# Patient Record
Sex: FEMALE | Race: WHITE | NOT HISPANIC OR LATINO | Employment: FULL TIME | URBAN - METROPOLITAN AREA
[De-identification: names, ages, dates, MRNs, and addresses within clinical notes are randomized per-mention and may not be internally consistent; named-entity substitution may affect disease eponyms.]

---

## 2016-01-27 LAB — PHQ9 SCORE: 16

## 2016-03-18 LAB — PHQ9 SCORE: 1

## 2016-04-01 LAB — PHQ9 SCORE: 0

## 2016-09-02 LAB — PHQ9 SCORE: 8

## 2017-01-02 ENCOUNTER — OFFICE VISIT (OUTPATIENT)
Dept: FAMILY MEDICINE | Facility: CLINIC | Age: 47
End: 2017-01-02
Payer: COMMERCIAL

## 2017-01-02 VITALS
TEMPERATURE: 98 F | HEART RATE: 80 BPM | OXYGEN SATURATION: 95 % | HEIGHT: 68 IN | SYSTOLIC BLOOD PRESSURE: 135 MMHG | BODY MASS INDEX: 43.95 KG/M2 | DIASTOLIC BLOOD PRESSURE: 88 MMHG | WEIGHT: 290 LBS

## 2017-01-02 DIAGNOSIS — I10 HYPERTENSION GOAL BP (BLOOD PRESSURE) < 140/90: ICD-10-CM

## 2017-01-02 DIAGNOSIS — M79.2 NEURALGIA: ICD-10-CM

## 2017-01-02 DIAGNOSIS — F33.0 MAJOR DEPRESSIVE DISORDER, RECURRENT EPISODE, MILD (H): ICD-10-CM

## 2017-01-02 DIAGNOSIS — E78.5 HYPERLIPIDEMIA LDL GOAL <100: ICD-10-CM

## 2017-01-02 DIAGNOSIS — I10 ESSENTIAL HYPERTENSION WITH GOAL BLOOD PRESSURE LESS THAN 140/90: ICD-10-CM

## 2017-01-02 DIAGNOSIS — E11.9 TYPE 2 DIABETES MELLITUS WITHOUT COMPLICATION, WITHOUT LONG-TERM CURRENT USE OF INSULIN (H): ICD-10-CM

## 2017-01-02 DIAGNOSIS — R09.81 CONGESTION OF PARANASAL SINUS: ICD-10-CM

## 2017-01-02 DIAGNOSIS — R73.9 HYPERGLYCEMIA: Primary | ICD-10-CM

## 2017-01-02 LAB
ALBUMIN SERPL-MCNC: 3.7 G/DL (ref 3.4–5)
ANION GAP SERPL CALCULATED.3IONS-SCNC: 8 MMOL/L (ref 3–14)
BUN SERPL-MCNC: 12 MG/DL (ref 7–30)
CALCIUM SERPL-MCNC: 9.1 MG/DL (ref 8.5–10.1)
CHLORIDE SERPL-SCNC: 105 MMOL/L (ref 94–109)
CO2 SERPL-SCNC: 28 MMOL/L (ref 20–32)
CREAT SERPL-MCNC: 0.76 MG/DL (ref 0.52–1.04)
CREAT UR-MCNC: 60 MG/DL
GFR SERPL CREATININE-BSD FRML MDRD: 81 ML/MIN/1.7M2
GLUCOSE SERPL-MCNC: 105 MG/DL (ref 70–99)
HBA1C MFR BLD: 7.5 % (ref 4.3–6)
MICROALBUMIN UR-MCNC: <5 MG/L
MICROALBUMIN/CREAT UR: NORMAL MG/G CR (ref 0–25)
PHOSPHATE SERPL-MCNC: 2.8 MG/DL (ref 2.5–4.5)
POTASSIUM SERPL-SCNC: 4 MMOL/L (ref 3.4–5.3)
SODIUM SERPL-SCNC: 141 MMOL/L (ref 133–144)
TSH SERPL DL<=0.005 MIU/L-ACNC: 0.77 MU/L (ref 0.4–4)

## 2017-01-02 PROCEDURE — 80069 RENAL FUNCTION PANEL: CPT | Performed by: FAMILY MEDICINE

## 2017-01-02 PROCEDURE — 82043 UR ALBUMIN QUANTITATIVE: CPT | Performed by: FAMILY MEDICINE

## 2017-01-02 PROCEDURE — 83036 HEMOGLOBIN GLYCOSYLATED A1C: CPT | Performed by: FAMILY MEDICINE

## 2017-01-02 PROCEDURE — 36415 COLL VENOUS BLD VENIPUNCTURE: CPT | Performed by: FAMILY MEDICINE

## 2017-01-02 PROCEDURE — 84443 ASSAY THYROID STIM HORMONE: CPT | Performed by: FAMILY MEDICINE

## 2017-01-02 PROCEDURE — 99214 OFFICE O/P EST MOD 30 MIN: CPT | Performed by: FAMILY MEDICINE

## 2017-01-02 RX ORDER — AZITHROMYCIN 250 MG/1
TABLET, FILM COATED ORAL
Qty: 6 TABLET | Refills: 1 | Status: SHIPPED | OUTPATIENT
Start: 2017-01-02 | End: 2017-02-22

## 2017-01-02 RX ORDER — LOSARTAN POTASSIUM 100 MG/1
50 TABLET ORAL 2 TIMES DAILY
Qty: 90 TABLET | Refills: 1 | Status: SHIPPED | OUTPATIENT
Start: 2017-01-02 | End: 2017-09-24

## 2017-01-02 RX ORDER — GABAPENTIN 300 MG/1
CAPSULE ORAL
Qty: 90 CAPSULE | Refills: 5 | Status: SHIPPED | OUTPATIENT
Start: 2017-01-02 | End: 2017-07-01

## 2017-01-02 RX ORDER — METFORMIN HCL 500 MG
1000 TABLET, EXTENDED RELEASE 24 HR ORAL
Qty: 180 TABLET | Refills: 1 | Status: SHIPPED | OUTPATIENT
Start: 2017-01-02 | End: 2017-02-22

## 2017-01-02 RX ORDER — GABAPENTIN 600 MG/1
600 TABLET ORAL 3 TIMES DAILY
Qty: 90 TABLET | Refills: 5 | Status: SHIPPED | OUTPATIENT
Start: 2017-01-02 | End: 2017-07-03

## 2017-01-02 RX ORDER — SIMVASTATIN 40 MG
40 TABLET ORAL AT BEDTIME
Qty: 90 TABLET | Refills: 3 | Status: SHIPPED | OUTPATIENT
Start: 2017-01-02 | End: 2017-02-22

## 2017-01-02 RX ORDER — METOPROLOL TARTRATE 100 MG
150 TABLET ORAL 2 TIMES DAILY
Qty: 270 TABLET | Refills: 1 | Status: SHIPPED | OUTPATIENT
Start: 2017-01-02 | End: 2017-05-14

## 2017-01-02 ASSESSMENT — ANXIETY QUESTIONNAIRES
2. NOT BEING ABLE TO STOP OR CONTROL WORRYING: NOT AT ALL
6. BECOMING EASILY ANNOYED OR IRRITABLE: SEVERAL DAYS
5. BEING SO RESTLESS THAT IT IS HARD TO SIT STILL: NOT AT ALL
1. FEELING NERVOUS, ANXIOUS, OR ON EDGE: NOT AT ALL
3. WORRYING TOO MUCH ABOUT DIFFERENT THINGS: NOT AT ALL
GAD7 TOTAL SCORE: 2
7. FEELING AFRAID AS IF SOMETHING AWFUL MIGHT HAPPEN: NOT AT ALL

## 2017-01-02 ASSESSMENT — PATIENT HEALTH QUESTIONNAIRE - PHQ9: 5. POOR APPETITE OR OVEREATING: SEVERAL DAYS

## 2017-01-02 NOTE — PROGRESS NOTES
SUBJECTIVE:  Carissa Spears, a 46 year old female scheduled an appointment to discuss the following issues:     Hyperglycemia  Hypertension goal BP (blood pressure) < 140/90  Major depressive disorder, recurrent episode, mild (H)  Congestion of paranasal sinus  Essential hypertension with goal blood pressure less than 140/90  Hyperlipidemia LDL goal <100  Type 2 diabetes mellitus without complication, without long-term current use of insulin (H)  Neuralgia  Follow-up hyperglycemia, bipolar, high cholesterol and htn.  On janumet in past. Has metformin 1000mg at home.   Meniscal tear in summer - limited exercise and needed surgery. New orthopedist going up soon. Eye exam in past year ok.   No chest pain or shortness of breath. No nausea, vomiting. Diarrhea recently - resolved. Sinus congestion and thick mucous. History sinus infection.   Seasonal allergies but not in winter. Fevers or chills. Some cough. No history asthma but bronchitis.   Gabapentin - for chronic pain. Emotionally ok. No feet changes.   NOW has insurance.  Diarrhea and sinus congestion.   Past Medical History   Diagnosis Date     HTN      Toxemia      Herniated lumbar intervertebral disc      age 22     Endometriosis      Polycystic ovarian syndrome      Sleep disorder      Bipolar disorder, unspecified (H)      Obesity      High cholesterol      Other disorder of menstruation and other abnormal bleeding from female genital tract      Allergies      Liver disease      fatty liver     DM II (diabetes mellitus, type II)      diet controlled       Past Surgical History   Procedure Laterality Date     C/section, classical       X2     Salpingo oophorectomy,r/l/ector  4/8/08     L     Hysterectomy, weston  4/8/08     Tubal ligation       Tonsillectomy  1983     Hysterectomy, pap no longer indicated       Laparoscopic cholecystectomy  7/18/2012     Procedure: LAPAROSCOPIC CHOLECYSTECTOMY;  Laparoscopic cholecystectomy;  Surgeon: Miguel Fuentes MD;   "Location: [facility] OR       Family History   Problem Relation Age of Onset     CANCER Mother      skin cancer - non-melanoma     Hypertension Mother      Lipids Father        Social History   Substance Use Topics     Smoking status: Former Smoker -- 0.25 packs/day for 20 years     Quit date: 07/18/2012     Smokeless tobacco: Never Used     Alcohol Use: No       ROS:    OBJECTIVE:  /88 mmHg  Pulse 80  Temp(Src) 98  F (36.7  C) (Oral)  Ht 5' 8\" (1.727 m)  Wt 290 lb (131.543 kg)  BMI 44.10 kg/m2  SpO2 95%  EXAM:  GENERAL APPEARANCE: healthy, alert and no distress  EYES: EOMI,  PERRL  HENT: ear canals and TM's normal and nose thick discharge and mouth without ulcers or lesions  NECK: no adenopathy, no asymmetry, masses, or scars and thyroid normal to palpation  RESP: lungs clear to auscultation - no rales, rhonchi or wheezes  CV: regular rates and rhythm, normal S1 S2, no S3 or S4 and no murmur, click or rub -  ABDOMEN:  soft, nontender, no HSM or masses and bowel sounds normal  MS: extremities normal- no gross deformities noted, no evidence of inflammation in joints, FROM in all extremities.  NEURO: Normal strength and tone, sensory exam grossly normal, mentation intact and speech normal  NEURO: good pulses/sensation bilateral feet  PSYCH: mentation appears normal and affect normal/bright    ASSESSMENT / PLAN:  (R73.9) Hyperglycemia  (primary encounter diagnosis)  Plan: Renal panel (Alb, BUN, Ca, Cl, CO2, Creat,         Gluc, Phos, K, Na), Hemoglobin A1c, TSH with         free T4 reflex, Albumin Random Urine         Quantitative            (I10) Hypertension goal BP (blood pressure) < 140/90  Comment: stable  Plan: Renal panel (Alb, BUN, Ca, Cl, CO2, Creat,         Gluc, Phos, K, Na)        Continue meds. Recheck in 6 months  Sooner if worse    (F33.0) Major depressive disorder, recurrent episode, mild (H)  Comment: stable  Plan: DEPRESSION ACTION PLAN (DAP)        Continue diet/exercise. Patient NOT interested " in psych meds. If SUICIAL IDEATION OR HOMOCIDAL IDEATION OR [place] TO ER. Recheck in 6 months  Sooner if worse    (R09.81) Congestion of paranasal sinus  Plan: azithromycin (ZITHROMAX) 250 MG tablet        zpak helpful in past. Nasal saline    (I10) Essential hypertension with goal blood pressure less than 140/90  Plan: losartan (COZAAR) 100 MG tablet, metoprolol         (LOPRESSOR) 100 MG tablet            (E78.5) Hyperlipidemia LDL goal <100  Comment: stable in past  Plan: simvastatin (ZOCOR) 40 MG tablet        Restart and recheck labs now with insurance. Reveiwed risks and side effects of medication  Chest pain or shortness of breath to er    (E11.9) Type 2 diabetes mellitus without complication, without long-term current use of insulin (H)  Comment: a little high  Plan: metFORMIN (GLUCOPHAGE-XR) 500 MG 24 hr tablet        Diet/exercise. Change to ER. Recheck in 6 months  Sooner if worse.     (M79.2) Neuralgia  Commentstable  Plan: gabapentin (NEURONTIN) 600 MG tablet,         gabapentin (NEURONTIN) 300 MG capsule        Reveiwed risks and side effects of medication      Silvino Beach

## 2017-01-02 NOTE — NURSING NOTE
"Chief Complaint   Patient presents with     Sinus Problem     Diarrhea       Initial /92 mmHg  Pulse 80  Temp(Src) 98  F (36.7  C) (Oral)  Ht 5' 8\" (1.727 m)  Wt 290 lb (131.543 kg)  BMI 44.10 kg/m2  SpO2 95% Estimated body mass index is 44.1 kg/(m^2) as calculated from the following:    Height as of this encounter: 5' 8\" (1.727 m).    Weight as of this encounter: 290 lb (131.543 kg).  BP completed using cuff size: pedro Lester CMA    "

## 2017-01-02 NOTE — Clinical Note
[facility]  [address] [facility] [address] [phone]        January 3, 2017    Carissa Spears  [address]  [place] [address]            Dear Carissa,    Generally normal results except blood sugars a little too high. Recheck in 6 months.  Normal thyroid and kidney tests.    If you have any questions or concerns, please call myself or my nurse at [phone].    Sincerely,    .Silvino Beach MD/evette      Results for orders placed or performed in visit on 01/02/17   Renal panel (Alb, BUN, Ca, Cl, CO2, Creat, Gluc, Phos, K, Na)   Result Value Ref Range    Sodium 141 133 - 144 mmol/L    Potassium 4.0 3.4 - 5.3 mmol/L    Chloride 105 94 - 109 mmol/L    Carbon Dioxide 28 20 - 32 mmol/L    Anion Gap 8 3 - 14 mmol/L    Glucose 105 (H) 70 - 99 mg/dL    Urea Nitrogen 12 7 - 30 mg/dL    Creatinine 0.76 0.52 - 1.04 mg/dL    GFR Estimate 81 >60 mL/min/1.7m2    GFR Estimate If Black >90   GFR Calc   >60 mL/min/1.7m2    Calcium 9.1 8.5 - 10.1 mg/dL    Phosphorus 2.8 2.5 - 4.5 mg/dL    Albumin 3.7 3.4 - 5.0 g/dL   Hemoglobin A1c   Result Value Ref Range    Hemoglobin A1C 7.5 (H) 4.3 - 6.0 %   TSH with free T4 reflex   Result Value Ref Range    TSH 0.77 0.40 - 4.00 mU/L   Albumin Random Urine Quantitative   Result Value Ref Range    Creatinine Urine 60 mg/dL    Albumin Urine mg/L <5 mg/L    Albumin Urine mg/g Cr Unable to calculate due to low value 0 - 25 mg/g Cr

## 2017-01-03 ASSESSMENT — PATIENT HEALTH QUESTIONNAIRE - PHQ9: SUM OF ALL RESPONSES TO PHQ QUESTIONS 1-9: 0

## 2017-01-03 ASSESSMENT — ANXIETY QUESTIONNAIRES: GAD7 TOTAL SCORE: 2

## 2017-01-26 ENCOUNTER — TELEPHONE (OUTPATIENT)
Dept: FAMILY MEDICINE | Facility: CLINIC | Age: 47
End: 2017-01-26

## 2017-01-26 NOTE — Clinical Note
[facility]  [address] [facility] [id number] [phone]          January 26, 2017    Carissa Samsonn  [address]  [facility] [address]                  Our records indicate that you have not scheduled for a(n)appointment with  Silvino Beach MD, mammogram and annual female exam which was recommended by your health care team. Monitoring and managing your preventative and chronic health conditions are very important to us.     [facility] now offers mammograms Thursdays, every other Monday, and Saturdays once a month.    If you have received your health care elsewhere, please provide us with that information so it can be documented in your chart.    Please call [phone] or message us through your [employer] [employer] account to schedule an appointment or provide information for your chart.     I look forward to seeing you and working with you on your health care needs.     Sincerely,       Silvino Beach MD              *If you have already scheduled an appointment, please disregard this reminder

## 2017-01-26 NOTE — TELEPHONE ENCOUNTER
Panel Management Review      Patient has the following on her problem list:     Hypertension   Last three blood pressure readings:  BP Readings from Last 3 Encounters:   01/02/17 135/88   06/17/16 153/92   10/27/13 147/93     Blood pressure: Failed    HTN Guidelines:  Age 18-59 BP range:  Less than 140/90  Age 60-85 with Diabetes:  Less than 140/90  Age 60-85 without Diabetes:  less than 150/90      Composite cancer screening  Chart review shows that this patient is due/due soon for the following Pap Smear and Mammogram  Summary:    Patient is due/failing the following:   BP CHECK    Action needed:   Patient needs office visit for afe.    Type of outreach:    Sent letter. for afe/ blood pressure 6 mos    Questions for provider review:    None                                                                                                                                    Jesenia Lester CMA     Chart routed to none .

## 2017-02-22 ENCOUNTER — OFFICE VISIT (OUTPATIENT)
Dept: FAMILY MEDICINE | Facility: CLINIC | Age: 47
End: 2017-02-22
Payer: COMMERCIAL

## 2017-02-22 VITALS
BODY MASS INDEX: 45.01 KG/M2 | SYSTOLIC BLOOD PRESSURE: 130 MMHG | TEMPERATURE: 98.2 F | WEIGHT: 293 LBS | HEART RATE: 80 BPM | OXYGEN SATURATION: 94 % | DIASTOLIC BLOOD PRESSURE: 84 MMHG

## 2017-02-22 DIAGNOSIS — Z01.818 PREOP GENERAL PHYSICAL EXAM: Primary | ICD-10-CM

## 2017-02-22 DIAGNOSIS — E78.5 HYPERLIPIDEMIA LDL GOAL <100: ICD-10-CM

## 2017-02-22 DIAGNOSIS — F33.0 MAJOR DEPRESSIVE DISORDER, RECURRENT EPISODE, MILD (H): ICD-10-CM

## 2017-02-22 DIAGNOSIS — I10 HYPERTENSION GOAL BP (BLOOD PRESSURE) < 140/90: ICD-10-CM

## 2017-02-22 DIAGNOSIS — E11.9 TYPE 2 DIABETES MELLITUS WITHOUT COMPLICATION, WITHOUT LONG-TERM CURRENT USE OF INSULIN (H): ICD-10-CM

## 2017-02-22 LAB
ALBUMIN SERPL-MCNC: 3.7 G/DL (ref 3.4–5)
ANION GAP SERPL CALCULATED.3IONS-SCNC: 6 MMOL/L (ref 3–14)
BUN SERPL-MCNC: 7 MG/DL (ref 7–30)
CALCIUM SERPL-MCNC: 9.4 MG/DL (ref 8.5–10.1)
CHLORIDE SERPL-SCNC: 102 MMOL/L (ref 94–109)
CO2 SERPL-SCNC: 29 MMOL/L (ref 20–32)
CREAT SERPL-MCNC: 0.68 MG/DL (ref 0.52–1.04)
ERYTHROCYTE [DISTWIDTH] IN BLOOD BY AUTOMATED COUNT: 13.6 % (ref 10–15)
GFR SERPL CREATININE-BSD FRML MDRD: ABNORMAL ML/MIN/1.7M2
GLUCOSE SERPL-MCNC: 171 MG/DL (ref 70–99)
HCT VFR BLD AUTO: 43.7 % (ref 35–47)
HGB BLD-MCNC: 14.3 G/DL (ref 11.7–15.7)
MCH RBC QN AUTO: 30.5 PG (ref 26.5–33)
MCHC RBC AUTO-ENTMCNC: 32.7 G/DL (ref 31.5–36.5)
MCV RBC AUTO: 93 FL (ref 78–100)
PHOSPHATE SERPL-MCNC: 3.9 MG/DL (ref 2.5–4.5)
PLATELET # BLD AUTO: 250 10E9/L (ref 150–450)
POTASSIUM SERPL-SCNC: 4.1 MMOL/L (ref 3.4–5.3)
RBC # BLD AUTO: 4.69 10E12/L (ref 3.8–5.2)
SODIUM SERPL-SCNC: 137 MMOL/L (ref 133–144)
WBC # BLD AUTO: 10.5 10E9/L (ref 4–11)

## 2017-02-22 PROCEDURE — 80069 RENAL FUNCTION PANEL: CPT | Performed by: PHYSICIAN ASSISTANT

## 2017-02-22 PROCEDURE — 85027 COMPLETE CBC AUTOMATED: CPT | Performed by: PHYSICIAN ASSISTANT

## 2017-02-22 PROCEDURE — 36415 COLL VENOUS BLD VENIPUNCTURE: CPT | Performed by: PHYSICIAN ASSISTANT

## 2017-02-22 PROCEDURE — 99215 OFFICE O/P EST HI 40 MIN: CPT | Performed by: PHYSICIAN ASSISTANT

## 2017-02-22 RX ORDER — SIMVASTATIN 40 MG
40 TABLET ORAL AT BEDTIME
Qty: 90 TABLET | Refills: 3 | Status: SHIPPED | OUTPATIENT
Start: 2017-02-22 | End: 2018-04-11

## 2017-02-22 RX ORDER — METFORMIN HCL 500 MG
1000 TABLET, EXTENDED RELEASE 24 HR ORAL
Qty: 180 TABLET | Refills: 1 | Status: SHIPPED | OUTPATIENT
Start: 2017-02-22 | End: 2018-11-19

## 2017-02-22 NOTE — MR AVS SNAPSHOT
After Visit Summary   2/22/2017    Carissa Spears    MRN: [id number]           Patient Information     Date Of Birth          1970        Visit Information        Provider Department      2/22/2017 8:50 AM Kehr, Kristen M, PA-C [facility]        Today's Diagnoses     Preop general physical exam    -  1    Type 2 diabetes mellitus without complication, without long-term current use of insulin (H)        Hyperlipidemia LDL goal <100        Hypertension goal BP (blood pressure) < 140/90        BMI 45.0-49.9, adult (H)        Major depressive disorder, recurrent episode, mild (H)          Care Instructions      Before Your Surgery      Call your surgeon if there is any change in your health. This includes signs of a cold or flu (such as a sore throat, runny nose, cough, rash or fever).    Do not smoke, drink alcohol or take over the counter medicine (unless your surgeon or primary care doctor tells you to) for the 24 hours before and after surgery.    If you take prescribed drugs: Follow your doctor s orders about which medicines to take and which to stop until after surgery.    Eating and drinking prior to surgery: follow the instructions from your surgeon    Take a shower or bath the night before surgery. Use the soap your surgeon gave you to gently clean your skin. If you do not have soap from your surgeon, use your regular soap. Do not shave or scrub the surgery site.  Wear clean pajamas and have clean sheets on your bed.         Follow-ups after your visit        Who to contact     If you have questions or need follow up information about today's clinic visit or your schedule please contact [facility] directly at [phone].  Normal or non-critical lab and imaging results will be communicated to you by MyChart, letter or phone within 4 business days after the clinic has received the results. If you do not hear from us within 7 days, please contact the clinic through  "[employer] or phone. If you have a critical or abnormal lab result, we will notify you by phone as soon as possible.  Submit refill requests through [employer] or call your pharmacy and they will forward the refill request to us. Please allow 3 business days for your refill to be completed.          Additional Information About Your Visit        [email or web address] Information     [employer] lets you send messages to your doctor, view your test results, renew your prescriptions, schedule appointments and more. To sign up, go to [email or web address] . Click on \"Log in\" on the left side of the screen, which will take you to the Welcome page. Then click on \"Sign up Now\" on the right side of the page.     You will be asked to enter the access code listed below, as well as some personal information. Please follow the directions to create your username and password.     Your access code is: [email or web address]  Expires: 2017  9:32 AM     Your access code will  in 90 days. If you need help or a new code, please call your [place] clinic or [phone].        Care EveryWhere ID     This is your Care EveryWhere ID. This could be used by other organizations to access your [place] medical records  [id number]        Your Vitals Were     Pulse Temperature Pulse Oximetry BMI (Body Mass Index)          80 98.2  F (36.8  C) (Oral) 94% 45.01 kg/m2         Blood Pressure from Last 3 Encounters:   17 130/84   17 135/88   16 (!) 153/92    Weight from Last 3 Encounters:   17 296 lb (134.3 kg)   17 290 lb (131.5 kg)   16 296 lb (134.3 kg)              We Performed the Following     CBC with platelets     Renal panel          Where to get your medicines      These medications were sent to [facility]/pharmacy #[id number] - [place]ELS[place] - [address] [address] AT [facility] [address]  [facility] [address] [place] [address]     Phone:  [phone]     metFORMIN 500 MG 24 hr tablet    simvastatin 40 MG tablet          " Primary Care Provider Office Phone # Fax #    Silvino Beach [phone]       [facility] [address] [address] [address]        Thank you!     Thank you for choosing [facility]  for your care. Our goal is always to provide you with excellent care. Hearing back from our patients is one way we can continue to improve our services. Please take a few minutes to complete the written survey that you may receive in the mail after your visit with us. Thank you!             Your Updated Medication List - Protect others around you: Learn how to safely use, store and throw away your medicines at www.disposemymeds.org.          This list is accurate as of: 2/22/17  9:32 AM.  Always use your most recent med list.                   Brand Name Dispense Instructions for use    * gabapentin 600 MG tablet    NEURONTIN    90 tablet    Take 1 tablet (600 mg) by mouth 3 times daily Take with one 300 mg tab 3 times a day for total dose of 900 mg 3 times a day.       * gabapentin 300 MG capsule    NEURONTIN    90 capsule    Take 1 tab with 1 600 mg tab for 900 mg 3 times a day.       losartan 100 MG tablet    COZAAR    90 tablet    Take 0.5 tablets (50 mg) by mouth 2 times daily For blood pressure       metFORMIN 500 MG 24 hr tablet    GLUCOPHAGE-XR    180 tablet    Take 2 tablets (1,000 mg) by mouth daily (with dinner) For diabetes       metoprolol 100 MG tablet    LOPRESSOR    270 tablet    Take 1.5 tablets (150 mg) by mouth 2 times daily For blood pressure       simvastatin 40 MG tablet    ZOCOR    90 tablet    Take 1 tablet (40 mg) by mouth At Bedtime For cholesterol       TRAZODONE HCL PO      Take 100 mg by mouth 1-2 at bedtime       * Notice:  This list has 2 medication(s) that are the same as other medications prescribed for you. Read the directions carefully, and ask your doctor or other care provider to review them with you.

## 2017-02-22 NOTE — PROGRESS NOTES
[facility]  [address] [facility] [address]  [phone]  Dept: [phone]    PRE-OP EVALUATION:  Today's date: 2017    Carissa Spears (: 1970) presents for pre-operative evaluation assessment as requested by Star Todd.  She requires evaluation and anesthesia risk assessment prior to undergoing surgery/procedure for treatment of right knee .  Proposed procedure: right knee total replacement    Date of Surgery/ Procedure: 17  Time of Surgery/ Procedure: 7:30am  Hospital/Surgical Facility: [facility]  Fax number for surgical facility:   Primary Physician: Silvino Beach  Type of Anesthesia Anticipated: to be determined    Patient has a Health Care Directive or Living Will:  NO    1. NO - Do you have a history of heart attack, stroke, stent, bypass or surgery on an artery in the head, neck, heart or legs?  2. NO - Do you ever have any pain or discomfort in your chest?  3. NO - Do you have a history of  Heart Failure?  4. NO - Are you troubled by shortness of breath when: walking on the level, up a slight hill or at night?  5. NO - Do you currently have a cold, bronchitis or other respiratory infection?  6. NO - Do you have a cough, shortness of breath or wheezing?  7. YES - DO YOU SOMETIMES GET PAINS IN THE CALVES OF YOUR LEGS WHEN YOU WALK? Right leg x 4 days due to compensation of her gait and right knee pain.   8. NO - Do you or anyone in your family have previous history of blood clots?  9. NO - Do you or does anyone in your family have a serious bleeding problem such as prolonged bleeding following surgeries or cuts?  10. NO - Have you ever had problems with anemia or been told to take iron pills?  11. NO - Have you had any abnormal blood loss such as black, tarry or bloody stools, or abnormal vaginal bleeding?  12. NO - Have you ever had a blood transfusion?  13. NO - Have you or any of your relatives ever had problems with anesthesia?  14. NO - Do you have  sleep apnea, excessive snoring or daytime drowsiness?  15. NO - Do you have any prosthetic heart valves?  16. NO - Do you have prosthetic joints?  17. NO - Is there any chance that you may be pregnant?      HPI:                                                      Brief HPI related to upcoming procedure:   Carissa will be having right total knee arthroplasty.         DIABETES - Patient has a longstanding history of DiabetesType Type II . Patient is being treated with oral agents and denies significant side effects. Control has been good. Complicating factors include but are not limited to: she has not been taking her metformin. Refills had been sent to the pharmacy after her routine appointment in January, she has not been able to pick them up, the pharmacy says there was not a valid refill. Her A1C done in January at her routine exam was 7.5%    HYPERTENSION - Patient has longstanding history of mod-severe HTN , currently denies any symptoms referable to elevated blood pressure. Specifically denies chest pain, palpitations, dyspnea, orthopnea, PND or peripheral edema. Blood pressure readings have been in normal range. Current medication regimen is as listed below. Patient denies any side effects of medication.                                                                                                                                                                                          .  HYPERLIPIDEMIA - Patient has a long history of significant Hyperlipidemia requiring medication for treatment with recent good control. She is normally on simvastatin, but did not  her refill in January and has been out x 1 month. Patient reports no problems or side effects with the medication.                                                                                                                                                         .  DEPRESSION - Patient has a long history of Depression of moderate severity  requiring medication for control with recent symptoms being stable..Current symptoms of depression include none.                                                                                                                                                                                    .    MEDICAL HISTORY:                                                      Patient Active Problem List    Diagnosis Date Noted     Migraine with aura and without status migrainosus, not intractable 06/17/2016     Priority: Medium     Myofascial pain 06/13/2013     Priority: Medium     Chronic abdominal pain 01/10/2013     Priority: Medium     Hyperglycemia 01/10/2013     Priority: Medium     BMI 45.0-49.9, adult (H) 04/29/2011     Priority: Medium     Tobacco abuse 04/29/2011     Priority: Medium     Mild major depression (H) 04/29/2011     Priority: Medium     Insomnia 04/29/2011     Priority: Medium     Bipolar disorder (H)      Priority: Medium     Problem list name updated by automated process. Provider to review       Polycystic ovarian syndrome      Priority: Medium     Hypertension goal BP (blood pressure) < 140/90 12/16/2010     Priority: Medium     HYPERLIPIDEMIA LDL GOAL <100 10/31/2010     Priority: Medium      Past Medical History   Diagnosis Date     Allergies      Bipolar disorder, unspecified (H)      DM II (diabetes mellitus, type II)      diet controlled     Endometriosis      Herniated lumbar intervertebral disc      age 22     High cholesterol      HTN      Liver disease      fatty liver     Obesity      Other disorder of menstruation and other abnormal bleeding from female genital tract      Polycystic ovarian syndrome      Sleep disorder      Toxemia      Past Surgical History   Procedure Laterality Date     C/section, classical       X2     Salpingo oophorectomy,r/l/ector  4/8/08     L     Hysterectomy, weston  4/8/08     Tubal ligation       Tonsillectomy  1983     Hysterectomy, pap no longer indicated        Laparoscopic cholecystectomy  7/18/2012     Procedure: LAPAROSCOPIC CHOLECYSTECTOMY;  Laparoscopic cholecystectomy;  Surgeon: Miguel Fuentes MD;  Location: [facility] OR     Current Outpatient Prescriptions   Medication Sig Dispense Refill     metFORMIN (GLUCOPHAGE-XR) 500 MG 24 hr tablet Take 2 tablets (1,000 mg) by mouth daily (with dinner) For diabetes 180 tablet 1     simvastatin (ZOCOR) 40 MG tablet Take 1 tablet (40 mg) by mouth At Bedtime For cholesterol 90 tablet 3     losartan (COZAAR) 100 MG tablet Take 0.5 tablets (50 mg) by mouth 2 times daily For blood pressure 90 tablet 1     metoprolol (LOPRESSOR) 100 MG tablet Take 1.5 tablets (150 mg) by mouth 2 times daily For blood pressure 270 tablet 1     gabapentin (NEURONTIN) 600 MG tablet Take 1 tablet (600 mg) by mouth 3 times daily Take with one 300 mg tab 3 times a day for total dose of 900 mg 3 times a day. 90 tablet 5     gabapentin (NEURONTIN) 300 MG capsule Take 1 tab with 1 600 mg tab for 900 mg 3 times a day. 90 capsule 5     TRAZODONE HCL PO Take 100 mg by mouth 1-2 at bedtime       OTC products: None, except as noted above    Allergies   Allergen Reactions     Imitrex [Sumatriptan] Anaphylaxis     Blood pressure high/ she was in hospital     Calcium Channel Blockers      Augmentin Diarrhea     Codeine      Sick to stomach     Latex      Puffy, rash      Sulfa Drugs Nausea     Lisinopril Nausea      Latex Allergy: NO    Social History   Substance Use Topics     Smoking status: Former Smoker     Packs/day: 0.25     Years: 20.00     Quit date: 7/18/2012     Smokeless tobacco: Never Used     Alcohol use No     History   Drug Use No       REVIEW OF SYSTEMS:                                                    C: NEGATIVE for fever, chills, change in weight  I: NEGATIVE for worrisome rashes, moles or lesions  E: NEGATIVE for vision changes or irritation  E/M: NEGATIVE for ear, mouth and throat problems  R: NEGATIVE for significant cough or SOB  B:  NEGATIVE for masses, tenderness or discharge  CV: NEGATIVE for chest pain, palpitations or peripheral edema  GI: NEGATIVE for nausea, abdominal pain, heartburn, or change in bowel habits  : NEGATIVE for frequency, dysuria, or hematuria  MUSCULOSKELETAL:right knee pain / ambulating with a walker.   N: NEGATIVE for weakness, dizziness or paresthesias  E: NEGATIVE for temperature intolerance, skin/hair changes. Blood sugars have been in the 150s since she has been out of the metformin.   H: NEGATIVE for bleeding problems  P: NEGATIVE for changes in mood or affect    EXAM:                                                    /84 (Cuff Size: Adult Large)  Pulse 80  Temp 98.2  F (36.8  C) (Oral)  Wt 296 lb (134.3 kg)  SpO2 94%  BMI 45.01 kg/m2    GENERAL APPEARANCE: healthy, alert and no distress     EYES: EOMI,- PERRL     HENT: ear canals and TM's normal and nose and mouth without ulcers or lesions     NECK: no adenopathy, no asymmetry, masses, or scars and thyroid normal to palpation     RESP: lungs clear to auscultation - no rales, rhonchi or wheezes     CV: regular rates and rhythm, normal S1 S2, no S3 or S4 and no murmur, click or rub -     ABDOMEN:  soft, nontender, no HSM or masses and bowel sounds normal     MS: extremities normal- no gross deformities noted, no evidence of inflammation in joints, FROM in all extremities.     SKIN: no suspicious lesions or rashes     NEURO: Normal strength and tone, sensory exam grossly normal, mentation intact and speech normal     PSYCH: mentation appears normal. and affect normal/bright    DIAGNOSTICS:                                                      Labs Drawn and in Process:     Results for orders placed or performed in visit on 02/22/17   Renal panel   Result Value Ref Range    Sodium 137 133 - 144 mmol/L    Potassium 4.1 3.4 - 5.3 mmol/L    Chloride 102 94 - 109 mmol/L    Carbon Dioxide 29 20 - 32 mmol/L    Anion Gap 6 3 - 14 mmol/L    Glucose 171 (H) 70 - 99  mg/dL    Urea Nitrogen 7 7 - 30 mg/dL    Creatinine 0.68 0.52 - 1.04 mg/dL    GFR Estimate >90  Non  GFR Calc   >60 mL/min/1.7m2    GFR Estimate If Black >90   GFR Calc   >60 mL/min/1.7m2    Calcium 9.4 8.5 - 10.1 mg/dL    Phosphorus 3.9 2.5 - 4.5 mg/dL    Albumin 3.7 3.4 - 5.0 g/dL   CBC with platelets   Result Value Ref Range    WBC 10.5 4.0 - 11.0 10e9/L    RBC Count 4.69 3.8 - 5.2 10e12/L    Hemoglobin 14.3 11.7 - 15.7 g/dL    Hematocrit 43.7 35.0 - 47.0 %    MCV 93 78 - 100 fl    MCH 30.5 26.5 - 33.0 pg    MCHC 32.7 31.5 - 36.5 g/dL    RDW 13.6 10.0 - 15.0 %    Platelet Count 250 150 - 450 10e9/L         Recent Labs   Lab Test  01/02/17   1424  09/23/13   0748   08/30/12   1211   HGB   --   15.2   --   13.8   PLT   --   296   --   264   NA  141  141   < >  142   POTASSIUM  4.0  4.1   < >  4.0   CR  0.76  0.80   < >  0.77   A1C  7.5*  5.8   < >   --     < > = values in this interval not displayed.        IMPRESSION:                                                    Reason for surgery/procedure: Right knee pain   Diagnosis/reason for consult: preoperative cleance    The proposed surgical procedure is considered INTERMEDIATE risk.    REVISED CARDIAC RISK INDEX  The patient has the following serious cardiovascular risks for perioperative complications such as (MI, PE, VFib and 3  AV Block):  High risk surgery (>5% cardiac complication risk)  INTERPRETATION: 2 risks: Class III (moderate risk - 6.6% complication rate)    The patient has the following additional risks for perioperative complications:  Diabetes type 2.   Obesity      ICD-10-CM    1. Preop general physical exam Z01.818 Renal panel     CBC with platelets   2. Type 2 diabetes mellitus without complication, without long-term current use of insulin (H) E11.9 metFORMIN (GLUCOPHAGE-XR) 500 MG 24 hr tablet     Renal panel     CBC with platelets   3. Hyperlipidemia LDL goal <100 E78.5 simvastatin (ZOCOR) 40 MG tablet   4.  Hypertension goal BP (blood pressure) < 140/90 I10    5. BMI 45.0-49.9, adult (H) Z68.42    6. Major depressive disorder, recurrent episode, mild (H) F33.0        RECOMMENDATIONS:                                                      1. Type 2 Diabetes:   Recent A1C done in January at 7.5%. This was not repeated today because it will not be accurate. She will have a renal panel and CBC done today. She has been out of her metformin x 1 month and will start taking it again. A new prescription was sent to the pharmacy and she was advised to contact the office if there is concerns with her prescriptions in the future. She should not go an entire month without better control of her diabetes. Her level is under 200. She is cleared to go forward with surgery as scheduled. She will need close follow up with her blood sugars after surgery.     2. Hyperlipidemia: managed on simvastatin, refills sent today. Stable.     3. Hypertension: well controlled on her current medications. She will take them the day of her surgery.     4. BMI 45 / Morbid Obesity: risk factor for her upcoming surgery. After the procedure she is hoping to be more mobile and will work on weight loss.     5. Depression is stable.     --Consult hospital rounder / IM to assist post-op medical management    --Patient is to take all scheduled medications on the day of surgery EXCEPT for modifications listed below.  trazodone    APPROVAL GIVEN to proceed with proposed procedure, if lab tests are normal. They will be back tomorrow and will be faxed with her preop exam paperwork.        Signed Electronically by: Kristen M. Kehr, PA-C  Agree with above. Silvino Beach MD  Copy of this evaluation report is provided to requesting physician.    [facility] Preop Guidelines

## 2017-02-22 NOTE — NURSING NOTE
"Chief Complaint   Patient presents with     Pre-Op Exam       Initial /84 (Cuff Size: Adult Large)  Pulse 80  Temp 98.2  F (36.8  C) (Oral)  Wt 296 lb (134.3 kg)  SpO2 94%  BMI 45.01 kg/m2 Estimated body mass index is 45.01 kg/(m^2) as calculated from the following:    Height as of 1/2/17: 5' 8\" (1.727 m).    Weight as of this encounter: 296 lb (134.3 kg).  Medication Reconciliation: complete    DESTINEE Mobley MA    "

## 2017-02-24 ENCOUNTER — TELEPHONE (OUTPATIENT)
Dept: FAMILY MEDICINE | Facility: CLINIC | Age: 47
End: 2017-02-24

## 2017-03-01 ENCOUNTER — MEDICAL CORRESPONDENCE (OUTPATIENT)
Dept: HEALTH INFORMATION MANAGEMENT | Facility: CLINIC | Age: 47
End: 2017-03-01

## 2017-03-06 ENCOUNTER — TELEPHONE (OUTPATIENT)
Dept: FAMILY MEDICINE | Facility: CLINIC | Age: 47
End: 2017-03-06

## 2017-03-06 DIAGNOSIS — I10 ESSENTIAL HYPERTENSION WITH GOAL BLOOD PRESSURE LESS THAN 140/90: ICD-10-CM

## 2017-03-06 RX ORDER — LOSARTAN POTASSIUM 100 MG/1
50 TABLET ORAL 2 TIMES DAILY
Qty: 90 TABLET | Refills: 1 | Status: CANCELLED | OUTPATIENT
Start: 2017-03-06

## 2017-03-06 NOTE — TELEPHONE ENCOUNTER
Left message on pharmacy vm that they should refill losartan off of 1/2/17 prescription we sent them.  Jesenia Agrawal RN

## 2017-03-06 NOTE — TELEPHONE ENCOUNTER
Please approve the PT.    Regarding the interactions, discuss with Dr. Beach when she sees him next.    Neto Laurent M.D.

## 2017-03-06 NOTE — TELEPHONE ENCOUNTER
Mary states she would like verbal orders for physical therapy 3 times a week for 2 weeks.  Also states she is taking two meds that have possible interaction but she is not having problems with them and they are vistaril and celexa.    Thank you.

## 2017-03-10 ENCOUNTER — MEDICAL CORRESPONDENCE (OUTPATIENT)
Dept: HEALTH INFORMATION MANAGEMENT | Facility: CLINIC | Age: 47
End: 2017-03-10

## 2017-03-21 ENCOUNTER — MEDICAL CORRESPONDENCE (OUTPATIENT)
Dept: HEALTH INFORMATION MANAGEMENT | Facility: CLINIC | Age: 47
End: 2017-03-21

## 2017-03-23 ENCOUNTER — TELEPHONE (OUTPATIENT)
Dept: NURSING | Facility: CLINIC | Age: 47
End: 2017-03-23

## 2017-03-24 NOTE — TELEPHONE ENCOUNTER
"Call Type: Triage Call    Presenting Problem: \"Is there a gastro flu bug going around?\" Patient  has had it 4 times since January, refused triage. Is eating, but  gets nauseated and has diarrhea. Is urinating normally. Denies other  sx or fever, or localized pain at this time. Gave home care advice,  call back if needed, follow up with PCP as needed.  Triage Note:  Guideline Title: Information Only Call; No Symptom Triage (Adult)  Recommended Disposition: Provide Information or Advice Only  Original Inclination: Wanted to speak with a nurse  Override Disposition:  Intended Action: Follow advice given  Physician Contacted: No  General information question; no triage required. Information provided from  approved references or knowledge of organization. ?  YES  Requesting regular office appointment ? NO  Sign(s) or symptom(s) associated with a diagnosed condition or with a new illness  ? NO  Requesting information about provider, services or community resources ? NO  Call back to complete assessment/clarification of information from prior caller to  complete triage ? NO  Requesting information and provider is best resource; no triage required. ? NO  Caller not with patient and is unable to provide clinical information about  patient to facilitate triage. ? NO  Requesting provider information for recently scheduled test, procedure; no triage  required. Needed information not available per approved resources or clinical  experience. ? NO  Requesting information not available per approved reference or clinical  experience; no triage required. ? NO  Requesting information regarding scheduled exam, test or procedure; no triage  required. Information provided from approved resources or clinical experience. ?  NO  Health information question; person denies any symptoms, no triage required.  Information provided from approved references or clinical experience. ? NO  Physician Instructions:  Care Advice:  "

## 2017-04-07 ENCOUNTER — TRANSFERRED RECORDS (OUTPATIENT)
Dept: HEALTH INFORMATION MANAGEMENT | Facility: CLINIC | Age: 47
End: 2017-04-07

## 2017-04-07 LAB — PHQ9 SCORE: 0

## 2017-05-08 ENCOUNTER — OFFICE VISIT (OUTPATIENT)
Dept: FAMILY MEDICINE | Facility: CLINIC | Age: 47
End: 2017-05-08
Payer: COMMERCIAL

## 2017-05-08 ENCOUNTER — TELEPHONE (OUTPATIENT)
Dept: FAMILY MEDICINE | Facility: CLINIC | Age: 47
End: 2017-05-08

## 2017-05-08 VITALS
HEART RATE: 99 BPM | DIASTOLIC BLOOD PRESSURE: 98 MMHG | SYSTOLIC BLOOD PRESSURE: 160 MMHG | RESPIRATION RATE: 16 BRPM | TEMPERATURE: 98 F | OXYGEN SATURATION: 97 %

## 2017-05-08 DIAGNOSIS — G43.809 OTHER MIGRAINE WITHOUT STATUS MIGRAINOSUS, NOT INTRACTABLE: Primary | ICD-10-CM

## 2017-05-08 PROCEDURE — 99213 OFFICE O/P EST LOW 20 MIN: CPT | Performed by: NURSE PRACTITIONER

## 2017-05-08 RX ORDER — CITALOPRAM HYDROBROMIDE 20 MG/1
20 TABLET ORAL
COMMUNITY
End: 2017-06-14

## 2017-05-08 RX ORDER — LAMOTRIGINE 25 MG/1
TABLET ORAL
COMMUNITY
Start: 2017-05-08 | End: 2017-06-14

## 2017-05-08 RX ORDER — ALPRAZOLAM 0.5 MG
0.5 TABLET ORAL
COMMUNITY
Start: 2017-02-21 | End: 2022-09-26

## 2017-05-08 RX ORDER — HYDROCODONE BITARTRATE AND ACETAMINOPHEN 5; 325 MG/1; MG/1
1 TABLET ORAL EVERY 6 HOURS PRN
Qty: 20 TABLET | Refills: 0 | Status: SHIPPED | OUTPATIENT
Start: 2017-05-08 | End: 2017-05-25

## 2017-05-08 ASSESSMENT — PAIN SCALES - GENERAL: PAINLEVEL: WORST PAIN (10)

## 2017-05-08 NOTE — MR AVS SNAPSHOT
After Visit Summary   5/8/2017    Carissa Spears    MRN: [id number]           Patient Information     Date Of Birth          1970        Visit Information        Provider Department      5/8/2017 2:40 PM Kinga Velasquez APRN [facility] ([facility]        Today's Diagnoses     Other migraine without status migrainosus, not intractable    -  1      Care Instructions      * Migraine Headache  Migraine headaches are related to changes in blood flow to the brain. This causes throbbing or constant pain on one or both sides of the head. The pain may last from a few hours to several days. There is usually nausea, vomiting, sensitivity to light and sound, and blurred vision. A migraine attack may be triggered by emotional stress, hormone changes during the menstrual cycle, oral contraceptives, alcohol use, certain foods containing tyramine, eye strain, weather changes, missing meals, or too little or too much sleep.  Home Care For This Headache:  1) If you were given pain medicine for this headache, do not drive yourself home . Arrange for a ride, instead. When you get home, try to sleep. You should feel much better when you wake up.  2) Migraine headaches may improve with an ice pack on the forehead or at the base of the skull. Heat to the back of your neck may relieve any neck spasm.  3) Drink only clear liquids or eat a very light diet to avoid nausea/vomiting until symptoms improve.  Preventing Future Headaches:  1) Pay attention to those factors that seem to trigger your headache. Try to avoid them when you can. If you have frequent headaches, it is useful to keep a diary of what you were doing, feeling or eating in the hours before each attack. Show this to your doctor to help find the cause of your headaches.  a) If you feel that stress is a factor in your headaches, look at the sources of stress in your life. Find ways to release the build-up of those stresses by using regular exercise,  relaxation methods (yoga, meditation), bio-feedback or simply taking time-out for yourself. For more information about this, consult your doctor or go to a local bookstore and review books and tapes on this subject.  b) Tyramine is a substance present in the following foods : chocolate, yogurt, all cheeses except cottage cheese and cream cheese. smoked or pickled fish and meat (including herring, caviar, bologna, pepperoni, salami), liver, avocados, bananas, figs, raisins, and red wine. Be aware that these foods may trigger a migraine in some persons. Try taking these foods out of your diet for 1-2 months to see if this reduces headache frequency.  Treating Future Attacks:  1) At the first sign of a migraine headache, take a medicine to stop it if one has been prescribed for you. If not, take acetaminophen (Tylenol) or ibuprofen (Motrin, Advil) if you are able to take these. The sooner you take medicine, the better it will work.  2) You may also want to find a quiet, dark, comfortable place to sit or lie down. Let yourself relax or sleep.  3) An ice pack on the forehead or area of greatest pain may also help.  Follow Up  with your doctor if the headache is not better within the next 24 hours. If you have frequent headaches you should discuss a treatment plan with your primary care doctor. Ask if you can have medicine to take at home the next time you get a bad headache. Poorly controlled chronic headaches may require a referral to a neurologist (headache specialist).  Get Prompt Medical Attention  if any of the following occur:    Your head pain gets worse, or does not improve within 24 hours    Repeated vomiting (can t keep liquids down)    Sinus or ear or throat pain (not already reported)    Fever of 101  F (38.3  C) or higher, or as directed by your healthcare provider    Stiff neck    Extreme drowsiness, confusion or fainting    Weakness of an arm or leg or one side of the face    Difficulty with speech or  vision    0945-0141 [employer]. [address] [place] [address], [place] [address]. All rights reserved. This information is not intended as a substitute for professional medical care. Always follow your healthcare professional's instructions.              Follow-ups after your visit        Who to contact     If you have questions or need follow up information about today's clinic visit or your schedule please contact [facility] directly at [phone].  Normal or non-critical lab and imaging results will be communicated to you by Tvoophart, letter or phone within 4 business days after the clinic has received the results. If you do not hear from us within 7 days, please contact the clinic through JMEA or phone. If you have a critical or abnormal lab result, we will notify you by phone as soon as possible.  Submit refill requests through [employer]MEA or call your pharmacy and they will forward the refill request to us. Please allow 3 business days for your refill to be completed.          Additional Information About Your Visit        [facility] Information     [employer]MEA gives you secure access to your electronic health record. If you see a primary care provider, you can also send messages to your care team and make appointments. If you have questions, please call your primary care clinic.  If you do not have a primary care provider, please call [phone] and they will assist you.        Care EveryWhere ID     This is your Care EveryWhere ID. This could be used by other organizations to access your [place] medical records  [id number]        Your Vitals Were     Pulse Temperature Respirations Pulse Oximetry Breastfeeding?       99 98  F (36.7  C) (Tympanic) 16 97% No        Blood Pressure from Last 3 Encounters:   05/08/17 (!) 182/120   02/22/17 130/84   01/02/17 135/88    Weight from Last 3 Encounters:   02/22/17 296 lb (134.3 kg)   01/02/17 290 lb (131.5 kg)   06/17/16 296 lb (134.3 kg)               Today, you had the following     No orders found for display         Today's Medication Changes          These changes are accurate as of: 5/8/17  3:01 PM.  If you have any questions, ask your nurse or doctor.               Start taking these medicines.        Dose/Directions    HYDROcodone-acetaminophen 5-325 MG per tablet   Commonly known as:  NORCO   Used for:  Other migraine without status migrainosus, not intractable   Started by:  Kinga Velasquez APRN CNP        Dose:  1 tablet   Take 1 tablet by mouth every 6 hours as needed for moderate to severe pain   Quantity:  20 tablet   Refills:  0            Where to get your medicines      Some of these will need a paper prescription and others can be bought over the counter.  Ask your nurse if you have questions.     Bring a paper prescription for each of these medications     HYDROcodone-acetaminophen 5-325 MG per tablet                Primary Care Provider Office Phone # Fax #    Silvino Beach [phone]       [facility] [address] [facility] [address]        Thank you!     Thank you for choosing [facility]  for your care. Our goal is always to provide you with excellent care. Hearing back from our patients is one way we can continue to improve our services. Please take a few minutes to complete the written survey that you may receive in the mail after your visit with us. Thank you!             Your Updated Medication List - Protect others around you: Learn how to safely use, store and throw away your medicines at www.disposemymeds.org.          This list is accurate as of: 5/8/17  3:01 PM.  Always use your most recent med list.                   Brand Name Dispense Instructions for use    ALPRAZolam 0.5 MG tablet    XANAX     Take 0.5 mg by mouth       citalopram 20 MG tablet    celeXA     Take 20 mg by mouth       * gabapentin 600 MG tablet    NEURONTIN    90 tablet    Take 1 tablet (600 mg) by mouth  3 times daily Take with one 300 mg tab 3 times a day for total dose of 900 mg 3 times a day.       * gabapentin 300 MG capsule    NEURONTIN    90 capsule    Take 1 tab with 1 600 mg tab for 900 mg 3 times a day.       HYDROcodone-acetaminophen 5-325 MG per tablet    NORCO    20 tablet    Take 1 tablet by mouth every 6 hours as needed for moderate to severe pain       lamoTRIgine 25 MG tablet    LaMICtal         losartan 100 MG tablet    COZAAR    90 tablet    Take 0.5 tablets (50 mg) by mouth 2 times daily For blood pressure       metFORMIN 500 MG 24 hr tablet    GLUCOPHAGE-XR    180 tablet    Take 2 tablets (1,000 mg) by mouth daily (with dinner) For diabetes       metoprolol 100 MG tablet    LOPRESSOR    270 tablet    Take 1.5 tablets (150 mg) by mouth 2 times daily For blood pressure       simvastatin 40 MG tablet    ZOCOR    90 tablet    Take 1 tablet (40 mg) by mouth At Bedtime For cholesterol       TRAZODONE HCL PO      Take 100 mg by mouth Reported on 5/8/2017       * Notice:  This list has 2 medication(s) that are the same as other medications prescribed for you. Read the directions carefully, and ask your doctor or other care provider to review them with you.

## 2017-05-08 NOTE — NURSING NOTE
"Chief Complaint   Patient presents with     Headache     pt c/o juan ceadache, states she has a history of migraines       Initial BP (!) 182/120  Pulse 99  Temp 98  F (36.7  C) (Tympanic)  Resp 16  SpO2 97%  Breastfeeding? No Estimated body mass index is 45.01 kg/(m^2) as calculated from the following:    Height as of 1/2/17: 5' 8\" (1.727 m).    Weight as of 2/22/17: 296 lb (134.3 kg).  Medication Reconciliation: complete   Alivia Sal MA      "

## 2017-05-08 NOTE — PATIENT INSTRUCTIONS
* Migraine Headache  Migraine headaches are related to changes in blood flow to the brain. This causes throbbing or constant pain on one or both sides of the head. The pain may last from a few hours to several days. There is usually nausea, vomiting, sensitivity to light and sound, and blurred vision. A migraine attack may be triggered by emotional stress, hormone changes during the menstrual cycle, oral contraceptives, alcohol use, certain foods containing tyramine, eye strain, weather changes, missing meals, or too little or too much sleep.  Home Care For This Headache:  1) If you were given pain medicine for this headache, do not drive yourself home . Arrange for a ride, instead. When you get home, try to sleep. You should feel much better when you wake up.  2) Migraine headaches may improve with an ice pack on the forehead or at the base of the skull. Heat to the back of your neck may relieve any neck spasm.  3) Drink only clear liquids or eat a very light diet to avoid nausea/vomiting until symptoms improve.  Preventing Future Headaches:  1) Pay attention to those factors that seem to trigger your headache. Try to avoid them when you can. If you have frequent headaches, it is useful to keep a diary of what you were doing, feeling or eating in the hours before each attack. Show this to your doctor to help find the cause of your headaches.  a) If you feel that stress is a factor in your headaches, look at the sources of stress in your life. Find ways to release the build-up of those stresses by using regular exercise, relaxation methods (yoga, meditation), bio-feedback or simply taking time-out for yourself. For more information about this, consult your doctor or go to a local bookstore and review books and tapes on this subject.  b) Tyramine is a substance present in the following foods : chocolate, yogurt, all cheeses except cottage cheese and cream cheese. smoked or pickled fish and meat (including herring,  caviar, bologna, pepperoni, salami), liver, avocados, bananas, figs, raisins, and red wine. Be aware that these foods may trigger a migraine in some persons. Try taking these foods out of your diet for 1-2 months to see if this reduces headache frequency.  Treating Future Attacks:  1) At the first sign of a migraine headache, take a medicine to stop it if one has been prescribed for you. If not, take acetaminophen (Tylenol) or ibuprofen (Motrin, Advil) if you are able to take these. The sooner you take medicine, the better it will work.  2) You may also want to find a quiet, dark, comfortable place to sit or lie down. Let yourself relax or sleep.  3) An ice pack on the forehead or area of greatest pain may also help.  Follow Up  with your doctor if the headache is not better within the next 24 hours. If you have frequent headaches you should discuss a treatment plan with your primary care doctor. Ask if you can have medicine to take at home the next time you get a bad headache. Poorly controlled chronic headaches may require a referral to a neurologist (headache specialist).  Get Prompt Medical Attention  if any of the following occur:    Your head pain gets worse, or does not improve within 24 hours    Repeated vomiting (can t keep liquids down)    Sinus or ear or throat pain (not already reported)    Fever of 101  F (38.3  C) or higher, or as directed by your healthcare provider    Stiff neck    Extreme drowsiness, confusion or fainting    Weakness of an arm or leg or one side of the face    Difficulty with speech or vision    9181-0930 [employer]. [address] [place] [address], [place] [address]. All rights reserved. This information is not intended as a substitute for professional medical care. Always follow your healthcare professional's instructions.

## 2017-05-08 NOTE — TELEPHONE ENCOUNTER
Advised pt she should come into clinic for evaluation of migraine.  Pt states she is unable to come in because her right knee is still recovering from surgery and her left knee just went out.  She can't ambulate.  Pt states she had been on oxycodone for 8 weeks post surgery and then last week was on percocet for her left leg pain.  This is first day without narcotic.  Pt has developed a migraine.  Pt states she usually has to use Vicodin for migraines due to adverse reactions to other migraine medicines in the past.   Pt has tried ibuprofen and tylenol today without relief.  To provider to advise.  Jesenia Agrawal RN

## 2017-05-08 NOTE — TELEPHONE ENCOUNTER
Patient calling, she has a migraine, she thinks it is because of coming off pain medications from her knee replacement. Wondering what she can do to help it.

## 2017-05-08 NOTE — TELEPHONE ENCOUNTER
Pt notified of provider message as written.  Pt verbalized good understanding.  Appointment made.  Jesenia Agrawal RN

## 2017-05-12 ENCOUNTER — TELEPHONE (OUTPATIENT)
Dept: FAMILY MEDICINE | Facility: CLINIC | Age: 47
End: 2017-05-12

## 2017-05-12 DIAGNOSIS — G43.109 MIGRAINE WITH AURA AND WITHOUT STATUS MIGRAINOSUS, NOT INTRACTABLE: Primary | ICD-10-CM

## 2017-05-12 NOTE — TELEPHONE ENCOUNTER
Called patient and gave providers message/ instructions.  Patient understands this.     Jackie Mckeon RN

## 2017-05-12 NOTE — TELEPHONE ENCOUNTER
Patient was seen by Kinga Velasquez Monday. She said to call if headache was still bothering her on Friday. Patient is out of pills. She can only use Vicodin due to side effects. Please call as soon as possible. Ok to leave a message.  Will  prescription at .

## 2017-05-12 NOTE — TELEPHONE ENCOUNTER
Patient will need to be reevaluated if still having headache. No guarantee another provider will give more narcotic  BEKAH Honeycutt CNP

## 2017-05-25 ENCOUNTER — OFFICE VISIT (OUTPATIENT)
Dept: FAMILY MEDICINE | Facility: CLINIC | Age: 47
End: 2017-05-25
Payer: COMMERCIAL

## 2017-05-25 VITALS
SYSTOLIC BLOOD PRESSURE: 136 MMHG | HEART RATE: 91 BPM | OXYGEN SATURATION: 96 % | TEMPERATURE: 98.7 F | BODY MASS INDEX: 42.57 KG/M2 | WEIGHT: 280 LBS | DIASTOLIC BLOOD PRESSURE: 78 MMHG

## 2017-05-25 DIAGNOSIS — R07.0 THROAT PAIN: Primary | ICD-10-CM

## 2017-05-25 LAB
DEPRECATED S PYO AG THROAT QL EIA: NORMAL
MICRO REPORT STATUS: NORMAL
SPECIMEN SOURCE: NORMAL

## 2017-05-25 PROCEDURE — 87880 STREP A ASSAY W/OPTIC: CPT | Performed by: PHYSICIAN ASSISTANT

## 2017-05-25 PROCEDURE — 99213 OFFICE O/P EST LOW 20 MIN: CPT | Performed by: PHYSICIAN ASSISTANT

## 2017-05-25 PROCEDURE — 87081 CULTURE SCREEN ONLY: CPT | Performed by: PHYSICIAN ASSISTANT

## 2017-05-25 NOTE — PATIENT INSTRUCTIONS
Try neti pot or nasal saline rinse over the counter  I will follow up if strep culture is positive

## 2017-05-25 NOTE — MR AVS SNAPSHOT
After Visit Summary   5/25/2017    Carissa Spears    MRN: [id number]           Patient Information     Date Of Birth          1970        Visit Information        Provider Department      5/25/2017 2:00 PM Vera Khan PA-C [facility]        Today's Diagnoses     Throat pain    -  1      Care Instructions    Try neti pot or nasal saline rinse over the counter  I will follow up if strep culture is positive            Follow-ups after your visit        Who to contact     If you have questions or need follow up information about today's clinic visit or your schedule please contact [facility] directly at [phone].  Normal or non-critical lab and imaging results will be communicated to you by MyChart, letter or phone within 4 business days after the clinic has received the results. If you do not hear from us within 7 days, please contact the clinic through SiSafhart or phone. If you have a critical or abnormal lab result, we will notify you by phone as soon as possible.  Submit refill requests through [employer] or call your pharmacy and they will forward the refill request to us. Please allow 3 business days for your refill to be completed.          Additional Information About Your Visit        MyChart Information     [employer] gives you secure access to your electronic health record. If you see a primary care provider, you can also send messages to your care team and make appointments. If you have questions, please call your primary care clinic.  If you do not have a primary care provider, please call [phone] and they will assist you.        Care EveryWhere ID     This is your Care EveryWhere ID. This could be used by other organizations to access your [place] medical records  [id number]        Your Vitals Were     Pulse Temperature Pulse Oximetry BMI (Body Mass Index)          91 98.7  F (37.1  C) (Tympanic) 96% 42.57 kg/m2         Blood Pressure from  Last 3 Encounters:   05/25/17 146/86   05/08/17 (!) 160/98   02/22/17 130/84    Weight from Last 3 Encounters:   05/25/17 280 lb (127 kg)   02/22/17 296 lb (134.3 kg)   01/02/17 290 lb (131.5 kg)              We Performed the Following     Beta strep group A culture     Strep, Rapid Screen        Primary Care Provider Office Phone # Fax #    Silvino Beach [phone]       [facility] [address] [facility] [address]        Thank you!     Thank you for choosing [facility]  for your care. Our goal is always to provide you with excellent care. Hearing back from our patients is one way we can continue to improve our services. Please take a few minutes to complete the written survey that you may receive in the mail after your visit with us. Thank you!             Your Updated Medication List - Protect others around you: Learn how to safely use, store and throw away your medicines at www.disposemymeds.org.          This list is accurate as of: 5/25/17  2:31 PM.  Always use your most recent med list.                   Brand Name Dispense Instructions for use    ALPRAZolam 0.5 MG tablet    XANAX     Take 0.5 mg by mouth       citalopram 20 MG tablet    celeXA     Take 20 mg by mouth       * gabapentin 600 MG tablet    NEURONTIN    90 tablet    Take 1 tablet (600 mg) by mouth 3 times daily Take with one 300 mg tab 3 times a day for total dose of 900 mg 3 times a day.       * gabapentin 300 MG capsule    NEURONTIN    90 capsule    Take 1 tab with 1 600 mg tab for 900 mg 3 times a day.       lamoTRIgine 25 MG tablet    LaMICtal         losartan 100 MG tablet    COZAAR    90 tablet    Take 0.5 tablets (50 mg) by mouth 2 times daily For blood pressure       metFORMIN 500 MG 24 hr tablet    GLUCOPHAGE-XR    180 tablet    Take 2 tablets (1,000 mg) by mouth daily (with dinner) For diabetes       metoprolol 100 MG tablet    LOPRESSOR    90 tablet    TAKE 1.5 TABLETS (150 MG) BY  MOUTH 2 TIMES DAILY FOR BLOOD PRESSURE       simvastatin 40 MG tablet    ZOCOR    90 tablet    Take 1 tablet (40 mg) by mouth At Bedtime For cholesterol       TRAZODONE HCL PO      Take 100 mg by mouth Reported on 5/8/2017       * Notice:  This list has 2 medication(s) that are the same as other medications prescribed for you. Read the directions carefully, and ask your doctor or other care provider to review them with you.

## 2017-05-25 NOTE — NURSING NOTE
"Chief Complaint   Patient presents with     Pharyngitis     x 2 weeks       Initial /86 (Cuff Size: Adult Large)  Pulse 91  Temp 98.7  F (37.1  C) (Tympanic)  Wt 280 lb (127 kg)  SpO2 96%  BMI 42.57 kg/m2 Estimated body mass index is 42.57 kg/(m^2) as calculated from the following:    Height as of 1/2/17: 5' 8\" (1.727 m).    Weight as of this encounter: 280 lb (127 kg).  Medication Reconciliation: complete    DESTINEE Mobley MA    "

## 2017-05-25 NOTE — PROGRESS NOTES
SUBJECTIVE:                                                    Carissa Spears is a 47 year old female who presents to clinic today for the following health issues:      ENT Symptoms             Symptoms: cc Present Absent Comment   Fever/Chills   x unsure   Fatigue   x    Muscle Aches   x    Eye Irritation   x    Sneezing   x    Nasal Jonathon/Drg   x    Sinus Pressure/Pain   x    Loss of smell   x    Dental pain   x    Sore Throat  x     Swollen Glands  x     Ear Pain/Fullness   x    Cough   x    Wheeze   x    Chest Pain   x    Shortness of breath   x    Rash   x    Other   x      Symptom duration:  2 weeks   Symptom severity: On and off   Treatments tried:  tylenol and ibuprofen   Contacts:  n/a       Taking zyrtec for allergies already.  Mainly compalins of sore throat.   Some PND also.   Does flonase already for her allergies.   Has not tried the neti pot.   Has a son with special needs, wants to make sure its just allergies and no strep.   No fevers or malaise.       Problem list and histories reviewed & adjusted, as indicated.  Additional history: as documented    Patient Active Problem List   Diagnosis     HYPERLIPIDEMIA LDL GOAL <100     Hypertension goal BP (blood pressure) < 140/90     Bipolar disorder (H)     Polycystic ovarian syndrome     BMI 45.0-49.9, adult (H)     Tobacco abuse     Mild major depression (H)     Insomnia     Chronic abdominal pain     Hyperglycemia     Myofascial pain     Migraine with aura and without status migrainosus, not intractable     Past Surgical History:   Procedure Laterality Date     C/SECTION, CLASSICAL      X2     HYSTERECTOMY, PAP NO LONGER INDICATED       HYSTERECTOMY, KAMILLA  4/8/08     LAPAROSCOPIC CHOLECYSTECTOMY  7/18/2012    Procedure: LAPAROSCOPIC CHOLECYSTECTOMY;  Laparoscopic cholecystectomy;  Surgeon: Miguel Fuentes MD;  Location: [facility] OR     SALPINGO OOPHORECTOMY,R/L/YANELIS  4/8/08    L     TONSILLECTOMY  1983     TUBAL LIGATION         Social History    Substance Use Topics     Smoking status: Former Smoker     Packs/day: 0.25     Years: 20.00     Quit date: 7/18/2012     Smokeless tobacco: Never Used     Alcohol use No     Family History   Problem Relation Age of Onset     CANCER Mother      skin cancer - non-melanoma     Hypertension Mother      Lipids Father          Current Outpatient Prescriptions   Medication Sig Dispense Refill     metoprolol (LOPRESSOR) 100 MG tablet TAKE 1.5 TABLETS (150 MG) BY MOUTH 2 TIMES DAILY FOR BLOOD PRESSURE 90 tablet 0     ALPRAZolam (XANAX) 0.5 MG tablet Take 0.5 mg by mouth       citalopram (CELEXA) 20 MG tablet Take 20 mg by mouth       lamoTRIgine (LAMICTAL) 25 MG tablet        metFORMIN (GLUCOPHAGE-XR) 500 MG 24 hr tablet Take 2 tablets (1,000 mg) by mouth daily (with dinner) For diabetes 180 tablet 1     simvastatin (ZOCOR) 40 MG tablet Take 1 tablet (40 mg) by mouth At Bedtime For cholesterol 90 tablet 3     losartan (COZAAR) 100 MG tablet Take 0.5 tablets (50 mg) by mouth 2 times daily For blood pressure 90 tablet 1     gabapentin (NEURONTIN) 600 MG tablet Take 1 tablet (600 mg) by mouth 3 times daily Take with one 300 mg tab 3 times a day for total dose of 900 mg 3 times a day. 90 tablet 5     gabapentin (NEURONTIN) 300 MG capsule Take 1 tab with 1 600 mg tab for 900 mg 3 times a day. 90 capsule 5     TRAZODONE HCL PO Take 100 mg by mouth Reported on 5/8/2017       Allergies   Allergen Reactions     Imitrex [Sumatriptan] Anaphylaxis     Blood pressure high/ she was in hospital     Calcium Channel Blockers      Augmentin Diarrhea     Codeine      Sick to stomach     Latex      Puffy, rash      Sulfa Drugs Nausea     Lisinopril Nausea       Reviewed and updated as needed this visit by clinical staff  Tobacco  Allergies  Meds  Med Hx  Surg Hx  Fam Hx  Soc Hx      Reviewed and updated as needed this visit by Provider         ROS:  Constitutional, HEENT, cardiovascular, pulmonary, gi and gu systems are negative, except  as otherwise noted.    OBJECTIVE:                                                    /86 (Cuff Size: Adult Large)  Pulse 91  Temp 98.7  F (37.1  C) (Tympanic)  Wt 280 lb (127 kg)  SpO2 96%  BMI 42.57 kg/m2  Body mass index is 42.57 kg/(m^2).  GENERAL:  No acute distress.  Interacts appropriately.  Breathing without difficulty.  Alert.  HEENT:  Tympanic membranes intact without effusion or erythema.  Oral mucosa moist. Posterior pharynx has no erythema.  Posterior pharynx has no exudate. Without edema. Has PND.   NECK:  Soft and supple.  without tenderness.  Without lymphadenopathy.  Normal range of motion.    CARDIAC:   Regular rate and rhythm.  No murmurs, rubs, or gallops.   PULMONARY: Clear to auscultation bilaterally.  No  wheezes, crackles, or rhonchi.  Normal air exchange/breath sounds.  No use of accessory muscles.    PSYCH: Normal affect.  SKIN: No rashes.        Results for orders placed or performed in visit on 05/25/17 (from the past 24 hour(s))   Strep, Rapid Screen   Result Value Ref Range    Specimen Description Throat     Rapid Strep A Screen       NEGATIVE: No Group A streptococcal antigen detected by immunoassay, await   culture report.      Micro Report Status FINAL 05/25/2017           ASSESSMENT/PLAN:                                                    ASSESSMENT / PLAN:  (R07.0) Throat pain  (primary encounter diagnosis)  Comment: likely PND from alleriges  Plan: Strep, Rapid Screen, Beta strep group A culture        Continue flonase and zytec  F/u PRN  Add nasal saline rinses  Reassured not strep  Use distilled or sterile water only in neti pot we discussed    Patient Instructions   Try neti pot or nasal saline rinse over the counter  I will follow up if strep culture is positive          Vera Khan PA-C  [facility]

## 2017-05-26 LAB
BACTERIA SPEC CULT: NORMAL
MICRO REPORT STATUS: NORMAL
SPECIMEN SOURCE: NORMAL

## 2017-06-01 ENCOUNTER — TRANSFERRED RECORDS (OUTPATIENT)
Dept: HEALTH INFORMATION MANAGEMENT | Facility: CLINIC | Age: 47
End: 2017-06-01

## 2017-06-01 LAB — PHQ9 SCORE: 17

## 2017-06-14 ENCOUNTER — ALLIED HEALTH/NURSE VISIT (OUTPATIENT)
Dept: NURSING | Facility: CLINIC | Age: 47
End: 2017-06-14
Payer: COMMERCIAL

## 2017-06-14 ENCOUNTER — OFFICE VISIT (OUTPATIENT)
Dept: FAMILY MEDICINE | Facility: CLINIC | Age: 47
End: 2017-06-14
Payer: COMMERCIAL

## 2017-06-14 VITALS
DIASTOLIC BLOOD PRESSURE: 84 MMHG | TEMPERATURE: 97.3 F | BODY MASS INDEX: 43.35 KG/M2 | HEIGHT: 68 IN | WEIGHT: 286 LBS | SYSTOLIC BLOOD PRESSURE: 136 MMHG | HEART RATE: 86 BPM | OXYGEN SATURATION: 96 %

## 2017-06-14 DIAGNOSIS — M25.562 LEFT KNEE PAIN, UNSPECIFIED CHRONICITY: ICD-10-CM

## 2017-06-14 DIAGNOSIS — Z12.31 VISIT FOR SCREENING MAMMOGRAM: ICD-10-CM

## 2017-06-14 DIAGNOSIS — Z01.818 PREOP GENERAL PHYSICAL EXAM: Primary | ICD-10-CM

## 2017-06-14 DIAGNOSIS — Z79.899 HIGH RISK MEDICATION USE: Primary | ICD-10-CM

## 2017-06-14 DIAGNOSIS — Z13.1 SCREENING FOR DIABETES MELLITUS: ICD-10-CM

## 2017-06-14 DIAGNOSIS — Z13.220 SCREENING CHOLESTEROL LEVEL: ICD-10-CM

## 2017-06-14 LAB
ANION GAP SERPL CALCULATED.3IONS-SCNC: 10 MMOL/L (ref 3–14)
BASOPHILS # BLD AUTO: 0 10E9/L (ref 0–0.2)
BASOPHILS NFR BLD AUTO: 0.4 %
BUN SERPL-MCNC: 11 MG/DL (ref 7–30)
CALCIUM SERPL-MCNC: 9.5 MG/DL (ref 8.5–10.1)
CHLORIDE SERPL-SCNC: 103 MMOL/L (ref 94–109)
CHOLEST SERPL-MCNC: 146 MG/DL
CO2 SERPL-SCNC: 25 MMOL/L (ref 20–32)
CREAT SERPL-MCNC: 0.8 MG/DL (ref 0.52–1.04)
DIFFERENTIAL METHOD BLD: NORMAL
EOSINOPHIL # BLD AUTO: 0.2 10E9/L (ref 0–0.7)
EOSINOPHIL NFR BLD AUTO: 2 %
ERYTHROCYTE [DISTWIDTH] IN BLOOD BY AUTOMATED COUNT: 14.9 % (ref 10–15)
GFR SERPL CREATININE-BSD FRML MDRD: 76 ML/MIN/1.7M2
GLUCOSE SERPL-MCNC: 146 MG/DL (ref 70–99)
HBA1C MFR BLD: 7.2 % (ref 4.3–6)
HCT VFR BLD AUTO: 45.3 % (ref 35–47)
HDLC SERPL-MCNC: 39 MG/DL
HGB BLD-MCNC: 14.9 G/DL (ref 11.7–15.7)
LDLC SERPL CALC-MCNC: 88 MG/DL
LYMPHOCYTES # BLD AUTO: 2.9 10E9/L (ref 0.8–5.3)
LYMPHOCYTES NFR BLD AUTO: 29.5 %
MCH RBC QN AUTO: 29.6 PG (ref 26.5–33)
MCHC RBC AUTO-ENTMCNC: 32.9 G/DL (ref 31.5–36.5)
MCV RBC AUTO: 90 FL (ref 78–100)
MONOCYTES # BLD AUTO: 0.3 10E9/L (ref 0–1.3)
MONOCYTES NFR BLD AUTO: 3.4 %
NEUTROPHILS # BLD AUTO: 6.3 10E9/L (ref 1.6–8.3)
NEUTROPHILS NFR BLD AUTO: 64.7 %
NONHDLC SERPL-MCNC: 107 MG/DL
PLATELET # BLD AUTO: 296 10E9/L (ref 150–450)
POTASSIUM SERPL-SCNC: 4.3 MMOL/L (ref 3.4–5.3)
RBC # BLD AUTO: 5.03 10E12/L (ref 3.8–5.2)
SODIUM SERPL-SCNC: 138 MMOL/L (ref 133–144)
TRIGL SERPL-MCNC: 94 MG/DL
WBC # BLD AUTO: 9.7 10E9/L (ref 4–11)

## 2017-06-14 PROCEDURE — 85025 COMPLETE CBC W/AUTO DIFF WBC: CPT | Performed by: PHYSICIAN ASSISTANT

## 2017-06-14 PROCEDURE — 99214 OFFICE O/P EST MOD 30 MIN: CPT | Performed by: PHYSICIAN ASSISTANT

## 2017-06-14 PROCEDURE — 99207 ZZC NO CHARGE NURSE ONLY: CPT

## 2017-06-14 PROCEDURE — 83036 HEMOGLOBIN GLYCOSYLATED A1C: CPT | Performed by: PHYSICIAN ASSISTANT

## 2017-06-14 PROCEDURE — 93005 ELECTROCARDIOGRAM TRACING: CPT

## 2017-06-14 PROCEDURE — 80048 BASIC METABOLIC PNL TOTAL CA: CPT | Performed by: PHYSICIAN ASSISTANT

## 2017-06-14 PROCEDURE — 80061 LIPID PANEL: CPT | Performed by: PHYSICIAN ASSISTANT

## 2017-06-14 PROCEDURE — 36415 COLL VENOUS BLD VENIPUNCTURE: CPT | Performed by: PHYSICIAN ASSISTANT

## 2017-06-14 RX ORDER — CITALOPRAM HYDROBROMIDE 40 MG/1
TABLET ORAL
COMMUNITY
Start: 2017-06-13

## 2017-06-14 RX ORDER — LURASIDONE HYDROCHLORIDE 40 MG/1
TABLET, FILM COATED ORAL
COMMUNITY
Start: 2017-06-06 | End: 2018-12-06

## 2017-06-14 NOTE — MR AVS SNAPSHOT
After Visit Summary   6/14/2017    Carissa Spears    MRN: [id number]           Patient Information     Date Of Birth          1970        Visit Information        Provider Department      6/14/2017 11:00 AM AN ANCILLARY [facility]        Today's Diagnoses     High risk medication use    -  1       Follow-ups after your visit        Future tests that were ordered for you today     Open Future Orders        Priority Expected Expires Ordered    MA SCREENING DIGITAL BILAT - Future  (s+30) Routine  6/14/2018 6/14/2017            Who to contact     If you have questions or need follow up information about today's clinic visit or your schedule please contact [facility] directly at [phone].  Normal or non-critical lab and imaging results will be communicated to you by MyChart, letter or phone within 4 business days after the clinic has received the results. If you do not hear from us within 7 days, please contact the clinic through [employer] or phone. If you have a critical or abnormal lab result, we will notify you by phone as soon as possible.  Submit refill requests through [employer] or call your pharmacy and they will forward the refill request to us. Please allow 3 business days for your refill to be completed.          Additional Information About Your Visit        MyChart Information     [employer] gives you secure access to your electronic health record. If you see a primary care provider, you can also send messages to your care team and make appointments. If you have questions, please call your primary care clinic.  If you do not have a primary care provider, please call [phone] and they will assist you.        Care EveryWhere ID     This is your Care EveryWhere ID. This could be used by other organizations to access your [facility] medical records  [id number]         Blood Pressure from Last 3 Encounters:   06/14/17 136/84   05/25/17 136/78   05/08/17 (!) 160/98     Weight from Last 3 Encounters:   06/14/17 286 lb (129.7 kg)   05/25/17 280 lb (127 kg)   02/22/17 296 lb (134.3 kg)              We Performed the Following     EKG 12-lead, tracing only          Today's Medication Changes          These changes are accurate as of: 6/14/17 11:47 AM.  If you have any questions, ask your nurse or doctor.               These medicines have changed or have updated prescriptions.        Dose/Directions    citalopram 40 MG tablet   Commonly known as:  celeXA   This may have changed:  Another medication with the same name was removed. Continue taking this medication, and follow the directions you see here.   Changed by:  Lucho Alvarez PA-C        Refills:  0         Stop taking these medicines if you haven't already. Please contact your care team if you have questions.     lamoTRIgine 25 MG tablet   Commonly known as:  LaMICtal   Stopped by:  Lucho Alvarez PA-C                    Primary Care Provider Office Phone # Fax #    Silvino [place] [phone]       [facility] [address] [facility] [address]        Thank you!     Thank you for choosing [facility]  for your care. Our goal is always to provide you with excellent care. Hearing back from our patients is one way we can continue to improve our services. Please take a few minutes to complete the written survey that you may receive in the mail after your visit with us. Thank you!             Your Updated Medication List - Protect others around you: Learn how to safely use, store and throw away your medicines at www.disposemymeds.org.          This list is accurate as of: 6/14/17 11:47 AM.  Always use your most recent med list.                   Brand Name Dispense Instructions for use    ALPRAZolam 0.5 MG tablet    XANAX     Take 0.5 mg by mouth       citalopram 40 MG tablet    celeXA         * gabapentin 600 MG tablet    NEURONTIN    90 tablet    Take 1 tablet (600 mg)  by mouth 3 times daily Take with one 300 mg tab 3 times a day for total dose of 900 mg 3 times a day.       * gabapentin 300 MG capsule    NEURONTIN    90 capsule    Take 1 tab with 1 600 mg tab for 900 mg 3 times a day.       LATUDA 40 MG Tabs tablet   Generic drug:  lurasidone          losartan 100 MG tablet    COZAAR    90 tablet    Take 0.5 tablets (50 mg) by mouth 2 times daily For blood pressure       metFORMIN 500 MG 24 hr tablet    GLUCOPHAGE-XR    180 tablet    Take 2 tablets (1,000 mg) by mouth daily (with dinner) For diabetes       metoprolol 100 MG tablet    LOPRESSOR    90 tablet    TAKE 1.5 TABLETS (150 MG) BY MOUTH 2 TIMES DAILY FOR BLOOD PRESSURE       simvastatin 40 MG tablet    ZOCOR    90 tablet    Take 1 tablet (40 mg) by mouth At Bedtime For cholesterol       TRAZODONE HCL PO      Take 100 mg by mouth Reported on 5/8/2017       * Notice:  This list has 2 medication(s) that are the same as other medications prescribed for you. Read the directions carefully, and ask your doctor or other care provider to review them with you.

## 2017-06-14 NOTE — LETTER
My Migraine Action Plan      Date: 6/14/2017     My Name: Carissa Spears   YOB: 1970  My Pharmacy:    WRITTEN PRESCRIPTION REQUESTED  [facility] [place] - [address], [address]  [place] - [address]  [id number] [place], [place] - [address]  [facility] [place] - [address] [facility] [place]       My (Preventative) Control Medicine: ***        My Rescue Medicine: ***   My Doctor: Silvino Beach     My Clinic: [facility]  [address] [facility] [address] [phone]        GREEN ZONE = Good Control    My headache plan is working.   I can do what I need to do.           I WILL:     ? Keep managing my triggers.  ? Write in my migraine diary each time I have a headache.  ? Keep taking my preventive (controller) medicine daily.  ? Take my relief and rescue medicine as needed.             YELLOW ZONE = Not Enough Control    My headache plan isn t always working.   My headaches keep me from doing   some of the things I need to do.       I WILL:     ? Set goals to control my triggers and act on them.  ? Write in my migraine diary each time I have a headache and review it for                      patterns or new triggers.  ? Keep taking my preventive (controller) medicine daily.  ? Take my relief and rescue medicine as needed.  ? Call my doctor or clinic at if I stay in the Yellow Zone.             RED ZONE = Poor or No Control    My headache plan has  failed. I can t do anything  when I have one. My  medicines aren t working.           I WILL:   ? Set goals to control my triggers and act on them.  ? Write in my migraine diary each time I have a headache and review it for                      patterns or new triggers.  ? Keep taking my preventive (controller) medicine daily.  ? Take my relief and rescue medicine as needed.  ? Call  my doctor or clinic or go to urgent care or an ER if I m having the worst                  headache of my life.  ? Call my doctor or clinic or go to urgent care or an ER if my medicine doesn t work.  ? Let my doctor or clinic know within 2 weeks if I have gone to an urgent care or             emergency department.          Provider specific instructions:  ***

## 2017-06-14 NOTE — PROGRESS NOTES
[facility]  [address] [facility] [address]  [phone]  Dept: [phone]    PRE-OP EVALUATION:  Today's date: 2017    Carissa Spears (: 1970) presents for pre-operative evaluation assessment as requested by Dr. Duggan.  She requires evaluation and anesthesia risk assessment prior to undergoing surgery/procedure for treatment of level 4 arthritis per pt .  Proposed procedure: scope of L knee    Date of Surgery/ Procedure: 17  Time of Surgery/ Procedure: TBD  Hospital/Surgical Facility: [facility]  Fax number for surgical facility: [phone]  Primary Physician: Silvino Beach  Type of Anesthesia Anticipated: TBD - possible spinal     Patient has a Health Care Directive or Living Will:  NO    1. NO - Do you have a history of heart attack, stroke, stent, bypass or surgery on an artery in the head, neck, heart or legs?  2. NO - Do you ever have any pain or discomfort in your chest?  3. NO - Do you have a history of  Heart Failure?  4. NO - Are you troubled by shortness of breath when: walking on the level, up a slight hill or at night?  5. NO - Do you currently have a cold, bronchitis or other respiratory infection?  6. NO - Do you have a cough, shortness of breath or wheezing?  7. YES - DO YOU SOMETIMES GET PAINS IN THE CALVES OF YOUR LEGS WHEN YOU WALK? Left leg due to knee.   8. NO - Do you or anyone in your family have previous history of blood clots?  9. NO - Do you or does anyone in your family have a serious bleeding problem such as prolonged bleeding following surgeries or cuts?  10. NO - Have you ever had problems with anemia or been told to take iron pills?  11. NO - Have you had any abnormal blood loss such as black, tarry or bloody stools, or abnormal vaginal bleeding?  12. NO - Have you ever had a blood transfusion?  13. NO - Have you or any of your relatives ever had problems with anesthesia?  14. NO - Do you have sleep apnea, excessive snoring  or daytime drowsiness?  15. NO - Do you have any prosthetic heart valves?  16. YES - DO YOU HAVE PROSTHETIC JOINTS? R knee  17. NO - Is there any chance that you may be pregnant?      HPI:                                                      Brief HPI related to upcoming procedure: history of left knee pain for 5 wks. Gave out on her.       See problem list for active medical problems.  Problems all longstanding and stable, except as noted/documented.  See ROS for pertinent symptoms related to these conditions.                                                                                                  .    MEDICAL HISTORY:                                                      Patient Active Problem List    Diagnosis Date Noted     Migraine with aura and without status migrainosus, not intractable 06/17/2016     Priority: Medium     Myofascial pain 06/13/2013     Priority: Medium     Chronic abdominal pain 01/10/2013     Priority: Medium     Hyperglycemia 01/10/2013     Priority: Medium     BMI 45.0-49.9, adult (H) 04/29/2011     Priority: Medium     Tobacco abuse 04/29/2011     Priority: Medium     Mild major depression (H) 04/29/2011     Priority: Medium     Insomnia 04/29/2011     Priority: Medium     Bipolar disorder (H)      Priority: Medium     Problem list name updated by automated process. Provider to review       Polycystic ovarian syndrome      Priority: Medium     Hypertension goal BP (blood pressure) < 140/90 12/16/2010     Priority: Medium     HYPERLIPIDEMIA LDL GOAL <100 10/31/2010     Priority: Medium      Past Medical History:   Diagnosis Date     Allergies      Bipolar disorder, unspecified (H)      DM II (diabetes mellitus, type II)     diet controlled     Endometriosis      Herniated lumbar intervertebral disc     age 22     High cholesterol      HTN      Liver disease     fatty liver     Obesity      Other disorder of menstruation and other abnormal bleeding from female genital tract       Polycystic ovarian syndrome      Sleep disorder      Toxemia      Past Surgical History:   Procedure Laterality Date     C/SECTION, CLASSICAL      X2     HYSTERECTOMY, PAP NO LONGER INDICATED       HYSTERECTOMY, KAMILLA  4/8/08     LAPAROSCOPIC CHOLECYSTECTOMY  7/18/2012    Procedure: LAPAROSCOPIC CHOLECYSTECTOMY;  Laparoscopic cholecystectomy;  Surgeon: Miguel Fuentes MD;  Location: [facility] OR     SALPINGO OOPHORECTOMY,R/L/YANELIS  4/8/08    L     TONSILLECTOMY  1983     TUBAL LIGATION       Current Outpatient Prescriptions   Medication Sig Dispense Refill     LATUDA 40 MG TABS tablet        citalopram (CELEXA) 40 MG tablet        metoprolol (LOPRESSOR) 100 MG tablet TAKE 1.5 TABLETS (150 MG) BY MOUTH 2 TIMES DAILY FOR BLOOD PRESSURE 90 tablet 0     ALPRAZolam (XANAX) 0.5 MG tablet Take 0.5 mg by mouth       simvastatin (ZOCOR) 40 MG tablet Take 1 tablet (40 mg) by mouth At Bedtime For cholesterol 90 tablet 3     losartan (COZAAR) 100 MG tablet Take 0.5 tablets (50 mg) by mouth 2 times daily For blood pressure 90 tablet 1     gabapentin (NEURONTIN) 600 MG tablet Take 1 tablet (600 mg) by mouth 3 times daily Take with one 300 mg tab 3 times a day for total dose of 900 mg 3 times a day. 90 tablet 5     gabapentin (NEURONTIN) 300 MG capsule Take 1 tab with 1 600 mg tab for 900 mg 3 times a day. 90 capsule 5     metFORMIN (GLUCOPHAGE-XR) 500 MG 24 hr tablet Take 2 tablets (1,000 mg) by mouth daily (with dinner) For diabetes (Patient not taking: Reported on 6/14/2017) 180 tablet 1     TRAZODONE HCL PO Take 100 mg by mouth Reported on 5/8/2017       OTC products: None, except as noted above    Allergies   Allergen Reactions     Imitrex [Sumatriptan] Anaphylaxis     Blood pressure high/ she was in hospital     Calcium Channel Blockers      Augmentin Diarrhea     Codeine      Sick to stomach     Latex      Puffy, rash      Sulfa Drugs Nausea     Lisinopril Nausea      Latex Allergy: Yes- hives    Social History   Substance Use  "Topics     Smoking status: Former Smoker     Packs/day: 0.25     Years: 20.00     Quit date: 7/18/2012     Smokeless tobacco: Never Used     Alcohol use No     History   Drug Use No       REVIEW OF SYSTEMS:                                                    C: NEGATIVE for fever, chills, change in weight  I: NEGATIVE for worrisome rashes, moles or lesions  E: NEGATIVE for vision changes or irritation  E/M: NEGATIVE for ear, mouth and throat problems  R: NEGATIVE for significant cough or SOB  B: NEGATIVE for masses, tenderness or discharge  CV: NEGATIVE for chest pain, palpitations or peripheral edema  GI: NEGATIVE for nausea, abdominal pain, heartburn, or change in bowel habits  : NEGATIVE for frequency, dysuria, or hematuria  M: NEGATIVE for significant arthralgias or myalgia  N: NEGATIVE for weakness, dizziness or paresthesias  E: NEGATIVE for temperature intolerance, skin/hair changes  H: NEGATIVE for bleeding problems  P: NEGATIVE for changes in mood or affect    EXAM:                                                    /84  Pulse 86  Temp 97.3  F (36.3  C) (Oral)  Ht 5' 8\" (1.727 m)  Wt 286 lb (129.7 kg)  SpO2 96%  BMI 43.49 kg/m2    GENERAL APPEARANCE: healthy, alert and no distress     EYES: EOMI, PERRL     HENT: ear canals and TM's normal and nose and mouth without ulcers or lesions     NECK: no adenopathy, no asymmetry, masses, or scars and thyroid normal to palpation     RESP: lungs clear to auscultation - no rales, rhonchi or wheezes     CV: regular rates and rhythm, normal S1 S2, no S3 or S4 and no murmur, click or rub     ABDOMEN:  soft, nontender, no HSM or masses and bowel sounds normal     MS: extremities normal- no gross deformities noted, no evidence of inflammation in joints, FROM in all extremities.     SKIN: no suspicious lesions or rashes     NEURO: Normal strength and tone, sensory exam grossly normal, mentation intact and speech normal     PSYCH: mentation appears normal. and " affect normal/bright     LYMPHATICS: No axillary, cervical, or supraclavicular nodes    DIAGNOSTICS:                                                      Labs Drawn and in Process: BMP and Lipid Panel  Results for orders placed or performed in visit on 06/14/17   HEMOGLOBIN A1C   Result Value Ref Range    Hemoglobin A1C 7.2 (H) 4.3 - 6.0 %   CBC with platelets differential   Result Value Ref Range    WBC 9.7 4.0 - 11.0 10e9/L    RBC Count 5.03 3.8 - 5.2 10e12/L    Hemoglobin 14.9 11.7 - 15.7 g/dL    Hematocrit 45.3 35.0 - 47.0 %    MCV 90 78 - 100 fl    MCH 29.6 26.5 - 33.0 pg    MCHC 32.9 31.5 - 36.5 g/dL    RDW 14.9 10.0 - 15.0 %    Platelet Count 296 150 - 450 10e9/L    Diff Method Automated Method     % Neutrophils 64.7 %    % Lymphocytes 29.5 %    % Monocytes 3.4 %    % Eosinophils 2.0 %    % Basophils 0.4 %    Absolute Neutrophil 6.3 1.6 - 8.3 10e9/L    Absolute Lymphocytes 2.9 0.8 - 5.3 10e9/L    Absolute Monocytes 0.3 0.0 - 1.3 10e9/L    Absolute Eosinophils 0.2 0.0 - 0.7 10e9/L    Absolute Basophils 0.0 0.0 - 0.2 10e9/L         IMPRESSION:                                                    Reason for surgery/procedure: treatment of level 4 arthritis per pt .  Proposed procedure: scope of L knee    The proposed surgical procedure is considered LOW risk.    REVISED CARDIAC RISK INDEX  The patient has the following serious cardiovascular risks for perioperative complications such as (MI, PE, VFib and 3  AV Block):  No serious cardiac risks  INTERPRETATION: 0 risks: Class I (very low risk - 0.4% complication rate)    The patient has the following additional risks for perioperative complications:  No identified additional risks      ICD-10-CM    1. Preop general physical exam Z01.818 Basic metabolic panel     CBC with platelets differential   2. Left knee pain, unspecified chronicity M25.562    3. Screening cholesterol level Z13.220 Lipid panel reflex to direct LDL   4. Screening for diabetes mellitus Z13.1  HEMOGLOBIN A1C   5. Visit for screening mammogram Z12.31 MA SCREENING DIGITAL BILAT - Future  (s+30)       RECOMMENDATIONS:                                                        APPROVAL GIVEN to proceed with proposed procedure, without further diagnostic evaluation       Signed Electronically by: Lucho Alvarez PA-C  Chart reviewed, agree with evaluation and recommendations above.  Isabelle Long M.D.       Copy of this evaluation report is provided to requesting physician.    [facility] Preop Guidelines

## 2017-06-14 NOTE — NURSING NOTE
EKG was done.  Faxed to [facility] [facility] Tad Rodriguez @[phone]      Isabelle Long reviewed EKG, patient okay to leave.    Gisela Dumont MA

## 2017-06-14 NOTE — MR AVS SNAPSHOT
After Visit Summary   6/14/2017    Carissa Spears    MRN: [id number]           Patient Information     Date Of Birth          1970        Visit Information        Provider Department      6/14/2017 7:40 AM Lucho Alvarez PA-C [facility]        Today's Diagnoses     Preop general physical exam    -  1    Screening cholesterol level        Screening for diabetes mellitus        Visit for screening mammogram          Care Instructions      Before Your Surgery      Call your surgeon if there is any change in your health. This includes signs of a cold or flu (such as a sore throat, runny nose, cough, rash or fever).    Do not smoke, drink alcohol or take over the counter medicine (unless your surgeon or primary care doctor tells you to) for the 24 hours before and after surgery.    If you take prescribed drugs: Follow your doctor s orders about which medicines to take and which to stop until after surgery.    Eating and drinking prior to surgery: follow the instructions from your surgeon    Take a shower or bath the night before surgery. Use the soap your surgeon gave you to gently clean your skin. If you do not have soap from your surgeon, use your regular soap. Do not shave or scrub the surgery site.  Wear clean pajamas and have clean sheets on your bed.           Follow-ups after your visit        Future tests that were ordered for you today     Open Future Orders        Priority Expected Expires Ordered    MA SCREENING DIGITAL BILAT - Future  (s+30) Routine  6/14/2018 6/14/2017            Who to contact     If you have questions or need follow up information about today's clinic visit or your schedule please contact [facility] directly at [phone].  Normal or non-critical lab and imaging results will be communicated to you by MyChart, letter or phone within 4 business days after the clinic has received the results. If you do not hear from us within 7 days,  "please contact the clinic through Pin or Peg or phone. If you have a critical or abnormal lab result, we will notify you by phone as soon as possible.  Submit refill requests through Pin or Peg or call your pharmacy and they will forward the refill request to us. Please allow 3 business days for your refill to be completed.          Additional Information About Your Visit        [employer] Translate Information     Pin or Peg gives you secure access to your electronic health record. If you see a primary care provider, you can also send messages to your care team and make appointments. If you have questions, please call your primary care clinic.  If you do not have a primary care provider, please call [phone] and they will assist you.        Care EveryWhere ID     This is your Care EveryWhere ID. This could be used by other organizations to access your [place] medical records  [id number]        Your Vitals Were     Pulse Temperature Height Pulse Oximetry BMI (Body Mass Index)       86 97.3  F (36.3  C) (Oral) 5' 8\" (1.727 m) 96% 43.49 kg/m2        Blood Pressure from Last 3 Encounters:   06/14/17 144/88   05/25/17 136/78   05/08/17 (!) 160/98    Weight from Last 3 Encounters:   06/14/17 286 lb (129.7 kg)   05/25/17 280 lb (127 kg)   02/22/17 296 lb (134.3 kg)              We Performed the Following     Basic metabolic panel     CBC with platelets differential     HEMOGLOBIN A1C     Lipid panel reflex to direct LDL     MIGRAINE ACTION PLAN          Today's Medication Changes          These changes are accurate as of: 6/14/17  8:18 AM.  If you have any questions, ask your nurse or doctor.               These medicines have changed or have updated prescriptions.        Dose/Directions    citalopram 40 MG tablet   Commonly known as:  celeXA   This may have changed:  Another medication with the same name was removed. Continue taking this medication, and follow the directions you see here.   Changed by:  Lucho Alvarez PA-C     "    Refills:  0         Stop taking these medicines if you haven't already. Please contact your care team if you have questions.     lamoTRIgine 25 MG tablet   Commonly known as:  LaMICtal   Stopped by:  Lucho Alvarez PA-C                    Primary Care Provider Office Phone # Fax #    Silvino Basil Beach [phone]       [facility] [address] [facility] [address]        Thank you!     Thank you for choosing [facility]  for your care. Our goal is always to provide you with excellent care. Hearing back from our patients is one way we can continue to improve our services. Please take a few minutes to complete the written survey that you may receive in the mail after your visit with us. Thank you!             Your Updated Medication List - Protect others around you: Learn how to safely use, store and throw away your medicines at www.disposemymeds.org.          This list is accurate as of: 6/14/17  8:18 AM.  Always use your most recent med list.                   Brand Name Dispense Instructions for use    ALPRAZolam 0.5 MG tablet    XANAX     Take 0.5 mg by mouth       citalopram 40 MG tablet    celeXA         * gabapentin 600 MG tablet    NEURONTIN    90 tablet    Take 1 tablet (600 mg) by mouth 3 times daily Take with one 300 mg tab 3 times a day for total dose of 900 mg 3 times a day.       * gabapentin 300 MG capsule    NEURONTIN    90 capsule    Take 1 tab with 1 600 mg tab for 900 mg 3 times a day.       LATUDA 40 MG Tabs tablet   Generic drug:  lurasidone          losartan 100 MG tablet    COZAAR    90 tablet    Take 0.5 tablets (50 mg) by mouth 2 times daily For blood pressure       metFORMIN 500 MG 24 hr tablet    GLUCOPHAGE-XR    180 tablet    Take 2 tablets (1,000 mg) by mouth daily (with dinner) For diabetes       metoprolol 100 MG tablet    LOPRESSOR    90 tablet    TAKE 1.5 TABLETS (150 MG) BY MOUTH 2 TIMES DAILY FOR BLOOD PRESSURE        simvastatin 40 MG tablet    ZOCOR    90 tablet    Take 1 tablet (40 mg) by mouth At Bedtime For cholesterol       TRAZODONE HCL PO      Take 100 mg by mouth Reported on 5/8/2017       * Notice:  This list has 2 medication(s) that are the same as other medications prescribed for you. Read the directions carefully, and ask your doctor or other care provider to review them with you.

## 2017-06-14 NOTE — NURSING NOTE
"Chief Complaint   Patient presents with     Pre-Op Exam       Initial /88  Pulse 86  Temp 97.3  F (36.3  C) (Oral)  Ht 5' 8\" (1.727 m)  Wt 286 lb (129.7 kg)  SpO2 96%  BMI 43.49 kg/m2 Estimated body mass index is 43.49 kg/(m^2) as calculated from the following:    Height as of this encounter: 5' 8\" (1.727 m).    Weight as of this encounter: 286 lb (129.7 kg).  Medication Reconciliation: complete    Vira Garnica MA    "

## 2017-06-20 ENCOUNTER — OFFICE VISIT (OUTPATIENT)
Dept: CARDIOLOGY | Facility: CLINIC | Age: 47
End: 2017-06-20
Payer: COMMERCIAL

## 2017-06-20 ENCOUNTER — TELEPHONE (OUTPATIENT)
Dept: FAMILY MEDICINE | Facility: CLINIC | Age: 47
End: 2017-06-20

## 2017-06-20 VITALS — HEART RATE: 83 BPM | SYSTOLIC BLOOD PRESSURE: 154 MMHG | DIASTOLIC BLOOD PRESSURE: 93 MMHG | OXYGEN SATURATION: 98 %

## 2017-06-20 DIAGNOSIS — Z01.810 PRE-OPERATIVE CARDIOVASCULAR EXAMINATION: ICD-10-CM

## 2017-06-20 DIAGNOSIS — I10 HYPERTENSION GOAL BP (BLOOD PRESSURE) < 140/90: Primary | ICD-10-CM

## 2017-06-20 DIAGNOSIS — Z72.0 TOBACCO ABUSE: ICD-10-CM

## 2017-06-20 DIAGNOSIS — E78.2 MIXED HYPERLIPIDEMIA: ICD-10-CM

## 2017-06-20 PROCEDURE — 99204 OFFICE O/P NEW MOD 45 MIN: CPT | Performed by: INTERNAL MEDICINE

## 2017-06-20 NOTE — NURSING NOTE
"Chief Complaint   Patient presents with     Pre-Op Exam     cardiac consultation self-referral for last EKG and upcoming knee surgery scheduled for 6/21/17 @ [facility].  Patient states that her psychiatrist (Dr. Soto @ [facility] recommended her to see a cardiologist regarding recent research that suggests heart related problems while taking Trazadone and Xanax.       Initial BP (!) 154/93 (BP Location: Left arm, Patient Position: Chair, Cuff Size: Adult Large)  Pulse 83  SpO2 98% Estimated body mass index is 43.49 kg/(m^2) as calculated from the following:    Height as of 6/14/17: 1.727 m (5' 8\").    Weight as of 6/14/17: 129.7 kg (286 lb)..  BP completed using cuff size: KOBI Banda.        "

## 2017-06-20 NOTE — LETTER
6/20/2017      RE: Carissa Spears  [address]  [place] [address]       Dear Colleague,    Thank you for the opportunity to participate in the care of your patient, Carissa Spears, at the [facility] HEART AT [facility]. Please see a copy of my visit note below.    CARDIOLOGY CONSULTATION    Referring Provider:  No ref. provider found  Primary Care Provider:   Silvino Beach  Indication for Consultation:  Pre op    HPI: Carissa Spears is a 47 year old female being seen today for evaluation of pre op ECG abnormality.  48 yo woman is pre op for arthroscopic surgery of her right knee. She had an ECG that was abnormal and her psychiatrist had mentioned to her possible cardiac effects of Trazodone and Xanax so came to my clinic because of concerns about her ECG. Patient is active, taking care of her disabled son including lifting him and his wheelchair which she is able to do. She denies chest pain, dyspnea, orthopnea, PND, palpitations, presyncope, syncope, edema or claudication. No history of bleeding or clotting. She had a left TKA in February of this year which was uneventful.  The patient's risk factor profile is: (+) HTN, Type II DM, (+) hypercholesterolemia, (+) 10 pack-year tobacco use, (-) fam Hx premature CAD.  The patient has no history of cardiovascular disease (CAD, CHF, arrhythmia, valvular heart disease).  The patient has no Hx of PAD or cerebrovascular disease.  The patient has not undergone prior cardiovascular evaluation and has never had an ECHO, stress study, cardiac catheterization, or EP study.     PAST MEDICAL HISTORY:  Past Medical History:   Diagnosis Date     Allergies      Bipolar disorder, unspecified (H)      DM II (diabetes mellitus, type II)     diet controlled     Endometriosis      Herniated lumbar intervertebral disc     age 22     High cholesterol      HTN      Liver disease     fatty liver      Obesity      Other disorder of menstruation and other abnormal bleeding from female genital tract      Polycystic ovarian syndrome      Sleep disorder      Toxemia        CURRENT MEDICATIONS:  Current Outpatient Prescriptions   Medication Sig Dispense Refill     metoprolol (LOPRESSOR) 100 MG tablet TAKE 1.5 TABLETS (150 MG) BY MOUTH 2 TIMES DAILY FOR BLOOD PRESSURE (NEED TO BE SEEN FOR REFILLS) 90 tablet 0     LATUDA 40 MG TABS tablet        citalopram (CELEXA) 40 MG tablet        ALPRAZolam (XANAX) 0.5 MG tablet Take 0.5 mg by mouth       metFORMIN (GLUCOPHAGE-XR) 500 MG 24 hr tablet Take 2 tablets (1,000 mg) by mouth daily (with dinner) For diabetes (Patient not taking: Reported on 6/14/2017) 180 tablet 1     simvastatin (ZOCOR) 40 MG tablet Take 1 tablet (40 mg) by mouth At Bedtime For cholesterol 90 tablet 3     losartan (COZAAR) 100 MG tablet Take 0.5 tablets (50 mg) by mouth 2 times daily For blood pressure 90 tablet 1     gabapentin (NEURONTIN) 600 MG tablet Take 1 tablet (600 mg) by mouth 3 times daily Take with one 300 mg tab 3 times a day for total dose of 900 mg 3 times a day. 90 tablet 5     gabapentin (NEURONTIN) 300 MG capsule Take 1 tab with 1 600 mg tab for 900 mg 3 times a day. 90 capsule 5     TRAZODONE HCL PO Take 100 mg by mouth Reported on 5/8/2017         PAST SURGICAL HISTORY:  Past Surgical History:   Procedure Laterality Date     C/SECTION, CLASSICAL      X2     HYSTERECTOMY, PAP NO LONGER INDICATED       HYSTERECTOMY, KAMILLA  4/8/08     LAPAROSCOPIC CHOLECYSTECTOMY  7/18/2012    Procedure: LAPAROSCOPIC CHOLECYSTECTOMY;  Laparoscopic cholecystectomy;  Surgeon: Miguel Fuentes MD;  Location: [facility] OR     SALPINGO OOPHORECTOMY,R/L/YANELIS  4/8/08    L     TONSILLECTOMY  1983     TUBAL LIGATION         ALLERGIES  Imitrex [sumatriptan]; Calcium channel blockers; Augmentin; Codeine; Latex; Sulfa drugs; and Lisinopril    FAMILY HX:  Family History   Problem Relation Age of Onset     CANCER Mother       skin cancer - non-melanoma     Hypertension Mother      Lipids Father        SOCIAL HX:  Social History     Social History     Marital status:      Spouse name: N/A     Number of children: N/A     Years of education: N/A     Occupational History      [employer]     Social History Main Topics     Smoking status: Former Smoker     Packs/day: 0.25     Years: 20.00     Quit date: 7/18/2012     Smokeless tobacco: Never Used     Alcohol use No     Drug use: No     Sexual activity: Yes     Partners: Male     Other Topics Concern     Not on file     Social History Narrative       ROS:  Constitutional: No fever, chills, or sweats. No weight gain/loss.   ENT: No visual disturbance, ear ache, epistaxis.   Allergies/Immunologic: Negative.   Respiratory: No cough, hemoptysis.   Cardiovascular: As per HPI.   GI: No nausea, vomiting, hematemesis or hematochezia.   : No urinary frequency, dysuria, or hematuria.   Integument: Negative.   Psychiatric: Negative.   Neuro: Negative.   Endocrinology: Negative.   Musculoskeletal: right knee pain.    VITAL SIGNS:  BP (!) 154/93 (BP Location: Left arm, Patient Position: Chair, Cuff Size: Adult Large)  Pulse 83  SpO2 98%  There is no height or weight on file to calculate BMI.  Wt Readings from Last 2 Encounters:   06/14/17 129.7 kg (286 lb)   05/25/17 127 kg (280 lb)       PHYSICAL EXAM  Carissa Spears is a 47 year old obese female in no distress.    HEENT: selerae not injected; nares clear.    Neck: JVP normal.  Carotids +4/4 bilaterally without bruits.  Thyroid normal.  Lungs: CTA.    Cor: RRR. Normal S1 and S2.  No murmur, rub, or gallop.  PMI in Lf 5th ICS.  Abd: Soft, obese nontender, nondistended.  NABS.  No pulsatile mass.  Extremities: No C/C/E.  Pulses +4/4 symmetric at the radial and posterior tibial sites.    Neuro: normal speech, gait and affect.    LABS    Lab Results   Component Value Date    WBC 9.7 06/14/2017     Lab Results   Component Value  Date    RBC 5.03 06/14/2017     Lab Results   Component Value Date    HGB 14.9 06/14/2017     Lab Results   Component Value Date    HCT 45.3 06/14/2017     No components found for: MCT  Lab Results   Component Value Date    MCV 90 06/14/2017     Lab Results   Component Value Date    MCH 29.6 06/14/2017     Lab Results   Component Value Date    MCHC 32.9 06/14/2017     Lab Results   Component Value Date    RDW 14.9 06/14/2017     Lab Results   Component Value Date     06/14/2017      Recent Labs   Lab Test  06/14/17   0825  02/22/17   0921   NA  138  137   POTASSIUM  4.3  4.1   CHLORIDE  103  102   CO2  25  29   ANIONGAP  10  6   GLC  146*  171*   BUN  11  7   CR  0.80  0.68   ANABELLE  9.5  9.4     Recent Labs   Lab Test  06/14/17   0825  09/23/13   0748  01/04/13   0826   CHOL  146  198  183   HDL  39*  44*  41*   LDL  88  136*  131*   TRIG  94  93  60   CHOLHDLRATIO   --   4.5  4.5   NHDL  107   --    --         EKG:    NSR 81/min; left atrial enlargement; QTc is normal at 410 msec    ASSESSMENT AND PLAN:  1. Normal cardiac exam.     2. ECG shows left atrial enlargement. Patient is on trazadone which has been reported to cause QT prolongation; the patient ECG shows a normal QT interval, and the ECG is otherwise normal. It would be reasonable to perform an echo in the future to see if the left atrium is actually enlarged and to look for diastolic dysfunction as a cause for this    3. Hypertension; I asked her to take BID home blood pressures and take them to Dr. Beach after she has recovered from surgery to be sure her hypertension is adequately controlled.    4. Dyslipidemia - last LDL satisfactory at 88   -simvastatin 40 mg/day    5. Tobacco abuse; patient counseled to quit smoking    6. Patient's Revised Cardiac Risk Index value is 0 which predicts a perioperative risk for a mayo adverse cardiac event of 0.4%. Patient may proceed to surgery with no further cardiac testing.    Gamaliel Acevedo MD

## 2017-06-20 NOTE — TELEPHONE ENCOUNTER
Patient is wondering if she is okay to have surgery tomorrow, patient concern about last EKG results.  Please call patient to advise  Thank you

## 2017-06-20 NOTE — MR AVS SNAPSHOT
After Visit Summary   6/20/2017    Carissa Spears    MRN: [id number]           Patient Information     Date Of Birth          1970        Visit Information        Provider Department      6/20/2017 2:40 PM Gamaliel Acevedo MD [facility] PHYSICIANS HEART AT [facility] Instructions    Thank you for coming to the [facility] @ [facility]; please note the following instructions:    1. Dr. Gamaliel Acevedo has requested you to monitor your blood pressure at home twice daily.  We have given you some Daily Blood Pressure Logs to record your readings for your convenience.  Please call your primary care provider, Silvino Beach or send a message via Sape in 1 week(s) with your recorded blood pressure readings.      2.  Dr. Gamaliel Acevedo is recommending to see you on an as needed basis regarding your heart care.  It was a pleasuring seeing you today at the [facility] Care @ the [facility].          If you have any questions regarding your visit please contact your care team:     Cardiology  Telephone Number   Phuong COMBS, YONIS CROWDER, YONIS MILLER, KUMAR SOLO, [facility]   [phone]    *After hours: [phone]   For scheduling appts:     [phone] or    [phone] (select option 1)    *After hours: [phone]     For the Device Clinic (Pacemakers and ICD's)  RN's :  Carissa Khan   During business hours: [phone]    *After business hours:  [phone] (select option 4)      Normal test result notifications will be released via Sape or mailed within 7 business days.  All other test results, will be communicated via telephone once reviewed by your cardiologist.    If you need a medication refill please contact your pharmacy.  Please allow 3 business days for your refill to be completed.    As always, thank you for trusting us with your health care needs!              Follow-ups after your visit         Your next 10 appointments already scheduled     Jul 03, 2017 11:00 AM CDT   MA SCREENING DIGITAL BILATERAL with MGMA1, MG MA TECH   [facility]    [address] [address] [place] [id number] [phone]           Do not use any powder, lotion or deodorant under your arms or on your breast. If you do, we will ask you to remove it before your exam.  Wear comfortable, two-piece clothing.  If you have any allergies, tell your care team.  Bring any previous mammograms from other facilities or have them mailed to the breast center. This mammogram location, [facility], now offers 3D mammography. It doesn't replace a screening mammogram and can be done with a regular screening mammogram. It is optional and not all insurances will pay for it. 3D mammography is a special kind of mammogram that produces a three-dimensional image of the breast by using low dose-xrays. 3D allows the radiologist to see the breast tissue differently from 2D, which reduces the chance of repeat testing due to overlapping breast tissue. If you are interested in have a 3D mammogram, please check with your insurance before you arrive for your exam. 3D mammography is offered to all patients. On the day of your exam you will be asked to sign a form stating yes, you wish to have 3D imaging or, no, you decline.              Who to contact     If you have questions or need follow up information about today's clinic visit or your schedule please contact [facility] PHYSICIANS HEART AT [facility] directly at [phone].  Normal or non-critical lab and imaging results will be communicated to you by MyChart, letter or phone within 4 business days after the clinic has received the results. If you do not hear from us within 7 days, please contact the clinic through MyChart or phone. If you have a critical or abnormal lab result, we will notify you by phone as soon as  possible.  Submit refill requests through [employer] or call your pharmacy and they will forward the refill request to us. Please allow 3 business days for your refill to be completed.          Additional Information About Your Visit        [employer]im[employer] Information     [employer] gives you secure access to your electronic health record. If you see a primary care provider, you can also send messages to your care team and make appointments. If you have questions, please call your primary care clinic.  If you do not have a primary care provider, please call [phone] and they will assist you.        Care EveryWhere ID     This is your Care EveryWhere ID. This could be used by other organizations to access your [place] medical records  [id number]        Your Vitals Were     Pulse Pulse Oximetry                83 98%           Blood Pressure from Last 3 Encounters:   06/20/17 (!) 154/93   06/14/17 136/84   05/25/17 136/78    Weight from Last 3 Encounters:   06/14/17 129.7 kg (286 lb)   05/25/17 127 kg (280 lb)   02/22/17 134.3 kg (296 lb)              Today, you had the following     No orders found for display       Primary Care Provider Office Phone # Fax #    Silvino Beach [phone]       [facility] [address] [address]        Thank you!     Thank you for choosing [facility] PHYSICIANS HEART AT [facility]  for your care. Our goal is always to provide you with excellent care. Hearing back from our patients is one way we can continue to improve our services. Please take a few minutes to complete the written survey that you may receive in the mail after your visit with us. Thank you!             Your Updated Medication List - Protect others around you: Learn how to safely use, store and throw away your medicines at www.disposemymeds.org.          This list is accurate as of: 6/20/17  3:08 PM.  Always use your most recent med list.                   Brand  Name Dispense Instructions for use    ALPRAZolam 0.5 MG tablet    XANAX     Take 0.5 mg by mouth       citalopram 40 MG tablet    celeXA         * gabapentin 600 MG tablet    NEURONTIN    90 tablet    Take 1 tablet (600 mg) by mouth 3 times daily Take with one 300 mg tab 3 times a day for total dose of 900 mg 3 times a day.       * gabapentin 300 MG capsule    NEURONTIN    90 capsule    Take 1 tab with 1 600 mg tab for 900 mg 3 times a day.       LATUDA 40 MG Tabs tablet   Generic drug:  lurasidone          losartan 100 MG tablet    COZAAR    90 tablet    Take 0.5 tablets (50 mg) by mouth 2 times daily For blood pressure       metFORMIN 500 MG 24 hr tablet    GLUCOPHAGE-XR    180 tablet    Take 2 tablets (1,000 mg) by mouth daily (with dinner) For diabetes       metoprolol 100 MG tablet    LOPRESSOR    90 tablet    TAKE 1.5 TABLETS (150 MG) BY MOUTH 2 TIMES DAILY FOR BLOOD PRESSURE (NEED TO BE SEEN FOR REFILLS)       simvastatin 40 MG tablet    ZOCOR    90 tablet    Take 1 tablet (40 mg) by mouth At Bedtime For cholesterol       TRAZODONE HCL PO      Take 100 mg by mouth At Bedtime Patient reports: Takes 100-2000 MG at bedtime       * Notice:  This list has 2 medication(s) that are the same as other medications prescribed for you. Read the directions carefully, and ask your doctor or other care provider to review them with you.

## 2017-06-20 NOTE — PROGRESS NOTES
CARDIOLOGY CONSULTATION    Referring Provider:  No ref. provider found  Primary Care Provider:   Silvino Beach  Indication for Consultation:  Pre op    HPI: Carissa Spears is a 47 year old female being seen today for evaluation of pre op ECG abnormality.  48 yo woman is pre op for arthroscopic surgery of her right knee. She had an ECG that was abnormal and her psychiatrist had mentioned to her possible cardiac effects of Trazodone and Xanax so came to my clinic because of concerns about her ECG. Patient is active, taking care of her disabled son including lifting him and his wheelchair which she is able to do. She denies chest pain, dyspnea, orthopnea, PND, palpitations, presyncope, syncope, edema or claudication. No history of bleeding or clotting. She had a left TKA in February of this year which was uneventful.  The patient's risk factor profile is: (+) HTN, Type II DM, (+) hypercholesterolemia, (+) 10 pack-year tobacco use, (-) fam Hx premature CAD.  The patient has no history of cardiovascular disease (CAD, CHF, arrhythmia, valvular heart disease).  The patient has no Hx of PAD or cerebrovascular disease.  The patient has not undergone prior cardiovascular evaluation and has never had an ECHO, stress study, cardiac catheterization, or EP study.     PAST MEDICAL HISTORY:  Past Medical History:   Diagnosis Date     Allergies      Bipolar disorder, unspecified (H)      DM II (diabetes mellitus, type II)     diet controlled     Endometriosis      Herniated lumbar intervertebral disc     age 22     High cholesterol      HTN      Liver disease     fatty liver     Obesity      Other disorder of menstruation and other abnormal bleeding from female genital tract      Polycystic ovarian syndrome      Sleep disorder      Toxemia        CURRENT MEDICATIONS:  Current Outpatient Prescriptions   Medication Sig Dispense Refill     metoprolol (LOPRESSOR) 100 MG tablet TAKE 1.5 TABLETS (150 MG) BY MOUTH 2 TIMES DAILY FOR  BLOOD PRESSURE (NEED TO BE SEEN FOR REFILLS) 90 tablet 0     LATUDA 40 MG TABS tablet        citalopram (CELEXA) 40 MG tablet        ALPRAZolam (XANAX) 0.5 MG tablet Take 0.5 mg by mouth       metFORMIN (GLUCOPHAGE-XR) 500 MG 24 hr tablet Take 2 tablets (1,000 mg) by mouth daily (with dinner) For diabetes (Patient not taking: Reported on 6/14/2017) 180 tablet 1     simvastatin (ZOCOR) 40 MG tablet Take 1 tablet (40 mg) by mouth At Bedtime For cholesterol 90 tablet 3     losartan (COZAAR) 100 MG tablet Take 0.5 tablets (50 mg) by mouth 2 times daily For blood pressure 90 tablet 1     gabapentin (NEURONTIN) 600 MG tablet Take 1 tablet (600 mg) by mouth 3 times daily Take with one 300 mg tab 3 times a day for total dose of 900 mg 3 times a day. 90 tablet 5     gabapentin (NEURONTIN) 300 MG capsule Take 1 tab with 1 600 mg tab for 900 mg 3 times a day. 90 capsule 5     TRAZODONE HCL PO Take 100 mg by mouth Reported on 5/8/2017         PAST SURGICAL HISTORY:  Past Surgical History:   Procedure Laterality Date     C/SECTION, CLASSICAL      X2     HYSTERECTOMY, PAP NO LONGER INDICATED       HYSTERECTOMY, KAMILLA  4/8/08     LAPAROSCOPIC CHOLECYSTECTOMY  7/18/2012    Procedure: LAPAROSCOPIC CHOLECYSTECTOMY;  Laparoscopic cholecystectomy;  Surgeon: Miguel Fuentes MD;  Location: [facility] OR     SALPINGO OOPHORECTOMY,R/L/AYNELIS  4/8/08    L     TONSILLECTOMY  1983     TUBAL LIGATION         ALLERGIES  Imitrex [sumatriptan]; Calcium channel blockers; Augmentin; Codeine; Latex; Sulfa drugs; and Lisinopril    FAMILY HX:  Family History   Problem Relation Age of Onset     CANCER Mother      skin cancer - non-melanoma     Hypertension Mother      Lipids Father        SOCIAL HX:  Social History     Social History     Marital status:      Spouse name: N/A     Number of children: N/A     Years of education: N/A     Occupational History      [employer]     Social History Main Topics     Smoking status: Former  Smoker     Packs/day: 0.25     Years: 20.00     Quit date: 7/18/2012     Smokeless tobacco: Never Used     Alcohol use No     Drug use: No     Sexual activity: Yes     Partners: Male     Other Topics Concern     Not on file     Social History Narrative       ROS:  Constitutional: No fever, chills, or sweats. No weight gain/loss.   ENT: No visual disturbance, ear ache, epistaxis.   Allergies/Immunologic: Negative.   Respiratory: No cough, hemoptysis.   Cardiovascular: As per HPI.   GI: No nausea, vomiting, hematemesis or hematochezia.   : No urinary frequency, dysuria, or hematuria.   Integument: Negative.   Psychiatric: Negative.   Neuro: Negative.   Endocrinology: Negative.   Musculoskeletal: right knee pain.    VITAL SIGNS:  BP (!) 154/93 (BP Location: Left arm, Patient Position: Chair, Cuff Size: Adult Large)  Pulse 83  SpO2 98%  There is no height or weight on file to calculate BMI.  Wt Readings from Last 2 Encounters:   06/14/17 129.7 kg (286 lb)   05/25/17 127 kg (280 lb)       PHYSICAL EXAM  Carissa Spears is a 47 year old obese female in no distress.    HEENT: selerae not injected; nares clear.    Neck: JVP normal.  Carotids +4/4 bilaterally without bruits.  Thyroid normal.  Lungs: CTA.    Cor: RRR. Normal S1 and S2.  No murmur, rub, or gallop.  PMI in Lf 5th ICS.  Abd: Soft, obese nontender, nondistended.  NABS.  No pulsatile mass.  Extremities: No C/C/E.  Pulses +4/4 symmetric at the radial and posterior tibial sites.    Neuro: normal speech, gait and affect.    LABS    Lab Results   Component Value Date    WBC 9.7 06/14/2017     Lab Results   Component Value Date    RBC 5.03 06/14/2017     Lab Results   Component Value Date    HGB 14.9 06/14/2017     Lab Results   Component Value Date    HCT 45.3 06/14/2017     No components found for: MCT  Lab Results   Component Value Date    MCV 90 06/14/2017     Lab Results   Component Value Date    MCH 29.6 06/14/2017     Lab Results   Component Value Date     MCHC 32.9 06/14/2017     Lab Results   Component Value Date    RDW 14.9 06/14/2017     Lab Results   Component Value Date     06/14/2017      Recent Labs   Lab Test  06/14/17   0825  02/22/17   0921   NA  138  137   POTASSIUM  4.3  4.1   CHLORIDE  103  102   CO2  25  29   ANIONGAP  10  6   GLC  146*  171*   BUN  11  7   CR  0.80  0.68   ANABELLE  9.5  9.4     Recent Labs   Lab Test  06/14/17   0825  09/23/13   0748  01/04/13   0826   CHOL  146  198  183   HDL  39*  44*  41*   LDL  88  136*  131*   TRIG  94  93  60   CHOLHDLRATIO   --   4.5  4.5   NHDL  107   --    --         EKG:    NSR 81/min; left atrial enlargement; QTc is normal at 410 msec    ASSESSMENT AND PLAN:  1. Normal cardiac exam.     2. ECG shows left atrial enlargement. Patient is on trazadone which has been reported to cause QT prolongation; the patient ECG shows a normal QT interval, and the ECG is otherwise normal. It would be reasonable to perform an echo in the future to see if the left atrium is actually enlarged and to look for diastolic dysfunction as a cause for this    3. Hypertension; I asked her to take BID home blood pressures and take them to Dr. Beahc after she has recovered from surgery to be sure her hypertension is adequately controlled.    4. Dyslipidemia - last LDL satisfactory at 88   -simvastatin 40 mg/day    5. Tobacco abuse; patient counseled to quit smoking    6. Patient's Revised Cardiac Risk Index value is 0 which predicts a perioperative risk for a [facility] adverse cardiac event of 0.4%. Patient may proceed to surgery with no further cardiac testing.

## 2017-06-20 NOTE — PATIENT INSTRUCTIONS
Thank you for coming to the [facility]; please note the following instructions:    1. Dr. Gamaliel Acevedo has requested you to monitor your blood pressure at home twice daily.  We have given you some Daily Blood Pressure Logs to record your readings for your convenience.  Please call your primary care provider, Silvino Beach or send a message via [facility] in 1 week(s) with your recorded blood pressure readings.      2.  Dr. Gamaliel Acevedo is recommending to see you on an as needed basis regarding your heart care.  It was a pleasuring seeing you today at the [facility] @ the [facility].          If you have any questions regarding your visit please contact your care team:     Cardiology  Telephone Number   Phuong COMBS, RN  Faina CROWDER, RN   Kaelyn MILLER, KUMAR SOLO, [facility]   [phone]    *After hours: [phone]   For scheduling appts:     [phone] or    [phone] (select option 1)    *After hours: [phone]     For the Device Clinic (Pacemakers and ICD's)  RN's :  Carissa Khan   During business hours: [phone]    *After business hours:  [phone] (select option 4)      Normal test result notifications will be released via [facility] or mailed within 7 business days.  All other test results, will be communicated via telephone once reviewed by your cardiologist.    If you need a medication refill please contact your pharmacy.  Please allow 3 business days for your refill to be completed.    As always, thank you for trusting us with your health care needs!

## 2017-06-20 NOTE — NURSING NOTE
Med Reconcile: Reviewed and verified all current medications with the patient. The updated medication list was printed and given to the patient.    Return Appointment: Patient given instructions regarding scheduling next clinic visit, to her PCP. Patient demonstrated understanding of this information and agreed to call with further questions or concerns.    Pt to take BP's and report back to Dr. Beach in 2 weeks.    OK'ed to proceed to surgery tomorrow.    Faina Quinn RN

## 2017-07-03 ENCOUNTER — TELEPHONE (OUTPATIENT)
Dept: FAMILY MEDICINE | Facility: CLINIC | Age: 47
End: 2017-07-03

## 2017-07-03 DIAGNOSIS — M79.2 NEURALGIA: ICD-10-CM

## 2017-07-03 RX ORDER — GABAPENTIN 600 MG/1
600 TABLET ORAL 3 TIMES DAILY
Qty: 90 TABLET | Refills: 2 | Status: SHIPPED | OUTPATIENT
Start: 2017-07-03 | End: 2017-09-25

## 2017-07-03 NOTE — TELEPHONE ENCOUNTER
Pt states the pharmacy received the 300 mg pills but not the 600 mg pills.  Pt takes 900 mg tid.  To provider for refill.  Jesenia Agrawal RN

## 2017-08-04 ENCOUNTER — TELEPHONE (OUTPATIENT)
Dept: FAMILY MEDICINE | Facility: CLINIC | Age: 47
End: 2017-08-04

## 2017-08-04 DIAGNOSIS — G43.109 MIGRAINE WITH AURA AND WITHOUT STATUS MIGRAINOSUS, NOT INTRACTABLE: Primary | ICD-10-CM

## 2017-08-04 DIAGNOSIS — J30.2 SEASONAL ALLERGIC RHINITIS: Primary | ICD-10-CM

## 2017-08-04 NOTE — TELEPHONE ENCOUNTER
Controlled Substance Refill Request for vicodin  Problem List Complete:  No     PROVIDER TO CONSIDER COMPLETION OF PROBLEM LIST AND OVERVIEW/CONTROLLED SUBSTANCE AGREEMENT    Last Written Prescription Date:  5-8-17  Last Fill Quantity: 20,   # refills: 0    [place] Office [place] [facility] [place]: 2-20-17    Future Office visit:     Controlled substance agreement on file: No.     Processing:  Patient will  in clinic     checked in past 6 months?  Yes 8/4/17   on your desk   Jesenia Agrawal RN

## 2017-08-04 NOTE — TELEPHONE ENCOUNTER
Pt notified of provider message as written.  Referral for pain clinic done and scheduling number given to pt.  Pt states her migraines are brought on by allergies and she is using her allergy medications.  Advised ov for plan if they are not working.  Pt states Dr. Beach is aware of her medical hx and migraines from allergies.   Pt states she almost  from trying imitrex and vicodin is only thing that works for her.  Pt asking what she should do for current migraine.  Advised to make pain clinic appointment and then make appointment for evaluation for plan to appointment.  Pt not happy and raising her voice louder and louder on phone.  Advised Dr. Beach is not willing to prescribe narcotic since pt is on xanax.  Advised pt I am only messenger.  Pt repeats Dr. Beach knows her history and only Vicodin works for her and she almost  trying Imitrex.  Advised pt yelling at nurse is not helpful right now and pt hung up.  Jesenia Agrawal RN

## 2017-08-04 NOTE — TELEPHONE ENCOUNTER
Narcotics denied by me. Patient on xanax - a benzo and can't do benzo's and narcotics. Needs to get through a pain specialist and looks like seen 3 different providers for narcotics in June. Will refer to pain specialist if needed. Silvino Beach MD

## 2017-08-04 NOTE — TELEPHONE ENCOUNTER
Patient states she has a migraine and would like a prescription for vicoden.  Please call when ready for .    Thank you.

## 2017-08-04 NOTE — TELEPHONE ENCOUNTER
Pt states she would like a referral for an allergist.  Referral done and scheduling number given to pt.  Pt asking what she can do now.  Advised Dr. Beach is declining to do Vicodin.  Pt states she did not know message I gave her earlier was from Dr. Beach.  advise I had said Dr. Beach had reviewed her message and this was his response.   I had also said I was only the messenger and that Dr. Beach was declining the refill of Vicodin.  Pt states she is concerned about getting nausea and dehydration.  I asked if she would like me to request an antinausea med for her from Dr. Beach.  Pt states no.  I asked what pt to clarify what she would like, pt states I am not a doctor so I do not know what to do and hung up.  Jesenia Agrawal RN

## 2017-08-08 ENCOUNTER — TELEPHONE (OUTPATIENT)
Dept: PHARMACY | Facility: CLINIC | Age: 47
End: 2017-08-08

## 2017-08-08 NOTE — TELEPHONE ENCOUNTER
Pt was scheduled for 9 AM phone visit for MTM medication review [facility]  Called at 9AM - no answer. Left message stating I would call back in 5-10 minutes. Called second time at 9:10 AM and left VM after no answer that including MTM Scheduling Line to reschedule call.    German BassD  Medication Therapy Management Provider  Pager (voicemail): [phone]

## 2017-08-14 ENCOUNTER — OFFICE VISIT (OUTPATIENT)
Dept: ALLERGY | Facility: CLINIC | Age: 47
End: 2017-08-14
Payer: COMMERCIAL

## 2017-08-14 VITALS
HEIGHT: 68 IN | DIASTOLIC BLOOD PRESSURE: 88 MMHG | WEIGHT: 282.8 LBS | SYSTOLIC BLOOD PRESSURE: 168 MMHG | HEART RATE: 98 BPM | OXYGEN SATURATION: 97 % | BODY MASS INDEX: 42.86 KG/M2

## 2017-08-14 DIAGNOSIS — G43.109 MIGRAINE WITH AURA AND WITHOUT STATUS MIGRAINOSUS, NOT INTRACTABLE: ICD-10-CM

## 2017-08-14 DIAGNOSIS — J31.0 NON-ALLERGIC RHINITIS: Primary | ICD-10-CM

## 2017-08-14 PROCEDURE — 99203 OFFICE O/P NEW LOW 30 MIN: CPT | Mod: 25 | Performed by: ALLERGY & IMMUNOLOGY

## 2017-08-14 PROCEDURE — 95004 PERQ TESTS W/ALRGNC XTRCS: CPT | Performed by: ALLERGY & IMMUNOLOGY

## 2017-08-14 NOTE — NURSING NOTE
BP was elevated at start of visit. At recheck it was 168/88. Patient states she is due to take her bp pills. She also states that she is in pain, and she believes this is the reason it is elevated. She will go home, take her pill, continue monitoring her pressure, and if it increases again she will schedule an appt with her PCP or go to the ED if it becomes too elevated.       Per provider verbal order, placed Adult Environmental Panel scratch test.  Consent was obtained prior to procedure.  Once panels were placed, patient was monitored for 15 minutes in clinic.  RN read test after 15 minutes and provider was notified of results.  Pt tolerated procedure well.  All questions and concerns were addressed at office visit.             Soraya Burnette RN

## 2017-08-14 NOTE — Clinical Note
I saw Carissa today in consultation. Negative allergy testing. Do not think nasal symptoms are contributing to migraines. If she was having persistent sinus headaches then would consider ENT evaluation for chronic sinusitis, but this was not history I was getting. Continue Flonase and Zyrtec as she thinks this has been helpful. Please see note for details. Thanks.   Damon Infante

## 2017-08-14 NOTE — PROGRESS NOTES
Carissa Spears is a 47 year old White female with previous medical history significant for hyperlipidemia, migraines, hypertension, bipolar, tobacco use. Carissa Spears is being seen today for evaluation of seasonal allergies. The patient is being seen in consultation at the request of Dr. Silvino Beach MD.     The patient reports that for her entire life in the spring, summer and fall she has had nasal and ocular symptoms. Her symptoms were improved when she was in her 30s, but have returned over the last few years. She endorses rhinorrhea, nasal itching, sneezing, congestion, ocular itching, ocular watering, ocular redness, ocular burning. Symptoms are present throughout the day. Symptoms are present in door and outdoor. Symptoms are made worse with dust, mowing grass, raking leaves and perfumes. Loratadine has not been beneficial. Flonase has been used for last 2 years and has been somewhat helpful for nasal symptoms. Zyrtec has been used and has been beneficial for nasal symptoms and ocular symptoms. Her main complaint today is headaches. She will have 4-5 prolonged migraine headaches yearly. These tend to occur when the seasons change. She denies sinus pressure on a consistent basis or with these headaches. She had allergy testing in childhood and this was positive, I do not have these results. She's never been on allergen immunotherapy. No recent CT of sinuses. She has not been seen by ENT.     The patient has no history of asthma, eczema, food allergies, medications allergies or hives.     ENVIRONMENTAL HISTORY: [place] The home is heated with a forced air. They does have central air conditioning. The patient's bedroom is furnished with carpeting in bedroom, allergen mattress cover and fabric window coverings.  Pets inside the house include 1 dog(s). There is not history of cockroach or mice infestation. There is/are 0 smokers in the house.  The house does have a  damp basement.     Past Medical History:   Diagnosis Date     Allergies      Bipolar disorder, unspecified (H)      DM II (diabetes mellitus, type II)     diet controlled     Endometriosis      Herniated lumbar intervertebral disc     age 22     High cholesterol      HTN      Liver disease     fatty liver     Obesity      Other disorder of menstruation and other abnormal bleeding from female genital tract      Polycystic ovarian syndrome      Sleep disorder      Toxemia      Family History   Problem Relation Age of Onset     CANCER Mother      skin cancer - non-melanoma     Hypertension Mother      Lipids Father      Past Surgical History:   Procedure Laterality Date     AS KNEE SCOPE, ALLOGRAFT IMPANT Left      C/SECTION, CLASSICAL      X2     HYSTERECTOMY, PAP NO LONGER INDICATED       HYSTERECTOMY, KAMILLA  4/8/08     LAPAROSCOPIC CHOLECYSTECTOMY  7/18/2012    Procedure: LAPAROSCOPIC CHOLECYSTECTOMY;  Laparoscopic cholecystectomy;  Surgeon: Mgiuel Fuentes MD;  Location: [facility] OR     SALPINGO OOPHORECTOMY,R/L/YANELIS  4/8/08    L     TONSILLECTOMY  1983     TUBAL LIGATION         REVIEW OF SYSTEMS:  General: negative for weight gain. negative for weight loss. negative for changes in sleep.   Ears: positive  for fullness. negative for hearing loss. positive  for dizziness.   Nose: negative for snoring.negative for changes in smell. negative for drainage.   Eyes: positive  for eye watering. positive  for eye itching. positive  for vision changes. positive  for eye redness.  Throat: positive  for hoarseness. positive  for sore throat. positive  for trouble swallowing.   Lungs: negative for shortness of breath.negative for wheezing. negative for sputum production.   Cardiovascular: negative for chest pain. negative for swelling of ankles. negative for fast or irregular heartbeat.   Gastrointestinal: positive  for nausea. negative for heartburn. negative for acid reflux.   Musculoskeletal: positive  for joint pain.  positive  for joint stiffness. positive  for joint swelling.   Neurologic: negative for seizures. negative for fainting. negative for weakness.   Psychiatric: negative for changes in mood. negative for anxiety.   Endocrine: negative for cold intolerance. negative for heat intolerance. negative for tremors.   Lymphatic: negative for lower extremity swelling. negative for lymph node swelling.   Hematologic: positive for easy bruising. negative for easy bleeding.  Integumentary: negative for rash. positive  for scaling. negative for nail changes.       Current Outpatient Prescriptions:      gabapentin (NEURONTIN) 300 MG capsule, TAKE 1 TAB WITH 1 600 MG TAB  MG 3 TIMES A DAY., Disp: 90 capsule, Rfl: 2     gabapentin (NEURONTIN) 600 MG tablet, Take 1 tablet (600 mg) by mouth 3 times daily Take with one 300 mg tab 3 times a day for total dose of 900 mg 3 times a day., Disp: 90 tablet, Rfl: 2     metoprolol (LOPRESSOR) 100 MG tablet, TAKE 1.5 TABLETS (150 MG) BY MOUTH 2 TIMES DAILY FOR BLOOD PRESSURE (NEED TO BE SEEN FOR REFILLS), Disp: 90 tablet, Rfl: 0     LATUDA 40 MG TABS tablet, , Disp: , Rfl:      citalopram (CELEXA) 40 MG tablet, , Disp: , Rfl:      simvastatin (ZOCOR) 40 MG tablet, Take 1 tablet (40 mg) by mouth At Bedtime For cholesterol, Disp: 90 tablet, Rfl: 3     losartan (COZAAR) 100 MG tablet, Take 0.5 tablets (50 mg) by mouth 2 times daily For blood pressure, Disp: 90 tablet, Rfl: 1     TRAZODONE HCL PO, Take 100 mg by mouth At Bedtime Patient reports: Takes 100-2000 MG at bedtime, Disp: , Rfl:      ALPRAZolam (XANAX) 0.5 MG tablet, Take 0.5 mg by mouth, Disp: , Rfl:      metFORMIN (GLUCOPHAGE-XR) 500 MG 24 hr tablet, Take 2 tablets (1,000 mg) by mouth daily (with dinner) For diabetes (Patient not taking: Reported on 8/14/2017), Disp: 180 tablet, Rfl: 1  Immunization History   Administered Date(s) Administered     HepB-Peds 09/29/1994     Hepatitis A Vac Ped/Adol-2 Dose 10/20/1999     Influenza  (H1N1) 01/12/2010     Influenza (IIV3) 11/20/2008, 01/12/2010, 12/08/2010, 10/24/2011, 10/09/2012     MMR 10/21/1997     TD (ADULT, 7+) 10/21/1997     TDAP Vaccine (Boostrix) 07/27/2010     Allergies   Allergen Reactions     Imitrex [Sumatriptan] Anaphylaxis     Blood pressure high/ she was in hospital     Calcium Channel Blockers      Augmentin Diarrhea     Codeine      Sick to stomach     Latex      Puffy, rash      Sulfa Drugs Nausea     Lisinopril Nausea         EXAM:   Constitutional:  Appears well-developed and well-nourished. No distress.   HEENT:   Head: Normocephalic.   Right Ear: External ear normal. TM normal  Left Ear: External ear normal. TM normal  Mouth/Throat: No oropharyngeal exudate present.   No cobblestoning of posterior oropharynx.   Nasal tissue pink and normal appearing.  No rhinorrhea noted.    Eyes: Conjunctivae are non-erythematous   No maxillary or frontal sinus tenderness to palpation.   Cardiovascular: Normal rate, regular rhythm and normal heart sounds. Exam reveals no gallop and no friction rub.   No murmur heard.  Respiratory: Effort normal and breath sounds normal. No respiratory distress. No wheezes. No rales.   Musculoskeletal: Normal range of motion.   Lymphadenopathy:   No cervical adenopathy.   No lower extremity edema.   Neuro: Oriented to person, place, and time.  Skin: Skin is warm and dry. No rash noted.   Psychiatric: Normal mood and affect.     Nursing note and vitals reviewed.      WORKUP:   Skin testing  Negative for environmental allergens.     ASSESSMENT/PLAN:  Problem List Items Addressed This Visit        Nervous and Auditory    Migraine with aura and without status migrainosus, not intractable       Respiratory    Non-allergic rhinitis - Primary     Chronic spring, summer and fall nasal and ocular symptoms. Symptoms improved with Flonase 2 sprays per nostril daily and Zyrtec 10 mg by mouth daily. Her symptoms worsened after she stops Zyrtec. Symptoms are made worse  with dust, mowing grass, raking leaves and perfumes. She is concerned that migraine headaches could potentially be secondary to seasonal allergies. She denies sinus headaches. No ENT evaluation. No recent sinus imaging.    Skin testing was negative for all environmental allergens tested. She had good positive and negative responses.    - Treating as nonallergic rhinitis. Continue Flonase 2 sprays per nostril daily. This will be effective for both allergic or nonallergic rhinitis.  - I do not think her headaches are secondary to nonallergic rhinitis. I suppose she may have chronic sinusitis, but typically this leads to sinus pressure and not classic migraine symptoms. She additionally is not persistently having symptoms as they may suspect some chronic sinusitis to have. Could consider ENT evaluation if symptoms persist.   - Since she has found Zyrtec to be helpful she can use daily as needed.   - Return to clinic as needed.          Relevant Orders    ALLERGY SKIN TESTS,ALLERGENS (Completed)          Chart documentation with Dragon Voice recognition Software. Although reviewed after completion, some words and grammatical errors may remain.    Damon Infante,    Allergy/Immunology  [facility], [place] and [place], [place]

## 2017-08-14 NOTE — PATIENT INSTRUCTIONS
Allergy Staff Appt Hours Shot Hours Locations    Physician     Damon Infante DO       Support Staff     Soraya SILVERIO RN      Kenzie MILLER MA  Monday:                      [place] 8-7     Tuesday:         [place] 8-5 Wednesday:        [place]: 7-5     Friday:        [place] 7-5   [place]        Monday: 9-6        Friday: 7-2     [place]        Tuesday: 7-12 Thursday: 1-6 [place] Tuesday: 1-6 Wednesday: 11-6 Thursday: 7-12 [facility]  [address] [place] [address]  Appt Line: [phone]  Allergy RN (Monday):  [phone]    [facility]  [address] [place] [address]  Appt Line: [phone]  Allergy RN (Tues & Wed):  [phone]    [facility]  [address] [place] [address]  Appt Line: [phone]  Allergy RN (Friday):  [phone]       Important Scheduling Information  Aspirin Desensitization: Appt will last 2 clinic days. Please call the Allergy RN line for your clinic to schedule. Discontinue antihistamines 7 days prior to the appointment.     Food Challenges: Appt will last 3-4 hours. Please call the Allergy RN line for your clinic to schedule. Discontinue antihistamines 7 days prior to the appointment.     Penicillin Testing: Appt will last 2-3 hours. Please call the Allergy RN line for your clinic to schedule. Discontinue antihistamines 7 days prior to the appointment.     Skin Testing: Appt will about 40 minutes. Call the appointment line for your clinic to schedule. Discontinue antihistamines 7 days prior to the appointment.     Venom Testing: Appt will last 2-3 hours. Please call the Allergy RN line for your clinic to schedule. Discontinue antihistamines 7 days prior to the appointment.     Thank you for trusting us with your Allergy, Asthma, and Immunology care. Please feel free to contact us with any questions or concerns you may have.

## 2017-08-14 NOTE — NURSING NOTE
"Chief Complaint   Patient presents with     Consult     migraines, allergies       Initial /70 (BP Location: Right arm, Patient Position: Sitting, Cuff Size: Adult Large)  Pulse 98  Ht 5' 7.79\" (1.722 m)  Wt 282 lb 12.8 oz (128.3 kg)  SpO2 97%  BMI 43.26 kg/m2 Estimated body mass index is 43.26 kg/(m^2) as calculated from the following:    Height as of this encounter: 5' 7.79\" (1.722 m).    Weight as of this encounter: 282 lb 12.8 oz (128.3 kg).  Medication Reconciliation: complete   Kenzie Huerta MA      "

## 2017-08-14 NOTE — MR AVS SNAPSHOT
After Visit Summary   8/14/2017    Carissa Spears    MRN: [id number]           Patient Information     Date Of Birth          1970        Visit Information        Provider Department      8/14/2017 6:00 PM Damon Infante DO [facility]        Today's Diagnoses     Non-allergic rhinitis    -  1    Migraine with aura and without status migrainosus, not intractable          Care Instructions    Allergy Staff Appt Hours Shot Hours Locations    Physician     Damon Infante DO       Support Staff     YONIS Catherine MA  Monday:                      [place] 8-7     Tuesday:         [place] 8-5 Wednesday:        [place]: 7-5 Friday:        [place] 7-5   [place]        Monday: 9-6 Friday: 7-2     [place]        Tuesday: 7-12 Thursday: 1-6 [place] Tuesday: 1-6 Wednesday: 11-6 Thursday: 7-12 [facility]  [address] [place] [address]  Appt Line: [phone]  Allergy RN (Monday):  [phone]    [facility]  [address] [place] [address]  Appt Line: [phone]  Allergy RN (Tues & Wed):  [phone]    [facility]  [address] [place] [address]  Appt Line: [phone]  Allergy RN (Friday):  [phone]       Important Scheduling Information  Aspirin Desensitization: Appt will last 2 clinic days. Please call the Allergy RN line for your clinic to schedule. Discontinue antihistamines 7 days prior to the appointment.     Food Challenges: Appt will last 3-4 hours. Please call the Allergy RN line for your clinic to schedule. Discontinue antihistamines 7 days prior to the appointment.     Penicillin Testing: Appt will last 2-3 hours. Please call the Allergy RN line for your clinic to schedule. Discontinue antihistamines 7 days prior to the appointment.     Skin Testing: Appt will about 40 minutes. Call the appointment line for your clinic to schedule. Discontinue  antihistamines 7 days prior to the appointment.     Venom Testing: Appt will last 2-3 hours. Please call the Allergy RN line for your clinic to schedule. Discontinue antihistamines 7 days prior to the appointment.     Thank you for trusting us with your Allergy, Asthma, and Immunology care. Please feel free to contact us with any questions or concerns you may have.              Follow-ups after your visit        Follow-up notes from your care team     Return if symptoms worsen or fail to improve.      Your next 10 appointments already scheduled     Aug 15, 2017  9:00 AM CDT   TELEMEDICINE with [facility] ([facility])    [address] [facility] [address] [phone]           Note: this is not an onsite visit; there is no need to come to the facility.              Who to contact     If you have questions or need follow up information about today's clinic visit or your schedule please contact [facility] directly at [phone].  Normal or non-critical lab and imaging results will be communicated to you by New KCBXhart, letter or phone within 4 business days after the clinic has received the results. If you do not hear from us within 7 days, please contact the clinic through [employer] or phone. If you have a critical or abnormal lab result, we will notify you by phone as soon as possible.  Submit refill requests through [employer]erve or call your pharmacy and they will forward the refill request to us. Please allow 3 business days for your refill to be completed.          Additional Information About Your Visit        SkuServe Information     S[employer]erve gives you secure access to your electronic health record. If you see a primary care provider, you can also send messages to your care team and make appointments. If you have questions, please call your primary care clinic.  If you do not have a primary care provider, please call [phone] and they  "will assist you.        Care EveryWhere ID     This is your Care EveryWhere ID. This could be used by other organizations to access your [facility] medical records  [id number]        Your Vitals Were     Pulse Height Pulse Oximetry BMI (Body Mass Index)          98 5' 7.79\" (1.722 m) 97% 43.26 kg/m2         Blood Pressure from Last 3 Encounters:   08/14/17 168/88   06/20/17 (!) 154/93   06/14/17 136/84    Weight from Last 3 Encounters:   08/14/17 282 lb 12.8 oz (128.3 kg)   06/14/17 286 lb (129.7 kg)   05/25/17 280 lb (127 kg)              We Performed the Following     ALLERGY SKIN TESTS,ALLERGENS        Primary Care Provider Office Phone # Fax #    Silvino Beach [phone] [address] [facility] [address]        Equal Access to Services     [facility] : Hadii aad ku hadasho Soomaali, waaxda luqadaha, qaybta kaalmada adeegyada, waxay idiin hayaan adehyun kharagayle jaman . So [facility] [phone].    ATENCIÓN: Si habla español, tiene a vila disposición servicios gratuitos de asistencia lingüística. Kathy al [phone].    We comply with applicable federal civil rights laws and [place] laws. We do not discriminate on the basis of race, color, national origin, age, disability sex, sexual orientation or gender identity.            Thank you!     Thank you for choosing [facility]  for your care. Our goal is always to provide you with excellent care. Hearing back from our patients is one way we can continue to improve our services. Please take a few minutes to complete the written survey that you may receive in the mail after your visit with us. Thank you!             Your Updated Medication List - Protect others around you: Learn how to safely use, store and throw away your medicines at www.disposemymeds.org.          This list is accurate as of: 8/14/17  7:26 PM.  Always use your most recent med list.                   Brand Name Dispense Instructions for use Diagnosis    " ALPRAZolam 0.5 MG tablet    XANAX     Take 0.5 mg by mouth        citalopram 40 MG tablet    celeXA          * gabapentin 300 MG capsule    NEURONTIN    90 capsule    TAKE 1 TAB WITH 1 600 MG TAB  MG 3 TIMES A DAY.    Neuralgia       * gabapentin 600 MG tablet    NEURONTIN    90 tablet    Take 1 tablet (600 mg) by mouth 3 times daily Take with one 300 mg tab 3 times a day for total dose of 900 mg 3 times a day.    Neuralgia       LATUDA 40 MG Tabs tablet   Generic drug:  lurasidone           losartan 100 MG tablet    COZAAR    90 tablet    Take 0.5 tablets (50 mg) by mouth 2 times daily For blood pressure    Essential hypertension with goal blood pressure less than 140/90       metFORMIN 500 MG 24 hr tablet    GLUCOPHAGE-XR    180 tablet    Take 2 tablets (1,000 mg) by mouth daily (with dinner) For diabetes    Type 2 diabetes mellitus without complication, without long-term current use of insulin (H)       metoprolol 100 MG tablet    LOPRESSOR    90 tablet    TAKE 1.5 TABLETS (150 MG) BY MOUTH 2 TIMES DAILY FOR BLOOD PRESSURE (NEED TO BE SEEN FOR REFILLS)    Essential hypertension with goal blood pressure less than 140/90       simvastatin 40 MG tablet    ZOCOR    90 tablet    Take 1 tablet (40 mg) by mouth At Bedtime For cholesterol    Hyperlipidemia LDL goal <100       TRAZODONE HCL PO      Take 100 mg by mouth At Bedtime Patient reports: Takes 100-2000 MG at bedtime        * Notice:  This list has 2 medication(s) that are the same as other medications prescribed for you. Read the directions carefully, and ask your doctor or other care provider to review them with you.

## 2017-08-15 ENCOUNTER — ALLIED HEALTH/NURSE VISIT (OUTPATIENT)
Dept: PHARMACY | Facility: OTHER | Age: 47
End: 2017-08-15
Payer: COMMERCIAL

## 2017-08-15 DIAGNOSIS — G89.29 CHRONIC ABDOMINAL PAIN: ICD-10-CM

## 2017-08-15 DIAGNOSIS — E11.9 TYPE 2 DIABETES MELLITUS WITHOUT COMPLICATION, WITHOUT LONG-TERM CURRENT USE OF INSULIN (H): ICD-10-CM

## 2017-08-15 DIAGNOSIS — I10 HYPERTENSION GOAL BP (BLOOD PRESSURE) < 140/90: ICD-10-CM

## 2017-08-15 DIAGNOSIS — J31.0 NON-ALLERGIC RHINITIS: ICD-10-CM

## 2017-08-15 DIAGNOSIS — F31.9 BIPOLAR AFFECTIVE DISORDER, REMISSION STATUS UNSPECIFIED (H): ICD-10-CM

## 2017-08-15 DIAGNOSIS — G43.109 MIGRAINE WITH AURA AND WITHOUT STATUS MIGRAINOSUS, NOT INTRACTABLE: Primary | ICD-10-CM

## 2017-08-15 DIAGNOSIS — E78.5 HYPERLIPIDEMIA LDL GOAL <100: ICD-10-CM

## 2017-08-15 DIAGNOSIS — R10.9 CHRONIC ABDOMINAL PAIN: ICD-10-CM

## 2017-08-15 DIAGNOSIS — G47.00 INSOMNIA, UNSPECIFIED TYPE: ICD-10-CM

## 2017-08-15 PROCEDURE — 99605 MTMS BY PHARM NP 15 MIN: CPT | Mod: U4 | Performed by: PHARMACIST

## 2017-08-15 PROCEDURE — 99607 MTMS BY PHARM ADDL 15 MIN: CPT | Mod: U4 | Performed by: PHARMACIST

## 2017-08-15 NOTE — ASSESSMENT & PLAN NOTE
Chronic spring, summer and fall nasal and ocular symptoms. Symptoms improved with Flonase 2 sprays per nostril daily and Zyrtec 10 mg by mouth daily. Her symptoms worsened after she stops Zyrtec. Symptoms are made worse with dust, mowing grass, raking leaves and perfumes. She is concerned that migraine headaches could potentially be secondary to seasonal allergies. She denies sinus headaches. No ENT evaluation. No recent sinus imaging.    Skin testing was negative for all environmental allergens tested. She had good positive and negative responses.    - Treating as nonallergic rhinitis. Continue Flonase 2 sprays per nostril daily. This will be effective for both allergic or nonallergic rhinitis.  - I do not think her headaches are secondary to nonallergic rhinitis. I suppose she may have chronic sinusitis, but typically this leads to sinus pressure and not classic migraine symptoms. She additionally is not persistently having symptoms as they may suspect some chronic sinusitis to have. Could consider ENT evaluation if symptoms persist.   - Since she has found Zyrtec to be helpful she can use daily as needed.   - Return to clinic as needed.

## 2017-08-15 NOTE — MR AVS SNAPSHOT
After Visit Summary   8/15/2017    Carissa Spears    MRN: [id number]           Patient Information     Date Of Birth          1970        Visit Information        Provider Department      8/15/2017 9:00 AM [facility]        Today's Diagnoses     Migraine with aura and without status migrainosus, not intractable    -  1    Chronic abdominal pain        Bipolar affective disorder, remission status unspecified (H)        Insomnia, unspecified type        Hypertension goal BP (blood pressure) < 140/90        Hyperlipidemia LDL goal <100        Non-allergic rhinitis        Type 2 diabetes mellitus without complication, without long-term current use of insulin (H)          Care Instructions    Recommendations from today's MTM visit:                                                    Today we reviewed what your medicines are for, how to know if they are working, that your medicines are safe and how to make your medicine regimen as easy as possible.     1) Please strongly consider visiting with the pain clinic.  You can work together with the pain clinic to come up with a plan for your chronic pain and migraines to reduce the burden ibuprofen is placing on your kidneys and blood pressure.     2) Please have an A1c drawn this fall to determine how well your lifestyle changes are managing your diabetes.    Next MTM visit: I will let you know your lab results when they return later in the fall.     To schedule another [facility]TM appointment, please call the clinic directly or you may call the [facility]TM scheduling line at [phone] or toll-free at [phone].     My Clinical Pharmacist's contact information:                                                      It was a pleasure speaking with you today!  Please feel free to contact me with any questions or concerns you have.      [facility], Pharm.D., BCACP  Medication Therapy Management Pharmacist  Page/VM:   [phone]      You may receive a survey about the [facility] services you received.  I would appreciate your feedback to help me serve you better in the future. Please fill it out and return it when you can. Your comments will be anonymous.                    Follow-ups after your visit        Who to contact     If you have questions or need follow up information about today's clinic visit or your schedule please contact [facility] directly at [phone].  Normal or non-critical lab and imaging results will be communicated to you by Milahart, letter or phone within 4 business days after the clinic has received the results. If you do not hear from us within 7 days, please contact the clinic through [employer] or phone. If you have a critical or abnormal lab result, we will notify you by phone as soon as possible.  Submit refill requests through [employer] or call your pharmacy and they will forward the refill request to us. Please allow 3 business days for your refill to be completed.          Additional Information About Your Visit        MilaharCoverMyMeds Information     [employer] gives you secure access to your electronic health record. If you see a primary care provider, you can also send messages to your care team and make appointments. If you have questions, please call your primary care clinic.  If you do not have a primary care provider, please call [phone] and they will assist you.        Care EveryWhere ID     This is your Care EveryWhere ID. This could be used by other organizations to access your [place] medical records  [id number]         Blood Pressure from Last 3 Encounters:   08/14/17 168/88   06/20/17 (!) 154/93   06/14/17 136/84    Weight from Last 3 Encounters:   08/14/17 282 lb 12.8 oz (128.3 kg)   06/14/17 286 lb (129.7 kg)   05/25/17 280 lb (127 kg)               Primary Care Provider Office Phone # Fax #    Silvino Beach [phone] [address] [facility]  [address]        Equal Access to Services     [facility] Center[facility]ATIE : Hadii ud[address] Goode, waaxda luqadaha, qaybta kaalmada sashagrazynagrant, genesis greerbhavanigayle grey. So [facility] [phone].    ATENCIÓN: Si habla español, tiene a vila disposición servicios gratuitos de asistencia lingüística. Reggieame al [phone].    We comply with applicable federal civil rights laws and [place] laws. We do not discriminate on the basis of race, color, national origin, age, disability sex, sexual orientation or gender identity.            Thank you!     Thank you for choosing [facility]  for your care. Our goal is always to provide you with excellent care. Hearing back from our patients is one way we can continue to improve our services. Please take a few minutes to complete the written survey that you may receive in the mail after your visit with us. Thank you!             Your Updated Medication List - Protect others around you: Learn how to safely use, store and throw away your medicines at www.disposemymeds.org.          This list is accurate as of: 8/15/17 11:59 PM.  Always use your most recent med list.                   Brand Name Dispense Instructions for use Diagnosis    ALPRAZolam 0.5 MG tablet    XANAX     Take 0.5 mg by mouth        citalopram 40 MG tablet    celeXA          * gabapentin 300 MG capsule    NEURONTIN    90 capsule    TAKE 1 TAB WITH 1 600 MG TAB  MG 3 TIMES A DAY.    Neuralgia       * gabapentin 600 MG tablet    NEURONTIN    90 tablet    Take 1 tablet (600 mg) by mouth 3 times daily Take with one 300 mg tab 3 times a day for total dose of 900 mg 3 times a day.    Neuralgia       IBUPROFEN PO      Take 200-400 mg by mouth every 4 hours as needed for moderate pain        LATUDA 40 MG Tabs tablet   Generic drug:  lurasidone           losartan 100 MG tablet    COZAAR    90 tablet    Take 0.5 tablets (50 mg) by mouth 2 times daily For blood pressure    Essential hypertension with  goal blood pressure less than 140/90       metFORMIN 500 MG 24 hr tablet    GLUCOPHAGE-XR    180 tablet    Take 2 tablets (1,000 mg) by mouth daily (with dinner) For diabetes    Type 2 diabetes mellitus without complication, without long-term current use of insulin (H)       metoprolol 100 MG tablet    LOPRESSOR    90 tablet    TAKE 1.5 TABLETS (150 MG) BY MOUTH 2 TIMES DAILY FOR BLOOD PRESSURE (NEED TO BE SEEN FOR REFILLS)    Essential hypertension with goal blood pressure less than 140/90       simvastatin 40 MG tablet    ZOCOR    90 tablet    Take 1 tablet (40 mg) by mouth At Bedtime For cholesterol    Hyperlipidemia LDL goal <100       TRAZODONE HCL PO      Take 100 mg by mouth At Bedtime Patient reports: Takes 100-2000 MG at bedtime        * Notice:  This list has 2 medication(s) that are the same as other medications prescribed for you. Read the directions carefully, and ask your doctor or other care provider to review them with you.

## 2017-08-15 NOTE — PROGRESS NOTES
"SUBJECTIVE/OBJECTIVE:                           Carissa Spears is a 47 year old female called for an initial visit for Medication Therapy Management.  She was referred to me from -Insurance.     Chief Complaint: Initial CMR.  This is a difficult conversation today.  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     Allergies/ADRs: Reviewed in Epic  Tobacco: 0-1 pack per day - is not interested in quittingTobacco Cessation Action Plan: Information offered: Patient not interested at this time  Alcohol: not currently using  Caffeine: 2 cups/day of coffee  Activity: Had a knee replacement and had the other knee injured during her recover.  She walks with significant discomfort right now.  Able to do the stairs and works on strength training at home to her ability.   PMH: Reviewed in Epic    Medication Adherence: no issues reported    History of chronic pain with gallbladder surgery and osteoarthritis: Gabapentin 900 mg TID.  This takes the edge off and allows her to function.  She details issues with her knees.  She uses ibuprofen 200 mg x12 daily.  She reports migraines recently.  She has had migraines since she was about 12 years old.  She does not endorse worsening headaches with ibuprofen use.   We discuss the risks with this level of ibuprofen use and how it is likely already affecting her organs.  She has been told she needs to go to the pain clinic for evaluation but she says she doesn't want to go.  \"They will only do alternatives to pain pills first.  They make you do all these things that don't work and they don't do pain pills anymore\".  She has tried injections but they were not effective.      Migraines: Patient wants to use Percocet for her migraines and feels that \"nothing else has worked or I'm allergic to everything\". She has been without treatment for this condition since she has been unable to use Percocet.  \"It is the only thing that works; " "I've never abused it, why won't they just give it to me?\".  We discuss that there are other options but she does not seem very open to this right now.     Hypertension: Current medications include losartan 50 mg twice daily and metoprolol tartrate 150 mg twice daily.  Patient does not self-monitor BP.  Patient reports no current medication side effects. Patient believes this is related to chronic pain that is not well controlled. We discuss this possibility and we also reiterate the role chronic NSAID use plays here.     Allergies: No current medications but will restart Flonase in the fall when her symptoms return.      Hyperlipidemia: Current therapy includes simvastatin 40 mg once daily.  Pt reports no significant myalgias or other side effects.   The 10-year ASCVD risk score (Mathervillemariel IBANEZ Jr, et al., 2013) is: 13.7%    Values used to calculate the score:      Age: 47 years      Sex: Female      Is Non- : No      Diabetic: Yes      Tobacco smoker: Yes      Systolic Blood Pressure: 168 mmHg      Is BP treated: Yes      HDL Cholesterol: 39 mg/dL      Total Cholesterol: 146 mg/dL    Diabetes:  Pt currently taking no medications. Patient believes that she is diet controlled at this point and sugars are in the 80's most of the day.  She is unwilling to discuss this further today.   SMB-4 times daily.   Ranges (patient reported): 80-90's m/dL  Patient is not experiencing hypoglycemia  Recent symptoms of high blood sugar? none  Eye exam: up to date  Foot exam: up to date  Microalbumin is < 30 mg/g. Pt is taking an ACEi/ARB.  Aspirin: Not taking due to age  Diet/Exercise: Meat, veggies and fruit.  She is also taking some vitamins - fish oil, calcium, magnesium, probiotic, MVI, B complex, CQ10.      Insomnia: Current medications include: trazodone 100-200 mg. Pt reports trouble falling asleep when she is manic especially. She endorses that this is helpful and denies side effects at this time. " "    Bipolar Disorder: Current medications include Latuda 40 mg, has alprazolam 0.5 mg twice daily (not using right now, uses to come down from k[place]), citalopram 40 mg daily.  She states that Latuda is a \"miracle drug\" and that she \"feels like a normal person\" using it.  She denies weight gain or feeling hungry.  She isn't sure that her citalopram works that well but understands that sometimes you have \"foundation\" drugs in mental health and that this is hers.     Current labs include:BP Readings from Last 3 Encounters:   08/14/17 168/88   06/20/17 (!) 154/93   06/14/17 136/84     Today's Vitals: There were no vitals taken for this visit. - telemed     Lab Results   Component Value Date    A1C 7.2 06/14/2017    A1C 7.5 01/02/2017    A1C 5.8 09/23/2013    A1C 5.5 01/04/2013    A1C 5.2 07/13/2012     Recent Labs   Lab Test  06/14/17   0825  09/23/13   0748  01/04/13   0826   CHOL  146  198  183   HDL  39*  44*  41*   LDL  88  136*  131*   TRIG  94  93  60   CHOLHDLRATIO   --   4.5  4.5       Lab Results   Component Value Date    LDL 88 06/14/2017       Liver Function Studies -   Recent Labs   Lab Test  02/22/17   0921   09/23/13   0748   PROTTOTAL   --    --   7.2   ALBUMIN  3.7   < >  3.8*   BILITOTAL   --    --   0.4   ALKPHOS   --    --   74   AST   --    --   23   ALT   --    --   32    < > = values in this interval not displayed.       Lab Results   Component Value Date    UCRR 60 01/02/2017    MICROL <5 01/02/2017    UMALCR Unable to calculate due to low value 01/02/2017       Last Basic Metabolic Panel:  Lab Results   Component Value Date     06/14/2017      Lab Results   Component Value Date    POTASSIUM 4.3 06/14/2017     Lab Results   Component Value Date    CHLORIDE 103 06/14/2017     Lab Results   Component Value Date    BUN 11 06/14/2017     Lab Results   Component Value Date    CR 0.80 06/14/2017     GFR Estimate   Date Value Ref Range Status   06/14/2017 76 >60 mL/min/1.7m2 Final     Comment:     " Non  GFR Calc   02/22/2017 >90  Non  GFR Calc   >60 mL/min/1.7m2 Final   01/02/2017 81 >60 mL/min/1.7m2 Final     Comment:     Non  GFR Calc     TSH   Date Value Ref Range Status   01/02/2017 0.77 0.40 - 4.00 mU/L Final     Most Recent Immunizations   Administered Date(s) Administered     HepA-Ped 2 dose 10/20/1999     HepB-Peds 09/29/1994     Influenza (H1N1) 01/12/2010     Influenza (IIV3) 10/09/2012     MMR 10/21/1997     TD (ADULT, 7+) 10/21/1997     TDAP Vaccine (Boostrix) 07/27/2010       ASSESSMENT:                             Current medications were reviewed today.     Medication Adherence: needs improvement - see below    History of chronic pain with gallbladder surgery and osteoarthritis: Needs improvement.  Patient would benefit from evaluation at the pain clinic.  Reduction of ibuprofen use is necessary to achieve well controlled BP and maintain sufficient kidney function.     Migraines: Needs improvement.  Patient is unwilling to discuss other options besides Percocet today.     Hypertension: Needs Improvement. Patient is not meeting BP goal of < 140/90mmHg.  It is possible this is secondary to chronic NSAID use due to uncontrolled chronic pain.  Kidney function appears stable with possible slow decline.  Consider taking referral to pain management to reduce chronic ibuprofen use.     Allergies: Stable.     Hyperlipidemia: Stable. Pt is not on high intensity statin which is indicated based on 2013 ACC/AHA guidelines for lipid management.  Patient may benefit from a higher intensity statin in the future.     Diabetes: Needs Improvement. Patient is not meeting A1c goal of < 7%. Pt would benefit from updated Hemoglobin A1c in the fall to assess her lifestyle changes and her stopping of metformin.  It is likely patient would benefit from metformin currently and will likely need it in the future.  Patient declines restarting today.     Insomnia: Stable.      Bipolar disorder: Stable.     PLAN:                            1) Please strongly consider visiting with the pain clinic.  You can work together with the pain clinic to come up with a plan for your chronic pain and migraines to reduce the burden ibuprofen is placing on your kidneys and blood pressure.   2) Please have an A1c drawn this fall to determine how well your lifestyle changes are managing your diabetes.     I spent 45 minutes with this patient today. All changes were made via collaborative practice agreement with Silvino Beach. A copy of the visit note was provided to the patient's primary care provider.    Will follow up in 3 months to see if patient has done A1c.    The patient was sent via [employer] a summary of these recommendations as an after visit summary.     Mercy Alba, Pharm.D., BCACP  Medication Therapy Management Pharmacist  Page/VM:  [phone]

## 2017-08-18 NOTE — PATIENT INSTRUCTIONS
Recommendations from today's MTM visit:                                                    Today we reviewed what your medicines are for, how to know if they are working, that your medicines are safe and how to make your medicine regimen as easy as possible.     1) Please strongly consider visiting with the pain clinic.  You can work together with the pain clinic to come up with a plan for your chronic pain and migraines to reduce the burden ibuprofen is placing on your kidneys and blood pressure.     2) Please have an A1c drawn this fall to determine how well your lifestyle changes are managing your diabetes.    Next MTM visit: I will let you know your lab results when they return later in the fall.     To schedule another MTM appointment, please call the clinic directly or you may call the MTM scheduling line at [phone] or toll-free at [phone].     My Clinical Pharmacist's contact information:                                                      It was a pleasure speaking with you today!  Please feel free to contact me with any questions or concerns you have.      Mercy Alba, Pharm.D., [facility]  Medication Therapy Management Pharmacist  Page/VM:  [phone]      You may receive a survey about the MTM services you received.  I would appreciate your feedback to help me serve you better in the future. Please fill it out and return it when you can. Your comments will be anonymous.

## 2017-08-31 ENCOUNTER — TRANSFERRED RECORDS (OUTPATIENT)
Dept: HEALTH INFORMATION MANAGEMENT | Facility: CLINIC | Age: 47
End: 2017-08-31

## 2017-08-31 LAB — PHQ9 SCORE: 1

## 2017-09-07 ENCOUNTER — TELEPHONE (OUTPATIENT)
Dept: FAMILY MEDICINE | Facility: CLINIC | Age: 47
End: 2017-09-07

## 2017-09-07 NOTE — TELEPHONE ENCOUNTER
Panel Management Review      Patient has the following on her problem list:     Diabetes    ASA: Failed    Last A1C  Lab Results   Component Value Date    A1C 7.2 06/14/2017    A1C 7.5 01/02/2017    A1C 5.8 09/23/2013    A1C 5.5 01/04/2013    A1C 5.2 07/13/2012     A1C tested: Passed    Last LDL:    Lab Results   Component Value Date    CHOL 146 06/14/2017     Lab Results   Component Value Date    HDL 39 06/14/2017     Lab Results   Component Value Date    LDL 88 06/14/2017     Lab Results   Component Value Date    TRIG 94 06/14/2017     Lab Results   Component Value Date    CHOLHDLRATIO 4.5 09/23/2013     Lab Results   Component Value Date    NHDL 107 06/14/2017       Is the patient on a Statin? YES             Is the patient on Aspirin? NO    Medications     HMG CoA Reductase Inhibitors    simvastatin (ZOCOR) 40 MG tablet          Last three blood pressure readings:  BP Readings from Last 3 Encounters:   08/14/17 168/88   06/20/17 (!) 154/93   06/14/17 136/84       Date of last diabetes office visit:      Tobacco History:     History   Smoking Status     Current Every Day Smoker     Packs/day: 0.25     Years: 20.00     Types: Cigarettes   Smokeless Tobacco     Never Used             Composite cancer screening  Chart review shows that this patient is due/due soon for the following None  Summary:    Patient is due/failing the following:   None....     Action needed:   Due in December 2017    Type of outreach:    none    Questions for provider review:    Is she to be on ASA??                                                                                                                                    Jesenia Lester CMA     Chart routed to Provider .

## 2017-09-13 DIAGNOSIS — I10 ESSENTIAL HYPERTENSION WITH GOAL BLOOD PRESSURE LESS THAN 140/90: ICD-10-CM

## 2017-09-14 RX ORDER — METOPROLOL TARTRATE 100 MG
TABLET ORAL
Qty: 270 TABLET | Refills: 0 | Status: SHIPPED | OUTPATIENT
Start: 2017-09-14 | End: 2017-10-02

## 2017-09-14 NOTE — TELEPHONE ENCOUNTER
Rx refilled per [facility] RN refill protocol.    TC, patient due for:    Health Maintenance Due   Topic Date Due     PNEUMOVAX 1X HI RISK PATIENT < 65 (NO IB MSG)  04/13/1972     URINE DRUG SCREEN Q1 YR  11/08/2013     EYE EXAM Q1 YEAR  01/03/2014     PAP SCREENING Q3 YR (SYSTEM ASSIGNED)  10/24/2014     MAMMO Q1 YR  09/24/2016     INFLUENZA VACCINE (SYSTEM ASSIGNED)  09/01/2017       Jackie Mckeon RN

## 2017-09-19 ENCOUNTER — TRANSFERRED RECORDS (OUTPATIENT)
Dept: HEALTH INFORMATION MANAGEMENT | Facility: CLINIC | Age: 47
End: 2017-09-19

## 2017-09-29 ENCOUNTER — TELEPHONE (OUTPATIENT)
Dept: FAMILY MEDICINE | Facility: CLINIC | Age: 47
End: 2017-09-29

## 2017-09-29 NOTE — TELEPHONE ENCOUNTER
See phone messages 8/4/17.  Pt has not made pain clinic appointment yet.   Advised she needs to make appointment with pain clinic and then appointment with Dr. Beach for plan to get to the appointment.  Appointment with Dr. Beach made for Monday.  No appointment's available today, since she is calling at almost 3 pm and we close at 5 pm.   Pt given scheduling number for pain clinic and will make appointment before Monday.  Jesenia Agrawal RN

## 2017-09-29 NOTE — TELEPHONE ENCOUNTER
Patient is calling stating has a migraine today and provider advised her to pain clinic, but son is handicap and sick and has been unable to get into pain clinic and would like to know what provider can do for her or what she can do . Please call to discuss. Thank you.

## 2017-09-30 DIAGNOSIS — I10 ESSENTIAL HYPERTENSION WITH GOAL BLOOD PRESSURE LESS THAN 140/90: ICD-10-CM

## 2017-10-01 ENCOUNTER — HEALTH MAINTENANCE LETTER (OUTPATIENT)
Age: 47
End: 2017-10-01

## 2017-10-02 ENCOUNTER — OFFICE VISIT (OUTPATIENT)
Dept: FAMILY MEDICINE | Facility: CLINIC | Age: 47
End: 2017-10-02
Payer: COMMERCIAL

## 2017-10-02 VITALS
SYSTOLIC BLOOD PRESSURE: 130 MMHG | OXYGEN SATURATION: 97 % | HEIGHT: 68 IN | TEMPERATURE: 97.3 F | WEIGHT: 287 LBS | HEART RATE: 72 BPM | DIASTOLIC BLOOD PRESSURE: 80 MMHG | BODY MASS INDEX: 43.5 KG/M2

## 2017-10-02 DIAGNOSIS — G43.109 MIGRAINE WITH AURA AND WITHOUT STATUS MIGRAINOSUS, NOT INTRACTABLE: Primary | ICD-10-CM

## 2017-10-02 DIAGNOSIS — F31.9 BIPOLAR AFFECTIVE DISORDER, REMISSION STATUS UNSPECIFIED (H): ICD-10-CM

## 2017-10-02 DIAGNOSIS — Z23 NEED FOR PROPHYLACTIC VACCINATION AND INOCULATION AGAINST INFLUENZA: ICD-10-CM

## 2017-10-02 DIAGNOSIS — R73.9 HYPERGLYCEMIA: ICD-10-CM

## 2017-10-02 PROCEDURE — 99214 OFFICE O/P EST MOD 30 MIN: CPT | Mod: 25 | Performed by: FAMILY MEDICINE

## 2017-10-02 PROCEDURE — 90686 IIV4 VACC NO PRSV 0.5 ML IM: CPT | Performed by: FAMILY MEDICINE

## 2017-10-02 PROCEDURE — 90471 IMMUNIZATION ADMIN: CPT | Performed by: FAMILY MEDICINE

## 2017-10-02 RX ORDER — OXYCODONE AND ACETAMINOPHEN 5; 325 MG/1; MG/1
1 TABLET ORAL EVERY 6 HOURS PRN
Qty: 30 TABLET | Refills: 0 | Status: SHIPPED | OUTPATIENT
Start: 2017-10-02 | End: 2018-06-07

## 2017-10-02 NOTE — PROGRESS NOTES
SUBJECTIVE:  Carissa Spears, a 47 year old female scheduled an appointment to discuss the following issues:  Need for prophylactic vaccination and inoculation against influenza  Migraines.  Blood sugars ok. Seeing psych at [facility]. Emotionally doing ok. No SUICIAL IDEATION OR HOMOCIDAL IDEATION OR [place].  Rare xanax.   S/p knee scope and injection. Possible OA in future.   S/p right TKR.    Given cortisone ortho 3 weeks ago - and ortho given. Knee pain currently stable.  No blood sugars <40 or >300. Mid 100's average. Eye exam - one year ago.   Blood pressure ok. No chest pain or shortness of breath.   Migraines worse past couple months. Behind eyes and nauseated and emesis at times. Compazine prn for nausea.  Migraines average 4-6x/year. S/p hysterectomy.   Migraines since 14 yo. ?midrin in past. imitrex not good.   fiorcet in past. Sleep overall ok on trazadone.   Caffeine 2 cups in AM. Percocet better.   Stress at home with sister-in-law will leavehopefully next month.  Past Medical History:   Diagnosis Date     Allergies      Bipolar disorder, unspecified (H)      DM II (diabetes mellitus, type II)     diet controlled     Endometriosis      Herniated lumbar intervertebral disc     age 22     High cholesterol      HTN      Liver disease     fatty liver     Obesity      Other disorder of menstruation and other abnormal bleeding from female genital tract      Polycystic ovarian syndrome      Sleep disorder      Toxemia        Past Surgical History:   Procedure Laterality Date     AS KNEE SCOPE, ALLOGRAFT IMPANT Left      C/SECTION, CLASSICAL      X2     HYSTERECTOMY, PAP NO LONGER INDICATED       HYSTERECTOMY, KAMILLA  4/8/08     LAPAROSCOPIC CHOLECYSTECTOMY  7/18/2012    Procedure: LAPAROSCOPIC CHOLECYSTECTOMY;  Laparoscopic cholecystectomy;  Surgeon: Miguel Fuentes MD;  Location: [facility] OR     SALPINGO OOPHORECTOMY,R/L/YANELIS  4/8/08    L     TONSILLECTOMY  1983     TUBAL LIGATION         Family History   Problem  "Relation Age of Onset     CANCER Mother      skin cancer - non-melanoma     Hypertension Mother      Lipids Father        Social History   Substance Use Topics     Smoking status: Current Every Day Smoker     Packs/day: 0.25     Years: 20.00     Types: Cigarettes     Smokeless tobacco: Never Used     Alcohol use No       ROS:  All other ROS negative  OBJECTIVE:  /80 (BP Location: Right arm, Patient Position: Sitting, Cuff Size: Adult Large)  Pulse 72  Temp 97.3  F (36.3  C) (Oral)  Ht 5' 8\" (1.727 m)  Wt 287 lb (130.2 kg)  SpO2 97%  BMI 43.64 kg/m2  EXAM:  GENERAL APPEARANCE: healthy, alert and no distress  EYES: EOMI,  PERRL  HENT: ear canals and TM's normal and nose and mouth without ulcers or lesions  NECK: no adenopathy, no asymmetry, masses, or scars and thyroid normal to palpation  RESP: lungs clear to auscultation - no rales, rhonchi or wheezes  CV: regular rates and rhythm, normal S1 S2, no S3 or S4 and no murmur, click or rub -  ABDOMEN:  soft, nontender, no HSM or masses and bowel sounds normal  MS: extremities normal- no gross deformities noted, no evidence of inflammation in joints, FROM in all extremities.  NEURO: Normal strength and tone, sensory exam grossly normal, mentation intact and speech normal  PSYCH: mentation appears normal and affect normal/bright    ASSESSMENT / PLAN:  (Z23) Need for prophylactic vaccination and inoculation against influenza  (primary encounter diagnosis)  Plan: FLU VAC, SPLIT VIRUS IM > 3 YO (QUADRIVALENT)         [86337], Vaccine Administration, Initial         [93706]            (G43.109) Migraine with aura and without status migrainosus, not intractable  Comment: worse likely from stress from sister-in-law living with patient   Plan: oxyCODONE-acetaminophen (PERCOCET) 5-325 MG per        tablet,         isometheptene-dichloralphenazone-acetaminophen         (MIDRIN) -325 MG per capsule        Advised small prescriptions of narcotics or needs to go back " to neurology if worse. Add prn midrin. Avoid xanax within 4 hours of percocet - patient says she rarely needs xanax now with daily psych meds. Expected course and warning signs reviewed. Call/email with questions/concerns. Exercise. Follow-up eye exam.     (Z68.42) BMI 45.0-49.9, adult (H)  Plan: regular exercise.     (F31.9) Bipolar affective disorder, remission status unspecified (H)  Comment: stable  Plan: per psych. If SUICIAL IDEATION OR HOMOCIDAL IDEATION OR [place] TO ER.     (R73.9) Hyperglycemia  Comment: stable  Plan: weight loss. Daily asa. Recheck in 4 months  Sooner if worse. Follow-up eye exam    Silvino Beach

## 2017-10-02 NOTE — MR AVS SNAPSHOT
After Visit Summary   10/2/2017    Carissa Spears    MRN: [id number]           Patient Information     Date Of Birth          1970        Visit Information        Provider Department      10/2/2017 9:35 AM Silvino Beach MD [facility]        Today's Diagnoses     Need for prophylactic vaccination and inoculation against influenza    -  1    Migraine with aura and without status migrainosus, not intractable        BMI 45.0-49.9, adult (H)        Bipolar affective disorder, remission status unspecified (H)        Hyperglycemia           Follow-ups after your visit        Who to contact     If you have questions or need follow up information about today's clinic visit or your schedule please contact [facility] directly at [phone].  Normal or non-critical lab and imaging results will be communicated to you by MyChart, letter or phone within 4 business days after the clinic has received the results. If you do not hear from us within 7 days, please contact the clinic through [employer] or phone. If you have a critical or abnormal lab result, we will notify you by phone as soon as possible.  Submit refill requests through [employer] or call your pharmacy and they will forward the refill request to us. Please allow 3 business days for your refill to be completed.          Additional Information About Your Visit        MyChart Information     [employer] gives you secure access to your electronic health record. If you see a primary care provider, you can also send messages to your care team and make appointments. If you have questions, please call your primary care clinic.  If you do not have a primary care provider, please call [phone] and they will assist you.        Care EveryWhere ID     This is your Care EveryWhere ID. This could be used by other organizations to access your [place] medical records  [id number]        Your Vitals Were     Pulse Temperature Height  "Pulse Oximetry BMI (Body Mass Index)       72 97.3  F (36.3  C) (Oral) 5' 8\" (1.727 m) 97% 43.64 kg/m2        Blood Pressure from Last 3 Encounters:   10/02/17 130/80   08/14/17 168/88   06/20/17 (!) 154/93    Weight from Last 3 Encounters:   10/02/17 287 lb (130.2 kg)   08/14/17 282 lb 12.8 oz (128.3 kg)   06/14/17 286 lb (129.7 kg)              We Performed the Following     FLU VAC, SPLIT VIRUS IM > 3 YO (QUADRIVALENT) [53781]     Vaccine Administration, Initial [27215]          Today's Medication Changes          These changes are accurate as of: 10/2/17  9:56 AM.  If you have any questions, ask your nurse or doctor.               Start taking these medicines.        Dose/Directions    isometheptene-dichloralphenazone-acetaminophen -325 MG per capsule   Commonly known as:  MIDRIN   Used for:  Migraine with aura and without status migrainosus, not intractable        Dose:  1 capsule   Take 1 capsule by mouth every 4 hours as needed for headaches   Quantity:  40 capsule   Refills:  1       oxyCODONE-acetaminophen 5-325 MG per tablet   Commonly known as:  PERCOCET   Used for:  Migraine with aura and without status migrainosus, not intractable        Dose:  1 tablet   Take 1 tablet by mouth every 6 hours as needed for pain For migraines. Don't take within 4 hours of xanax   Quantity:  30 tablet   Refills:  0            Where to get your medicines      Some of these will need a paper prescription and others can be bought over the counter.  Ask your nurse if you have questions.     Bring a paper prescription for each of these medications     isometheptene-dichloralphenazone-acetaminophen -325 MG per capsule    oxyCODONE-acetaminophen 5-325 MG per tablet                Primary Care Provider Office Phone # Fax #    Silvino Beach [phone] [address] [facility] [address]        Equal Access to Services     PHUC GONCALVES AH: marko Corcoran, " genesis clements ah. So [facility] [phone].    ATENCIÓN: Si jayjay high, tiene a vila disposición servicios gratuitos de asistencia lingüística. Kathy al [phone].    We comply with applicable federal civil rights laws and [place] laws. We do not discriminate on the basis of race, color, national origin, age, disability, sex, sexual orientation, or gender identity.            Thank you!     Thank you for choosing [facility]  for your care. Our goal is always to provide you with excellent care. Hearing back from our patients is one way we can continue to improve our services. Please take a few minutes to complete the written survey that you may receive in the mail after your visit with us. Thank you!             Your Updated Medication List - Protect others around you: Learn how to safely use, store and throw away your medicines at www.disposemymeds.org.          This list is accurate as of: 10/2/17  9:56 AM.  Always use your most recent med list.                   Brand Name Dispense Instructions for use Diagnosis    ALPRAZolam 0.5 MG tablet    XANAX     Take 0.5 mg by mouth        aspirin 81 MG tablet     1 tablet    Take 1 tablet (81 mg) by mouth daily        citalopram 40 MG tablet    celeXA          * gabapentin 300 MG capsule    NEURONTIN    270 capsule    TAKE 1 TAB WITH 1 600 MG TAB  MG 3 TIMES A DAY.    Neuralgia       * gabapentin 600 MG tablet    NEURONTIN    270 tablet    TAKE 1 TABLET BY MOUTH 3 TIMES DAILY    Neuralgia       IBUPROFEN PO      Take 200-400 mg by mouth every 4 hours as needed for moderate pain        isometheptene-dichloralphenazone-acetaminophen -325 MG per capsule    MIDRIN    40 capsule    Take 1 capsule by mouth every 4 hours as needed for headaches    Migraine with aura and without status migrainosus, not intractable       LATUDA 40 MG Tabs tablet   Generic drug:  lurasidone           losartan 100 MG  tablet    COZAAR    60 tablet    TAKE 0.5 TABLETS (50 MG) BY MOUTH 2 TIMES DAILY FOR BLOOD PRESSURE    Essential hypertension with goal blood pressure less than 140/90       metFORMIN 500 MG 24 hr tablet    GLUCOPHAGE-XR    180 tablet    Take 2 tablets (1,000 mg) by mouth daily (with dinner) For diabetes    Type 2 diabetes mellitus without complication, without long-term current use of insulin (H)       metoprolol 100 MG tablet    LOPRESSOR    90 tablet    TAKE 1.5 TABLETS (150 MG) BY MOUTH 2 TIMES DAILY FOR BLOOD PRESSURE (NEED TO BE SEEN FOR REFILLS)    Essential hypertension with goal blood pressure less than 140/90       oxyCODONE-acetaminophen 5-325 MG per tablet    PERCOCET    30 tablet    Take 1 tablet by mouth every 6 hours as needed for pain For migraines. Don't take within 4 hours of xanax    Migraine with aura and without status migrainosus, not intractable       simvastatin 40 MG tablet    ZOCOR    90 tablet    Take 1 tablet (40 mg) by mouth At Bedtime For cholesterol    Hyperlipidemia LDL goal <100       TRAZODONE HCL PO      Take 100 mg by mouth At Bedtime Patient reports: Takes 100-2000 MG at bedtime        * Notice:  This list has 2 medication(s) that are the same as other medications prescribed for you. Read the directions carefully, and ask your doctor or other care provider to review them with you.

## 2017-10-02 NOTE — PROGRESS NOTES
Headache follow up   Injectable Influenza Immunization Documentation    1.  Is the person to be vaccinated sick today?   No    2. Does the person to be vaccinated have an allergy to a component   of the vaccine?   No    3. Has the person to be vaccinated ever had a serious reaction   to influenza vaccine in the past?   No    4. Has the person to be vaccinated ever had Guillain-Barré syndrome?   No    Form completed by Jesenia Lester CMA

## 2017-10-03 RX ORDER — LOSARTAN POTASSIUM 100 MG/1
TABLET ORAL
Qty: 90 TABLET | Refills: 1 | Status: SHIPPED | OUTPATIENT
Start: 2017-10-03 | End: 2018-05-11

## 2017-10-09 ENCOUNTER — NURSE TRIAGE (OUTPATIENT)
Dept: NURSING | Facility: CLINIC | Age: 47
End: 2017-10-09

## 2017-10-09 NOTE — TELEPHONE ENCOUNTER
"Caller reports she has been experiencing abdominal cramping after she eats for past 2 days; \"feels like a virus\"  ; body aches and \"low grade fever of 100\"  Triage protocol reviewed  Advised to be seen in clinic in 24 hrs and follow care advice  Caller hung up before connected with   Reason for Disposition    [1] MODERATE (e.g., interferes with normal activities) AND [2] pain comes and goes (cramps) AND [3] present > 24 hours  (Exception: pain with Vomiting or Diarrhea - see that Guideline)    Protocols used: ABDOMINAL PAIN - FEMALE-ADULT-    "

## 2017-11-30 ENCOUNTER — TRANSFERRED RECORDS (OUTPATIENT)
Dept: HEALTH INFORMATION MANAGEMENT | Facility: CLINIC | Age: 47
End: 2017-11-30

## 2017-12-01 ENCOUNTER — OFFICE VISIT (OUTPATIENT)
Dept: FAMILY MEDICINE | Facility: CLINIC | Age: 47
End: 2017-12-01
Payer: COMMERCIAL

## 2017-12-01 VITALS
BODY MASS INDEX: 44.41 KG/M2 | DIASTOLIC BLOOD PRESSURE: 80 MMHG | HEIGHT: 68 IN | TEMPERATURE: 96.6 F | OXYGEN SATURATION: 96 % | WEIGHT: 293 LBS | RESPIRATION RATE: 15 BRPM | HEART RATE: 75 BPM | SYSTOLIC BLOOD PRESSURE: 126 MMHG

## 2017-12-01 DIAGNOSIS — I10 HYPERTENSION GOAL BP (BLOOD PRESSURE) < 140/90: ICD-10-CM

## 2017-12-01 DIAGNOSIS — E11.65 TYPE 2 DIABETES MELLITUS WITH HYPERGLYCEMIA, WITHOUT LONG-TERM CURRENT USE OF INSULIN (H): ICD-10-CM

## 2017-12-01 DIAGNOSIS — F31.9 BIPOLAR AFFECTIVE DISORDER, REMISSION STATUS UNSPECIFIED (H): ICD-10-CM

## 2017-12-01 DIAGNOSIS — Z91.040 LATEX ALLERGY: ICD-10-CM

## 2017-12-01 DIAGNOSIS — Z01.818 PREOP GENERAL PHYSICAL EXAM: Primary | ICD-10-CM

## 2017-12-01 DIAGNOSIS — M17.12 PRIMARY OSTEOARTHRITIS OF LEFT KNEE: ICD-10-CM

## 2017-12-01 DIAGNOSIS — Z72.0 TOBACCO ABUSE: ICD-10-CM

## 2017-12-01 LAB
ANION GAP SERPL CALCULATED.3IONS-SCNC: 6 MMOL/L (ref 3–14)
BUN SERPL-MCNC: 13 MG/DL (ref 7–30)
CALCIUM SERPL-MCNC: 9.6 MG/DL (ref 8.5–10.1)
CHLORIDE SERPL-SCNC: 104 MMOL/L (ref 94–109)
CO2 SERPL-SCNC: 29 MMOL/L (ref 20–32)
CREAT SERPL-MCNC: 0.89 MG/DL (ref 0.52–1.04)
GFR SERPL CREATININE-BSD FRML MDRD: 68 ML/MIN/1.7M2
GLUCOSE SERPL-MCNC: 119 MG/DL (ref 70–99)
HBA1C MFR BLD: 6.7 % (ref 4.3–6)
POTASSIUM SERPL-SCNC: 4.3 MMOL/L (ref 3.4–5.3)
SODIUM SERPL-SCNC: 139 MMOL/L (ref 133–144)

## 2017-12-01 PROCEDURE — 83036 HEMOGLOBIN GLYCOSYLATED A1C: CPT | Performed by: NURSE PRACTITIONER

## 2017-12-01 PROCEDURE — 36415 COLL VENOUS BLD VENIPUNCTURE: CPT | Performed by: NURSE PRACTITIONER

## 2017-12-01 PROCEDURE — 80048 BASIC METABOLIC PNL TOTAL CA: CPT | Performed by: NURSE PRACTITIONER

## 2017-12-01 PROCEDURE — 99214 OFFICE O/P EST MOD 30 MIN: CPT | Performed by: NURSE PRACTITIONER

## 2017-12-01 NOTE — Clinical Note
Please fax pre-op notes, labs from 12/01/17 and EKG from 6/14/17 to surgical facility listed in pre-op note.  Jesenia Balderas, CNP

## 2017-12-01 NOTE — PROGRESS NOTES
[facility]  [address] [place] [id number]  [phone]  Dept: [phone]    PRE-OP EVALUATION:  Today's date: 2017    Carissa Spears (: 1970) presents for pre-operative evaluation assessment as requested by Dr. Star Duggan.  She requires evaluation and anesthesia risk assessment prior to undergoing surgery/procedure for treatment of knee pain .  Proposed procedure: left knee replacement    Date of Surgery/ Procedure: 17  Time of Surgery/ Procedure: 7:30  Hospital/Surgical Facility: [facility]  Fax number for surgical facility:   Primary Physician: Silvino Beach  Type of Anesthesia Anticipated: General    Patient has a Health Care Directive or Living Will:  NO    Preop Questions 2017   1.  Do you have a history of heart attack, stroke, stent, bypass or surgery on an artery in the head, neck, heart or legs? No   2.  Do you ever have any pain or discomfort in your chest? No   3.  Do you have a history of  Heart Failure? No   4.   Are you troubled by shortness of breath when:  walking on a level surface, or up a slight hill, or at night? No   5.  Do you currently have a cold, bronchitis or other respiratory infection? No   6.  Do you have a cough, shortness of breath, or wheezing? No   7.  Do you sometimes get pains in the calves of your legs when you walk? No   8. Do you or anyone in your family have previous history of blood clots? No   9.  Do you or does anyone in your family have a serious bleeding problem such as prolonged bleeding following surgeries or cuts? No   10. Have you ever had problems with anemia or been told to take iron pills? No   11. Have you had any abnormal blood loss such as black, tarry or bloody stools, or abnormal vaginal bleeding? No   12. Have you ever had a blood transfusion? No   13. Have you or any of your relatives ever had problems with anesthesia? No   14. Do you have sleep apnea, excessive snoring or daytime drowsiness?  No   15. Do you have any prosthetic heart valves? No   16. Do you have prosthetic joints? YES - right knee   17. Is there any chance that you may be pregnant? No           HPI:                                                      Brief HPI related to upcoming procedure: History of chronic bilateral knee pain and diagnosed with severe arthritis in both knees. Had right knee replacement in February of this year. June 2017 had arthroscopy of left knee due to meniscus tear but pain persists. Total knee replacement planned for left knee now.    History of diet controlled T2DM. Post-prandial blood sugars are 120, fasting blood sugars .    See problem list for active medical problems.  Problems all longstanding and stable, except as noted/documented.  See ROS for pertinent symptoms related to these conditions.                                                                                                  .    MEDICAL HISTORY:                                                    Patient Active Problem List    Diagnosis Date Noted     Bipolar affective disorder, remission status unspecified (H) 10/02/2017     Priority: Medium     Non-allergic rhinitis 08/14/2017     Priority: Medium     Migraine with aura and without status migrainosus, not intractable 06/17/2016     Priority: Medium     Myofascial pain 06/13/2013     Priority: Medium     Chronic abdominal pain 01/10/2013     Priority: Medium     Hyperglycemia 01/10/2013     Priority: Medium     BMI 45.0-49.9, adult (H) 04/29/2011     Priority: Medium     Tobacco abuse 04/29/2011     Priority: Medium     Mild major depression (H) 04/29/2011     Priority: Medium     Insomnia 04/29/2011     Priority: Medium     Bipolar disorder (H)      Priority: Medium     Problem list name updated by automated process. Provider to review       Polycystic ovarian syndrome      Priority: Medium     Hypertension goal BP (blood pressure) < 140/90 12/16/2010     Priority: Medium      HYPERLIPIDEMIA LDL GOAL <100 10/31/2010     Priority: Medium      Past Medical History:   Diagnosis Date     Allergies      Bipolar disorder, unspecified (H)      DM II (diabetes mellitus, type II)     diet controlled     Endometriosis      Herniated lumbar intervertebral disc     age 22     High cholesterol      HTN      Liver disease     fatty liver     Obesity      Other disorder of menstruation and other abnormal bleeding from female genital tract      Polycystic ovarian syndrome      Sleep disorder      Toxemia      Past Surgical History:   Procedure Laterality Date     AS KNEE SCOPE, ALLOGRAFT IMPANT Left      C/SECTION, CLASSICAL      X2     HYSTERECTOMY, PAP NO LONGER INDICATED       HYSTERECTOMY, KAMILLA  4/8/08     LAPAROSCOPIC CHOLECYSTECTOMY  7/18/2012    Procedure: LAPAROSCOPIC CHOLECYSTECTOMY;  Laparoscopic cholecystectomy;  Surgeon: Miguel Fuentes MD;  Location: [facility] OR     SALPINGO OOPHORECTOMY,R/L/YANELIS  4/8/08    L     TONSILLECTOMY  1983     TUBAL LIGATION       Current Outpatient Prescriptions   Medication Sig Dispense Refill     losartan (COZAAR) 100 MG tablet TAKE 0.5 TABLETS (50 MG) BY MOUTH 2 TIMES DAILY FOR BLOOD PRESSURE 90 tablet 1     aspirin 81 MG tablet Take 1 tablet (81 mg) by mouth daily 1 tablet 0     oxyCODONE-acetaminophen (PERCOCET) 5-325 MG per tablet Take 1 tablet by mouth every 6 hours as needed for pain For migraines. Don't take within 4 hours of xanax 30 tablet 0     isometheptene-dichloralphenazone-acetaminophen (MIDRIN) -325 MG per capsule Take 1 capsule by mouth every 4 hours as needed for headaches 40 capsule 1     losartan (COZAAR) 100 MG tablet TAKE 0.5 TABLETS (50 MG) BY MOUTH 2 TIMES DAILY FOR BLOOD PRESSURE 60 tablet 0     gabapentin (NEURONTIN) 300 MG capsule TAKE 1 TAB WITH 1 600 MG TAB  MG 3 TIMES A DAY. 270 capsule 1     gabapentin (NEURONTIN) 600 MG tablet TAKE 1 TABLET BY MOUTH 3 TIMES DAILY 270 tablet 1     IBUPROFEN PO Take 200-400 mg by mouth  every 4 hours as needed for moderate pain       metoprolol (LOPRESSOR) 100 MG tablet TAKE 1.5 TABLETS (150 MG) BY MOUTH 2 TIMES DAILY FOR BLOOD PRESSURE (NEED TO BE SEEN FOR REFILLS) 90 tablet 0     LATUDA 40 MG TABS tablet        citalopram (CELEXA) 40 MG tablet        ALPRAZolam (XANAX) 0.5 MG tablet Take 0.5 mg by mouth       metFORMIN (GLUCOPHAGE-XR) 500 MG 24 hr tablet Take 2 tablets (1,000 mg) by mouth daily (with dinner) For diabetes 180 tablet 1     simvastatin (ZOCOR) 40 MG tablet Take 1 tablet (40 mg) by mouth At Bedtime For cholesterol 90 tablet 3     TRAZODONE HCL PO Take 100 mg by mouth At Bedtime Patient reports: Takes 100-2000 MG at bedtime       OTC products: Vitamins: CoQ10, Mg, Zinc, Calcium, Multivitamin, Fish Oil, B Complex    Allergies   Allergen Reactions     Imitrex [Sumatriptan] Anaphylaxis     Blood pressure high/ she was in hospital     Calcium Channel Blockers      Augmentin Diarrhea     Codeine      Sick to stomach     Latex      Puffy, rash      Sulfa Drugs Nausea     Lisinopril Nausea      Latex Allergy: YES: Precautions to take: use Latex free products    Social History   Substance Use Topics     Smoking status: Current Every Day Smoker     Packs/day: 0.25     Years: 20.00     Types: Cigarettes     Smokeless tobacco: Never Used     Alcohol use No     History   Drug Use No       REVIEW OF SYSTEMS:                                                    Constitutional, neuro, ENT, endocrine, pulmonary, cardiac, gastrointestinal, genitourinary, musculoskeletal, integument and psychiatric systems are negative, except as otherwise noted.      EXAM:                                                    There were no vitals taken for this visit.    GENERAL APPEARANCE: healthy, alert and no distress     EYES: EOMI, PERRL     HENT: ear canals and TM's normal and nose and mouth without ulcers or lesions     NECK: no adenopathy, no asymmetry, masses, or scars and thyroid normal to palpation     RESP:  lungs clear to auscultation - no rales, rhonchi or wheezes     CV: regular rates and rhythm, normal S1 S2, no S3 or S4 and no murmur, click or rub     ABDOMEN:  soft, nontender, no HSM or masses and bowel sounds normal     MS: extremities normal- no gross deformities noted, no evidence of inflammation in joints, FROM in all extremities.     SKIN: no suspicious lesions or rashes     NEURO: Normal strength and tone, sensory exam grossly normal, mentation intact and speech normal     PSYCH: mentation appears normal. and affect normal/bright     LYMPHATICS: No axillary, cervical, or supraclavicular nodes    DIAGNOSTICS:                                                    EKG: Not indicated due to non-vascular surgery and last ekg on 6/14/17- left atrial enlargement     Recent Labs   Lab Test  06/14/17   0825  02/22/17   0921  01/02/17   1424   HGB  14.9  14.3   --    PLT  296  250   --    NA  138  137  141   POTASSIUM  4.3  4.1  4.0   CR  0.80  0.68  0.76   A1C  7.2*   --   7.5*        IMPRESSION:                                                    Reason for surgery/procedure: Left knee osteoarthritis  Diagnosis/reason for consult: Evaluate perioperative risk    The proposed surgical procedure is considered INTERMEDIATE risk.    REVISED CARDIAC RISK INDEX  The patient has the following serious cardiovascular risks for perioperative complications such as (MI, PE, VFib and 3  AV Block):  No serious cardiac risks  INTERPRETATION: 0 risks: Class I (very low risk - 0.4% complication rate)    The patient has the following additional risks for perioperative complications:  No identified additional risks      ICD-10-CM    1. Preop general physical exam Z01.818    2. Primary osteoarthritis of left knee M17.12    3. Hypertension goal BP (blood pressure) < 140/90 I10 Basic metabolic panel   4. Type 2 diabetes mellitus with hyperglycemia, without long-term current use of insulin (H) E11.65 Hemoglobin A1c   5. Tobacco abuse Z72.0     6. Latex allergy Z91.040    7. Bipolar affective disorder, remission status unspecified (H) F31.9        RECOMMENDATIONS:                                                      -Last EKG demonstrates atrial enlargement- was cleared by Cardiology prior to her last surgery. Ok to proceed with this surgery but recommend she follow up with her Primary Care Provider post-operatively to discuss possible Echo as recommended by Cardiology.    -Patient plans to quit smoking on Monday. Declines medications.    --Patient is to take all scheduled medications on the day of surgery EXCEPT for modifications listed below.  Hold metformin the night before surgery.   Hold Losartan for 24 hours before surgery.  Hold Vitamin E, Fish oil for 1 week before surgery.  Stop Ibuprofen and Aspirin from now until surgery- ok to use the Percocet or Tylenol as needed.    APPROVAL GIVEN to proceed with proposed procedure, without further diagnostic evaluation       Signed Electronically by: BEKAH Fitzpatrick CNP    Copy of this evaluation report is provided to requesting physician.    [facility] Preop Guidelines

## 2017-12-01 NOTE — NURSING NOTE
"Chief Complaint   Patient presents with     Pre-Op Exam       Initial /80  Pulse 75  Temp 96.6  F (35.9  C)  Resp 15  Ht 5' 8\" (1.727 m)  Wt 293 lb (132.9 kg)  SpO2 96%  BMI 44.55 kg/m2 Estimated body mass index is 44.55 kg/(m^2) as calculated from the following:    Height as of this encounter: 5' 8\" (1.727 m).    Weight as of this encounter: 293 lb (132.9 kg).  Medication Reconciliation: complete     Krista Grove. MA      "

## 2017-12-01 NOTE — MR AVS SNAPSHOT
After Visit Summary   12/1/2017    Carissa Spears    MRN: [id number]           Patient Information     Date Of Birth          1970        Visit Information        Provider Department      12/1/2017 9:40 AM Jesenia Balderas APRN [facility]        Today's Diagnoses     Preop general physical exam    -  1    Primary osteoarthritis of left knee        Hypertension goal BP (blood pressure) < 140/90        Type 2 diabetes mellitus with hyperglycemia, without long-term current use of insulin (H)        Tobacco abuse        Latex allergy        Bipolar affective disorder, remission status unspecified (H)          Care Instructions    Hold your metformin the night before surgery.   Hold your Losartan for 24 hours before surgery.  Hold your Vitamin E and Fish oil for 1 week before surgery.  Stop Ibuprofen and Aspirin from now until surgery- ok to use the Percocet or Tylenol as needed.  Don't mix the Xanax and the Percocet.    Can take the Celexa, Neurontin, and Metoprolol the morning of surgery with a small sip of water.    [facility]    If you have any questions regarding to your visit please contact your care team:       Team Red:   Clinic Hours Telephone Number   Dr. Nelia Abbasi  (pediatrics)  Jesenia Balderas NP 7am-7pm  Monday - Thursday   7am-5pm  Fridays  [phone] (fax)    [facility]  [phone]   Urgent Care - [place] and [place] Monday-Friday  [place] - 11am-8pm  Saturday-Sunday  Both sites - 9am-5pm  [phone] - [place]   [phone] [facility]       What options do I have for visits at the clinic other than the traditional office visit?  To expand how we care for you, many of our providers are utilizing electronic visits (e-visits) and telephone visits, when medically appropriate, for interactions with their patients rather than a visit in the clinic.   We also offer nurse visits  for many medical concerns. Just like any other service, we will bill your insurance company for this type of visit based on time spent on the phone with your provider. Not all insurance companies cover these visits. Please check with your medical insurance if this type of visit is covered. You will be responsible for any charges that are not paid by your insurance.      E-visits via [employer]:  generally incur a $35.00 fee.  Telephone visits:  Time spent on the phone: *charged based on time that is spent on the phone in increments of 10 minutes. Estimated cost:   5-10 mins $30.00   11-20 mins. $59.00   21-30 mins. $85.00     As always, Thank you for trusting us with your health care needs!    Humberto Banegas    Before Your Surgery      Call your surgeon if there is any change in your health. This includes signs of a cold or flu (such as a sore throat, runny nose, cough, rash or fever).    Do not smoke, drink alcohol or take over the counter medicine (unless your surgeon or primary care doctor tells you to) for the 24 hours before and after surgery.    If you take prescribed drugs: Follow your doctor s orders about which medicines to take and which to stop until after surgery.    Eating and drinking prior to surgery: follow the instructions from your surgeon    Take a shower or bath the night before surgery. Use the soap your surgeon gave you to gently clean your skin. If you do not have soap from your surgeon, use your regular soap. Do not shave or scrub the surgery site.  Wear clean pajamas and have clean sheets on your bed.           Follow-ups after your visit        Who to contact     If you have questions or need follow up information about today's clinic visit or your schedule please contact [facility] directly at [phone].  Normal or non-critical lab and imaging results will be communicated to you by [employer], letter or phone within 4 business days after the clinic has received the results.  "If you do not hear from us within 7 days, please contact the clinic through [employer] or phone. If you have a critical or abnormal lab result, we will notify you by phone as soon as possible.  Submit refill requests through [employer] or call your pharmacy and they will forward the refill request to us. Please allow 3 business days for your refill to be completed.          Additional Information About Your Visit        [email or web address] Information     [employer] gives you secure access to your electronic health record. If you see a primary care provider, you can also send messages to your care team and make appointments. If you have questions, please call your primary care clinic.  If you do not have a primary care provider, please call [phone] and they will assist you.        Care EveryWhere ID     This is your Care EveryWhere ID. This could be used by other organizations to access your [facility] medical records  [id number]        Your Vitals Were     Pulse Temperature Respirations Height Pulse Oximetry BMI (Body Mass Index)    75 96.6  F (35.9  C) 15 5' 8\" (1.727 m) 96% 44.55 kg/m2       Blood Pressure from Last 3 Encounters:   12/01/17 126/80   10/02/17 130/80   08/14/17 168/88    Weight from Last 3 Encounters:   12/01/17 293 lb (132.9 kg)   10/02/17 287 lb (130.2 kg)   08/14/17 282 lb 12.8 oz (128.3 kg)              We Performed the Following     Basic metabolic panel     Hemoglobin A1c        Primary Care Provider Office Phone # Fax #    Silvino Beach [phone] [address] [facility] [address]        Equal Access to Services     [facility]: Hadii aad ku hadasho Soomaali, waaxda luqadaha, qaybta kaalmada genesis turner. So [facility] [phone].    ATENCIÓN: Si habla español, tiene a vila disposición servicios gratuitos de asistencia lingüística. Kathy al [phone].    We comply with applicable federal civil rights laws and [place] laws. We do not " discriminate on the basis of race, color, national origin, age, disability, sex, sexual orientation, or gender identity.            Thank you!     Thank you for choosing [facility]  for your care. Our goal is always to provide you with excellent care. Hearing back from our patients is one way we can continue to improve our services. Please take a few minutes to complete the written survey that you may receive in the mail after your visit with us. Thank you!             Your Updated Medication List - Protect others around you: Learn how to safely use, store and throw away your medicines at www.disposemymeds.org.          This list is accurate as of: 12/1/17 10:32 AM.  Always use your most recent med list.                   Brand Name Dispense Instructions for use Diagnosis    ALPRAZolam 0.5 MG tablet    XANAX     Take 0.5 mg by mouth        aspirin 81 MG tablet     1 tablet    Take 1 tablet (81 mg) by mouth daily        citalopram 40 MG tablet    celeXA          * gabapentin 300 MG capsule    NEURONTIN    270 capsule    TAKE 1 TAB WITH 1 600 MG TAB  MG 3 TIMES A DAY.    Neuralgia       * gabapentin 600 MG tablet    NEURONTIN    270 tablet    TAKE 1 TABLET BY MOUTH 3 TIMES DAILY    Neuralgia       IBUPROFEN PO      Take 200-400 mg by mouth every 4 hours as needed for moderate pain        isometheptene-dichloralphenazone-acetaminophen -325 MG per capsule    MIDRIN    40 capsule    Take 1 capsule by mouth every 4 hours as needed for headaches    Migraine with aura and without status migrainosus, not intractable       LATUDA 40 MG Tabs tablet   Generic drug:  lurasidone           losartan 100 MG tablet    COZAAR    90 tablet    TAKE 0.5 TABLETS (50 MG) BY MOUTH 2 TIMES DAILY FOR BLOOD PRESSURE    Essential hypertension with goal blood pressure less than 140/90       metFORMIN 500 MG 24 hr tablet    GLUCOPHAGE-XR    180 tablet    Take 2 tablets (1,000 mg) by mouth daily (with dinner) For  diabetes    Type 2 diabetes mellitus without complication, without long-term current use of insulin (H)       metoprolol 100 MG tablet    LOPRESSOR    90 tablet    TAKE 1.5 TABLETS (150 MG) BY MOUTH 2 TIMES DAILY FOR BLOOD PRESSURE (NEED TO BE SEEN FOR REFILLS)    Essential hypertension with goal blood pressure less than 140/90       oxyCODONE-acetaminophen 5-325 MG per tablet    PERCOCET    30 tablet    Take 1 tablet by mouth every 6 hours as needed for pain For migraines. Don't take within 4 hours of xanax    Migraine with aura and without status migrainosus, not intractable       simvastatin 40 MG tablet    ZOCOR    90 tablet    Take 1 tablet (40 mg) by mouth At Bedtime For cholesterol    Hyperlipidemia LDL goal <100       TRAZODONE HCL PO      Take 100 mg by mouth At Bedtime Patient reports: Takes 100-2000 MG at bedtime        * Notice:  This list has 2 medication(s) that are the same as other medications prescribed for you. Read the directions carefully, and ask your doctor or other care provider to review them with you.

## 2017-12-01 NOTE — PATIENT INSTRUCTIONS
Hold your metformin the night before surgery.   Hold your Losartan for 24 hours before surgery.  Hold your Vitamin E and Fish oil for 1 week before surgery.  Stop Ibuprofen and Aspirin from now until surgery- ok to use the Percocet or Tylenol as needed.  Don't mix the Xanax and the Percocet.    Can take the Celexa, Neurontin, and Metoprolol the morning of surgery with a small sip of water.    [facility]    If you have any questions regarding to your visit please contact your care team:       Team Red:   Clinic Hours Telephone Number   Dr. Nelia Abbasi  (pediatrics)  Jesenia Balderas NP 7am-7pm  Monday - Thursday   7am-5pm  Fridays  [phone] (fax)    [facility]  [phone]   Urgent Care - [place] and [place] Monday-Friday  [place] - 11am-8pm  Saturday-Sunday  Both sites - 9am-5pm  [phone] - [facility]  [phone] [facility]       What options do I have for visits at the clinic other than the traditional office visit?  To expand how we care for you, many of our providers are utilizing electronic visits (e-visits) and telephone visits, when medically appropriate, for interactions with their patients rather than a visit in the clinic.   We also offer nurse visits for many medical concerns. Just like any other service, we will bill your insurance company for this type of visit based on time spent on the phone with your provider. Not all insurance companies cover these visits. Please check with your medical insurance if this type of visit is covered. You will be responsible for any charges that are not paid by your insurance.      E-visits via [employer]:  generally incur a $35.00 fee.  Telephone visits:  Time spent on the phone: *charged based on time that is spent on the phone in increments of 10 minutes. Estimated cost:   5-10 mins $30.00   11-20 mins. $59.00   21-30 mins. $85.00     As always, Thank you for trusting us with your  health care needs!    Humberto Banegas    Before Your Surgery      Call your surgeon if there is any change in your health. This includes signs of a cold or flu (such as a sore throat, runny nose, cough, rash or fever).    Do not smoke, drink alcohol or take over the counter medicine (unless your surgeon or primary care doctor tells you to) for the 24 hours before and after surgery.    If you take prescribed drugs: Follow your doctor s orders about which medicines to take and which to stop until after surgery.    Eating and drinking prior to surgery: follow the instructions from your surgeon    Take a shower or bath the night before surgery. Use the soap your surgeon gave you to gently clean your skin. If you do not have soap from your surgeon, use your regular soap. Do not shave or scrub the surgery site.  Wear clean pajamas and have clean sheets on your bed.

## 2017-12-10 ENCOUNTER — TELEPHONE (OUTPATIENT)
Dept: FAMILY MEDICINE | Facility: CLINIC | Age: 47
End: 2017-12-10

## 2017-12-10 ENCOUNTER — NURSE TRIAGE (OUTPATIENT)
Dept: NURSING | Facility: CLINIC | Age: 47
End: 2017-12-10

## 2017-12-10 NOTE — TELEPHONE ENCOUNTER
Racheal Ospina, PT [facility], [phone] was the caller.  Requesting continuance of home care for PT s/p total knee replacement:  2x week x 3 wks.   Requesting verbal asap with paper orders to follow.  Please callRacheal  Routed: P [id number] AN Dr Yong CARPENTER, RN [facility]

## 2017-12-10 NOTE — TELEPHONE ENCOUNTER
Racheal Ospina, PT [facility], [phone] was the caller.  Requesting continuance of home care for PT s/p total knee replacement:  2x week x 3 wks.   Requesting verbal asap with paper orders to follow.  Please callRacheal  Routed: P [id number] AN Dr Yong CARPENTER, RN [place] [facility]

## 2017-12-12 ENCOUNTER — TRANSFERRED RECORDS (OUTPATIENT)
Dept: HEALTH INFORMATION MANAGEMENT | Facility: CLINIC | Age: 47
End: 2017-12-12

## 2017-12-13 NOTE — TELEPHONE ENCOUNTER
Left message on answering machine for Racheal to call back to [phone].  Jesenia Agrawal RN

## 2017-12-13 NOTE — TELEPHONE ENCOUNTER
Advised Racheal PT orders need to come for surgeon.  Racheal disagreed but will call surgeon and hung up on me.  Jesenia Agrawal RN

## 2017-12-24 ENCOUNTER — TRANSFERRED RECORDS (OUTPATIENT)
Dept: HEALTH INFORMATION MANAGEMENT | Facility: CLINIC | Age: 47
End: 2017-12-24

## 2017-12-26 ENCOUNTER — MEDICAL CORRESPONDENCE (OUTPATIENT)
Dept: HEALTH INFORMATION MANAGEMENT | Facility: CLINIC | Age: 47
End: 2017-12-26

## 2018-01-09 DIAGNOSIS — I10 ESSENTIAL HYPERTENSION WITH GOAL BLOOD PRESSURE LESS THAN 140/90: ICD-10-CM

## 2018-01-09 RX ORDER — METOPROLOL TARTRATE 100 MG
TABLET ORAL
Qty: 270 TABLET | Refills: 0 | Status: SHIPPED | OUTPATIENT
Start: 2018-01-09 | End: 2018-04-11

## 2018-02-12 ENCOUNTER — TELEPHONE (OUTPATIENT)
Dept: FAMILY MEDICINE | Facility: CLINIC | Age: 48
End: 2018-02-12

## 2018-02-12 NOTE — TELEPHONE ENCOUNTER
Panel Management Review      Patient has the following on her problem list:     Diabetes    ASA: Passed    Last A1C  Lab Results   Component Value Date    A1C 6.7 12/01/2017    A1C 7.2 06/14/2017    A1C 7.5 01/02/2017    A1C 5.8 09/23/2013    A1C 5.5 01/04/2013     A1C tested: Passed    Last LDL:    Lab Results   Component Value Date    CHOL 146 06/14/2017     Lab Results   Component Value Date    HDL 39 06/14/2017     Lab Results   Component Value Date    LDL 88 06/14/2017     Lab Results   Component Value Date    TRIG 94 06/14/2017     Lab Results   Component Value Date    CHOLHDLRATIO 4.5 09/23/2013     Lab Results   Component Value Date    NHDL 107 06/14/2017       Is the patient on a Statin? YES             Is the patient on Aspirin? YES    Medications     HMG CoA Reductase Inhibitors    simvastatin (ZOCOR) 40 MG tablet    Salicylates    aspirin 81 MG tablet          Last three blood pressure readings:  BP Readings from Last 3 Encounters:   12/01/17 126/80   10/02/17 130/80   08/14/17 168/88       Date of last diabetes office visit: 01/2017     Tobacco History:     History   Smoking Status     Current Every Day Smoker     Packs/day: 0.25     Years: 20.00     Types: Cigarettes   Smokeless Tobacco     Never Used           Composite cancer screening  Chart review shows that this patient is due/due soon for the following Pap Smear and Mammogram  Summary:    Patient is due/failing the following:   A1C, MAMMOGRAM, PAP and PHYSICAL    Action needed:   Patient needs office visit for physical and diabetes check .    Type of outreach:    Sent letter.    Questions for provider review:    None                                                                                                                                    Pily Amaya M.A.       Chart routed to n/a .

## 2018-02-20 ENCOUNTER — TELEPHONE (OUTPATIENT)
Dept: FAMILY MEDICINE | Facility: CLINIC | Age: 48
End: 2018-02-20

## 2018-02-20 NOTE — TELEPHONE ENCOUNTER
[facility]-FYI message that she is closing patients care management case, as she has met her goal. Call if any questions. Thank you

## 2018-03-16 ENCOUNTER — TELEPHONE (OUTPATIENT)
Dept: FAMILY MEDICINE | Facility: CLINIC | Age: 48
End: 2018-03-16

## 2018-03-16 DIAGNOSIS — R73.9 HYPERGLYCEMIA: Primary | ICD-10-CM

## 2018-03-16 NOTE — TELEPHONE ENCOUNTER
Patient looking for new Rx for contour next meter, strips, lancets.   Could you send new order to us.   Thanks per [facility].

## 2018-04-11 DIAGNOSIS — E78.5 HYPERLIPIDEMIA LDL GOAL <100: ICD-10-CM

## 2018-04-11 RX ORDER — SIMVASTATIN 40 MG
TABLET ORAL
Qty: 90 TABLET | Refills: 0 | Status: SHIPPED | OUTPATIENT
Start: 2018-04-11 | End: 2018-07-11

## 2018-05-11 DIAGNOSIS — I10 ESSENTIAL HYPERTENSION WITH GOAL BLOOD PRESSURE LESS THAN 140/90: ICD-10-CM

## 2018-05-14 RX ORDER — LOSARTAN POTASSIUM 100 MG/1
TABLET ORAL
Qty: 90 TABLET | Refills: 1 | Status: SHIPPED | OUTPATIENT
Start: 2018-05-14 | End: 2018-11-15

## 2018-05-21 ENCOUNTER — TELEPHONE (OUTPATIENT)
Dept: FAMILY MEDICINE | Facility: CLINIC | Age: 48
End: 2018-05-21

## 2018-05-21 NOTE — TELEPHONE ENCOUNTER
Panel Management Review      Patient has the following on her problem list:     Diabetes    ASA: Passed    Last A1C  Lab Results   Component Value Date    A1C 6.7 12/01/2017    A1C 7.2 06/14/2017    A1C 7.5 01/02/2017    A1C 5.8 09/23/2013    A1C 5.5 01/04/2013     A1C tested: Passed    Last LDL:    Lab Results   Component Value Date    CHOL 146 06/14/2017     Lab Results   Component Value Date    HDL 39 06/14/2017     Lab Results   Component Value Date    LDL 88 06/14/2017     Lab Results   Component Value Date    TRIG 94 06/14/2017     Lab Results   Component Value Date    CHOLHDLRATIO 4.5 09/23/2013     Lab Results   Component Value Date    NHDL 107 06/14/2017       Is the patient on a Statin? YES             Is the patient on Aspirin? YES    Medications     HMG CoA Reductase Inhibitors    simvastatin (ZOCOR) 40 MG tablet    Salicylates    aspirin 81 MG tablet          Last three blood pressure readings:  BP Readings from Last 3 Encounters:   12/01/17 126/80   10/02/17 130/80   08/14/17 168/88       Date of last diabetes office visit: 2017     Tobacco History:     History   Smoking Status     Current Every Day Smoker     Packs/day: 0.25     Years: 20.00     Types: Cigarettes   Smokeless Tobacco     Never Used           Composite cancer screening  Chart review shows that this patient is due/due soon for the following Mammogram  Summary:    Patient is due/failing the following:   A1C, MAMMOGRAM and PAP    Action needed:   Patient needs office visit for diabetes check.will schedule Mammogram when she comes in     Type of outreach:    Sent [employer] message.    Questions for provider review:    None                                                                                                                                    Jesenia Lester CMA     Chart routed to 0 .

## 2018-06-01 ENCOUNTER — TRANSFERRED RECORDS (OUTPATIENT)
Dept: HEALTH INFORMATION MANAGEMENT | Facility: CLINIC | Age: 48
End: 2018-06-01

## 2018-06-06 ENCOUNTER — TELEPHONE (OUTPATIENT)
Dept: FAMILY MEDICINE | Facility: CLINIC | Age: 48
End: 2018-06-06

## 2018-06-06 DIAGNOSIS — M79.2 NEURALGIA: ICD-10-CM

## 2018-06-06 RX ORDER — GABAPENTIN 600 MG/1
600 TABLET ORAL 3 TIMES DAILY
Qty: 90 TABLET | Refills: 0 | Status: SHIPPED | OUTPATIENT
Start: 2018-06-06 | End: 2018-06-07

## 2018-06-06 NOTE — TELEPHONE ENCOUNTER
Please abstract the following data from this visit with this patient into the appropriate field in Epic:    Eye exam with ophthalmology on this date: 6/1/18

## 2018-06-06 NOTE — TELEPHONE ENCOUNTER
RN returned call to pt and advised that Dr. Beach has approved the pending refill of gabapentin.   Pt states she returned to the pharmacy today and the provider had not yet refilled the 600mg gabapentin order, but they were able to fill her 300mg gabapentin so pt states she will use these capsules to achieve her usual dose. Pt states she will keep her scheduled appointment tomorrow and plans to arrive fasting for any required labs.  Angelica Mcqueen RN

## 2018-06-06 NOTE — TELEPHONE ENCOUNTER
Patient calling, she is completely out of gabapentin. I let her know that Dr. Beach wanted her to make an appointment. She states that she needs a refill sent to pharmacy today, because she was told that if she stops this medication she would have a seizure.     She does not want to come in but did make an appointment for tomorrow morning.

## 2018-06-07 ENCOUNTER — RADIANT APPOINTMENT (OUTPATIENT)
Dept: MAMMOGRAPHY | Facility: CLINIC | Age: 48
End: 2018-06-07
Attending: FAMILY MEDICINE
Payer: COMMERCIAL

## 2018-06-07 ENCOUNTER — OFFICE VISIT (OUTPATIENT)
Dept: FAMILY MEDICINE | Facility: CLINIC | Age: 48
End: 2018-06-07
Payer: COMMERCIAL

## 2018-06-07 VITALS
RESPIRATION RATE: 18 BRPM | TEMPERATURE: 97.7 F | WEIGHT: 290 LBS | BODY MASS INDEX: 44.09 KG/M2 | DIASTOLIC BLOOD PRESSURE: 79 MMHG | HEART RATE: 71 BPM | SYSTOLIC BLOOD PRESSURE: 133 MMHG

## 2018-06-07 DIAGNOSIS — Z12.31 ENCOUNTER FOR SCREENING MAMMOGRAM FOR BREAST CANCER: Primary | ICD-10-CM

## 2018-06-07 DIAGNOSIS — I10 HYPERTENSION GOAL BP (BLOOD PRESSURE) < 140/90: ICD-10-CM

## 2018-06-07 DIAGNOSIS — F31.9 BIPOLAR AFFECTIVE DISORDER, REMISSION STATUS UNSPECIFIED (H): ICD-10-CM

## 2018-06-07 DIAGNOSIS — E11.9 TYPE 2 DIABETES MELLITUS WITHOUT COMPLICATION, WITHOUT LONG-TERM CURRENT USE OF INSULIN (H): ICD-10-CM

## 2018-06-07 DIAGNOSIS — M79.2 NEURALGIA: ICD-10-CM

## 2018-06-07 DIAGNOSIS — E55.9 VITAMIN D DEFICIENCY: ICD-10-CM

## 2018-06-07 DIAGNOSIS — Z12.31 ENCOUNTER FOR SCREENING MAMMOGRAM FOR BREAST CANCER: ICD-10-CM

## 2018-06-07 DIAGNOSIS — E78.5 HYPERLIPIDEMIA LDL GOAL <100: ICD-10-CM

## 2018-06-07 LAB
CHOLEST SERPL-MCNC: 157 MG/DL
DEPRECATED CALCIDIOL+CALCIFEROL SERPL-MC: 63 UG/L (ref 20–75)
HBA1C MFR BLD: 6.7 % (ref 0–5.6)
HDLC SERPL-MCNC: 43 MG/DL
LDLC SERPL CALC-MCNC: 93 MG/DL
NONHDLC SERPL-MCNC: 114 MG/DL
TRIGL SERPL-MCNC: 105 MG/DL

## 2018-06-07 PROCEDURE — 83036 HEMOGLOBIN GLYCOSYLATED A1C: CPT | Performed by: FAMILY MEDICINE

## 2018-06-07 PROCEDURE — 36415 COLL VENOUS BLD VENIPUNCTURE: CPT | Performed by: FAMILY MEDICINE

## 2018-06-07 PROCEDURE — 80061 LIPID PANEL: CPT | Performed by: FAMILY MEDICINE

## 2018-06-07 PROCEDURE — 99214 OFFICE O/P EST MOD 30 MIN: CPT | Performed by: FAMILY MEDICINE

## 2018-06-07 PROCEDURE — 82306 VITAMIN D 25 HYDROXY: CPT | Performed by: FAMILY MEDICINE

## 2018-06-07 PROCEDURE — 77067 SCR MAMMO BI INCL CAD: CPT | Mod: TC

## 2018-06-07 RX ORDER — CHOLECALCIFEROL (VITAMIN D3) 50 MCG
2000 TABLET ORAL 2 TIMES DAILY
Qty: 180 TABLET | Refills: 3 | Status: SHIPPED | OUTPATIENT
Start: 2018-06-07 | End: 2019-07-01

## 2018-06-07 RX ORDER — GABAPENTIN 600 MG/1
600 TABLET ORAL 3 TIMES DAILY
Qty: 90 TABLET | Refills: 0 | Status: SHIPPED | OUTPATIENT
Start: 2018-06-07 | End: 2018-06-28

## 2018-06-07 RX ORDER — GABAPENTIN 300 MG/1
CAPSULE ORAL
Qty: 90 CAPSULE | Refills: 1 | Status: SHIPPED | OUTPATIENT
Start: 2018-06-07 | End: 2018-06-28

## 2018-06-07 NOTE — MR AVS SNAPSHOT
After Visit Summary   6/7/2018    Carissa Spears    MRN: [id number]           Patient Information     Date Of Birth          1970        Visit Information        Provider Department      6/7/2018 8:00 AM Silvino Beach MD [facility]        Today's Diagnoses     Encounter for screening mammogram for breast cancer    -  1    Bipolar affective disorder, remission status unspecified (H)        Type 2 diabetes mellitus without complication, without long-term current use of insulin (H)        Vitamin D deficiency        Hyperlipidemia LDL goal <100        Hypertension goal BP (blood pressure) < 140/90        Neuralgia           Follow-ups after your visit        Your next 10 appointments already scheduled     Jun 07, 2018 11:00 AM CDT   MA SCREENING DIGITAL BILATERAL with ANDMA[facility]   [facility]    [address] [facility], [place] [address] [phone]           Do not use any powder, lotion or deodorant under your arms or on your breast. If you do, we will ask you to remove it before your exam.  Wear comfortable, two-piece clothing.  If you have any allergies, tell your care team.  Bring any previous mammograms from other facilities or have them mailed to the breast center.              Future tests that were ordered for you today     Open Future Orders        Priority Expected Expires Ordered    *MA Screening Digital Bilateral Routine  6/7/2019 6/7/2018            Who to contact     If you have questions or need follow up information about today's clinic visit or your schedule please contact [facility] directly at [phone].  Normal or non-critical lab and imaging results will be communicated to you by MyChart, letter or phone within 4 business days after the clinic has received the results. If you do not hear from us within 7 days, please contact the clinic through MyChart or phone. If you have a critical or abnormal lab  result, we will notify you by phone as soon as possible.  Submit refill requests through Pazien or call your pharmacy and they will forward the refill request to us. Please allow 3 business days for your refill to be completed.          Additional Information About Your Visit        CBLPathhart Information     Pazien gives you secure access to your electronic health record. If you see a primary care provider, you can also send messages to your care team and make appointments. If you have questions, please call your primary care clinic.  If you do not have a primary care provider, please call [phone] and they will assist you.        Care EveryWhere ID     This is your Care EveryWhere ID. This could be used by other organizations to access your [place] medical records  [id number]        Your Vitals Were     Pulse Temperature Respirations BMI (Body Mass Index)          71 97.7  F (36.5  C) (Oral) 18 44.09 kg/m2         Blood Pressure from Last 3 Encounters:   06/07/18 133/79   12/01/17 126/80   10/02/17 130/80    Weight from Last 3 Encounters:   06/07/18 290 lb (131.5 kg)   12/01/17 293 lb (132.9 kg)   10/02/17 287 lb (130.2 kg)              We Performed the Following     Hemoglobin A1c     Lipid Profile (Chol, Trig, HDL, LDL calc)     Vitamin D Deficiency          Today's Medication Changes          These changes are accurate as of 6/7/18  9:04 AM.  If you have any questions, ask your nurse or doctor.               Start taking these medicines.        Dose/Directions    vitamin D 2000 units tablet   Used for:  Vitamin D deficiency   Started by:  Silvino Beach MD        Dose:  2000 Units   Take 2,000 Units by mouth 2 times daily   Quantity:  180 tablet   Refills:  3         Stop taking these medicines if you haven't already. Please contact your care team if you have questions.     oxyCODONE-acetaminophen 5-325 MG per tablet   Commonly known as:  PERCOCET   Stopped by:  Silvino Beach MD                 Where to get your medicines      These medications were sent to [id number] - [place], [place] - [address] [facility] [address] [address]  [address] [facility] [facility] [address]     Phone:  [phone]     gabapentin 300 MG capsule    gabapentin 600 MG tablet    vitamin D 2000 units tablet                Primary Care Provider Office Phone # Fax #    Silvino Basil Beach [phone] [address] [facility] [address]        Equal Access to Services     [facility]: Hadii aad ku hadasho Soomaali, waaxda luqadaha, qaybta kaalmada adeegyada, waxay idiin hayaan adeeg kharash laiveth . So [facility] [phone].    ATENCIÓN: Si habla español, tiene a vila disposición servicios gratuitos de asistencia lingüística. [facility] [phone].    We comply with applicable federal civil rights laws and [place] laws. We do not discriminate on the basis of race, color, national origin, age, disability, sex, sexual orientation, or gender identity.            Thank you!     Thank you for choosing [facility]  for your care. Our goal is always to provide you with excellent care. Hearing back from our patients is one way we can continue to improve our services. Please take a few minutes to complete the written survey that you may receive in the mail after your visit with us. Thank you!             Your Updated Medication List - Protect others around you: Learn how to safely use, store and throw away your medicines at www.disposemymeds.org.          This list is accurate as of 6/7/18  9:04 AM.  Always use your most recent med list.                   Brand Name Dispense Instructions for use Diagnosis    ALPRAZolam 0.5 MG tablet    XANAX     Take 0.5 mg by mouth        aspirin 81 MG tablet     1 tablet    Take 1 tablet (81 mg) by mouth daily        blood glucose lancets standard    no brand specified    100 each    Use to test blood sugar 1 times daily or as directed.contour next  brand or per insurance    Hyperglycemia       blood glucose monitoring meter device kit    no brand specified    1 kit    Use to test blood sugar 1 times daily or as directed. contour next brand or per insurance    Hyperglycemia       citalopram 40 MG tablet    celeXA          CONTOUR NEXT TEST test strip   Generic drug:  blood glucose monitoring     100 strip    USE TO TEST BLOOD SUGAR ONCE DAILY OR AS DIRECTED    Hyperglycemia       * gabapentin 300 MG capsule    NEURONTIN    90 capsule    TAKE 1 CAPSULE BY MOUTH THREE TIMES DAILY ALONG WITH ONE 600MG CAPSULE FOR A TOTAL OF 900MG THREE TIMES DAILY    Neuralgia       * gabapentin 600 MG tablet    NEURONTIN    90 tablet    Take 1 tablet (600 mg) by mouth 3 times daily Last refill - need to be seen in next month    Neuralgia       IBUPROFEN PO      Take 200-400 mg by mouth every 4 hours as needed for moderate pain        isometheptene-dichloralphenazone-acetaminophen -325 MG per capsule    MIDRIN    40 capsule    Take 1 capsule by mouth every 4 hours as needed for headaches    Migraine with aura and without status migrainosus, not intractable       LATUDA 40 MG Tabs tablet   Generic drug:  lurasidone           losartan 100 MG tablet    COZAAR    90 tablet    TAKE ONE-HALF TABLET BY MOUTH TWICE DAILY AS NEEDED FOR BLOOD PRESSURE    Essential hypertension with goal blood pressure less than 140/90       metFORMIN 500 MG 24 hr tablet    GLUCOPHAGE-XR    180 tablet    Take 2 tablets (1,000 mg) by mouth daily (with dinner) For diabetes    Type 2 diabetes mellitus without complication, without long-term current use of insulin (H)       * metoprolol tartrate 100 MG tablet    LOPRESSOR    90 tablet    TAKE 1.5 TABLETS (150 MG) BY MOUTH 2 TIMES DAILY FOR BLOOD PRESSURE (NEED TO BE SEEN FOR REFILLS)    Essential hypertension with goal blood pressure less than 140/90       * metoprolol tartrate 100 MG tablet    LOPRESSOR    270 tablet    TAKE ONE AND ONE-HALF TABLETS BY  MOUTH TWICE DAILY FOR BLOOD PRESSURE    Essential hypertension with goal blood pressure less than 140/90       simvastatin 40 MG tablet    ZOCOR    90 tablet    TAKE 1 TABLET BY MOUTH EVERY NIGHT AT BEDTIME FOR CHOLESTEROL    Hyperlipidemia LDL goal <100       TRAZODONE HCL PO      Take 100 mg by mouth At Bedtime Patient reports: Takes 100-2000 MG at bedtime        vitamin D 2000 units tablet     180 tablet    Take 2,000 Units by mouth 2 times daily    Vitamin D deficiency       * Notice:  This list has 4 medication(s) that are the same as other medications prescribed for you. Read the directions carefully, and ask your doctor or other care provider to review them with you.

## 2018-06-07 NOTE — PROGRESS NOTES
SUBJECTIVE:  Carissa Spears, a 48 year old female scheduled an appointment to discuss the following issues:  Follow-up dm, htn, high cholesterol, bipolar and lumbar radiculopathy.   No breast cancer family history or breast changes.   Blood sugar a little higher with increase psych med. Exercise and some weight loss.   S/p knee replacment - walking better. No chest pain or shortness of breath. Emotionally doing ok.   No urine changes. No feet changes. Eye exam last week. No retinal changes. Chronic side pain s/p gall bladder replace -gabapentin helpful.   Blood pressure ok outside office.     Past Medical History:   Diagnosis Date     Allergies      Bipolar disorder, unspecified      DM II (diabetes mellitus, type II)     diet controlled     Endometriosis      Herniated lumbar intervertebral disc     age 22     High cholesterol      HTN      Liver disease     fatty liver     Obesity      Other disorder of menstruation and other abnormal bleeding from female genital tract      Polycystic ovarian syndrome      Sleep disorder      Toxemia        Past Surgical History:   Procedure Laterality Date     AS KNEE SCOPE, ALLOGRAFT IMPANT Left      C/SECTION, CLASSICAL      X2     HYSTERECTOMY, PAP NO LONGER INDICATED       HYSTERECTOMY, KAMILLA  4/8/08     LAPAROSCOPIC CHOLECYSTECTOMY  7/18/2012    Procedure: LAPAROSCOPIC CHOLECYSTECTOMY;  Laparoscopic cholecystectomy;  Surgeon: Miguel Fuentes MD;  Location: [facility] OR     SALPINGO OOPHORECTOMY,R/L/YANELIS  4/8/08    L     TONSILLECTOMY  1983     TUBAL LIGATION         Family History   Problem Relation Age of Onset     CANCER Mother      skin cancer - non-melanoma     Hypertension Mother      Lipids Father        Social History   Substance Use Topics     Smoking status: Current Every Day Smoker     Packs/day: 0.25     Years: 20.00     Types: Cigarettes     Smokeless tobacco: Never Used     Alcohol use No       ROS:  All other ROS negative    OBJECTIVE:  /79 (BP Location:  Left arm, Cuff Size: Adult Large)  Pulse 71  Temp 97.7  F (36.5  C) (Oral)  Resp 18  Wt 290 lb (131.5 kg)  BMI 44.09 kg/m2  EXAM:  GENERAL APPEARANCE: healthy, alert and no distress  EYES: EOMI,  PERRL  HENT: ear canals and TM's normal and nose and mouth without ulcers or lesions  NECK: no adenopathy, no asymmetry, masses, or scars and thyroid normal to palpation  RESP: lungs clear to auscultation - no rales, rhonchi or wheezes  CV: regular rates and rhythm, normal S1 S2, no S3 or S4 and no murmur, click or rub -  ABDOMEN:  soft, nontender, no HSM or masses and bowel sounds normal  MS: extremities normal- no gross deformities noted, no evidence of inflammation in joints, FROM in all extremities.  NEURO: Normal strength and tone, sensory exam grossly normal, mentation intact and speech normal  PSYCH: mentation appears normal and affect normal/bright    ASSESSMENT / PLAN:  (Z12.31) Encounter for screening mammogram for breast cancer  (primary encounter diagnosis)  Plan: *MA Screening Digital Bilateral        Set-up    (F31.9) Bipolar affective disorder, remission status unspecified (H)  Comment: stable  Plan: per psych    (E11.9) Type 2 diabetes mellitus without complication, without long-term current use of insulin (H)  Comment: stable  Plan: Hemoglobin A1c        Exercise/ weight loss and continue metformin.     (E55.9) Vitamin D deficiency  Plan: Vitamin D Deficiency        [id number] daily. Await labs    (E78.5) Hyperlipidemia LDL goal <100    Plan: Lipid Profile (Chol, Trig, HDL, LDL calc)        Continue statin/ weight loss.     (I10) Hypertension goal BP (blood pressure) < 140/90  Comment: stable  Plan: continue meds. Recheck in 6 months  Exercise/ weight loss. Chest pain or shortness of breath to er    (M79.2) Neuralgia  Comment: stable  Plan: gabapentin (NEURONTIN) 300 MG capsule,         gabapentin (NEURONTIN) 600 MG tablet        Patient would like to wean down/off gabapentin. Wean down by 300mg  every 2 days. Can continue lowest effective dosage too.     Silvino Beach

## 2018-06-07 NOTE — NURSING NOTE
"Chief Complaint   Patient presents with     Refill Request     Discuss medications         Initial /82 (BP Location: Left arm, Cuff Size: Adult Large)  Pulse 71  Temp 97.7  F (36.5  C) (Oral)  Resp 18  Wt 290 lb (131.5 kg)  BMI 44.09 kg/m2 Estimated body mass index is 44.09 kg/(m^2) as calculated from the following:    Height as of 12/1/17: 5' 8\" (1.727 m).    Weight as of this encounter: 290 lb (131.5 kg)..  BP completed using cuff size: large    Kaelyn HEATH  "

## 2018-06-08 ASSESSMENT — ANXIETY QUESTIONNAIRES
5. BEING SO RESTLESS THAT IT IS HARD TO SIT STILL: SEVERAL DAYS
6. BECOMING EASILY ANNOYED OR IRRITABLE: SEVERAL DAYS
1. FEELING NERVOUS, ANXIOUS, OR ON EDGE: MORE THAN HALF THE DAYS
GAD7 TOTAL SCORE: 6
7. FEELING AFRAID AS IF SOMETHING AWFUL MIGHT HAPPEN: NOT AT ALL
2. NOT BEING ABLE TO STOP OR CONTROL WORRYING: NOT AT ALL
IF YOU CHECKED OFF ANY PROBLEMS ON THIS QUESTIONNAIRE, HOW DIFFICULT HAVE THESE PROBLEMS MADE IT FOR YOU TO DO YOUR WORK, TAKE CARE OF THINGS AT HOME, OR GET ALONG WITH OTHER PEOPLE: SOMEWHAT DIFFICULT
3. WORRYING TOO MUCH ABOUT DIFFERENT THINGS: NOT AT ALL

## 2018-06-08 ASSESSMENT — PATIENT HEALTH QUESTIONNAIRE - PHQ9: 5. POOR APPETITE OR OVEREATING: MORE THAN HALF THE DAYS

## 2018-06-09 ASSESSMENT — PATIENT HEALTH QUESTIONNAIRE - PHQ9: SUM OF ALL RESPONSES TO PHQ QUESTIONS 1-9: 3

## 2018-06-09 ASSESSMENT — ANXIETY QUESTIONNAIRES: GAD7 TOTAL SCORE: 6

## 2018-06-10 DIAGNOSIS — R73.9 HYPERGLYCEMIA: ICD-10-CM

## 2018-06-11 RX ORDER — BLOOD-GLUCOSE METER
EACH MISCELLANEOUS
Qty: 1 KIT | Refills: 0 | Status: SHIPPED | OUTPATIENT
Start: 2018-06-11

## 2018-06-21 ENCOUNTER — TRANSFERRED RECORDS (OUTPATIENT)
Dept: HEALTH INFORMATION MANAGEMENT | Facility: CLINIC | Age: 48
End: 2018-06-21

## 2018-06-28 ENCOUNTER — OFFICE VISIT (OUTPATIENT)
Dept: FAMILY MEDICINE | Facility: CLINIC | Age: 48
End: 2018-06-28
Payer: COMMERCIAL

## 2018-06-28 ENCOUNTER — RADIANT APPOINTMENT (OUTPATIENT)
Dept: GENERAL RADIOLOGY | Facility: CLINIC | Age: 48
End: 2018-06-28
Attending: FAMILY MEDICINE
Payer: COMMERCIAL

## 2018-06-28 VITALS
WEIGHT: 289 LBS | OXYGEN SATURATION: 96 % | DIASTOLIC BLOOD PRESSURE: 80 MMHG | RESPIRATION RATE: 20 BRPM | BODY MASS INDEX: 43.94 KG/M2 | TEMPERATURE: 97.1 F | HEART RATE: 88 BPM | SYSTOLIC BLOOD PRESSURE: 132 MMHG

## 2018-06-28 DIAGNOSIS — J40 BRONCHITIS: ICD-10-CM

## 2018-06-28 DIAGNOSIS — R05.9 COUGH: Primary | ICD-10-CM

## 2018-06-28 DIAGNOSIS — R05.9 COUGH: ICD-10-CM

## 2018-06-28 PROCEDURE — 71046 X-RAY EXAM CHEST 2 VIEWS: CPT | Mod: FY

## 2018-06-28 PROCEDURE — 99213 OFFICE O/P EST LOW 20 MIN: CPT | Performed by: FAMILY MEDICINE

## 2018-06-28 RX ORDER — CEFUROXIME AXETIL 500 MG/1
500 TABLET ORAL 2 TIMES DAILY
Qty: 20 TABLET | Refills: 0 | Status: SHIPPED | OUTPATIENT
Start: 2018-06-28 | End: 2018-09-19

## 2018-06-28 ASSESSMENT — PAIN SCALES - GENERAL: PAINLEVEL: NO PAIN (0)

## 2018-06-28 NOTE — MR AVS SNAPSHOT
After Visit Summary   6/28/2018    Carissa Spears    MRN: [id number]           Patient Information     Date Of Birth          1970        Visit Information        Provider Department      6/28/2018 7:40 AM Mat Daigle MD [facility]        Today's Diagnoses     Cough    -  1    Bronchitis          Care Instructions      Take prescribed medication as directed.    Have a follow up if not improving.          Follow-ups after your visit        Who to contact     If you have questions or need follow up information about today's clinic visit or your schedule please contact [facility] directly at [phone].  Normal or non-critical lab and imaging results will be communicated to you by MyChart, letter or phone within 4 business days after the clinic has received the results. If you do not hear from us within 7 days, please contact the clinic through Antavot or phone. If you have a critical or abnormal lab result, we will notify you by phone as soon as possible.  Submit refill requests through Media Redefined or call your pharmacy and they will forward the refill request to us. Please allow 3 business days for your refill to be completed.          Additional Information About Your Visit        MyChart Information     Media Redefined gives you secure access to your electronic health record. If you see a primary care provider, you can also send messages to your care team and make appointments. If you have questions, please call your primary care clinic.  If you do not have a primary care provider, please call [phone] and they will assist you.        Care EveryWhere ID     This is your Care EveryWhere ID. This could be used by other organizations to access your [place] medical records  [id number]        Your Vitals Were     Pulse Temperature Respirations Pulse Oximetry BMI (Body Mass Index)       88 97.1  F (36.2  C) (Oral) 20 96% 43.94 kg/m2        Blood Pressure from Last 3  Encounters:   06/28/18 132/80   06/07/18 133/79   12/01/17 126/80    Weight from Last 3 Encounters:   06/28/18 289 lb (131.1 kg)   06/07/18 290 lb (131.5 kg)   12/01/17 293 lb (132.9 kg)                 Today's Medication Changes          These changes are accurate as of 6/28/18  8:03 AM.  If you have any questions, ask your nurse or doctor.               Start taking these medicines.        Dose/Directions    cefuroxime 500 MG tablet   Commonly known as:  CEFTIN   Used for:  Bronchitis   Started by:  Mat Daigle MD        Dose:  500 mg   Take 1 tablet (500 mg) by mouth 2 times daily   Quantity:  20 tablet   Refills:  0            Where to get your medicines      These medications were sent to [facility]  [place] - [address] [facility] [address]  [address] [address] [facility] [place] [address]     Phone:  [phone]     cefuroxime 500 MG tablet                Primary Care Provider Office Phone # Fax #    Silvino Basil Beach [phone] [address] [facility] [address]        Equal Access to Services     [facility] : Hadii uday hung hadasho Soomaali, waaxda luqadaha, qaybta kaalmada adeegyada, genesis grey. So [facility] [phone].    ATENCIÓN: Si habla español, tiene a vila disposición servicios gratuitos de asistencia lingüística. [facility] [phone].    We comply with applicable federal civil rights laws and [place] laws. We do not discriminate on the basis of race, color, national origin, age, disability, sex, sexual orientation, or gender identity.            Thank you!     Thank you for choosing [facility]  for your care. Our goal is always to provide you with excellent care. Hearing back from our patients is one way we can continue to improve our services. Please take a few minutes to complete the written survey that you may receive in the mail after your visit with us. Thank you!             Your  Updated Medication List - Protect others around you: Learn how to safely use, store and throw away your medicines at www.disposemymeds.org.          This list is accurate as of 6/28/18  8:03 AM.  Always use your most recent med list.                   Brand Name Dispense Instructions for use Diagnosis    ALPRAZolam 0.5 MG tablet    XANAX     Take 0.5 mg by mouth        aspirin 81 MG tablet     1 tablet    Take 1 tablet (81 mg) by mouth daily        blood glucose lancets standard    no brand specified    100 each    Use to test blood sugar 1 times daily or as directed.contour next brand or per insurance    Hyperglycemia       blood glucose monitoring meter device kit     1 kit    USE TO TEST BLOOD SUGAR ONCE DAILY OR AS DIRECTED    Hyperglycemia       cefuroxime 500 MG tablet    CEFTIN    20 tablet    Take 1 tablet (500 mg) by mouth 2 times daily    Bronchitis       citalopram 40 MG tablet    celeXA          CONTOUR NEXT TEST test strip   Generic drug:  blood glucose monitoring     100 strip    USE TO TEST BLOOD SUGAR ONCE DAILY OR AS DIRECTED    Hyperglycemia       IBUPROFEN PO      Take 200-400 mg by mouth every 4 hours as needed for moderate pain        isometheptene-dichloralphenazone-acetaminophen -325 MG per capsule    MIDRIN    40 capsule    Take 1 capsule by mouth every 4 hours as needed for headaches    Migraine with aura and without status migrainosus, not intractable       LATUDA 40 MG Tabs tablet   Generic drug:  lurasidone           losartan 100 MG tablet    COZAAR    90 tablet    TAKE ONE-HALF TABLET BY MOUTH TWICE DAILY AS NEEDED FOR BLOOD PRESSURE    Essential hypertension with goal blood pressure less than 140/90       metFORMIN 500 MG 24 hr tablet    GLUCOPHAGE-XR    180 tablet    Take 2 tablets (1,000 mg) by mouth daily (with dinner) For diabetes    Type 2 diabetes mellitus without complication, without long-term current use of insulin (H)       * metoprolol tartrate 100 MG tablet     LOPRESSOR    90 tablet    TAKE 1.5 TABLETS (150 MG) BY MOUTH 2 TIMES DAILY FOR BLOOD PRESSURE (NEED TO BE SEEN FOR REFILLS)    Essential hypertension with goal blood pressure less than 140/90       * metoprolol tartrate 100 MG tablet    LOPRESSOR    270 tablet    TAKE ONE AND ONE-HALF TABLETS BY MOUTH TWICE DAILY FOR BLOOD PRESSURE    Essential hypertension with goal blood pressure less than 140/90       simvastatin 40 MG tablet    ZOCOR    90 tablet    TAKE 1 TABLET BY MOUTH EVERY NIGHT AT BEDTIME FOR CHOLESTEROL    Hyperlipidemia LDL goal <100       TRAZODONE HCL PO      Take 100 mg by mouth At Bedtime Patient reports: Takes 100-2000 MG at bedtime        vitamin D 2000 units tablet     180 tablet    Take 2,000 Units by mouth 2 times daily    Vitamin D deficiency       * Notice:  This list has 2 medication(s) that are the same as other medications prescribed for you. Read the directions carefully, and ask your doctor or other care provider to review them with you.

## 2018-06-28 NOTE — PROGRESS NOTES
CHIEF COMPLAINT    Persistent cough      HISTORY    Carissa has had a cough for about 2 weeks.  She has had occasional fevers.  She is getting greenish sputum especially in the mornings.    She is a 1 pack per day smoker.  She does not have asthma.  She is not on inhalers.    Patient Active Problem List   Diagnosis     HYPERLIPIDEMIA LDL GOAL <100     Hypertension goal BP (blood pressure) < 140/90     Bipolar disorder (H)     Polycystic ovarian syndrome     BMI 45.0-49.9, adult (H)     Tobacco abuse     Mild major depression (H)     Insomnia     Chronic abdominal pain     Hyperglycemia     Myofascial pain     Migraine with aura and without status migrainosus, not intractable     Non-allergic rhinitis     Bipolar affective disorder, remission status unspecified (H)     Current Outpatient Prescriptions   Medication Sig Dispense Refill     ALPRAZolam (XANAX) 0.5 MG tablet Take 0.5 mg by mouth       aspirin 81 MG tablet Take 1 tablet (81 mg) by mouth daily 1 tablet 0     blood glucose (NO BRAND SPECIFIED) lancets standard Use to test blood sugar 1 times daily or as directed.contour next brand or per insurance 100 each 0     blood glucose monitoring (CONTOUR NEXT MONITOR W/DEVICE KIT) meter device kit USE TO TEST BLOOD SUGAR ONCE DAILY OR AS DIRECTED 1 kit 0     Cholecalciferol (VITAMIN D) 2000 units tablet Take 2,000 Units by mouth 2 times daily 180 tablet 3     citalopram (CELEXA) 40 MG tablet        CONTOUR NEXT TEST test strip USE TO TEST BLOOD SUGAR ONCE DAILY OR AS DIRECTED 100 strip 0     IBUPROFEN PO Take 200-400 mg by mouth every 4 hours as needed for moderate pain       isometheptene-dichloralphenazone-acetaminophen (MIDRIN) -325 MG per capsule Take 1 capsule by mouth every 4 hours as needed for headaches 40 capsule 1     LATUDA 40 MG TABS tablet        losartan (COZAAR) 100 MG tablet TAKE ONE-HALF TABLET BY MOUTH TWICE DAILY AS NEEDED FOR BLOOD PRESSURE 90 tablet 1     metFORMIN (GLUCOPHAGE-XR) 500 MG 24  hr tablet Take 2 tablets (1,000 mg) by mouth daily (with dinner) For diabetes 180 tablet 1     metoprolol (LOPRESSOR) 100 MG tablet TAKE 1.5 TABLETS (150 MG) BY MOUTH 2 TIMES DAILY FOR BLOOD PRESSURE (NEED TO BE SEEN FOR REFILLS) 90 tablet 0     metoprolol tartrate (LOPRESSOR) 100 MG tablet TAKE ONE AND ONE-HALF TABLETS BY MOUTH TWICE DAILY FOR BLOOD PRESSURE 270 tablet 0     simvastatin (ZOCOR) 40 MG tablet TAKE 1 TABLET BY MOUTH EVERY NIGHT AT BEDTIME FOR CHOLESTEROL 90 tablet 0     TRAZODONE HCL PO Take 100 mg by mouth At Bedtime Patient reports: Takes 100-2000 MG at bedtime         REVIEW OF SYSTEMS    No S OB.  No wheezing.  No chest pain.  No edema.      Past Medical History:   Diagnosis Date     Allergies      Bipolar disorder, unspecified      DM II (diabetes mellitus, type II)     diet controlled     Endometriosis      Herniated lumbar intervertebral disc     age 22     High cholesterol      HTN      Liver disease     fatty liver     Obesity      Other disorder of menstruation and other abnormal bleeding from female genital tract      Polycystic ovarian syndrome      Sleep disorder      Toxemia        EXAM  /80  Pulse 88  Temp 97.1  F (36.2  C) (Oral)  Resp 20  Wt 289 lb (131.1 kg)  SpO2 96%  BMI 43.94 kg/m2    Pharynx is negative.  No cervical adenopathy.  Chest has occasional rhonchi.  No rales or consolidation.  She is status post bilateral TKA.    CXR:  Neg      (R05) Cough  (primary encounter diagnosis)  Comment:   Plan: XR Chest 2 Views            (J40) Bronchitis  Comment:   Plan: cefuroxime (CEFTIN) 500 MG tablet

## 2018-06-28 NOTE — NURSING NOTE
"Chief Complaint   Patient presents with     URI     cough - green sputum - fatigue - x 2 weeks       Initial /80  Pulse 88  Temp 97.1  F (36.2  C) (Oral)  Resp 20  Wt 289 lb (131.1 kg)  SpO2 96%  BMI 43.94 kg/m2 Estimated body mass index is 43.94 kg/(m^2) as calculated from the following:    Height as of 12/1/17: 5' 8\" (1.727 m).    Weight as of this encounter: 289 lb (131.1 kg).  Medication Reconciliation: complete  Pily Amaya M.A.    "

## 2018-07-11 DIAGNOSIS — E78.5 HYPERLIPIDEMIA LDL GOAL <100: ICD-10-CM

## 2018-07-11 DIAGNOSIS — I10 ESSENTIAL HYPERTENSION WITH GOAL BLOOD PRESSURE LESS THAN 140/90: ICD-10-CM

## 2018-07-12 RX ORDER — SIMVASTATIN 40 MG
TABLET ORAL
Qty: 90 TABLET | Refills: 1 | Status: SHIPPED | OUTPATIENT
Start: 2018-07-12 | End: 2018-12-06

## 2018-07-13 RX ORDER — METOPROLOL TARTRATE 100 MG
TABLET ORAL
Qty: 270 TABLET | Refills: 1 | Status: SHIPPED | OUTPATIENT
Start: 2018-07-13 | End: 2018-10-23

## 2018-08-11 DIAGNOSIS — R73.9 HYPERGLYCEMIA: ICD-10-CM

## 2018-08-20 DIAGNOSIS — R73.9 HYPERGLYCEMIA: ICD-10-CM

## 2018-08-20 RX ORDER — LANCETS
EACH MISCELLANEOUS
Qty: 100 EACH | Refills: 3 | Status: SHIPPED | OUTPATIENT
Start: 2018-08-20

## 2018-08-30 ENCOUNTER — TELEPHONE (OUTPATIENT)
Dept: FAMILY MEDICINE | Facility: CLINIC | Age: 48
End: 2018-08-30

## 2018-08-30 NOTE — TELEPHONE ENCOUNTER
Panel Management Review      Patient has the following on her problem list:     Diabetes    ASA: Passed    Last A1C  Lab Results   Component Value Date    A1C 6.7 06/07/2018    A1C 6.7 12/01/2017    A1C 7.2 06/14/2017    A1C 7.5 01/02/2017    A1C 5.8 09/23/2013     A1C tested: Passed    Last LDL:    Lab Results   Component Value Date    CHOL 157 06/07/2018     Lab Results   Component Value Date    HDL 43 06/07/2018     Lab Results   Component Value Date    LDL 93 06/07/2018     Lab Results   Component Value Date    TRIG 105 06/07/2018     Lab Results   Component Value Date    CHOLHDLRATIO 4.5 09/23/2013     Lab Results   Component Value Date    NHDL 114 06/07/2018       Is the patient on a Statin? YES             Is the patient on Aspirin? yes    Medications     HMG CoA Reductase Inhibitors    simvastatin (ZOCOR) 40 MG tablet    Salicylates    aspirin 81 MG tablet          Last three blood pressure readings:  BP Readings from Last 3 Encounters:   06/28/18 132/80   06/07/18 133/79   12/01/17 126/80       Date of last diabetes office visit:      Tobacco History:     History   Smoking Status     Current Every Day Smoker     Packs/day: 0.25     Years: 20.00     Types: Cigarettes   Smokeless Tobacco     Never Used           Composite cancer screening  Chart review shows that this patient is due/due soon for the following None  Summary:    Patient is due/failing the following:   She smokes    Action needed:   0    Type of outreach:    0    Questions for provider review:    None                                                                                                                                    Jesenia Lester CMA     Chart routed to 0 .

## 2018-09-18 ENCOUNTER — TELEPHONE (OUTPATIENT)
Dept: INTERNAL MEDICINE | Facility: CLINIC | Age: 48
End: 2018-09-18

## 2018-09-18 DIAGNOSIS — G43.809 OTHER MIGRAINE WITHOUT STATUS MIGRAINOSUS, NOT INTRACTABLE: ICD-10-CM

## 2018-09-18 RX ORDER — HYDROCODONE BITARTRATE AND ACETAMINOPHEN 5; 325 MG/1; MG/1
1 TABLET ORAL EVERY 6 HOURS PRN
Qty: 20 TABLET | Refills: 0 | Status: CANCELLED | OUTPATIENT
Start: 2018-09-18

## 2018-09-18 NOTE — TELEPHONE ENCOUNTER
"Per patient, c/o migraine today, this has not been treated \"in a while\".  Usually happens in spring and fall.  Requesting something for the pain.  Patient states hs not tried the Midren yet, but is reluctant to do so because the last time she took Imitrex she almost had a heart attack.  Patient states previous prescription was for Vicodin and this was effective.  Patient states she takes care of her handicap son.   Patient states she took Excedrin for migraines and did not work today.  Patient states she has not had to use anything in almost a year, because over the counter medications were effective.      Pharmacy Monitoring Program, [place] is printed.    Last prescription for Norco by Dr Silvino Beach 10/2/17'  Previous prescription by another provider, last prescription dated 1/15/18.    To Dr Silvino Beach to advise  Radha Skelton RN     "

## 2018-09-18 NOTE — TELEPHONE ENCOUNTER
Patient is calling to ask pcp to Vicodin she has another migraine and can't start the new medication now her son is sick and she doesn't know how she will react to new medication unsure what the new medication was.     Please call to advice  Thank you       Caller informed that calls received after 3pm may not be returned same day.

## 2018-09-19 ENCOUNTER — OFFICE VISIT (OUTPATIENT)
Dept: FAMILY MEDICINE | Facility: CLINIC | Age: 48
End: 2018-09-19
Payer: COMMERCIAL

## 2018-09-19 VITALS
OXYGEN SATURATION: 97 % | TEMPERATURE: 97.9 F | SYSTOLIC BLOOD PRESSURE: 138 MMHG | RESPIRATION RATE: 16 BRPM | HEART RATE: 76 BPM | BODY MASS INDEX: 44.7 KG/M2 | WEIGHT: 293 LBS | DIASTOLIC BLOOD PRESSURE: 87 MMHG

## 2018-09-19 DIAGNOSIS — G43.919 INTRACTABLE MIGRAINE WITHOUT STATUS MIGRAINOSUS, UNSPECIFIED MIGRAINE TYPE: Primary | ICD-10-CM

## 2018-09-19 DIAGNOSIS — E11.9 TYPE 2 DIABETES MELLITUS WITHOUT COMPLICATION, WITHOUT LONG-TERM CURRENT USE OF INSULIN (H): ICD-10-CM

## 2018-09-19 PROBLEM — E11.8 TYPE 2 DIABETES MELLITUS WITH COMPLICATION, WITHOUT LONG-TERM CURRENT USE OF INSULIN (H): Status: RESOLVED | Noted: 2018-09-19 | Resolved: 2018-09-19

## 2018-09-19 PROBLEM — E11.8 TYPE 2 DIABETES MELLITUS WITH COMPLICATION, WITHOUT LONG-TERM CURRENT USE OF INSULIN (H): Status: ACTIVE | Noted: 2018-09-19

## 2018-09-19 PROCEDURE — 99214 OFFICE O/P EST MOD 30 MIN: CPT | Performed by: FAMILY MEDICINE

## 2018-09-19 RX ORDER — HYDROCODONE BITARTRATE AND ACETAMINOPHEN 5; 325 MG/1; MG/1
1 TABLET ORAL EVERY 6 HOURS PRN
Qty: 18 TABLET | Refills: 0 | Status: SHIPPED | OUTPATIENT
Start: 2018-09-19 | End: 2018-10-23

## 2018-09-19 RX ORDER — LURASIDONE HYDROCHLORIDE 80 MG/1
120 TABLET, FILM COATED ORAL DAILY
COMMUNITY
Start: 2018-09-05 | End: 2020-01-28

## 2018-09-19 ASSESSMENT — PAIN SCALES - GENERAL: PAINLEVEL: EXTREME PAIN (8)

## 2018-09-19 NOTE — MR AVS SNAPSHOT
After Visit Summary   9/19/2018    Carissa Spears    MRN: [id number]           Patient Information     Date Of Birth          1970        Visit Information        Provider Department      9/19/2018 12:00 PM Leisa Murphy MD [facility]        Today's Diagnoses     Intractable migraine without status migrainosus, unspecified migraine type    -  1    Type 2 diabetes mellitus without complication, without long-term current use of insulin (H)           Follow-ups after your visit        Who to contact     If you have questions or need follow up information about today's clinic visit or your schedule please contact [facility] directly at [phone].  Normal or non-critical lab and imaging results will be communicated to you by MyChart, letter or phone within 4 business days after the clinic has received the results. If you do not hear from us within 7 days, please contact the clinic through [employer] or phone. If you have a critical or abnormal lab result, we will notify you by phone as soon as possible.  Submit refill requests through [employer] or call your pharmacy and they will forward the refill request to us. Please allow 3 business days for your refill to be completed.          Additional Information About Your Visit        MyChart Information     [employer] gives you secure access to your electronic health record. If you see a primary care provider, you can also send messages to your care team and make appointments. If you have questions, please call your primary care clinic.  If you do not have a primary care provider, please call [phone] and they will assist you.        Care EveryWhere ID     This is your Care EveryWhere ID. This could be used by other organizations to access your [facility] medical records  [id number]        Your Vitals Were     Pulse Temperature Respirations Pulse Oximetry Breastfeeding? BMI (Body Mass Index)    76 97.9  F (36.6  C)  (Oral) 16 97% No 44.7 kg/m2       Blood Pressure from Last 3 Encounters:   09/19/18 138/87   06/28/18 132/80   06/07/18 133/79    Weight from Last 3 Encounters:   09/19/18 294 lb (133.4 kg)   06/28/18 289 lb (131.1 kg)   06/07/18 290 lb (131.5 kg)              Today, you had the following     No orders found for display         Today's Medication Changes          These changes are accurate as of 9/19/18  5:06 PM.  If you have any questions, ask your nurse or doctor.               Start taking these medicines.        Dose/Directions    HYDROcodone-acetaminophen 5-325 MG per tablet   Commonly known as:  NORCO   Used for:  Intractable migraine without status migrainosus, unspecified migraine type   Started by:  Leisa Murphy MD        Dose:  1 tablet   Take 1 tablet by mouth every 6 hours as needed for pain or moderate to severe pain   Quantity:  18 tablet   Refills:  0            Where to get your medicines      Some of these will need a paper prescription and others can be bought over the counter.  Ask your nurse if you have questions.     Bring a paper prescription for each of these medications     HYDROcodone-acetaminophen 5-325 MG per tablet               Information about OPIOIDS     PRESCRIPTION OPIOIDS: WHAT YOU NEED TO KNOW   We gave you an opioid (narcotic) pain medicine. It is important to manage your pain, but opioids are not always the best choice. You should first try all the other options your care team gave you. Take this medicine for as short a time (and as few doses) as possible.    Some activities can increase your pain, such as bandage changes or therapy sessions. It may help to take your pain medicine 30 to 60 minutes before these activities. Reduce your stress by getting enough sleep, working on hobbies you enjoy and practicing relaxation or meditation. Talk to your care team about ways to manage your pain beyond prescription opioids.    These medicines have risks:    DO NOT drive when  on new or higher doses of pain medicine. These medicines can affect your alertness and reaction times, and you could be arrested for driving under the influence (DUI). If you need to use opioids long-term, talk to your care team about driving.    DO NOT operate heavy machinery    DO NOT do any other dangerous activities while taking these medicines.    DO NOT drink any alcohol while taking these medicines.     If the opioid prescribed includes acetaminophen, DO NOT take with any other medicines that contain acetaminophen. Read all labels carefully. Look for the word  acetaminophen  or  Tylenol.  Ask your pharmacist if you have questions or are unsure.    You can get addicted to pain medicines, especially if you have a history of addiction (chemical, alcohol or substance dependence). Talk to your care team about ways to reduce this risk.    All opioids tend to cause constipation. Drink plenty of water and eat foods that have a lot of fiber, such as fruits, vegetables, prune juice, apple juice and high-fiber cereal. Take a laxative (Miralax, milk of magnesia, Colace, Senna) if you don t move your bowels at least every other day. Other side effects include upset stomach, sleepiness, dizziness, throwing up, tolerance (needing more of the medicine to have the same effect), physical dependence and slowed breathing.    Store your pills in a secure place, locked if possible. We will not replace any lost or stolen medicine. If you don t finish your medicine, please throw away (dispose) as directed by your pharmacist. The [place] Pollution Control Agency has more information about safe disposal: [email or web address]         Primary Care Provider Office Phone # Fax #    Silvino Beach [phone] [address] [facility] [address]        Equal Access to Services     PHUC GONCALVES AH: marko Corcoran qaybta kaalmada  genesis turnerhyun lynnaan ah. [facility] [phone].    ATENCIÓN: Si betsyla anila, tiene a vila disposición servicios gratuitos de asistencia lingüística. Kathy al [phone].    We comply with applicable federal civil rights laws and [place] laws. We do not discriminate on the basis of race, color, national origin, age, disability, sex, sexual orientation, or gender identity.            Thank you!     Thank you for choosing [facility]  for your care. Our goal is always to provide you with excellent care. Hearing back from our patients is one way we can continue to improve our services. Please take a few minutes to complete the written survey that you may receive in the mail after your visit with us. Thank you!             Your Updated Medication List - Protect others around you: Learn how to safely use, store and throw away your medicines at www.disposemymeds.org.          This list is accurate as of 9/19/18  5:06 PM.  Always use your most recent med list.                   Brand Name Dispense Instructions for use Diagnosis    ALPRAZolam 0.5 MG tablet    XANAX     Take 0.5 mg by mouth        aspirin 81 MG tablet     1 tablet    Take 1 tablet (81 mg) by mouth daily        blood glucose monitoring meter device kit     1 kit    USE TO TEST BLOOD SUGAR ONCE DAILY OR AS DIRECTED    Hyperglycemia       citalopram 40 MG tablet    celeXA          CONTOUR NEXT TEST test strip   Generic drug:  blood glucose monitoring     100 strip    USE TO TEST BLOOD SUGAR ONCE DAILY OR AS DIRECTED    Hyperglycemia       HYDROcodone-acetaminophen 5-325 MG per tablet    NORCO    18 tablet    Take 1 tablet by mouth every 6 hours as needed for pain or moderate to severe pain    Intractable migraine without status migrainosus, unspecified migraine type       IBUPROFEN PO      Take 200-400 mg by mouth every 4 hours as needed for moderate pain        isometheptene-dichloralphenazone-acetaminophen  -325 MG per capsule    MIDRIN    40 capsule    Take 1 capsule by mouth every 4 hours as needed for headaches    Migraine with aura and without status migrainosus, not intractable       * LATUDA 40 MG Tabs tablet   Generic drug:  lurasidone           * LATUDA 80 MG Tabs tablet   Generic drug:  lurasidone           losartan 100 MG tablet    COZAAR    90 tablet    TAKE ONE-HALF TABLET BY MOUTH TWICE DAILY AS NEEDED FOR BLOOD PRESSURE    Essential hypertension with goal blood pressure less than 140/90       metFORMIN 500 MG 24 hr tablet    GLUCOPHAGE-XR    180 tablet    Take 2 tablets (1,000 mg) by mouth daily (with dinner) For diabetes    Type 2 diabetes mellitus without complication, without long-term current use of insulin (H)       * metoprolol tartrate 100 MG tablet    LOPRESSOR    90 tablet    TAKE 1.5 TABLETS (150 MG) BY MOUTH 2 TIMES DAILY FOR BLOOD PRESSURE (NEED TO BE SEEN FOR REFILLS)    Essential hypertension with goal blood pressure less than 140/90       * metoprolol tartrate 100 MG tablet    LOPRESSOR    270 tablet    TAKE ONE AND ONE-HALF TABLETS BY MOUTH TWICE DAILY FOR BLOOD PRESSURE    Essential hypertension with goal blood pressure less than 140/90       MICROLET LANCETS Misc     100 each    USE TO TEST BLOOD SUGAR ONCE DAILY OR AS DIRECTED    Hyperglycemia       simvastatin 40 MG tablet    ZOCOR    90 tablet    TAKE 1 TABLET BY MOUTH EVERY NIGHT AT BEDTIME FOR CHOLESTEROL    Hyperlipidemia LDL goal <100       TRAZODONE HCL PO      Take 100 mg by mouth At Bedtime Patient reports: Takes 100-2000 MG at bedtime        vitamin D 2000 units tablet     180 tablet    Take 2,000 Units by mouth 2 times daily    Vitamin D deficiency       * Notice:  This list has 4 medication(s) that are the same as other medications prescribed for you. Read the directions carefully, and ask your doctor or other care provider to review them with you.

## 2018-09-19 NOTE — PROGRESS NOTES
SUBJECTIVE:   Carissa Spears is a 48 year old female who presents to clinic today for the following health issues:      Patient presents with:  Headache: pt needs refill of Vicodin, having a migraine. Pt was given Midrin at his last appt, but hasn't started it due to concern of reaction,     Hasn't slept much because of taking care of disabled son.  Headache started yesterday progressively worsening    This headache is not too unusual    Doesn't get daily headaches.    No thoughts of harming self or others   Description of pain: top and temples and behind eyes  Severity of pain:9/10  Associated symptoms: photophobia  Warning signs: None    Alleviating factors: unable to obtain relief with OTC meds.   Precipitating factors: lack of sleep.   History of trauma to head or neck: no  Fever: No  Sinus or cold symptoms: No   Thoughts of harming self or others: No  Skin: no rash.  Possibility of pregnancy: No     Problem list, Medication list, Allergies, and Medical/Social/Surgical histories reviewed in [facility] and updated as appropriate.    REVIEW OF SYSTEMS :     Constitutional, HEENT, cardiovascular, pulmonary, gi and gu systems are negative, except as otherwise noted.  No fevers or chills chest pain or shortness of breath       OBJECTIVE:                                                    /87  Pulse 76  Temp 97.9  F (36.6  C) (Oral)  Resp 16  Wt 294 lb (133.4 kg)  SpO2 97%  Breastfeeding? No  BMI 44.7 kg/m2  Body mass index is 44.7 kg/(m^2).  GENERAL: healthy, alert and no distress  EYES: Eyes grossly normal to inspection, PERRL and conjunctivae and sclerae normal  ENT: midline nasal septum. No sinus congestion.  rest of ENT exam normal.  NECK: no adenopathy, no asymmetry, masses, or scars and thyroid normal to palpation  RESP: lungs clear to auscultation - no rales, rhonchi or wheezes  CV: regular rate and rhythm, normal S1 S2, no S3 or S4, no murmur, click or rub, no peripheral edema and peripheral pulses  strong  MS: no gross musculoskeletal defects noted, no edema. No paraspinal muscle spasm.  No trapezius muscle spasm.  Neurologic Exam: CN's were intact. MMT 5/5 bilateral upper and lower extremities. Sensory was intact. Normal Gait. Normal Cerebellar Function. No focal or gross neurologic deficits. No meningeal signs  Psych: Pleasant. Appropriate mood and affect.  Skin: no obvious rash or lesions.     Diagnostic Test Results:  none      ASSESSMENT/PLAN:                                                        ICD-10-CM    1. Intractable migraine without status migrainosus, unspecified migraine type G43.919 HYDROcodone-acetaminophen (NORCO) 5-325 MG per tablet   2. Type 2 diabetes mellitus without complication, without long-term current use of insulin (H) E11.9      Prescribed with vicodin only for severe symptoms   Alarm signs or symptoms discussed, if present recommend go to ER. She declines today. She states this is her usual headache   Medication overuse risk discussed.   Warned sedating and habit forming. Do not take with other sedating meds. She states she only takes alprazolam as needed and not planning to take while on vicodin  No alcohol  Patient was looked up in [facility] and there are No concerns for controlled substance abuse.   Sedating medications given. Aware not to drive or operate machinery while on these medications. Caution with .     hgbA1c was higher last visit - recommend follow up with primary care provider and she plans to do so.   Recommend follow up with primary care provider if no relief, sooner if worse  Adverse reactions of medications discussed.  Over the counter medications discussed.   Aware to come back in if with worsening symptoms or if no relief despite treatment plan  Patient voiced understanding and had no further questions.     Alarm symptoms discussed. Imaging offered patient declined.  If with any worsening symptoms or concerns proceed to ER, especially if with any  headache, fever, blurring of vision, numbness weakness or gait instability, persistent nausea or vomiting or no relief.    MD Leisa Iyer MD  [facility]

## 2018-09-19 NOTE — TELEPHONE ENCOUNTER
Pt notified of provider message as written.  Pt verbalized good understanding.  Appointment made.  Jesenia PEREYRAN, RN

## 2018-09-27 ENCOUNTER — TRANSFERRED RECORDS (OUTPATIENT)
Dept: HEALTH INFORMATION MANAGEMENT | Facility: CLINIC | Age: 48
End: 2018-09-27

## 2018-10-23 ENCOUNTER — OFFICE VISIT (OUTPATIENT)
Dept: FAMILY MEDICINE | Facility: CLINIC | Age: 48
End: 2018-10-23
Payer: COMMERCIAL

## 2018-10-23 DIAGNOSIS — G43.109 MIGRAINE WITH AURA AND WITHOUT STATUS MIGRAINOSUS, NOT INTRACTABLE: Primary | ICD-10-CM

## 2018-10-23 DIAGNOSIS — Z23 NEED FOR PROPHYLACTIC VACCINATION AND INOCULATION AGAINST INFLUENZA: ICD-10-CM

## 2018-10-23 PROCEDURE — 99214 OFFICE O/P EST MOD 30 MIN: CPT | Mod: 25 | Performed by: PHYSICIAN ASSISTANT

## 2018-10-23 PROCEDURE — 90471 IMMUNIZATION ADMIN: CPT | Performed by: PHYSICIAN ASSISTANT

## 2018-10-23 PROCEDURE — 90686 IIV4 VACC NO PRSV 0.5 ML IM: CPT | Performed by: PHYSICIAN ASSISTANT

## 2018-10-23 RX ORDER — HYDROCODONE BITARTRATE AND ACETAMINOPHEN 5; 325 MG/1; MG/1
1 TABLET ORAL EVERY 6 HOURS PRN
Qty: 12 TABLET | Refills: 0 | Status: SHIPPED | OUTPATIENT
Start: 2018-10-23 | End: 2019-03-13

## 2018-10-23 NOTE — MR AVS SNAPSHOT
After Visit Summary   10/23/2018    Carissa Spears    MRN: [id number]           Patient Information     Date Of Birth          1970        Visit Information        Provider Department      10/23/2018 10:40 AM Mere Cardenas PA-C [facility]        Today's Diagnoses     Migraine with aura and without status migrainosus, not intractable    -  1    Need for prophylactic vaccination and inoculation against influenza           Follow-ups after your visit        Who to contact     If you have questions or need follow up information about today's clinic visit or your schedule please contact [facility] directly at [phone].  Normal or non-critical lab and imaging results will be communicated to you by Instamojohart, letter or phone within 4 business days after the clinic has received the results. If you do not hear from us within 7 days, please contact the clinic through Instamojohart or phone. If you have a critical or abnormal lab result, we will notify you by phone as soon as possible.  Submit refill requests through Yik Yak or call your pharmacy and they will forward the refill request to us. Please allow 3 business days for your refill to be completed.          Additional Information About Your Visit        MyChart Information     Yik Yak gives you secure access to your electronic health record. If you see a primary care provider, you can also send messages to your care team and make appointments. If you have questions, please call your primary care clinic.  If you do not have a primary care provider, please call [phone] and they will assist you.        Care EveryWhere ID     This is your Care EveryWhere ID. This could be used by other organizations to access your [place] medical records  [id number]         Blood Pressure from Last 3 Encounters:   09/19/18 138/87   06/28/18 132/80   06/07/18 133/79    Weight from Last 3 Encounters:   09/19/18 294 lb (133.4 kg)    06/28/18 289 lb (131.1 kg)   06/07/18 290 lb (131.5 kg)              We Performed the Following     FLU VACCINE, SPLIT VIRUS, IM (QUADRIVALENT) [99347]- >3 YRS     Vaccine Administration, Initial [15719]          Where to get your medicines      Some of these will need a paper prescription and others can be bought over the counter.  Ask your nurse if you have questions.     Bring a paper prescription for each of these medications     HYDROcodone-acetaminophen 5-325 MG per tablet         Information about OPIOIDS     PRESCRIPTION OPIOIDS: WHAT YOU NEED TO KNOW   We gave you an opioid (narcotic) pain medicine. It is important to manage your pain, but opioids are not always the best choice. You should first try all the other options your care team gave you. Take this medicine for as short a time (and as few doses) as possible.    Some activities can increase your pain, such as bandage changes or therapy sessions. It may help to take your pain medicine 30 to 60 minutes before these activities. Reduce your stress by getting enough sleep, working on hobbies you enjoy and practicing relaxation or meditation. Talk to your care team about ways to manage your pain beyond prescription opioids.    These medicines have risks:    DO NOT drive when on new or higher doses of pain medicine. These medicines can affect your alertness and reaction times, and you could be arrested for driving under the influence (DUI). If you need to use opioids long-term, talk to your care team about driving.    DO NOT operate heavy machinery    DO NOT do any other dangerous activities while taking these medicines.    DO NOT drink any alcohol while taking these medicines.     If the opioid prescribed includes acetaminophen, DO NOT take with any other medicines that contain acetaminophen. Read all labels carefully. Look for the word  acetaminophen  or  Tylenol.  Ask your pharmacist if you have questions or are unsure.    You can get addicted to pain  medicines, especially if you have a history of addiction (chemical, alcohol or substance dependence). Talk to your care team about ways to reduce this risk.    All opioids tend to cause constipation. Drink plenty of water and eat foods that have a lot of fiber, such as fruits, vegetables, prune juice, apple juice and high-fiber cereal. Take a laxative (Miralax, milk of magnesia, Colace, Senna) if you don t move your bowels at least every other day. Other side effects include upset stomach, sleepiness, dizziness, throwing up, tolerance (needing more of the medicine to have the same effect), physical dependence and slowed breathing.    Store your pills in a secure place, locked if possible. We will not replace any lost or stolen medicine. If you don t finish your medicine, please throw away (dispose) as directed by your pharmacist. The [place] [employer]ution [employer] has more information about safe disposal: https://www.pca.[facility].[place].us/living-green/managing-unwanted-medications         Primary Care Provider Office Phone # Fax #    Silvino Beach [phone] [address] [facility] [address]        Equal Access to Services     JAMES GONCALVES : Hadii uday hung hadasho Soesdrasali, waaxda luqadaha, qaybta kaalmada adehyunyada, genesis fields . So [facility] [phone].    ATENCIÓN: Si habla español, tiene a vila disposición servicios gratuitos de asistencia lingüística. Llame al [phone].    We comply with applicable federal civil rights laws and [place] laws. We do not discriminate on the basis of race, color, national origin, age, disability, sex, sexual orientation, or gender identity.            Thank you!     Thank you for choosing [facility]  for your care. Our goal is always to provide you with excellent care. Hearing back from our patients is one way we can continue to improve our services. Please take a few minutes to complete the written survey  that you may receive in the mail after your visit with us. Thank you!             Your Updated Medication List - Protect others around you: Learn how to safely use, store and throw away your medicines at www.disposemymeds.org.          This list is accurate as of 10/23/18 12:19 PM.  Always use your most recent med list.                   Brand Name Dispense Instructions for use Diagnosis    ALPRAZolam 0.5 MG tablet    XANAX     Take 0.5 mg by mouth        aspirin 81 MG tablet     1 tablet    Take 1 tablet (81 mg) by mouth daily        blood glucose monitoring meter device kit     1 kit    USE TO TEST BLOOD SUGAR ONCE DAILY OR AS DIRECTED    Hyperglycemia       citalopram 40 MG tablet    celeXA          CONTOUR NEXT TEST test strip   Generic drug:  blood glucose monitoring     100 strip    USE TO TEST BLOOD SUGAR ONCE DAILY OR AS DIRECTED    Hyperglycemia       HYDROcodone-acetaminophen 5-325 MG per tablet    NORCO    12 tablet    Take 1 tablet by mouth every 6 hours as needed for pain or moderate to severe pain    Migraine with aura and without status migrainosus, not intractable       IBUPROFEN PO      Take 200-400 mg by mouth every 4 hours as needed for moderate pain        isometheptene-dichloralphenazone-acetaminophen -325 MG per capsule    MIDRIN    40 capsule    Take 1 capsule by mouth every 4 hours as needed for headaches    Migraine with aura and without status migrainosus, not intractable       * LATUDA 40 MG Tabs tablet   Generic drug:  lurasidone           * LATUDA 80 MG Tabs tablet   Generic drug:  lurasidone           losartan 100 MG tablet    COZAAR    90 tablet    TAKE ONE-HALF TABLET BY MOUTH TWICE DAILY AS NEEDED FOR BLOOD PRESSURE    Essential hypertension with goal blood pressure less than 140/90       metFORMIN 500 MG 24 hr tablet    GLUCOPHAGE-XR    180 tablet    Take 2 tablets (1,000 mg) by mouth daily (with dinner) For diabetes    Type 2 diabetes mellitus without complication, without  long-term current use of insulin (H)       metoprolol tartrate 100 MG tablet    LOPRESSOR    90 tablet    TAKE 1.5 TABLETS (150 MG) BY MOUTH 2 TIMES DAILY FOR BLOOD PRESSURE (NEED TO BE SEEN FOR REFILLS)    Essential hypertension with goal blood pressure less than 140/90       MICROLET LANCETS Misc     100 each    USE TO TEST BLOOD SUGAR ONCE DAILY OR AS DIRECTED    Hyperglycemia       simvastatin 40 MG tablet    ZOCOR    90 tablet    TAKE 1 TABLET BY MOUTH EVERY NIGHT AT BEDTIME FOR CHOLESTEROL    Hyperlipidemia LDL goal <100       TRAZODONE HCL PO      Take 100 mg by mouth At Bedtime Patient reports: Takes 100-2000 MG at bedtime        vitamin D 2000 units tablet     180 tablet    Take 2,000 Units by mouth 2 times daily    Vitamin D deficiency       * Notice:  This list has 2 medication(s) that are the same as other medications prescribed for you. Read the directions carefully, and ask your doctor or other care provider to review them with you.

## 2018-10-23 NOTE — PROGRESS NOTES
SUBJECTIVE:   Carissa Spears is a 48 year old female who presents to clinic today for the following health issues:      Headaches      Duration: this morning    Description  Location: bilateral in the frontal area   Character: throbbing pain  Frequency:  4 times a year gets migraines  Duration:  All day     Intensity:  mild    Accompanying signs and symptoms:    Precipitating or Alleviating factors:  Nausea/vomiting: no  Dizziness: occasionally  Weakness or numbness: no  Visual changes: halo  Fever: no   Sinus or URI symptoms no     History  Head trauma: no   Family history of migraines: YES  Previous tests for headaches: YES  Neurologist evaluations: YES  Able to do daily activities when headache present: YES- when getting under control   Wake with headaches: YES  Daily pain medication use: no   Any changes in: none    Precipitating or Alleviating factors (light/sound/sleep/caffeine): yes    Therapies tried and outcome: Excedrin    Outcome - not effective  Frequent/daily pain medication use: no           Problem list and histories reviewed & adjusted, as indicated.  Additional history: Patient notes she gets this 4-5 x per year.  Will talk with her PCP about prophylaxis.        Reviewed and updated as needed this visit by clinical staff  Tobacco  Allergies  Meds  Med Hx  Surg Hx  Fam Hx  Soc Hx      ROS:  Constitutional, HEENT, cardiovascular, pulmonary, gi and gu systems are negative, except as otherwise noted.    OBJECTIVE:     There were no vitals taken for this visit.  There is no height or weight on file to calculate BMI.  GENERAL: healthy, alert and no distress  EYES: Eyes grossly normal to inspection, PERRL and conjunctivae and sclerae normal  HENT: ear canals and TM's normal, nose and mouth without ulcers or lesions  NECK: no adenopathy, no asymmetry, masses, or scars and thyroid normal to palpation  MS: no gross musculoskeletal defects noted, no edema  SKIN: no suspicious lesions or rashes  NEURO:  Normal strength and tone, sensory exam grossly normal, mentation intact and Normal speech    Diagnostic Test Results:  none     ASSESSMENT/PLAN:   1. Migraine with aura and without status migrainosus, not intractable  - HYDROcodone-acetaminophen (NORCO) 5-325 MG per tablet; Take 1 tablet by mouth every 6 hours as needed for pain or moderate to severe pain  Dispense: 12 tablet; Refill: 0    2. Need for prophylactic vaccination and inoculation against influenza  - FLU VACCINE, SPLIT VIRUS, IM (QUADRIVALENT) [86365]- >3 YRS  - Vaccine Administration, Initial [46220]    Use medication as directed.  Follow up if symptoms should persist, change or worsen.  Follow up with PCP  Patient amenable to this follow up plan.     Mere Cardenas PA-C  [facility]    Injectable Influenza Immunization Documentation    1.  Is the person to be vaccinated sick today?   No    2. Does the person to be vaccinated have an allergy to a component   of the vaccine?   No  Egg Allergy Algorithm Link    3. Has the person to be vaccinated ever had a serious reaction   to influenza vaccine in the past?   No    4. Has the person to be vaccinated ever had Guillain-Barré syndrome?   No    Form completed by Mia REYES

## 2018-11-15 DIAGNOSIS — I10 ESSENTIAL HYPERTENSION WITH GOAL BLOOD PRESSURE LESS THAN 140/90: ICD-10-CM

## 2018-11-15 RX ORDER — LOSARTAN POTASSIUM 100 MG/1
TABLET ORAL
Qty: 90 TABLET | Refills: 0 | Status: SHIPPED | OUTPATIENT
Start: 2018-11-15 | End: 2018-12-06

## 2018-11-15 NOTE — TELEPHONE ENCOUNTER
Rx refilled per [facility] RN refill protocol.      TC, patient due for:  Diabetes, HTN OV   Health Maintenance Due   Topic Date Due     HIV SCREEN (SYSTEM ASSIGNED)  04/13/1987     URINE DRUG SCREEN Q1 YR  11/08/2013     FOOT EXAM Q1 YEAR  01/02/2018     MICROALBUMIN Q1 YEAR  01/02/2018     DEPRESSION ACTION PLAN Q1 YR  01/02/2018     CREATININE Q1 YEAR  12/01/2018     A1C Q6 MO  12/07/2018     PHQ-9 Q6 MONTHS  12/07/2018     RODDY CoreyN RN

## 2018-11-19 ENCOUNTER — TELEPHONE (OUTPATIENT)
Dept: FAMILY MEDICINE | Facility: CLINIC | Age: 48
End: 2018-11-19

## 2018-11-19 DIAGNOSIS — I10 ESSENTIAL HYPERTENSION WITH GOAL BLOOD PRESSURE LESS THAN 140/90: ICD-10-CM

## 2018-11-19 DIAGNOSIS — E11.9 TYPE 2 DIABETES MELLITUS WITHOUT COMPLICATION, WITHOUT LONG-TERM CURRENT USE OF INSULIN (H): ICD-10-CM

## 2018-11-19 RX ORDER — METOPROLOL TARTRATE 100 MG
TABLET ORAL
Qty: 90 TABLET | Refills: 0 | Status: SHIPPED | OUTPATIENT
Start: 2018-11-19 | End: 2018-12-06

## 2018-11-19 RX ORDER — METFORMIN HCL 500 MG
1000 TABLET, EXTENDED RELEASE 24 HR ORAL
Qty: 180 TABLET | Refills: 0 | Status: SHIPPED | OUTPATIENT
Start: 2018-11-19 | End: 2018-12-06

## 2018-11-19 NOTE — TELEPHONE ENCOUNTER
Refilled per RN refill protocol.  Pt notified refills sent to pharmacy and that she is due for fasting lab appointment and ov with Dr. Beach in dec.  Jesenia Agrawal BSN, RN

## 2018-11-19 NOTE — TELEPHONE ENCOUNTER
Patient is calling to request refill for Metformin, and Metoprolol stated this is a new pharmacy and they have no scripts for refill     Please call to advice  Thank you

## 2018-11-27 DIAGNOSIS — I10 ESSENTIAL HYPERTENSION WITH GOAL BLOOD PRESSURE LESS THAN 140/90: ICD-10-CM

## 2018-11-28 RX ORDER — METOPROLOL TARTRATE 100 MG
TABLET ORAL
Qty: 270 TABLET | Refills: 0 | OUTPATIENT
Start: 2018-11-28

## 2018-12-06 ENCOUNTER — OFFICE VISIT (OUTPATIENT)
Dept: FAMILY MEDICINE | Facility: CLINIC | Age: 48
End: 2018-12-06
Payer: COMMERCIAL

## 2018-12-06 VITALS
RESPIRATION RATE: 20 BRPM | DIASTOLIC BLOOD PRESSURE: 84 MMHG | TEMPERATURE: 97.6 F | BODY MASS INDEX: 43.8 KG/M2 | SYSTOLIC BLOOD PRESSURE: 134 MMHG | WEIGHT: 289 LBS | HEART RATE: 69 BPM | HEIGHT: 68 IN | OXYGEN SATURATION: 95 %

## 2018-12-06 DIAGNOSIS — E78.5 HYPERLIPIDEMIA LDL GOAL <100: ICD-10-CM

## 2018-12-06 DIAGNOSIS — I10 HYPERTENSION GOAL BP (BLOOD PRESSURE) < 140/90: ICD-10-CM

## 2018-12-06 DIAGNOSIS — E11.9 TYPE 2 DIABETES MELLITUS WITHOUT COMPLICATION, WITHOUT LONG-TERM CURRENT USE OF INSULIN (H): Primary | ICD-10-CM

## 2018-12-06 LAB
ALBUMIN SERPL-MCNC: 3.8 G/DL (ref 3.4–5)
ANION GAP SERPL CALCULATED.3IONS-SCNC: 7 MMOL/L (ref 3–14)
BUN SERPL-MCNC: 10 MG/DL (ref 7–30)
CALCIUM SERPL-MCNC: 9.1 MG/DL (ref 8.5–10.1)
CHLORIDE SERPL-SCNC: 106 MMOL/L (ref 94–109)
CO2 SERPL-SCNC: 27 MMOL/L (ref 20–32)
CREAT SERPL-MCNC: 0.87 MG/DL (ref 0.52–1.04)
GFR SERPL CREATININE-BSD FRML MDRD: 69 ML/MIN/1.7M2
GLUCOSE SERPL-MCNC: 137 MG/DL (ref 70–99)
HBA1C MFR BLD: 7 % (ref 0–5.6)
PHOSPHATE SERPL-MCNC: 3.4 MG/DL (ref 2.5–4.5)
POTASSIUM SERPL-SCNC: 4 MMOL/L (ref 3.4–5.3)
SODIUM SERPL-SCNC: 140 MMOL/L (ref 133–144)

## 2018-12-06 PROCEDURE — 36415 COLL VENOUS BLD VENIPUNCTURE: CPT | Performed by: FAMILY MEDICINE

## 2018-12-06 PROCEDURE — 80069 RENAL FUNCTION PANEL: CPT | Performed by: FAMILY MEDICINE

## 2018-12-06 PROCEDURE — 99214 OFFICE O/P EST MOD 30 MIN: CPT | Performed by: FAMILY MEDICINE

## 2018-12-06 PROCEDURE — 83036 HEMOGLOBIN GLYCOSYLATED A1C: CPT | Performed by: FAMILY MEDICINE

## 2018-12-06 RX ORDER — SIMVASTATIN 40 MG
TABLET ORAL
Qty: 90 TABLET | Refills: 1 | Status: SHIPPED | OUTPATIENT
Start: 2018-12-06 | End: 2019-05-21

## 2018-12-06 RX ORDER — METOPROLOL TARTRATE 100 MG
TABLET ORAL
Qty: 90 TABLET | Refills: 0 | Status: SHIPPED | OUTPATIENT
Start: 2018-12-06 | End: 2019-02-02

## 2018-12-06 RX ORDER — LOSARTAN POTASSIUM AND HYDROCHLOROTHIAZIDE 12.5; 1 MG/1; MG/1
TABLET ORAL
Qty: 90 TABLET | Refills: 1 | Status: SHIPPED | OUTPATIENT
Start: 2018-12-06 | End: 2019-05-21

## 2018-12-06 RX ORDER — METFORMIN HCL 500 MG
1000 TABLET, EXTENDED RELEASE 24 HR ORAL
Qty: 180 TABLET | Refills: 1 | Status: SHIPPED | OUTPATIENT
Start: 2018-12-06 | End: 2019-05-21

## 2018-12-06 ASSESSMENT — PATIENT HEALTH QUESTIONNAIRE - PHQ9: SUM OF ALL RESPONSES TO PHQ QUESTIONS 1-9: 2

## 2018-12-06 NOTE — PROGRESS NOTES
"SUBJECTIVE:  Carissa Spears, a 48 year old female scheduled an appointment to discuss the following issues:     Type 2 diabetes mellitus without complication, without long-term current use of insulin (H)  Hypertension goal BP (blood pressure) < 140/90  Follow-up dm, htn, high cholesterol, bipolar and lumbar radiculopathy.   Out of metformin xr x1 month. Blood pressure cuff at home but not taking. No shortness of breath or chest pain.  Exercise - limited in past month with sick son.   No feet changes. Eye in past year.   Seeing psych provider. Weaned off gabapentin.   No SUICIAL IDEATION OR HOMOCIDAL IDEATION OR [place]RA[place]. Sleep hit/miss.   No nausea, vomiting or diarrhea or black or bloody stools. No colonoscopy in past.   Medical, social, surgical, and family histories reviewed.    ROS:  All other ROS negative.    OBJECTIVE:  /84  Pulse 69  Temp 97.6  F (36.4  C) (Oral)  Resp 20  Ht 5' 8\" (1.727 m)  Wt 289 lb (131.1 kg)  SpO2 95%  BMI 43.94 kg/m2  EXAM:  GENERAL APPEARANCE: healthy, alert and no distress  EYES: EOMI,  PERRL  NECK: no adenopathy, no asymmetry, masses, or scars and thyroid normal to palpation  RESP: lungs clear to auscultation - no rales, rhonchi or wheezes  CV: regular rates and rhythm, normal S1 S2, no S3 or S4 and no murmur, click or rub -  ABDOMEN:  soft, nontender, no HSM or masses and bowel sounds normal  MS: extremities normal- no gross deformities noted, no evidence of inflammation in joints, FROM in all extremities.  PSYCH: mentation appears normal and affect normal/bright    ASSESSMENT / PLAN:  (E11.9) Type 2 diabetes mellitus without complication, without long-term current use of insulin (H)  (primary encounter diagnosis)  Comment: slightly worse but off metformin  Plan: Hemoglobin A1c, Renal panel (Alb, BUN, Ca, Cl,         CO2, Creat, Gluc, Phos, K, Na), metFORMIN         (GLUCOPHAGE-XR) 500 MG 24 hr tablet        Restart and exercise. Recheck in 6 months      (I10) Hypertension " goal BP (blood pressure) < 140/90  Comment: a little high. Some mild edema issues in past  Plan: Renal panel (Alb, BUN, Ca, Cl, CO2, Creat,         Gluc, Phos, K, Na),         losartan-hydrochlorothiazide (HYZAAR) 100-12.5         MG tablet, metoprolol tartrate (LOPRESSOR) 100         MG tablet        Will add hydrochlorothiazide to cozaar. Recheck in 6 months  Self-monitor. Return to clinic if worse. Chest pain or shortness of breath to er. Reveiwed risks and side effects of medication      (E78.5) Hyperlipidemia LDL goal <100  Comment: stable  Plan: simvastatin (ZOCOR) 40 MG tablet        Recheck in 6 months  Exercise.     Silvino Beach MD

## 2018-12-06 NOTE — MR AVS SNAPSHOT
After Visit Summary   12/6/2018    Carissa Spears    MRN: [id number]           Patient Information     Date Of Birth          1970        Visit Information        Provider Department      12/6/2018 7:20 AM Silvino Beach MD [facility]        Today's Diagnoses     Type 2 diabetes mellitus without complication, without long-term current use of insulin (H)    -  1    Hypertension goal BP (blood pressure) < 140/90        Hyperlipidemia LDL goal <100           Follow-ups after your visit        Who to contact     If you have questions or need follow up information about today's clinic visit or your schedule please contact [facility] directly at [phone].  Normal or non-critical lab and imaging results will be communicated to you by MyChart, letter or phone within 4 business days after the clinic has received the results. If you do not hear from us within 7 days, please contact the clinic through Svbtlehart or phone. If you have a critical or abnormal lab result, we will notify you by phone as soon as possible.  Submit refill requests through "[employer] or call your pharmacy and they will forward the refill request to us. Please allow 3 business days for your refill to be completed.          Additional Information About Your Visit        MyChart Information     "[employer] gives you secure access to your electronic health record. If you see a primary care provider, you can also send messages to your care team and make appointments. If you have questions, please call your primary care clinic.  If you do not have a primary care provider, please call [phone] and they will assist you.        Care EveryWhere ID     This is your Care EveryWhere ID. This could be used by other organizations to access your [place] medical records  [id number]        Your Vitals Were     Pulse Temperature Respirations Height Pulse Oximetry BMI (Body Mass Index)    69 97.6  F (36.4  C) (Oral) 20  "5' 8\" (1.727 m) 95% 43.94 kg/m2       Blood Pressure from Last 3 Encounters:   12/06/18 134/84   09/19/18 138/87   06/28/18 132/80    Weight from Last 3 Encounters:   12/06/18 289 lb (131.1 kg)   09/19/18 294 lb (133.4 kg)   06/28/18 289 lb (131.1 kg)              We Performed the Following     Hemoglobin A1c     Renal panel (Alb, BUN, Ca, Cl, CO2, Creat, Gluc, Phos, K, Na)          Today's Medication Changes          These changes are accurate as of 12/6/18  7:54 AM.  If you have any questions, ask your nurse or doctor.               Start taking these medicines.        Dose/Directions    losartan-hydrochlorothiazide 100-12.5 MG tablet   Commonly known as:  HYZAAR   Used for:  Hypertension goal BP (blood pressure) < 140/90   Started by:  Silvino Beach MD        1/2 pill twice daily for blood pressure (replaces cozaar with added hydrochlorothiazide)   Quantity:  90 tablet   Refills:  1         These medicines have changed or have updated prescriptions.        Dose/Directions    metFORMIN 500 MG 24 hr tablet   Commonly known as:  GLUCOPHAGE-XR   This may have changed:  additional instructions   Used for:  Type 2 diabetes mellitus without complication, without long-term current use of insulin (H)   Changed by:  Silvino Beach MD        Dose:  1000 mg   Take 2 tablets (1,000 mg) by mouth daily (with dinner) For diabetes Pharmacy ok to hold prescription until due   Quantity:  180 tablet   Refills:  1       metoprolol tartrate 100 MG tablet   Commonly known as:  LOPRESSOR   This may have changed:  additional instructions   Used for:  Hypertension goal BP (blood pressure) < 140/90   Changed by:  Silvino Beach MD        TAKE 1.5 TABLETS (150 MG) BY MOUTH 2 TIMES DAILY FOR BLOOD PRESSURE Pharmacy ok to hold prescription until due   Quantity:  90 tablet   Refills:  0         Stop taking these medicines if you haven't already. Please contact your care team if you have questions.     losartan 100 MG tablet "   Commonly known as:  COZAAR   Stopped by:  Silvino Beach MD                Where to get your medicines      These medications were sent to [id number] - [place], [place] - [address] [facility] [address]  [address] [facility] [address]     Phone:  [phone]     losartan-hydrochlorothiazide 100-12.5 MG tablet    metFORMIN 500 MG 24 hr tablet    metoprolol tartrate 100 MG tablet    simvastatin 40 MG tablet                Primary Care Provider Office Phone # Fax #    Silvino Beach [phone] [address] [facility] [address]        Equal Access to Services     [facility] : Hadii uday hung hadasho Soomaali, waaxda luqadaha, qaybta kaalmada adeegyada, genesis grey. So [facility] [phone].    ATENCIÓN: Si habla español, tiene a vila disposición servicios gratuitos de asistencia lingüística. [facility] [phone].    We comply with applicable federal civil rights laws and [place] laws. We do not discriminate on the basis of race, color, national origin, age, disability, sex, sexual orientation, or gender identity.            Thank you!     Thank you for choosing [facility]  for your care. Our goal is always to provide you with excellent care. Hearing back from our patients is one way we can continue to improve our services. Please take a few minutes to complete the written survey that you may receive in the mail after your visit with us. Thank you!             Your Updated Medication List - Protect others around you: Learn how to safely use, store and throw away your medicines at www.disposemymeds.org.          This list is accurate as of 12/6/18  7:54 AM.  Always use your most recent med list.                   Brand Name Dispense Instructions for use Diagnosis    ALPRAZolam 0.5 MG tablet    XANAX     Take 0.5 mg by mouth        aspirin 81 MG tablet    ASA    1 tablet    Take 1 tablet (81 mg) by mouth  daily        blood glucose monitoring meter device kit     1 kit    USE TO TEST BLOOD SUGAR ONCE DAILY OR AS DIRECTED    Hyperglycemia       BUSPAR PO      Take 15 mg by mouth 2 times daily        citalopram 40 MG tablet    celeXA          CONTOUR NEXT TEST test strip   Generic drug:  blood glucose monitoring     100 strip    USE TO TEST BLOOD SUGAR ONCE DAILY OR AS DIRECTED    Hyperglycemia       HYDROcodone-acetaminophen 5-325 MG tablet    NORCO    12 tablet    Take 1 tablet by mouth every 6 hours as needed for pain or moderate to severe pain    Migraine with aura and without status migrainosus, not intractable       IBUPROFEN PO      Take 200-400 mg by mouth every 4 hours as needed for moderate pain        isometheptene-dichloralphenazone-acetaminophen -325 MG capsule    MIDRIN    40 capsule    Take 1 capsule by mouth every 4 hours as needed for headaches    Migraine with aura and without status migrainosus, not intractable       LATUDA 80 MG Tabs tablet   Generic drug:  lurasidone           losartan-hydrochlorothiazide 100-12.5 MG tablet    HYZAAR    90 tablet    1/2 pill twice daily for blood pressure (replaces cozaar with added hydrochlorothiazide)    Hypertension goal BP (blood pressure) < 140/90       metFORMIN 500 MG 24 hr tablet    GLUCOPHAGE-XR    180 tablet    Take 2 tablets (1,000 mg) by mouth daily (with dinner) For diabetes Pharmacy ok to hold prescription until due    Type 2 diabetes mellitus without complication, without long-term current use of insulin (H)       metoprolol tartrate 100 MG tablet    LOPRESSOR    90 tablet    TAKE 1.5 TABLETS (150 MG) BY MOUTH 2 TIMES DAILY FOR BLOOD PRESSURE Pharmacy ok to hold prescription until due    Hypertension goal BP (blood pressure) < 140/90       MICROLET LANCETS Misc     100 each    USE TO TEST BLOOD SUGAR ONCE DAILY OR AS DIRECTED    Hyperglycemia       simvastatin 40 MG tablet    ZOCOR    90 tablet    TAKE 1 TABLET BY MOUTH EVERY NIGHT AT BEDTIME  FOR CHOLESTEROL    Hyperlipidemia LDL goal <100       TRAZODONE HCL PO      Take 100 mg by mouth At Bedtime Patient reports: Takes 100-2000 MG at bedtime        vitamin D3 2000 units tablet    CHOLECALCIFEROL    180 tablet    Take 2,000 Units by mouth 2 times daily    Vitamin D deficiency

## 2018-12-06 NOTE — NURSING NOTE
"Chief Complaint   Patient presents with     Diabetes     Health Maintenance       Initial BP (!) 140/98  Pulse 69  Temp 97.6  F (36.4  C) (Oral)  Resp 20  Ht 5' 8\" (1.727 m)  Wt 289 lb (131.1 kg)  SpO2 95%  BMI 43.94 kg/m2 Estimated body mass index is 43.94 kg/(m^2) as calculated from the following:    Height as of this encounter: 5' 8\" (1.727 m).    Weight as of this encounter: 289 lb (131.1 kg).    Jesenia Lester, AMY    "

## 2019-01-17 ENCOUNTER — TELEPHONE (OUTPATIENT)
Dept: FAMILY MEDICINE | Facility: CLINIC | Age: 49
End: 2019-01-17

## 2019-01-17 ENCOUNTER — E-VISIT (OUTPATIENT)
Dept: FAMILY MEDICINE | Facility: CLINIC | Age: 49
End: 2019-01-17
Payer: COMMERCIAL

## 2019-01-17 DIAGNOSIS — R09.81 CONGESTION OF PARANASAL SINUS: ICD-10-CM

## 2019-01-17 PROCEDURE — 99444 ZZC PHYSICIAN ONLINE EVALUATION & MANAGEMENT SERVICE: CPT | Performed by: FAMILY MEDICINE

## 2019-01-17 RX ORDER — CEFUROXIME AXETIL 500 MG/1
500 TABLET ORAL 2 TIMES DAILY
Qty: 20 TABLET | Refills: 0 | Status: SHIPPED | OUTPATIENT
Start: 2019-01-17 | End: 2019-02-20

## 2019-01-17 NOTE — TELEPHONE ENCOUNTER
Pt advised to do an e visit for evaluation and treatment.  Pt will do this.  Jesenia PEREYRAN, RN

## 2019-01-17 NOTE — TELEPHONE ENCOUNTER
Patient states she has a sinus infection and she can't come in because her won is sick and wonders if you will prescribe a Zpak.    Thank you.

## 2019-01-21 DIAGNOSIS — E78.5 HYPERLIPIDEMIA LDL GOAL <100: ICD-10-CM

## 2019-01-22 RX ORDER — SIMVASTATIN 40 MG
TABLET ORAL
Qty: 90 TABLET | Refills: 0 | OUTPATIENT
Start: 2019-01-22

## 2019-01-30 ENCOUNTER — TELEPHONE (OUTPATIENT)
Dept: FAMILY MEDICINE | Facility: CLINIC | Age: 49
End: 2019-01-30

## 2019-01-30 NOTE — TELEPHONE ENCOUNTER
Panel Management Review      Patient has the following on her problem list:     Diabetes    ASA: Passed    Last A1C  Lab Results   Component Value Date    A1C 7.0 12/06/2018    A1C 6.7 06/07/2018    A1C 6.7 12/01/2017    A1C 7.2 06/14/2017    A1C 7.5 01/02/2017     A1C tested: Passed    Last LDL:    Lab Results   Component Value Date    CHOL 157 06/07/2018     Lab Results   Component Value Date    HDL 43 06/07/2018     Lab Results   Component Value Date    LDL 93 06/07/2018     Lab Results   Component Value Date    TRIG 105 06/07/2018     Lab Results   Component Value Date    CHOLHDLRATIO 4.5 09/23/2013     Lab Results   Component Value Date    NHDL 114 06/07/2018       Is the patient on a Statin? YES             Is the patient on Aspirin? YES    Medications     HMG CoA Reductase Inhibitors    simvastatin (ZOCOR) 40 MG tablet    Salicylates    aspirin 81 MG tablet          Last three blood pressure readings:  BP Readings from Last 3 Encounters:   12/06/18 134/84   09/19/18 138/87   06/28/18 132/80       Date of last diabetes office visit:      Tobacco History:     History   Smoking Status     Current Every Day Smoker     Packs/day: 0.25     Years: 20.00     Types: Cigarettes   Smokeless Tobacco     Never Used         Hypertension   Last three blood pressure readings:  BP Readings from Last 3 Encounters:   12/06/18 134/84   09/19/18 138/87   06/28/18 132/80     Blood pressure: Passed    HTN Guidelines:  Age 18-59 BP range:  Less than 140/90  Age 60-85 with Diabetes:  Less than 140/90  Age 60-85 without Diabetes:  less than 150/90      Composite cancer screening  Chart review shows that this patient is due/due soon for the following None  Summary:    Patient is due/failing the following:   None/ she smokes     Action needed:   None/ not due till june    Type of outreach:    0    Questions for provider review:    None                                                                                                                                     Jesenia Lester, [facility]     Chart routed to 0 .

## 2019-02-02 DIAGNOSIS — I10 HYPERTENSION GOAL BP (BLOOD PRESSURE) < 140/90: ICD-10-CM

## 2019-02-06 RX ORDER — METOPROLOL TARTRATE 100 MG
TABLET ORAL
Qty: 270 TABLET | Refills: 1 | Status: SHIPPED | OUTPATIENT
Start: 2019-02-06 | End: 2020-01-28

## 2019-02-20 ENCOUNTER — OFFICE VISIT (OUTPATIENT)
Dept: FAMILY MEDICINE | Facility: CLINIC | Age: 49
End: 2019-02-20
Payer: COMMERCIAL

## 2019-02-20 VITALS
DIASTOLIC BLOOD PRESSURE: 92 MMHG | BODY MASS INDEX: 43.49 KG/M2 | TEMPERATURE: 97.6 F | OXYGEN SATURATION: 100 % | WEIGHT: 286 LBS | SYSTOLIC BLOOD PRESSURE: 148 MMHG | HEART RATE: 82 BPM

## 2019-02-20 DIAGNOSIS — G43.109 MIGRAINE WITH AURA AND WITHOUT STATUS MIGRAINOSUS, NOT INTRACTABLE: Primary | ICD-10-CM

## 2019-02-20 PROCEDURE — 99213 OFFICE O/P EST LOW 20 MIN: CPT | Performed by: PHYSICIAN ASSISTANT

## 2019-02-20 RX ORDER — HYDROCODONE BITARTRATE AND ACETAMINOPHEN 5; 325 MG/1; MG/1
1 TABLET ORAL EVERY 6 HOURS PRN
Qty: 10 TABLET | Refills: 0 | Status: SHIPPED | OUTPATIENT
Start: 2019-02-20 | End: 2019-03-05

## 2019-02-20 ASSESSMENT — PAIN SCALES - GENERAL: PAINLEVEL: NO PAIN (0)

## 2019-02-20 NOTE — NURSING NOTE
"Chief Complaint   Patient presents with     Headache       Initial BP (!) 148/92   Pulse 82   Temp 97.6  F (36.4  C) (Oral)   Wt 129.7 kg (286 lb)   SpO2 100%   BMI 43.49 kg/m   Estimated body mass index is 43.49 kg/m  as calculated from the following:    Height as of 12/6/18: 1.727 m (5' 8\").    Weight as of this encounter: 129.7 kg (286 lb).  Medication Reconciliation: complete    DESTINEE Mobley MA    "

## 2019-02-20 NOTE — PROGRESS NOTES
SUBJECTIVE:   Carissa Spears is a 48 year old female who presents to clinic today for the following health issues:    Carissa is here today for a migraine.   She has a history of migraines in the past. She has tried imitrex and midrin. She had an allergic reaction to imitrex.   She typically will come in for a same day appointment and gets a prescription for vicodin.     Headache  Onset: started this morning    Description:   Location: bilateral in the frontal area and top  Character: sharp pain  Frequency:    Duration:  4-5 days    Intensity: 8/10    Progression of Symptoms:  worsening    Accompanying Signs & Symptoms:  Stiff neck: no  Neck or upper back pain: no  Fever: no  Sinus pressure: no  Nausea or vomiting: no  Dizziness: YES- last few days  Numbness: no  Weakness: no  Visual changes: YES    History:   Head trauma: no  Family history of migraines: no  Previous tests for headaches: no  Neurologist evaluations: no  Able to do daily activities: no  Wake with a headaches: no  Do headaches wake you up: YES  Daily pain medication use: no  Work/school stressors/changes: no    Precipitating factors:   Does light make it worse: YES  Does sound make it worse: YES    Alleviating factors:  Does sleep help: YES    Therapies Tried and outcome:       Problem list and histories reviewed & adjusted, as indicated.  Additional history: as documented    Patient Active Problem List   Diagnosis     HYPERLIPIDEMIA LDL GOAL <100     Hypertension goal BP (blood pressure) < 140/90     Bipolar disorder (H)     Polycystic ovarian syndrome     BMI 45.0-49.9, adult (H)     Tobacco abuse     Mild major depression (H)     Insomnia     Chronic abdominal pain     Hyperglycemia     Myofascial pain     Migraine with aura and without status migrainosus, not intractable     Non-allergic rhinitis     Bipolar affective disorder, remission status unspecified (H)     Diabetes mellitus, type 2 (H)     Type 2 diabetes mellitus without complication,  without long-term current use of insulin (H)     Past Surgical History:   Procedure Laterality Date     AS KNEE SCOPE, ALLOGRAFT IMPANT Left      C/SECTION, CLASSICAL      X2     HYSTERECTOMY, PAP NO LONGER INDICATED       HYSTERECTOMY, KAMILLA  4/8/08     LAPAROSCOPIC CHOLECYSTECTOMY  7/18/2012    Procedure: LAPAROSCOPIC CHOLECYSTECTOMY;  Laparoscopic cholecystectomy;  Surgeon: Miguel Fuentes MD;  Location: [facility] OR     SALPINGO OOPHORECTOMY,R/L/YANELIS  4/8/08    L     TONSILLECTOMY  1983     TUBAL LIGATION         Social History     Tobacco Use     Smoking status: Current Every Day Smoker     Packs/day: 0.25     Years: 20.00     Pack years: 5.00     Types: Cigarettes     Smokeless tobacco: Never Used   Substance Use Topics     Alcohol use: No     Family History   Problem Relation Age of Onset     Cancer Mother         skin cancer - non-melanoma     Hypertension Mother      Lipids Father          Allergies   Allergen Reactions     Imitrex [Sumatriptan] Anaphylaxis     Blood pressure high/ she was in hospital     Calcium Channel Blockers      Augmentin Diarrhea     Codeine      Sick to stomach     Latex      Puffy, rash      Sulfa Drugs Nausea     Lisinopril Nausea       Reviewed and updated as needed this visit by clinical staff       Reviewed and updated as needed this visit by Provider         ROS:  Constitutional, HEENT, cardiovascular, pulmonary, GI, , musculoskeletal, neuro, skin, endocrine and psych systems are negative, except as otherwise noted.    OBJECTIVE:     BP (!) 148/92   Pulse 82   Temp 97.6  F (36.4  C) (Oral)   Wt 129.7 kg (286 lb)   SpO2 100%   BMI 43.49 kg/m    Body mass index is 43.49 kg/m .  GENERAL: healthy, alert and no distress  MS: no gross musculoskeletal defects noted, no edema  SKIN: no suspicious lesions or rashes  PSYCH: mentation appears normal, affect normal/bright    Diagnostic Test Results:  none     ASSESSMENT/PLAN:       1. Migraine with aura and without status  migrainosus, not intractable  She was given a prescription for 10 vicodin for her migraine.   She does not have a plan for treatment of migraines established with her primary provider.   Her blood pressure was also elevated today at her appointment most likely due to the migraine.   She will go home and take the medication and rest today.   An appointment was scheduled to discuss migraine plan and prescription / pain contract agreement with her primary provider in 2 weeks.   - HYDROcodone-acetaminophen (NORCO) 5-325 MG tablet; Take 1 tablet by mouth every 6 hours as needed for pain  Dispense: 10 tablet; Refill: 0      Kristen M. Kehr, PA-C  [facility]

## 2019-03-05 ENCOUNTER — OFFICE VISIT (OUTPATIENT)
Dept: FAMILY MEDICINE | Facility: CLINIC | Age: 49
End: 2019-03-05
Payer: COMMERCIAL

## 2019-03-05 ENCOUNTER — TELEPHONE (OUTPATIENT)
Dept: FAMILY MEDICINE | Facility: CLINIC | Age: 49
End: 2019-03-05

## 2019-03-05 VITALS
BODY MASS INDEX: 41.81 KG/M2 | WEIGHT: 275 LBS | DIASTOLIC BLOOD PRESSURE: 84 MMHG | HEART RATE: 93 BPM | OXYGEN SATURATION: 94 % | TEMPERATURE: 98.6 F | SYSTOLIC BLOOD PRESSURE: 155 MMHG | RESPIRATION RATE: 16 BRPM

## 2019-03-05 DIAGNOSIS — J06.9 UPPER RESPIRATORY TRACT INFECTION, UNSPECIFIED TYPE: ICD-10-CM

## 2019-03-05 DIAGNOSIS — J06.9 UPPER RESPIRATORY TRACT INFECTION, UNSPECIFIED TYPE: Primary | ICD-10-CM

## 2019-03-05 DIAGNOSIS — I10 HYPERTENSION GOAL BP (BLOOD PRESSURE) < 140/90: ICD-10-CM

## 2019-03-05 PROCEDURE — 99214 OFFICE O/P EST MOD 30 MIN: CPT | Performed by: INTERNAL MEDICINE

## 2019-03-05 RX ORDER — CLINDAMYCIN HCL 150 MG
CAPSULE ORAL
Refills: 0 | COMMUNITY
Start: 2019-02-25 | End: 2019-03-13

## 2019-03-05 RX ORDER — FLUTICASONE PROPIONATE 50 MCG
2 SPRAY, SUSPENSION (ML) NASAL DAILY
Qty: 15.8 ML | Refills: 0 | Status: SHIPPED | OUTPATIENT
Start: 2019-03-05 | End: 2019-03-05

## 2019-03-05 RX ORDER — ALBUTEROL SULFATE 90 UG/1
2 AEROSOL, METERED RESPIRATORY (INHALATION) EVERY 4 HOURS PRN
Qty: 1 INHALER | Refills: 0 | Status: SHIPPED | OUTPATIENT
Start: 2019-03-05 | End: 2020-01-28

## 2019-03-05 ASSESSMENT — PAIN SCALES - GENERAL: PAINLEVEL: NO PAIN (0)

## 2019-03-05 NOTE — TELEPHONE ENCOUNTER
Patient was prescribe Clindamycin for tooth infection, x 8 days, has 2 additional days remaining.  Patient states felt as though a sinus infection started yesterday.  Afebrile.  C/o facial pain/pressure, PND and cough at night.  Patient reports Tightness in her chest.+ wheezing.   Advise evaluation due to wheezing and chest tightness.  Patient is speaking in full clear sentences.  + smoker.  Patient/parent verbalized understanding of instructions provided and agreed with the plan of care  Radha Skelton RN

## 2019-03-05 NOTE — NURSING NOTE
"Chief Complaint   Patient presents with     Sinus Problem     pt c/o cough, post nasal drainage, congestion, sinus pressure x 4-5 days, pt states wheezing and shortness of breath started today.       Initial /88   Pulse 93   Temp 98.6  F (37  C) (Oral)   Resp 16   Wt 124.7 kg (275 lb)   SpO2 94%   Breastfeeding? No   BMI 41.81 kg/m   Estimated body mass index is 41.81 kg/m  as calculated from the following:    Height as of 12/6/18: 1.727 m (5' 8\").    Weight as of this encounter: 124.7 kg (275 lb).  Medication Reconciliation: complete  Patient and/or MA were masked during rooming process  Alivia Sal MA        "

## 2019-03-05 NOTE — TELEPHONE ENCOUNTER
Patient is calling stating has been taking clindamycin for tooth infection and now has sinus infection, would like to discuss if it would help clear up sinus as well. Please call to discuss.Thank you.

## 2019-03-05 NOTE — PROGRESS NOTES
SUBJECTIVE:  Carissa Spears is an 48 year old female who presents for sinus pain.  Yesterday was coughing some.  Feels some sinus pressure.  Has some pain behind eyes.  Has had some wheezing as well.  Has felt hot and cold but no fevers.  tyelnol and advil some.  Feels like sxs started about five days ago with cough and runny nose; wheezing started last night and cough worsened a little.  No h/o asthma or copd but is a long time smoker.  No n/v/d.  No known exposures but is around people some.  No recent travel.  No skin rashes.  No ear pain.      PMH:   has a past medical history of Allergies, Bipolar disorder, unspecified (H), DM II (diabetes mellitus, type II), Endometriosis, Herniated lumbar intervertebral disc, High cholesterol, HTN, Liver disease, Obesity, Other disorder of menstruation and other abnormal bleeding from female genital tract, Polycystic ovarian syndrome, Sleep disorder, and Toxemia.  Patient Active Problem List   Diagnosis     HYPERLIPIDEMIA LDL GOAL <100     Hypertension goal BP (blood pressure) < 140/90     Bipolar disorder (H)     Polycystic ovarian syndrome     BMI 45.0-49.9, adult (H)     Tobacco abuse     Mild major depression (H)     Insomnia     Chronic abdominal pain     Hyperglycemia     Myofascial pain     Migraine with aura and without status migrainosus, not intractable     Non-allergic rhinitis     Bipolar affective disorder, remission status unspecified (H)     Diabetes mellitus, type 2 (H)     Type 2 diabetes mellitus without complication, without long-term current use of insulin (H)     Social History     Socioeconomic History     Marital status:      Spouse name: None     Number of children: None     Years of education: None     Highest education level: None   Occupational History     Employer: [employer]   Social Needs     Financial resource strain: None     Food insecurity:     Worry: None     Inability: None     Transportation needs:     Medical:  None     Non-medical: None   Tobacco Use     Smoking status: Current Every Day Smoker     Packs/day: 0.25     Years: 20.00     Pack years: 5.00     Types: Cigarettes     Smokeless tobacco: Never Used   Substance and Sexual Activity     Alcohol use: No     Drug use: No     Sexual activity: Yes     Partners: Male   Lifestyle     Physical activity:     Days per week: None     Minutes per session: None     Stress: None   Relationships     Social connections:     Talks on phone: None     Gets together: None     Attends Hinduism service: None     Active member of club or organization: None     Attends meetings of clubs or organizations: None     Relationship status: None     Intimate partner violence:     Fear of current or ex partner: None     Emotionally abused: None     Physically abused: None     Forced sexual activity: None   Other Topics Concern     Parent/sibling w/ CABG, MI or angioplasty before 65F 55M? No   Social History Narrative     None     Family History   Problem Relation Age of Onset     Cancer Mother         skin cancer - non-melanoma     Hypertension Mother      Lipids Father        ALLERGIES:  Imitrex [sumatriptan]; Calcium channel blockers; Augmentin; Codeine; Latex; Sulfa drugs; and Lisinopril    Current Outpatient Medications   Medication     albuterol (PROAIR HFA/PROVENTIL HFA/VENTOLIN HFA) 108 (90 Base) MCG/ACT inhaler     ALPRAZolam (XANAX) 0.5 MG tablet     aspirin 81 MG tablet     blood glucose monitoring (CONTOUR NEXT MONITOR W/DEVICE KIT) meter device kit     BusPIRone HCl (BUSPAR PO)     Cholecalciferol (VITAMIN D) 2000 units tablet     citalopram (CELEXA) 40 MG tablet     CONTOUR NEXT TEST test strip     fluticasone (FLONASE) 50 MCG/ACT nasal spray     HYDROcodone-acetaminophen (NORCO) 5-325 MG per tablet     IBUPROFEN PO     isometheptene-dichloralphenazone-acetaminophen (MIDRIN) -325 MG per capsule     LATUDA 80 MG TABS tablet     losartan-hydrochlorothiazide (HYZAAR) 100-12.5 MG  tablet     metFORMIN (GLUCOPHAGE-XR) 500 MG 24 hr tablet     metoprolol tartrate (LOPRESSOR) 100 MG tablet     MICROLET LANCETS MISC     simvastatin (ZOCOR) 40 MG tablet     TRAZODONE HCL PO     clindamycin (CLEOCIN) 150 MG capsule     No current facility-administered medications for this visit.          ROS:  ROS is done and is negative for general/constitutional, eye, ENT, Respiratory, cardiovascular, GI, , Skin, musculoskeletal except as noted elsewhere.  All other review of systems negative except as noted elsewhere.      OBJECTIVE:  /84   Pulse 93   Temp 98.6  F (37  C) (Oral)   Resp 16   Wt 124.7 kg (275 lb)   SpO2 94%   Breastfeeding? No   BMI 41.81 kg/m    GENERAL APPEARANCE: Alert, in no acute distress  EYES: normal  EARS: External ears normal. Canals clear. TM's normal.  NOSE:mild clear discharge and moderately inflamed mucosa  OROPHARYNX:normal  NECK:No adenopathy,masses or thyromegaly  RESP: normal and clear to auscultation.  Periodic dry cough.  CV:regular rate and rhythm and no murmurs, clicks, or gallops  ABDOMEN: Abdomen soft, non-tender. BS normal. No masses, organomegaly  SKIN: no ulcers, lesions or rash  MUSCULOSKELETAL:Musculoskeletal normal      RESULTS  Results for orders placed or performed in visit on 12/06/18   Hemoglobin A1c   Result Value Ref Range    Hemoglobin A1C 7.0 (H) 0 - 5.6 %   Renal panel (Alb, BUN, Ca, Cl, CO2, Creat, Gluc, Phos, K, Na)   Result Value Ref Range    Sodium 140 133 - 144 mmol/L    Potassium 4.0 3.4 - 5.3 mmol/L    Chloride 106 94 - 109 mmol/L    Carbon Dioxide 27 20 - 32 mmol/L    Anion Gap 7 3 - 14 mmol/L    Glucose 137 (H) 70 - 99 mg/dL    Urea Nitrogen 10 7 - 30 mg/dL    Creatinine 0.87 0.52 - 1.04 mg/dL    GFR Estimate 69 >60 mL/min/1.7m2    GFR Estimate If Black 83 >60 mL/min/1.7m2    Calcium 9.1 8.5 - 10.1 mg/dL    Phosphorus 3.4 2.5 - 4.5 mg/dL    Albumin 3.8 3.4 - 5.0 g/dL   .  No results found for this or any previous visit (from the past 48  hour(s)).    ASSESSMENT/PLAN:    ASSESSMENT / PLAN:  (J06.9) Upper respiratory tract infection, unspecified type  (primary encounter diagnosis)  Comment: sxs c/w viral uri at this time.  Will rx albuterol and flonase for sxs.  Plan: albuterol (PROAIR HFA/PROVENTIL HFA/VENTOLIN         HFA) 108 (90 Base) MCG/ACT inhaler, fluticasone        (FLONASE) 50 MCG/ACT nasal spray        Reviewed medication instructions and side effects. Follow up if experiences side effects. I reviewed supportive care, otc meds to use if needed, expected course, and signs of concern.  Follow up as needed or if she does not improve within 7 day(s) or if worsens in any way.  Reviewed red flag symptoms and is to go to the ER if experiences any of these.  Also advised pt to monitor her blood sugars as she has diabetes and sugars may fluctuate more than usual with illness, and to contact pcp if running too high or too low.    (I10) Hypertension goal BP (blood pressure) < 140/90  Comment: BP above goal today, likely due to not feeling well, but needs f/u  Plan: Carissa to follow up with Primary Care provider regarding elevated blood pressure.  Continue current meds for now.      See [facility] for orders, medications, letters, patient instructions    Vera Leggett M.D.

## 2019-03-06 RX ORDER — FLUTICASONE PROPIONATE 50 MCG
2 SPRAY, SUSPENSION (ML) NASAL DAILY
Qty: 48 ML | Refills: 0 | Status: SHIPPED | OUTPATIENT
Start: 2019-03-06 | End: 2020-01-28

## 2019-03-13 ENCOUNTER — OFFICE VISIT (OUTPATIENT)
Dept: FAMILY MEDICINE | Facility: CLINIC | Age: 49
End: 2019-03-13
Payer: COMMERCIAL

## 2019-03-13 ENCOUNTER — TELEPHONE (OUTPATIENT)
Dept: FAMILY MEDICINE | Facility: CLINIC | Age: 49
End: 2019-03-13

## 2019-03-13 VITALS
BODY MASS INDEX: 41.11 KG/M2 | WEIGHT: 270.4 LBS | OXYGEN SATURATION: 91 % | SYSTOLIC BLOOD PRESSURE: 148 MMHG | DIASTOLIC BLOOD PRESSURE: 94 MMHG | TEMPERATURE: 98.3 F | HEART RATE: 81 BPM

## 2019-03-13 DIAGNOSIS — R42 LIGHTHEADEDNESS: Primary | ICD-10-CM

## 2019-03-13 PROCEDURE — 99214 OFFICE O/P EST MOD 30 MIN: CPT | Performed by: FAMILY MEDICINE

## 2019-03-13 NOTE — PROGRESS NOTES
orthostatSUBJECTIVE:  Carissa Spears is a 48 year old female who presents to clinic for evaluation of symptoms of dizziness which she describes as feeling light-headedness.  She was sick 1 week ago with a cough and virus. She reports that she is feeling much better from that standpoint.    This/these symptoms were first noticed about 3 days ago. When she stand up she gets dizzy.   This symptom(s) usually last about 5 minutes  The patient reports that standing up typically caused the symptoms to occur.   She has also had some nausea but has not vomited.  She reports a blackness coming in from both sides of her head. This occurred today for the first time.    She denies any chest pain, shortness of breath fevers, and chills    The patient denies any ringing in her ears and denies any pain or pressure in her ears.  She reports some previous problems with similar symptoms.  She had these symptom(s) after her pregnancy with PIH. She was eventually taken off of her antihypertensive medication    She reports that she did have a sensation this morning of the room moving. This only occurred when she started to walk from a standing position.. It did not occur when she sat or stood up.     She has had vertigo years ago and these symptoms are not similar.      OBJECTIVE:  Blood pressure (!) 148/94, pulse 81, temperature 98.3  F (36.8  C), temperature source Oral, weight 122.7 kg (270 lb 6.4 oz), SpO2 92 %, not currently breastfeeding.     BP and Pulse:   Supine 142/88  73  Sitting 145 88  86  Standing 134/90   90    General appearance - healthy, alert and no distress  Skin - Skin color, texture, turgor normal. No rashes or lesions.  Head - Normocephalic. No masses, lesions, tenderness or abnormalities  Eyes - conjunctivae/corneas clear. PERRL, EOM's intact. Fundi benign  Ears - External ears normal. Canals clear. TM's normal.  Nose/Sinuses - Nares normal. Septum midline. Mucosa normal. No drainage or sinus  tenderness.  Oropharynx - Lips, mucosa, and tongue normal. Teeth and gums normal.  Neck - Neck supple. No adenopathy. Thyroid symmetric, normal size,  Back - Back symmetric, no curvature. ROM normal. No CVA tenderness.  Lungs - Percussion normal. Good diaphragmatic excursion. Lungs clear  Heart - PMI normal. No lifts, heaves, or thrills. RRR. No murmurs, clicks gallops or rub  Abdomen - Abdomen soft, non-tender. BS normal. No masses, organomegaly  Extremities - Extremities normal. No deformities, edema, or skin discoloration.  Musculoskeletal - Spine ROM normal. Muscular strength intact.  NEURO EXAM_  DTR's  Right bicep 1+  Right brachiradialis 1+  Right tricep Abscent  Left bicep 1+  Left brachioradialis 1+  Left tricepAbscent  DTR's  Right knee 1+  Right ankle Abscent  Left knee 1+  Left ankleAbscent  Strength was +5 globally in the upper extremities.    Strength was +5 globally in the lower extremities    CN 2-12 intact    Cerebellar function showed intact  finger to nose, heel to shin testing and there was no dysdiadochokinesis.      Results for orders placed or performed in visit on 12/06/18   Hemoglobin A1c   Result Value Ref Range    Hemoglobin A1C 7.0 (H) 0 - 5.6 %   Renal panel (Alb, BUN, Ca, Cl, CO2, Creat, Gluc, Phos, K, Na)   Result Value Ref Range    Sodium 140 133 - 144 mmol/L    Potassium 4.0 3.4 - 5.3 mmol/L    Chloride 106 94 - 109 mmol/L    Carbon Dioxide 27 20 - 32 mmol/L    Anion Gap 7 3 - 14 mmol/L    Glucose 137 (H) 70 - 99 mg/dL    Urea Nitrogen 10 7 - 30 mg/dL    Creatinine 0.87 0.52 - 1.04 mg/dL    GFR Estimate 69 >60 mL/min/1.7m2    GFR Estimate If Black 83 >60 mL/min/1.7m2    Calcium 9.1 8.5 - 10.1 mg/dL    Phosphorus 3.4 2.5 - 4.5 mg/dL    Albumin 3.8 3.4 - 5.0 g/dL          ASSESSMENT/PLAN    The patient's symptom(s) are most consistent with orthostatic hypotension. We can not reproduce that today and her versus do not demonstrate a significant(ly) change with postural changes.   I  recommend(ed) that she get plenty of fluids and rest.     Follow up in 1 week with her primary medical provider if her symptom(s) have  not resolved.

## 2019-03-13 NOTE — TELEPHONE ENCOUNTER
Carissa Spears is a 48 year old female who calls with dizziness.    NURSING ASSESSMENT:  Description:  Pt states she has had dizziness when she stands and moves around on and off for a few days. Today it is every time she stands up.  She describes it as faint feeling with the dizziness.  Pt denies any chest pain, weakness, numbness, confusion, difficulty speaking, fainting spells, loc, severe ha, change in vision, or fever.  Pt is diabetic.  Pt consulted with a dietition and is on a ketosis diet that consists of less than 20 carbs daily and about 25% fat, she has lost 27 lb's in 35 days. Pt has had a cough and is feeling sicker every day.  Pt is a diabetic. Pt pulse is 62 and bp 114/84.  Pt usually runs a much higher bp and pulse.  Pt denies dehydration.    Allergies:   Allergies   Allergen Reactions     Imitrex [Sumatriptan] Anaphylaxis     Blood pressure high/ she was in hospital     Calcium Channel Blockers      Augmentin Diarrhea     Codeine      Sick to stomach     Latex      Puffy, rash      Sulfa Drugs Nausea     Lisinopril Nausea         NURSING PLAN: Nursing advice to patient advised ov today.  appt made.   will drive    RECOMMENDED DISPOSITION:  See in 4 hours, another person to drive -   Will comply with recommendation: Yes  If further questions/concerns or if symptoms do not improve, worsen or new symptoms develop, call your PCP or [facility] as soon as possible.      Guideline used:  Telephone Triage Protocols for Nurses, Fifth Edition, Carissa Maoy pp. [phone]    Jesenia Agrawal RN

## 2019-03-13 NOTE — TELEPHONE ENCOUNTER
Patient states she lost a lot of weight.  She is getting dizzy.  She thinks her blood pressure medication is off.  She took her bp and it was 114/84.  This was after she had a cigarette.  Please call.  Thank You.

## 2019-04-10 ENCOUNTER — OFFICE VISIT (OUTPATIENT)
Dept: FAMILY MEDICINE | Facility: CLINIC | Age: 49
End: 2019-04-10
Payer: COMMERCIAL

## 2019-04-10 VITALS
BODY MASS INDEX: 40.47 KG/M2 | HEIGHT: 68 IN | SYSTOLIC BLOOD PRESSURE: 125 MMHG | WEIGHT: 267 LBS | HEART RATE: 63 BPM | DIASTOLIC BLOOD PRESSURE: 84 MMHG | OXYGEN SATURATION: 96 % | RESPIRATION RATE: 20 BRPM | TEMPERATURE: 98.2 F

## 2019-04-10 DIAGNOSIS — G43.919 INTRACTABLE MIGRAINE WITHOUT STATUS MIGRAINOSUS, UNSPECIFIED MIGRAINE TYPE: ICD-10-CM

## 2019-04-10 DIAGNOSIS — Z11.4 SCREENING FOR HIV (HUMAN IMMUNODEFICIENCY VIRUS): Primary | ICD-10-CM

## 2019-04-10 PROCEDURE — 99213 OFFICE O/P EST LOW 20 MIN: CPT | Performed by: FAMILY MEDICINE

## 2019-04-10 RX ORDER — HYDROCODONE BITARTRATE AND ACETAMINOPHEN 5; 325 MG/1; MG/1
1 TABLET ORAL EVERY 6 HOURS PRN
Qty: 18 TABLET | Refills: 0 | Status: SHIPPED | OUTPATIENT
Start: 2019-04-10 | End: 2019-05-09

## 2019-04-10 ASSESSMENT — MIFFLIN-ST. JEOR: SCORE: 1884.6

## 2019-04-10 ASSESSMENT — PAIN SCALES - GENERAL: PAINLEVEL: SEVERE PAIN (7)

## 2019-04-10 NOTE — PROGRESS NOTES
"SUBJECTIVE:  Carissa Spears is a 48 year old female who presents to [facility] for evaluation of a headache.  The current headache began this morning.    She was diagnosis with migraines at age 11.       The only medication that she takes is vicodin.    \"The midrin does not work\"    She last took midrin a few month(s) ago and she had to come into the clinic anyway.       She reports that she had an aura when she woke up which she describes as a visual disturbance.     At 8:30 am the pain started in the top of her head.     She denies any nausea or emesis today.         The patient reports any neurological symptom that precedes the onset of the headache.   she denies symptom(s) of nausea or vomiting that come along with the headache.  she reports symptom(s) of photophobia and sonophobia.    The patient denies unilateral autonomic facial symptoms such as redness of one eye, tearing from one eye, sweating on one side of the face, redness of one side of the face, runny nose on one side      History of Migranes: Yes   Are most headaches similar in presentation? YES      Past Medical History:   Diagnosis Date     Allergies      Bipolar disorder, unspecified (H)      DM II (diabetes mellitus, type II)     diet controlled     Endometriosis      Herniated lumbar intervertebral disc     age 22     High cholesterol      HTN      Liver disease     fatty liver     Obesity      Other disorder of menstruation and other abnormal bleeding from female genital tract      Polycystic ovarian syndrome      Sleep disorder      Toxemia      Current Outpatient Medications   Medication Sig Dispense Refill     albuterol (PROAIR HFA/PROVENTIL HFA/VENTOLIN HFA) 108 (90 Base) MCG/ACT inhaler Inhale 2 puffs into the lungs every 4 hours as needed for shortness of breath / dyspnea, wheezing or other (cough) 1 Inhaler 0     ALPRAZolam (XANAX) 0.5 MG tablet Take 0.5 mg by mouth       aspirin 81 MG tablet Take 1 tablet (81 mg) by mouth daily 1 tablet 0     " "blood glucose monitoring (CONTOUR NEXT MONITOR W/DEVICE KIT) meter device kit USE TO TEST BLOOD SUGAR ONCE DAILY OR AS DIRECTED 1 kit 0     BusPIRone HCl (BUSPAR PO) Take 15 mg by mouth 2 times daily       Cholecalciferol (VITAMIN D) 2000 units tablet Take 2,000 Units by mouth 2 times daily 180 tablet 3     citalopram (CELEXA) 40 MG tablet        CONTOUR NEXT TEST test strip USE TO TEST BLOOD SUGAR ONCE DAILY OR AS DIRECTED 100 strip 3     fluticasone (FLONASE) 50 MCG/ACT nasal spray Spray 2 sprays into both nostrils daily 48 mL 0     IBUPROFEN PO Take 200-400 mg by mouth every 4 hours as needed for moderate pain       isometheptene-dichloralphenazone-acetaminophen (MIDRIN) -325 MG per capsule Take 1 capsule by mouth every 4 hours as needed for headaches 40 capsule 1     LATUDA 80 MG TABS tablet        losartan-hydrochlorothiazide (HYZAAR) 100-12.5 MG tablet 1/2 pill twice daily for blood pressure (replaces cozaar with added hydrochlorothiazide) 90 tablet 1     metFORMIN (GLUCOPHAGE-XR) 500 MG 24 hr tablet Take 2 tablets (1,000 mg) by mouth daily (with dinner) For diabetes Pharmacy ok to hold prescription until due 180 tablet 1     metoprolol tartrate (LOPRESSOR) 100 MG tablet TAKE ONE AND ONE-HALF TABLETS BY MOUTH TWICE DAILY FOR BLOOD PRESSURE 270 tablet 1     MICROLET LANCETS MISC USE TO TEST BLOOD SUGAR ONCE DAILY OR AS DIRECTED 100 each 3     simvastatin (ZOCOR) 40 MG tablet TAKE 1 TABLET BY MOUTH EVERY NIGHT AT BEDTIME FOR CHOLESTEROL 90 tablet 1     TRAZODONE HCL PO Take 100 mg by mouth At Bedtime Patient reports: Takes 100-200 MG at bedtime       Social History     Tobacco Use     Smoking status: Current Every Day Smoker     Packs/day: 0.25     Years: 20.00     Pack years: 5.00     Types: Cigarettes     Smokeless tobacco: Never Used   Substance Use Topics     Alcohol use: No         OBJECTIVE:  /84   Pulse 63   Temp 98.2  F (36.8  C) (Oral)   Resp 20   Ht 1.727 m (5' 8\")   Wt 121.1 kg (267 " lb)   SpO2 96%   BMI 40.60 kg/m      There was not tenderness of the temporal arteries.    GENERAL APPEARANCE: healthy, alert and no distress  EYES: EOMI,  PERRL, conjunctiva clear  HENT: ear canals and TM's normal.  Nose and mouth without ulcers, erythema or lesions  NECK: supple, nontender, no lymphadenopathy  RESP: lungs clear to auscultation - no rales, rhonchi or wheezes  CV: regular rates and rhythm, normal S1 S2, no murmur noted  ABDOMEN:  soft, nontender, no HSM or masses and bowel sounds normal  NEURO: Normal strength and tone, sensory exam grossly normal,  normal speech and mentation  SKIN: no suspicious lesions or rashes      Strength was +5 globally in the upper extremities.  Strength was +5 globally in the lower extremities.    CN 2-12 intact    Cerebellar function showed intact  finger to nose, heel to shin testing and there was no dysdiadochokinesis        ASSESSMENT:  Common migraine    PLAN:  See orders in Epic  I honored the patient's request for vicodin today   I recommend(ed) that she follow up with her primary medical provider to discuss treatment options and to come up with a care plan     Patient Instructions   Please follow up with Dr Beach in 2-3 weeks for a migraine appointment(s). Please come up with a care plan regarding acute migraine treatment that is placed in the chart.

## 2019-04-10 NOTE — PATIENT INSTRUCTIONS
Please follow up with Dr Beach in 2-3 weeks for a migraine appointment(s). Please come up with a care plan regarding acute migraine treatment that is placed in the chart.

## 2019-04-10 NOTE — Clinical Note
I saw Carissa today. Angela saw her in February. Hoping that you can create a  a care plan for her migraine management when you see here. I told her to follow up with you in 2-3 weeks

## 2019-04-23 ENCOUNTER — OFFICE VISIT (OUTPATIENT)
Dept: FAMILY MEDICINE | Facility: CLINIC | Age: 49
End: 2019-04-23
Payer: COMMERCIAL

## 2019-04-23 VITALS
DIASTOLIC BLOOD PRESSURE: 76 MMHG | RESPIRATION RATE: 15 BRPM | HEART RATE: 78 BPM | BODY MASS INDEX: 40.29 KG/M2 | OXYGEN SATURATION: 97 % | WEIGHT: 265 LBS | TEMPERATURE: 97.9 F | SYSTOLIC BLOOD PRESSURE: 119 MMHG

## 2019-04-23 DIAGNOSIS — G43.109 MIGRAINE WITH AURA AND WITHOUT STATUS MIGRAINOSUS, NOT INTRACTABLE: Primary | ICD-10-CM

## 2019-04-23 PROCEDURE — 99214 OFFICE O/P EST MOD 30 MIN: CPT | Performed by: INTERNAL MEDICINE

## 2019-04-23 RX ORDER — ONDANSETRON 4 MG/1
4-8 TABLET, ORALLY DISINTEGRATING ORAL EVERY 8 HOURS PRN
Qty: 30 TABLET | Refills: 0 | Status: SHIPPED | OUTPATIENT
Start: 2019-04-23 | End: 2020-09-08

## 2019-04-23 RX ORDER — HYDROCODONE BITARTRATE AND ACETAMINOPHEN 5; 325 MG/1; MG/1
1-2 TABLET ORAL EVERY 6 HOURS PRN
Qty: 18 TABLET | Refills: 0 | Status: SHIPPED | OUTPATIENT
Start: 2019-04-23 | End: 2019-04-23

## 2019-04-23 RX ORDER — HYDROCODONE BITARTRATE AND ACETAMINOPHEN 5; 325 MG/1; MG/1
1-2 TABLET ORAL EVERY 6 HOURS PRN
Qty: 18 TABLET | Refills: 0 | Status: SHIPPED | OUTPATIENT
Start: 2019-04-23 | End: 2019-05-06

## 2019-04-23 ASSESSMENT — PAIN SCALES - GENERAL: PAINLEVEL: SEVERE PAIN (7)

## 2019-04-23 NOTE — PROGRESS NOTES
SUBJECTIVE:  Carissa Spears is an 49 year old female who presents for migraine headache.  Started this morning after being up for a while.  Had an aura but didn't initially notice it.  Has headache, more on right than left.  Has nausea and lightheadedness.  Is sensitive to light and some to sound.  Took some midrin which didn't help much.  Also took advil this morning.  In past had anaphylactic reaction to imitrex so can't take that.  Seems to have more headaches in spring and fall.  This feels like her typical migraines.  In past vicodin seems to work the best for her when headaches get like this.      PMH:   has a past medical history of Allergies, Bipolar disorder, unspecified (H), DM II (diabetes mellitus, type II), Endometriosis, Herniated lumbar intervertebral disc, High cholesterol, HTN, Liver disease, Obesity, Other disorder of menstruation and other abnormal bleeding from female genital tract, Polycystic ovarian syndrome, Sleep disorder, and Toxemia.  Patient Active Problem List   Diagnosis     HYPERLIPIDEMIA LDL GOAL <100     Hypertension goal BP (blood pressure) < 140/90     Bipolar disorder (H)     Polycystic ovarian syndrome     BMI 45.0-49.9, adult (H)     Tobacco abuse     Mild major depression (H)     Insomnia     Chronic abdominal pain     Hyperglycemia     Myofascial pain     Migraine with aura and without status migrainosus, not intractable     Non-allergic rhinitis     Bipolar affective disorder, remission status unspecified (H)     Diabetes mellitus, type 2 (H)     Type 2 diabetes mellitus without complication, without long-term current use of insulin (H)     Social History     Socioeconomic History     Marital status:      Spouse name: None     Number of children: None     Years of education: None     Highest education level: None   Occupational History     Employer: [employer]   Social Needs     Financial resource strain: None     Food insecurity:     Worry: None      Inability: None     Transportation needs:     Medical: None     Non-medical: None   Tobacco Use     Smoking status: Current Every Day Smoker     Packs/day: 0.25     Years: 20.00     Pack years: 5.00     Types: Cigarettes     Smokeless tobacco: Never Used   Substance and Sexual Activity     Alcohol use: No     Drug use: No     Sexual activity: Yes     Partners: Male   Lifestyle     Physical activity:     Days per week: None     Minutes per session: None     Stress: None   Relationships     Social connections:     Talks on phone: None     Gets together: None     Attends Nondenominational service: None     Active member of club or organization: None     Attends meetings of clubs or organizations: None     Relationship status: None     Intimate partner violence:     Fear of current or ex partner: None     Emotionally abused: None     Physically abused: None     Forced sexual activity: None   Other Topics Concern     Parent/sibling w/ CABG, MI or angioplasty before 65F 55M? No   Social History Narrative     None     Family History   Problem Relation Age of Onset     Cancer Mother         skin cancer - non-melanoma     Hypertension Mother      Lipids Father        ALLERGIES:  Imitrex [sumatriptan]; Calcium channel blockers; Augmentin; Codeine; Latex; Sulfa drugs; and Lisinopril    Current Outpatient Medications   Medication     albuterol (PROAIR HFA/PROVENTIL HFA/VENTOLIN HFA) 108 (90 Base) MCG/ACT inhaler     ALPRAZolam (XANAX) 0.5 MG tablet     aspirin 81 MG tablet     blood glucose monitoring (CONTOUR NEXT MONITOR W/DEVICE KIT) meter device kit     BusPIRone HCl (BUSPAR PO)     Cholecalciferol (VITAMIN D) 2000 units tablet     citalopram (CELEXA) 40 MG tablet     CONTOUR NEXT TEST test strip     fluticasone (FLONASE) 50 MCG/ACT nasal spray     HYDROcodone-acetaminophen (NORCO) 5-325 MG tablet     IBUPROFEN PO     isometheptene-dichloralphenazone-acetaminophen (MIDRIN) -325 MG per capsule     LATUDA 80 MG TABS tablet      losartan-hydrochlorothiazide (HYZAAR) 100-12.5 MG tablet     metFORMIN (GLUCOPHAGE-XR) 500 MG 24 hr tablet     metoprolol tartrate (LOPRESSOR) 100 MG tablet     MICROLET LANCETS MISC     simvastatin (ZOCOR) 40 MG tablet     TRAZODONE HCL PO     No current facility-administered medications for this visit.          ROS:  ROS is done and is negative for general/constitutional, eye, ENT, Respiratory, cardiovascular, GI, , Skin, musculoskeletal except as noted elsewhere.  All other review of systems negative except as noted elsewhere.      OBJECTIVE:  /76   Pulse 78   Temp 97.9  F (36.6  C) (Oral)   Resp 15   Wt 120.2 kg (265 lb)   SpO2 97%   Breastfeeding? No   BMI 40.29 kg/m    GENERAL APPEARANCE: Alert, in no acute distress.  Wearing sunglasses in room.  EYES: normal  EARS: External ears normal. Canals clear. TM's normal.  NOSE:normal  OROPHARYNX:normal  NECK:No adenopathy,masses or thyromegaly.  No tenderness, normal rom without pain.  RESP: normal and clear to auscultation  CV:regular rate and rhythm and no murmurs, clicks, or gallops  ABDOMEN: Abdomen soft, non-tender. BS normal. No masses, organomegaly  SKIN: no ulcers, lesions or rash  MUSCULOSKELETAL:Musculoskeletal normal  NEURO: CN 2-12 grossly intact.  Strength 5/5 and symmetric in bilateral upper and lower extremities.  DTRs 2+ and symmetric in bilateral upper and lower extremities.  Sensation to light touch grossly intact in bilateral upper and lower extremities.    RESULTS  .  No results found for this or any previous visit (from the past 48 hour(s)).    ASSESSMENT/PLAN:    ASSESSMENT / PLAN:  (G43.109) Migraine with aura and without status migrainosus, not intractable  (primary encounter diagnosis)  Comment: currently sxs and exam c/w recurrent migraine.  As vicodin po has worked best for her in past, will rx this.  In past toradol injections have helped for a short time but then has headache again a couple hours later.  As has nausea will  rx zofran as well.  Currently no signs of brain mass, meninigitis, or stroke.  Plan: ondansetron (ZOFRAN-ODT) 4 MG ODT tab,         HYDROcodone-acetaminophen (NORCO) 5-325 MG         tablet, naloxone (NARCAN) 4 MG/0.1ML nasal         spray,        Reviewed medication instructions and side effects. Follow up if experiences side effects.. Advised not to take her prn xanax within 8 hours of norco.  Advised that norco can make drowsy or altered and is not to drive, operate machinery or consume alcohol or street drugs when taking.  As has prn xanax, narcan is prescribed as well in case needed, and use was discussed with pt.  I reviewed supportive care, otc meds to use if needed, expected course, and signs of concern.  Follow up as needed or if she does not improve within 2 day(s) or if worsens in any way.  Reviewed red flag symptoms and is to go to the ER if experiences any of these.      See [facility] for orders, medications, letters, patient instructions    Vera Leggett M.D.

## 2019-04-23 NOTE — NURSING NOTE
"Chief Complaint   Patient presents with     Headache     pt c/o migraine that started today     Health Maintenance     Preventive care/HIV screen/Microalbumin/TSH/Zoster       Initial /76   Pulse 78   Temp 97.9  F (36.6  C) (Oral)   Resp 15   Wt 120.2 kg (265 lb)   SpO2 97%   Breastfeeding? No   BMI 40.29 kg/m   Estimated body mass index is 40.29 kg/m  as calculated from the following:    Height as of 4/10/19: 1.727 m (5' 8\").    Weight as of this encounter: 120.2 kg (265 lb).  Medication Reconciliation: complete  Alivia Sal MA    "

## 2019-05-06 ENCOUNTER — OFFICE VISIT (OUTPATIENT)
Dept: URGENT CARE | Facility: URGENT CARE | Age: 49
End: 2019-05-06
Payer: COMMERCIAL

## 2019-05-06 ENCOUNTER — TELEPHONE (OUTPATIENT)
Dept: FAMILY MEDICINE | Facility: CLINIC | Age: 49
End: 2019-05-06

## 2019-05-06 VITALS
SYSTOLIC BLOOD PRESSURE: 148 MMHG | HEART RATE: 74 BPM | TEMPERATURE: 98.5 F | BODY MASS INDEX: 40.18 KG/M2 | RESPIRATION RATE: 16 BRPM | WEIGHT: 265.1 LBS | DIASTOLIC BLOOD PRESSURE: 84 MMHG | HEIGHT: 68 IN | OXYGEN SATURATION: 95 %

## 2019-05-06 DIAGNOSIS — I10 HYPERTENSION GOAL BP (BLOOD PRESSURE) < 140/90: ICD-10-CM

## 2019-05-06 DIAGNOSIS — G43.909 MIGRAINE WITHOUT STATUS MIGRAINOSUS, NOT INTRACTABLE, UNSPECIFIED MIGRAINE TYPE: Primary | ICD-10-CM

## 2019-05-06 PROCEDURE — 99213 OFFICE O/P EST LOW 20 MIN: CPT | Performed by: NURSE PRACTITIONER

## 2019-05-06 RX ORDER — HYDROCODONE BITARTRATE AND ACETAMINOPHEN 5; 325 MG/1; MG/1
1 TABLET ORAL EVERY 6 HOURS PRN
Qty: 2 TABLET | Refills: 0 | Status: SHIPPED | OUTPATIENT
Start: 2019-05-06 | End: 2019-05-21

## 2019-05-06 ASSESSMENT — ENCOUNTER SYMPTOMS
PHOTOPHOBIA: 1
RESPIRATORY NEGATIVE: 1
HEADACHES: 1
CONSTITUTIONAL NEGATIVE: 1
CARDIOVASCULAR NEGATIVE: 1
MUSCULOSKELETAL NEGATIVE: 1

## 2019-05-06 ASSESSMENT — MIFFLIN-ST. JEOR: SCORE: 1870.99

## 2019-05-06 ASSESSMENT — PAIN SCALES - GENERAL: PAINLEVEL: EXTREME PAIN (8)

## 2019-05-06 NOTE — TELEPHONE ENCOUNTER
Patient has a migraine headache and would like to  a prescription at the . Patient has been seen recently. Ok to leave a detailed message.

## 2019-05-06 NOTE — TELEPHONE ENCOUNTER
Pt states she gets more frequent migraines in the spring and fall.  She has been getting them weekly and has used all the norco she has been given.    Pt has appointment to discuss migraine treatment with Dr. Beach on 5/21/19.    Controlled Substance Refill Request for norco  Problem List Complete:    No     Last Written Prescription Date:  4/23/19  Last Fill Quantity: 18,   # refills: 0    Last Office Visit with [facility] primary care provider: 12/6/18    Future Office visit:   Next 5 appointments (look out 90 days)    May 21, 2019 12:00 PM CDT  [facility]  [facility] [address] [address] [phone]          Controlled substance agreement:   Encounter-Level CSA:    There are no encounter-level csa.     Patient-Level CSA:    There are no patient-level csa.         Last Urine Drug Screen:   Pain Drug SCR UR W RPTD Meds   Date Value Ref Range Status   11/08/2012   Final    FINAL  (Note)  Test                         Result      Flag        UNITS  ====================================================================   Creatinine                 67                      mg/dL    Drugs Present and Declared for Prescription Verification    Citalopram                PRESENT    Desmethylcitalopram       PRESENT    Drugs Present but Not Declared for Prescription Verification    Gabapentin                PRESENT    UNEXPECTED    Acetaminophen             PRESENT    UNEXPECTED    Metoprolol                PRESENT    UNEXPECTED    Drugs Absent but Declared for Prescription Verification    Oxycodone                 Not Detected UNEXPECTED ng/mg creat     Declared Medications:  The flagging and interpretation on this report are based on  the following declared medications. Unexpected results may  arise from inaccuracies in the declared medications.    Celexa (citalopram)  Oxycodone    **Note:  The testing scope of this panel does not include           small to moderate amounts of these reported          medications:    Zolpidem    For further interpretive information, please call our  [employer] Hotline, [phone].    Analysis performed by [employer], [email or web address] [place] [address]   , No results found for: COMDAT, No results found for: THC13, PCP13, COC13, MAMP13, OPI13, AMP13, BZO13, TCA13, MTD13, BAR13, OXY13, PPX13, BUP13     Processing:  Patient will  in clinic     [email or web address]       checked in past 3 months?  Yes 5/6/19 on your desk for review.   Jesenia PEREYRAN, RN

## 2019-05-07 ENCOUNTER — TELEPHONE (OUTPATIENT)
Dept: FAMILY MEDICINE | Facility: CLINIC | Age: 49
End: 2019-05-07

## 2019-05-07 NOTE — TELEPHONE ENCOUNTER
Patient calling was seen in Urgent care last pm with migraine. Went to [place] location for urgent care. Was given two vicodin at visit was told to contact primary for more. States put in a message yesterday and has not heard back on refill. Please call to advise today please patient states.

## 2019-05-07 NOTE — TELEPHONE ENCOUNTER
Need to be seen for narcotics. Seen  maybe he would be willing to do small refill vicodin. Denied by myself. Silvino Beach MD

## 2019-05-07 NOTE — PATIENT INSTRUCTIONS
Call Dr Beach's office for full prescription tomorrow.     Carissa to follow up with Primary Care provider regarding elevated blood pressure.

## 2019-05-07 NOTE — PROGRESS NOTES
HPI  Carissa Spears 49 year old presents with a migraine headache. It started this morning and has accompanying nausea. She contacted her PCP (see notes in chart) but he was called away on an emergency so her prescription was not refilled. She denies vomiting, weakness of extremities or other accompanying symptoms. She does endorse photophobia.     Past Medical History:   Diagnosis Date     Allergies      Bipolar disorder, unspecified (H)      DM II (diabetes mellitus, type II)     diet controlled     Endometriosis      Herniated lumbar intervertebral disc     age 22     High cholesterol      HTN      Liver disease     fatty liver     Obesity      Other disorder of menstruation and other abnormal bleeding from female genital tract      Polycystic ovarian syndrome      Sleep disorder      Toxemia       Patient Active Problem List   Diagnosis     HYPERLIPIDEMIA LDL GOAL <100     Hypertension goal BP (blood pressure) < 140/90     Bipolar disorder (H)     Polycystic ovarian syndrome     BMI 45.0-49.9, adult (H)     Tobacco abuse     Mild major depression (H)     Insomnia     Chronic abdominal pain     Hyperglycemia     Myofascial pain     Migraine with aura and without status migrainosus, not intractable     Non-allergic rhinitis     Bipolar affective disorder, remission status unspecified (H)     Diabetes mellitus, type 2 (H)     Type 2 diabetes mellitus without complication, without long-term current use of insulin (H)      Past Surgical History:   Procedure Laterality Date     AS KNEE SCOPE, ALLOGRAFT IMPANT Left      C/SECTION, CLASSICAL      X2     HYSTERECTOMY, PAP NO LONGER INDICATED       HYSTERECTOMY, KAMILLA  4/8/08     LAPAROSCOPIC CHOLECYSTECTOMY  7/18/2012    Procedure: LAPAROSCOPIC CHOLECYSTECTOMY;  Laparoscopic cholecystectomy;  Surgeon: Miguel Fuentes MD;  Location: [facility] OR     SALPINGO OOPHORECTOMY,R/L/YANELIS  4/8/08    L     TONSILLECTOMY  1983     TUBAL LIGATION        Allergies   Allergen Reactions      Sumatriptan Anaphylaxis and Other (See Comments)     Blood pressure high/ she was in hospital  Chest pressure     Calcium Channel Blockers      Augmentin Diarrhea     Codeine      Sick to stomach     Food Nausea and Vomiting     Kidney beans- mold.     Latex      Puffy, rash      Sulfa Drugs Nausea     Lisinopril Nausea      Current Outpatient Medications   Medication     albuterol (PROAIR HFA/PROVENTIL HFA/VENTOLIN HFA) 108 (90 Base) MCG/ACT inhaler     ALPRAZolam (XANAX) 0.5 MG tablet     BusPIRone HCl (BUSPAR PO)     Cholecalciferol (VITAMIN D) 2000 units tablet     citalopram (CELEXA) 40 MG tablet     HYDROcodone-acetaminophen (NORCO) 5-325 MG tablet     IBUPROFEN PO     LATUDA 80 MG TABS tablet     losartan-hydrochlorothiazide (HYZAAR) 100-12.5 MG tablet     metFORMIN (GLUCOPHAGE-XR) 500 MG 24 hr tablet     metoprolol tartrate (LOPRESSOR) 100 MG tablet     ondansetron (ZOFRAN-ODT) 4 MG ODT tab     simvastatin (ZOCOR) 40 MG tablet     TRAZODONE HCL PO     aspirin 81 MG tablet     blood glucose monitoring (CONTOUR NEXT MONITOR W/DEVICE KIT) meter device kit     CONTOUR NEXT TEST test strip     fluticasone (FLONASE) 50 MCG/ACT nasal spray     HYDROcodone-acetaminophen (NORCO) 5-325 MG tablet     isometheptene-dichloralphenazone-acetaminophen (MIDRIN) -325 MG per capsule     MICROLET LANCETS MISC     naloxone (NARCAN) 4 MG/0.1ML nasal spray     No current facility-administered medications for this visit.        Review of Systems   Constitutional: Negative.    HENT: Negative.    Eyes: Positive for photophobia.   Respiratory: Negative.    Cardiovascular: Negative.    Musculoskeletal: Negative.    Skin: Negative.    Neurological: Positive for headaches.   All other systems reviewed and are negative.        Physical Exam   Constitutional: She is oriented to person, place, and time.   HENT:   Head: Normocephalic.   Eyes: Pupils are equal, round, and reactive to light. EOM are normal.   Neck: Normal range of  motion.   Cardiovascular: Normal rate.   BP elevated   Pulmonary/Chest: Effort normal.   Neurological: She is alert and oriented to person, place, and time. No cranial nerve deficit or sensory deficit. She exhibits normal muscle tone.   Skin: Skin is warm and dry.   Nursing note and vitals reviewed.    Assessment:  1. Migraine without status migrainosus, not intractable, unspecified migraine type    2. Hypertension goal BP (blood pressure) < 140/90        Plan:  Orders Placed This Encounter     HYDROcodone-acetaminophen (NORCO) 5-325 MG tablet (2 tablets)   Call Dr Beach in the AM for your prescription   Instructions regarding self-care of patient/child reviewed.    Red flag symptoms reviewed and patient has been instructed to seek emergent   Care at the closest emergency room for evaluation and treatment.   Please contact pharmacy for medication questions.  Patient instructed to take medications as directed on package.      BEKAH Garcia, CNP

## 2019-05-07 NOTE — TELEPHONE ENCOUNTER
See 5/6 telephone encounter. Patient seen recently with Dr. Bazzi for migraines.   To Dr. Bazzi to advise on refill of Vicodin.    Torrie PEREYRAN, RN, CPN

## 2019-05-08 NOTE — TELEPHONE ENCOUNTER
I told the patient at her last appointment(s) on 4-10 to follow up with Dr Beach in 2-3 weeks and she did not do that. I am not going to refill(s) her prescription. She needs to follow up with her primary medical provider in clinic to discuss a plan for future migraines.  Jay Bazzi MD

## 2019-05-08 NOTE — TELEPHONE ENCOUNTER
See previous refill request also, Dr. Beach advises pt needs to be seen also.  Left message on answering machine for patient to call back to [phone].  Jesenia PEREYRAN, RN

## 2019-05-08 NOTE — TELEPHONE ENCOUNTER
Pt advised both providers said she needs to be seen by Dr. Beach for refills.  Pt currently has a migraine so needs ov this week. Appointment made for tomorrow.  Advised only migraine will be done at appointment tomorrow and appointment she has later in month should be kept for other health issues. Pt agrees to plan.  Jesenia PEREYRAN, RN

## 2019-05-09 ENCOUNTER — OFFICE VISIT (OUTPATIENT)
Dept: FAMILY MEDICINE | Facility: CLINIC | Age: 49
End: 2019-05-09
Payer: COMMERCIAL

## 2019-05-09 VITALS
HEART RATE: 68 BPM | HEIGHT: 68 IN | OXYGEN SATURATION: 96 % | DIASTOLIC BLOOD PRESSURE: 77 MMHG | RESPIRATION RATE: 20 BRPM | WEIGHT: 259 LBS | SYSTOLIC BLOOD PRESSURE: 120 MMHG | TEMPERATURE: 98.1 F | BODY MASS INDEX: 39.25 KG/M2

## 2019-05-09 DIAGNOSIS — G43.919 INTRACTABLE MIGRAINE WITHOUT STATUS MIGRAINOSUS, UNSPECIFIED MIGRAINE TYPE: ICD-10-CM

## 2019-05-09 PROCEDURE — 99214 OFFICE O/P EST MOD 30 MIN: CPT | Performed by: FAMILY MEDICINE

## 2019-05-09 RX ORDER — HYDROCODONE BITARTRATE AND ACETAMINOPHEN 5; 325 MG/1; MG/1
1 TABLET ORAL EVERY 6 HOURS PRN
Qty: 18 TABLET | Refills: 0 | Status: SHIPPED | OUTPATIENT
Start: 2019-05-09 | End: 2019-10-23

## 2019-05-09 RX ORDER — BUTALBITAL, ACETAMINOPHEN AND CAFFEINE 50; 325; 40 MG/1; MG/1; MG/1
1 TABLET ORAL EVERY 4 HOURS PRN
Qty: 12 TABLET | Refills: 1 | Status: SHIPPED | OUTPATIENT
Start: 2019-05-09 | End: 2019-08-31

## 2019-05-09 ASSESSMENT — MIFFLIN-ST. JEOR: SCORE: 1843.32

## 2019-05-09 NOTE — NURSING NOTE
"Chief Complaint   Patient presents with     Headache       Initial /77   Pulse 68   Temp 98.1  F (36.7  C) (Oral)   Resp 20   Ht 1.727 m (5' 8\")   Wt 117.5 kg (259 lb)   SpO2 96%   BMI 39.38 kg/m   Estimated body mass index is 39.38 kg/m  as calculated from the following:    Height as of this encounter: 1.727 m (5' 8\").    Weight as of this encounter: 117.5 kg (259 lb).    Jesenia Lester CMA    "

## 2019-05-09 NOTE — PROGRESS NOTES
"SUBJECTIVE:  Carissa Spears, a 49 year old female scheduled an appointment to discuss the following issues:  Migraines. Had been seen in clinic and given some vicodin.   No neurology in years. Seeing psych for bipolar - doing ok.   Migraines in spring/fall. Negative allergy testing. Spring is worse.   midrin not helpful. Allergy to imitrex.   Can last 5-days. vicodin helpful with functioning.   Taking metoprolol.    Blurry and nausea - zofran odt helpful.   Caffeine - 2 cups in AM and 1 in PM. Higher fat diet - lots of cheese. No focal weakness/tingling. No fevers or chills.   Medical, social, surgical, and family histories reviewed.    ROS:  All other ROS negative    OBJECTIVE:  /77   Pulse 68   Temp 98.1  F (36.7  C) (Oral)   Resp 20   Ht 1.727 m (5' 8\")   Wt 117.5 kg (259 lb)   SpO2 96%   BMI 39.38 kg/m    EXAM:  GENERAL APPEARANCE: healthy, alert and no distress  EYES: EOMI,  PERRL  EYES: +photophobia  HENT: ear canals and TM's normal and nose and mouth without ulcers or lesions  NECK: no adenopathy, no asymmetry, masses, or scars and thyroid normal to palpation  RESP: lungs clear to auscultation - no rales, rhonchi or wheezes  CV: regular rates and rhythm, normal S1 S2, no S3 or S4 and no murmur, click or rub -  ABDOMEN:  soft, nontender, no HSM or masses and bowel sounds normal  MS: extremities normal- no gross deformities noted, no evidence of inflammation in joints, FROM in all extremities.  NEURO: Normal strength and tone, sensory exam grossly normal, mentation intact and speech normal  PSYCH: mentation appears normal and affect normal/bright    ASSESSMENT / PLAN:  (G43.919) Intractable migraine without status migrainosus, unspecified migraine type  Comment: needs help  Plan: HYDROcodone-acetaminophen (NORCO) 5-325 MG         tablet, NEUROLOGY ADULT REFERRAL,         butalbital-acetaminophen-caffeine         (FIORICET/ESGIC) -40 MG tablet        Advised vicodin if not a good long term " plan. Limited # given and don't take with fiorcet or xanax. Advised follow-up with neurology for help. Exercise and food diary (lower cheese?). Reveiwed risks and side effects of medication  Follow-up myself recheck dm/labs in 2 months. Call/email with questions/concerns    Silvino Beach MD

## 2019-05-11 ENCOUNTER — DOCUMENTATION ONLY (OUTPATIENT)
Dept: LAB | Facility: CLINIC | Age: 49
End: 2019-05-11

## 2019-05-11 DIAGNOSIS — E78.5 HYPERLIPIDEMIA LDL GOAL <100: Primary | ICD-10-CM

## 2019-05-11 DIAGNOSIS — I10 HYPERTENSION GOAL BP (BLOOD PRESSURE) < 140/90: ICD-10-CM

## 2019-05-11 DIAGNOSIS — E11.9 TYPE 2 DIABETES MELLITUS WITHOUT COMPLICATION, WITHOUT LONG-TERM CURRENT USE OF INSULIN (H): ICD-10-CM

## 2019-05-11 NOTE — PROGRESS NOTES
Patient has an up coming lab appointment on 5.15.2019. Per Health Maintenance patient is due for URINE ALBUMIN, TSH, A1C, LIPID and I have pended the order. Please review, sign, and place any additional future orders that may be needed.     Thank you,   Jessica Ponce MLT ([facility])

## 2019-05-15 DIAGNOSIS — E78.5 HYPERLIPIDEMIA LDL GOAL <100: ICD-10-CM

## 2019-05-15 DIAGNOSIS — I10 HYPERTENSION GOAL BP (BLOOD PRESSURE) < 140/90: ICD-10-CM

## 2019-05-15 DIAGNOSIS — E11.9 TYPE 2 DIABETES MELLITUS WITHOUT COMPLICATION, WITHOUT LONG-TERM CURRENT USE OF INSULIN (H): ICD-10-CM

## 2019-05-15 LAB
ALBUMIN SERPL-MCNC: 4.3 G/DL (ref 3.4–5)
ANION GAP SERPL CALCULATED.3IONS-SCNC: 7 MMOL/L (ref 3–14)
BUN SERPL-MCNC: 16 MG/DL (ref 7–30)
CALCIUM SERPL-MCNC: 9.5 MG/DL (ref 8.5–10.1)
CHLORIDE SERPL-SCNC: 105 MMOL/L (ref 94–109)
CHOLEST SERPL-MCNC: 147 MG/DL
CO2 SERPL-SCNC: 27 MMOL/L (ref 20–32)
CREAT SERPL-MCNC: 0.89 MG/DL (ref 0.52–1.04)
CREAT UR-MCNC: 189 MG/DL
GFR SERPL CREATININE-BSD FRML MDRD: 76 ML/MIN/{1.73_M2}
GLUCOSE SERPL-MCNC: 111 MG/DL (ref 70–99)
HBA1C MFR BLD: 5.2 % (ref 0–5.6)
HDLC SERPL-MCNC: 40 MG/DL
LDLC SERPL CALC-MCNC: 88 MG/DL
MICROALBUMIN UR-MCNC: 51 MG/L
MICROALBUMIN/CREAT UR: 26.98 MG/G CR (ref 0–25)
NONHDLC SERPL-MCNC: 107 MG/DL
PHOSPHATE SERPL-MCNC: 3.6 MG/DL (ref 2.5–4.5)
POTASSIUM SERPL-SCNC: 3.8 MMOL/L (ref 3.4–5.3)
SODIUM SERPL-SCNC: 139 MMOL/L (ref 133–144)
TRIGL SERPL-MCNC: 94 MG/DL
TSH SERPL DL<=0.005 MIU/L-ACNC: 1.49 MU/L (ref 0.4–4)

## 2019-05-15 PROCEDURE — 36415 COLL VENOUS BLD VENIPUNCTURE: CPT | Performed by: FAMILY MEDICINE

## 2019-05-15 PROCEDURE — 80069 RENAL FUNCTION PANEL: CPT | Performed by: FAMILY MEDICINE

## 2019-05-15 PROCEDURE — 83036 HEMOGLOBIN GLYCOSYLATED A1C: CPT | Performed by: FAMILY MEDICINE

## 2019-05-15 PROCEDURE — 84443 ASSAY THYROID STIM HORMONE: CPT | Performed by: FAMILY MEDICINE

## 2019-05-15 PROCEDURE — 80061 LIPID PANEL: CPT | Performed by: FAMILY MEDICINE

## 2019-05-15 PROCEDURE — 82043 UR ALBUMIN QUANTITATIVE: CPT | Performed by: FAMILY MEDICINE

## 2019-05-21 ENCOUNTER — OFFICE VISIT (OUTPATIENT)
Dept: FAMILY MEDICINE | Facility: CLINIC | Age: 49
End: 2019-05-21
Payer: COMMERCIAL

## 2019-05-21 VITALS
BODY MASS INDEX: 38.95 KG/M2 | SYSTOLIC BLOOD PRESSURE: 116 MMHG | WEIGHT: 257 LBS | HEART RATE: 62 BPM | HEIGHT: 68 IN | TEMPERATURE: 97.8 F | RESPIRATION RATE: 20 BRPM | DIASTOLIC BLOOD PRESSURE: 77 MMHG | OXYGEN SATURATION: 96 %

## 2019-05-21 DIAGNOSIS — E11.9 TYPE 2 DIABETES MELLITUS WITHOUT COMPLICATION, WITHOUT LONG-TERM CURRENT USE OF INSULIN (H): Primary | ICD-10-CM

## 2019-05-21 DIAGNOSIS — I10 HYPERTENSION GOAL BP (BLOOD PRESSURE) < 140/90: ICD-10-CM

## 2019-05-21 DIAGNOSIS — Z12.11 SPECIAL SCREENING FOR MALIGNANT NEOPLASMS, COLON: ICD-10-CM

## 2019-05-21 DIAGNOSIS — E78.5 HYPERLIPIDEMIA LDL GOAL <100: ICD-10-CM

## 2019-05-21 PROCEDURE — 99214 OFFICE O/P EST MOD 30 MIN: CPT | Performed by: FAMILY MEDICINE

## 2019-05-21 RX ORDER — SIMVASTATIN 40 MG
TABLET ORAL
Qty: 90 TABLET | Refills: 1 | Status: SHIPPED | OUTPATIENT
Start: 2019-05-21 | End: 2020-09-08

## 2019-05-21 RX ORDER — METFORMIN HCL 500 MG
1000 TABLET, EXTENDED RELEASE 24 HR ORAL
Qty: 180 TABLET | Refills: 1 | Status: SHIPPED | OUTPATIENT
Start: 2019-05-21 | End: 2019-12-14

## 2019-05-21 RX ORDER — LOSARTAN POTASSIUM AND HYDROCHLOROTHIAZIDE 12.5; 1 MG/1; MG/1
TABLET ORAL
Qty: 90 TABLET | Refills: 1 | Status: SHIPPED | OUTPATIENT
Start: 2019-05-21 | End: 2019-11-20

## 2019-05-21 ASSESSMENT — MIFFLIN-ST. JEOR: SCORE: 1834.24

## 2019-05-21 NOTE — NURSING NOTE
"Chief Complaint   Patient presents with     Hypertension     Lipids     Headache       Initial /77   Pulse 62   Temp 97.8  F (36.6  C) (Oral)   Resp 20   Ht 1.727 m (5' 8\")   Wt 116.6 kg (257 lb)   SpO2 96%   BMI 39.08 kg/m   Estimated body mass index is 39.08 kg/m  as calculated from the following:    Height as of this encounter: 1.727 m (5' 8\").    Weight as of this encounter: 116.6 kg (257 lb).    Jesenia Lester, CMA      "

## 2019-05-21 NOTE — PROGRESS NOTES
"SUBJECTIVE:  Carissa Spears, a 49 year old female scheduled an appointment to discuss the following issues:  Follow-up dm, obesity, htn and high cholesterol.   Working on diet and exercise. Seen dietitian. No feet changes or numbnes. Eye exam one year ago.   Outside blood pressure ok. No chest pain or shortness of breath.   No nausea, vomiting or diarrhea or black or bloody stools. No urine changes.   No family history colon cancer.   Emotionally doing ok.   Going to call for neurology appointment.   Medical, social, surgical, and family histories reviewed.    ROS:  All other ROS negative.  OBJECTIVE:  /77   Pulse 62   Temp 97.8  F (36.6  C) (Oral)   Resp 20   Ht 1.727 m (5' 8\")   Wt 116.6 kg (257 lb)   SpO2 96%   BMI 39.08 kg/m    EXAM:  GENERAL APPEARANCE: healthy, alert and no distress  EYES: EOMI,  PERRL  HENT: ear canals and TM's normal and nose and mouth without ulcers or lesions  NECK: no adenopathy, no asymmetry, masses, or scars and thyroid normal to palpation  RESP: lungs clear to auscultation - no rales, rhonchi or wheezes  CV: regular rates and rhythm, normal S1 S2, no S3 or S4 and no murmur, click or rub -  ABDOMEN:  soft, nontender, no HSM or masses and bowel sounds normal  MS: extremities normal- no gross deformities noted, no evidence of inflammation in joints, FROM in all extremities.  NEURO: Normal strength and tone, sensory exam grossly normal, mentation intact and speech normal  PSYCH: mentation appears normal and affect normal/bright    ASSESSMENT / PLAN:  (Z12.11) Special screening for malignant neoplasms, colon  (primary encounter diagnosis)  Plan: Fecal colorectal cancer screen (FIT)        Patient not interested in colonoscopy. Will do fit card.     (I10) Hypertension goal BP (blood pressure) < 140/90  Comment: stable  Plan: losartan-hydrochlorothiazide (HYZAAR) 100-12.5         MG tablet        Patient would like to wean down or off lopressor. Reveiwed risks and side effects of " medication  Chest pain or shortness of breath to er. Recheck in 6 months      (E11.9) Type 2 diabetes mellitus without complication, without long-term current use of insulin (H)  Comment: stable  Plan: metFORMIN (GLUCOPHAGE-XR) 500 MG 24 hr tablet        Recheck in 6 months  Continue diet/exercise.     (E78.5) Hyperlipidemia LDL goal <100  Comment: stable  Plan: simvastatin (ZOCOR) 40 MG tablet        Reveiwed risks and side effects of medication      Silvino Beach MD

## 2019-06-12 ENCOUNTER — TELEPHONE (OUTPATIENT)
Dept: FAMILY MEDICINE | Facility: CLINIC | Age: 49
End: 2019-06-12

## 2019-06-12 NOTE — TELEPHONE ENCOUNTER
Panel Management Review      Patient has the following on her problem list:     Diabetes    ASA: Passed    Last A1C  Lab Results   Component Value Date    A1C 5.2 05/15/2019    A1C 7.0 12/06/2018    A1C 6.7 06/07/2018    A1C 6.7 12/01/2017    A1C 7.2 06/14/2017     A1C tested: Passed    Last LDL:    Lab Results   Component Value Date    CHOL 147 05/15/2019     Lab Results   Component Value Date    HDL 40 05/15/2019     Lab Results   Component Value Date    LDL 88 05/15/2019     Lab Results   Component Value Date    TRIG 94 05/15/2019     Lab Results   Component Value Date    CHOLHDLRATIO 4.5 09/23/2013     Lab Results   Component Value Date    NHDL 107 05/15/2019       Is the patient on a Statin? YES             Is the patient on Aspirin? YES    Medications     HMG CoA Reductase Inhibitors     simvastatin (ZOCOR) 40 MG tablet       Salicylates     aspirin 81 MG tablet             Last three blood pressure readings:  BP Readings from Last 3 Encounters:   05/21/19 116/77   05/09/19 120/77   05/06/19 148/84       Date of last diabetes office visit:      Tobacco History:     History   Smoking Status     Current Every Day Smoker     Packs/day: 0.25     Years: 20.00     Types: Cigarettes   Smokeless Tobacco     Never Used         Hypertension   Last three blood pressure readings:  BP Readings from Last 3 Encounters:   05/21/19 116/77   05/09/19 120/77   05/06/19 148/84     Blood pressure: FAILED    HTN Guidelines:  Less than 140/90      Composite cancer screening  Chart review shows that this patient is due/due soon for the following Fecal Colorectal (FIT)  Summary:    Patient is due/failing the following:   BP CHECK and FIT    Action needed:   Patient needs office visit for turn in fit card/ . diab check due in November 2019    Type of outreach:    My chart to turn in fit card,mammogram, eye    Questions for provider review:    None                                                                                                                                     Jesenia Lester, [facility]       Chart routed to 0 .

## 2019-07-01 DIAGNOSIS — E55.9 VITAMIN D DEFICIENCY: ICD-10-CM

## 2019-07-01 RX ORDER — CHOLECALCIFEROL (VITAMIN D3) 50 MCG
TABLET ORAL
Qty: 180 TABLET | Refills: 0 | Status: SHIPPED | OUTPATIENT
Start: 2019-07-01 | End: 2019-09-30

## 2019-07-15 ENCOUNTER — TELEPHONE (OUTPATIENT)
Dept: FAMILY MEDICINE | Facility: CLINIC | Age: 49
End: 2019-07-15

## 2019-08-31 ENCOUNTER — OFFICE VISIT (OUTPATIENT)
Dept: URGENT CARE | Facility: URGENT CARE | Age: 49
End: 2019-08-31
Payer: COMMERCIAL

## 2019-08-31 VITALS
OXYGEN SATURATION: 98 % | TEMPERATURE: 97.9 F | DIASTOLIC BLOOD PRESSURE: 87 MMHG | WEIGHT: 257 LBS | SYSTOLIC BLOOD PRESSURE: 165 MMHG | HEART RATE: 79 BPM | BODY MASS INDEX: 39.08 KG/M2

## 2019-08-31 DIAGNOSIS — G43.919 INTRACTABLE MIGRAINE WITHOUT STATUS MIGRAINOSUS, UNSPECIFIED MIGRAINE TYPE: ICD-10-CM

## 2019-08-31 PROCEDURE — 99213 OFFICE O/P EST LOW 20 MIN: CPT | Performed by: FAMILY MEDICINE

## 2019-08-31 RX ORDER — BUTALBITAL, ACETAMINOPHEN AND CAFFEINE 50; 325; 40 MG/1; MG/1; MG/1
1 TABLET ORAL EVERY 6 HOURS PRN
Qty: 12 TABLET | Refills: 0 | Status: SHIPPED | OUTPATIENT
Start: 2019-08-31 | End: 2020-01-28

## 2019-08-31 NOTE — PROGRESS NOTES
Subjective     Carissa Spears is a 49 year old female who presents to clinic today for the following health issues:    HPI   Chief Complaint   Patient presents with     Headache     migraine started this am,        Duration: today     Description (location/character/radiation): frontal headache, light intolerance     Intensity:  moderate    Accompanying signs and symptoms: no nausea, vomiting, speech difficulty, leg or arm weakness     History (similar episodes/previous evaluation): migraine     Precipitating or alleviating factors: None    Therapies tried and outcome: Excedrin          Patient Active Problem List   Diagnosis     HYPERLIPIDEMIA LDL GOAL <100     Hypertension goal BP (blood pressure) < 140/90     Bipolar disorder (H)     Polycystic ovarian syndrome     BMI 45.0-49.9, adult (H)     Tobacco abuse     Mild major depression (H)     Insomnia     Chronic abdominal pain     Hyperglycemia     Myofascial pain     Migraine with aura and without status migrainosus, not intractable     Non-allergic rhinitis     Bipolar affective disorder, remission status unspecified (H)     Diabetes mellitus, type 2 (H)     Type 2 diabetes mellitus without complication, without long-term current use of insulin (H)     Past Surgical History:   Procedure Laterality Date     AS KNEE SCOPE, ALLOGRAFT IMPANT Left      C/SECTION, CLASSICAL      X2     HYSTERECTOMY, PAP NO LONGER INDICATED       HYSTERECTOMY, KAMILLA  4/8/08     LAPAROSCOPIC CHOLECYSTECTOMY  7/18/2012    Procedure: LAPAROSCOPIC CHOLECYSTECTOMY;  Laparoscopic cholecystectomy;  Surgeon: Miguel Fuentes MD;  Location: [facility] OR     SALPINGO OOPHORECTOMY,R/L/YANELIS  4/8/08    L     TONSILLECTOMY  1983     TUBAL LIGATION         Social History     Tobacco Use     Smoking status: Current Every Day Smoker     Packs/day: 0.25     Years: 20.00     Pack years: 5.00     Types: Cigarettes     Smokeless tobacco: Never Used   Substance Use Topics     Alcohol use: No     Family History    Problem Relation Age of Onset     Cancer Mother         skin cancer - non-melanoma     Hypertension Mother      Lipids Father          Current Outpatient Medications   Medication Sig Dispense Refill     albuterol (PROAIR HFA/PROVENTIL HFA/VENTOLIN HFA) 108 (90 Base) MCG/ACT inhaler Inhale 2 puffs into the lungs every 4 hours as needed for shortness of breath / dyspnea, wheezing or other (cough) 1 Inhaler 0     ALPRAZolam (XANAX) 0.5 MG tablet Take 0.5 mg by mouth       aspirin 81 MG tablet Take 1 tablet (81 mg) by mouth daily 1 tablet 0     blood glucose monitoring (CONTOUR NEXT MONITOR W/DEVICE KIT) meter device kit USE TO TEST BLOOD SUGAR ONCE DAILY OR AS DIRECTED 1 kit 0     BusPIRone HCl (BUSPAR PO) Take 15 mg by mouth 2 times daily       citalopram (CELEXA) 40 MG tablet        CONTOUR NEXT TEST test strip USE TO TEST BLOOD SUGAR ONCE DAILY OR AS DIRECTED 100 strip 3     fluticasone (FLONASE) 50 MCG/ACT nasal spray Spray 2 sprays into both nostrils daily 48 mL 0     LATUDA 80 MG TABS tablet        losartan-hydrochlorothiazide (HYZAAR) 100-12.5 MG tablet 1/2 pill twice daily for blood pressure (replaces cozaar with added hydrochlorothiazide) 90 tablet 1     metFORMIN (GLUCOPHAGE-XR) 500 MG 24 hr tablet Take 2 tablets (1,000 mg) by mouth daily (with dinner) For diabetes Pharmacy ok to hold prescription until due 180 tablet 1     MICROLET LANCETS MISC USE TO TEST BLOOD SUGAR ONCE DAILY OR AS DIRECTED 100 each 3     naloxone (NARCAN) 4 MG/0.1ML nasal spray Spray 1 spray (4 mg) into one nostril alternating nostrils once as needed for opioid reversal every 2-3 minutes until assistance arrives 0.2 mL 0     ondansetron (ZOFRAN-ODT) 4 MG ODT tab Take 1-2 tablets (4-8 mg) by mouth every 8 hours as needed for nausea 30 tablet 0     TRAZODONE HCL PO Take 100 mg by mouth At Bedtime Patient reports: Takes 100-200 MG at bedtime       vitamin D3 (CHOLECALCIFEROL) 2000 units (50 mcg) tablet TAKE 1 TABLET BY MOUTH TWICE DAILY  180 tablet 0     butalbital-acetaminophen-caffeine (FIORICET/ESGIC) -40 MG tablet Take 1 tablet by mouth every 4 hours as needed for headaches Do not take with vicodin or xanax (Patient not taking: Reported on 8/31/2019) 12 tablet 1     HYDROcodone-acetaminophen (NORCO) 5-325 MG tablet Take 1 tablet by mouth every 6 hours as needed for pain or moderate to severe pain Do not take within 4 hours of xanax (Patient not taking: Reported on 8/31/2019) 18 tablet 0     IBUPROFEN PO Take 200-400 mg by mouth every 4 hours as needed for moderate pain       isometheptene-dichloralphenazone-acetaminophen (MIDRIN) -325 MG per capsule Take 1 capsule by mouth every 4 hours as needed for headaches (Patient not taking: Reported on 8/31/2019) 40 capsule 1     metoprolol tartrate (LOPRESSOR) 100 MG tablet TAKE ONE AND ONE-HALF TABLETS BY MOUTH TWICE DAILY FOR BLOOD PRESSURE (Patient not taking: Reported on 8/31/2019) 270 tablet 1     simvastatin (ZOCOR) 40 MG tablet TAKE 1 TABLET BY MOUTH EVERY NIGHT AT BEDTIME FOR CHOLESTEROL (Patient not taking: Reported on 8/31/2019) 90 tablet 1     Allergies   Allergen Reactions     Sumatriptan Anaphylaxis and Other (See Comments)     Blood pressure high/ she was in hospital  Chest pressure     Calcium Channel Blockers      Augmentin Diarrhea     Codeine      Sick to stomach     Food Nausea and Vomiting     Kidney beans- mold.     Latex      Puffy, rash      Sulfa Drugs Nausea     Lisinopril Nausea     Recent Labs   Lab Test 05/15/19  0723 12/06/18  0720 06/07/18  0847  06/14/17  0825  01/02/17  1424 09/23/13  0748 01/04/13  0826 08/30/12  1211   A1C 5.2 7.0* 6.7*   < > 7.2*  --  7.5* 5.8 5.5  --    LDL 88  --  93  --  88  --   --  136* 131*  --    HDL 40*  --  43*  --  39*  --   --  44* 41*  --    TRIG 94  --  105  --  94  --   --  93 60  --    ALT  --   --   --   --   --   --   --  32 36 20   CR 0.89 0.87  --    < > 0.80   < > 0.76 0.80 0.78 0.77   GFRESTIMATED 76 69  --    < > 76   <  > 81 78 81 82   GFRESTBLACK 88 83  --    < > >90   GFR Calc     < > >90   GFR Calc   >90 >90 >90   POTASSIUM 3.8 4.0  --    < > 4.3   < > 4.0 4.1 4.1 4.0   TSH 1.49  --   --   --   --   --  0.77  --   --   --     < > = values in this interval not displayed.      BP Readings from Last 3 Encounters:   08/31/19 (!) 165/87   05/21/19 116/77   05/09/19 120/77    Wt Readings from Last 3 Encounters:   08/31/19 116.6 kg (257 lb)   05/21/19 116.6 kg (257 lb)   05/09/19 117.5 kg (259 lb)                    Reviewed and updated as needed this visit by Provider         Review of Systems   ROS COMP: Constitutional, HEENT, cardiovascular, pulmonary, gi and gu systems are negative, except as otherwise noted.      Objective    BP (!) 165/87   Pulse 79   Temp 97.9  F (36.6  C) (Oral)   Wt 116.6 kg (257 lb)   SpO2 98%   Breastfeeding? No   BMI 39.08 kg/m    Body mass index is 39.08 kg/m .  Physical Exam   GENERAL: alert, no distress and obese  EYES: Eyes grossly normal to inspection, PERRL and conjunctivae and sclerae normal  HENT: normal cephalic/atraumatic, ear canals and TM's normal, nose and mouth without ulcers or lesions, oropharynx clear and oral mucous membranes moist  NECK: no adenopathy, no asymmetry, masses, or scars and thyroid normal to palpation  RESP: lungs clear to auscultation - no rales, rhonchi or wheezes  CV: regular rates and rhythm, normal S1 S2, no S3 or S4 and no murmur, click or rub  MS: no gross musculoskeletal defects noted, no edema  SKIN: no suspicious lesions or rashes  NEURO: Normal strength and tone, mentation intact and speech normal      Assessment & Plan     (G43.919) Intractable migraine without status migrainosus, unspecified migraine type  Comment: Suspect symptoms secondary to migraine.  Patient is allergic to sumatriptan.  Fioricet refilled, common side effects discussed.  Suggested to go ER if symptom worsens.  Stressed on smoking cessation.  Other  "medication reviewed and no changes made.  All questions answered.  Plan: butalbital-acetaminophen-caffeine         (FIORICET/ESGIC) -40 MG tablet             Tobacco Cessation:   reports that she has been smoking cigarettes.  She has a 5.00 pack-year smoking history. She has never used smokeless tobacco.  Tobacco Cessation Action Plan: Information offered: Patient not interested at this time            Patient Instructions     Patient Education     Migraine Headache: Stages and Treatment    A migraine headache tends to progress in stages. Learning these stages can help you better understand what is happening. Then you can learn ways to reduce pain and relieve other symptoms. Methods for relieving your symptoms include self-care and medicines.  Migraine stages  Migraines tend to progress through 4 stages. Many people don't have all stages, and stages may differ with each headache:    Prodrome. A few hours to a day or so before the headache, you may feel tired, (yawning many times), uneasy, or connell. You may also feel bloated or crave certain foods.    Aura. Up to an hour before the headache starts, some migraine sufferers experience aura--flashing lights, blind spots, other vision problems, confusion, difficulty speaking, or other neurologic symptoms.    Headache. Moderate to severe pain affects one side of the head and then can spread to both sides, often along with nausea. You may be highly sensitive to light, sound, and odors. Vomiting or diarrhea may also happen. This stage lasts 4 to 72 hours.    Postdrome. After your headache ends, you may feel tired, achy, and \"washed out.\" This may last for a day or so.  Self-care during a migraine  Here is what you can do:    Use a cold compress. Wrap a thin cloth around a cold pack, a cold can of soda, or a bag of frozen vegetables. Apply this to your temple or other pain site.    Drink fluids. If nausea makes it hard to drink, try sucking on ice.    Rest. If " possible, lie down. Try not to bend over, as this may increase your pain. Sometimes laying in a dark quiet room can help the migraine from being aggravated.      Try caffeine. Some people find that drinking fluids with caffeine, such as coffee or tea, helps to lessen migraine pain.  Using medicines  Work with your healthcare provider to find the right medicines for you. Medicines for migraine may relieve pain (analgesics), relieve nausea, or attack the migraine's root causes (migraine-specific medicines).  Rebound headache  Taking analgesics each day, or even several times a week, may lead to more frequent and severe headaches. These are called rebound headaches. If you think you're having rebound headaches, tell your healthcare provider. He or she can help you safely decrease your medicine. Rebound caffeine withdrawal headaches can also happen. Certain medicines are addictive and can cause rebound headaches when discontinued abruptly.  Date Last Reviewed: 5/1/2018 2000-2018 [employer]. [address] [place] [address], [place] [address]. All rights reserved. This information is not intended as a substitute for professional medical care. Always follow your healthcare professional's instructions.               Juan Manuel Ferguson MD  [facility]

## 2019-08-31 NOTE — PATIENT INSTRUCTIONS
"  Patient Education     Migraine Headache: Stages and Treatment    A migraine headache tends to progress in stages. Learning these stages can help you better understand what is happening. Then you can learn ways to reduce pain and relieve other symptoms. Methods for relieving your symptoms include self-care and medicines.  Migraine stages  Migraines tend to progress through 4 stages. Many people don't have all stages, and stages may differ with each headache:    Prodrome. A few hours to a day or so before the headache, you may feel tired, (yawning many times), uneasy, or connell. You may also feel bloated or crave certain foods.    Aura. Up to an hour before the headache starts, some migraine sufferers experience aura--flashing lights, blind spots, other vision problems, confusion, difficulty speaking, or other neurologic symptoms.    Headache. Moderate to severe pain affects one side of the head and then can spread to both sides, often along with nausea. You may be highly sensitive to light, sound, and odors. Vomiting or diarrhea may also happen. This stage lasts 4 to 72 hours.    Postdrome. After your headache ends, you may feel tired, achy, and \"washed out.\" This may last for a day or so.  Self-care during a migraine  Here is what you can do:    Use a cold compress. Wrap a thin cloth around a cold pack, a cold can of soda, or a bag of frozen vegetables. Apply this to your temple or other pain site.    Drink fluids. If nausea makes it hard to drink, try sucking on ice.    Rest. If possible, lie down. Try not to bend over, as this may increase your pain. Sometimes laying in a dark quiet room can help the migraine from being aggravated.      Try caffeine. Some people find that drinking fluids with caffeine, such as coffee or tea, helps to lessen migraine pain.  Using medicines  Work with your healthcare provider to find the right medicines for you. Medicines for migraine may relieve pain (analgesics), relieve nausea, " or attack the migraine's root causes (migraine-specific medicines).  Rebound headache  Taking analgesics each day, or even several times a week, may lead to more frequent and severe headaches. These are called rebound headaches. If you think you're having rebound headaches, tell your healthcare provider. He or she can help you safely decrease your medicine. Rebound caffeine withdrawal headaches can also happen. Certain medicines are addictive and can cause rebound headaches when discontinued abruptly.  Date Last Reviewed: 5/1/2018 2000-2018 [employer]. [address] [place] [address], [place] [address]. All rights reserved. This information is not intended as a substitute for professional medical care. Always follow your healthcare professional's instructions.

## 2019-09-01 ENCOUNTER — TRANSFERRED RECORDS (OUTPATIENT)
Dept: HEALTH INFORMATION MANAGEMENT | Facility: CLINIC | Age: 49
End: 2019-09-01

## 2019-09-03 ENCOUNTER — OFFICE VISIT (OUTPATIENT)
Dept: FAMILY MEDICINE | Facility: CLINIC | Age: 49
End: 2019-09-03
Payer: COMMERCIAL

## 2019-09-03 VITALS
HEART RATE: 83 BPM | TEMPERATURE: 98.1 F | DIASTOLIC BLOOD PRESSURE: 93 MMHG | SYSTOLIC BLOOD PRESSURE: 160 MMHG | OXYGEN SATURATION: 96 %

## 2019-09-03 DIAGNOSIS — G43.919 INTRACTABLE MIGRAINE WITHOUT STATUS MIGRAINOSUS, UNSPECIFIED MIGRAINE TYPE: Primary | ICD-10-CM

## 2019-09-03 PROCEDURE — 99214 OFFICE O/P EST MOD 30 MIN: CPT | Performed by: PHYSICIAN ASSISTANT

## 2019-09-03 RX ORDER — HYDROCODONE BITARTRATE AND ACETAMINOPHEN 5; 325 MG/1; MG/1
1 TABLET ORAL EVERY 6 HOURS PRN
Qty: 18 TABLET | Refills: 0 | Status: SHIPPED | OUTPATIENT
Start: 2019-09-03 | End: 2019-10-23

## 2019-09-03 ASSESSMENT — PAIN SCALES - GENERAL: PAINLEVEL: SEVERE PAIN (7)

## 2019-09-03 NOTE — PROGRESS NOTES
Subjective     Carissa Spears is a 49 year old female who presents to clinic today for the following health issues:    [facility]   ED/ Followup:    Facility:  [facility]  Date of visit: 9/1/19  Reason for visit: migraine  Current Status: was doing better, until this AM - 7AM  Also seen 8/31/2019 at  [facility]     Given Toradol, Reglan, Benadryl, and droperidol.  Did well until this morning.  Headache no back bilateral forehead and top of head, pain 7 out of 10.  Fioricet given the day prior without any relief.  She states Dr. Beach always gives her Norco in the spring and fall as that is when her migraines flare.  She will have migraine headaches that she struggles with for approximately 2 weeks each time.  She states she has been ruled out with testing for allergies.  She is no longer on gabapentin.  She took this for gallbladder/nerve pain.  Positive for photophobia.  Zofran helps with her nausea.    Associated symptoms: no nausea, emesis, photophobia or aura, nausea and photophobia.  No neck pain.  No history of TMJ  Warning signs: None    Alleviating factors: unable to obtain relief with OTC meds.   Previous headache medications: Triptans.  Allergy to Imitrex  Previous head or neck imaging:yes, extensive work-up including negative imaging.  Has seen a neurologist in the past.  Past medical history and medications were reviewed and are up to date.  No  longer taking oxycodone.  That was for gallbladder surgery.  Not currently taking Xanax.  That is to be taken only for flareup of her bipolar disorder.    REVIEW OF SYSTEMS :   GENERAL: No change in weight, sleep or appetite.  Normal energy.  No fever or chills  RESP: No coughing, wheezing or shortness of breath  CV: No chest pains or palpitations  GI: No  vomiting,  heartburn, abdominal pain, diarrhea, constipation or change in bowel habits  : No urinary frequency or dysuria, bladder or kidney problems  Neurologic: No numbness, tingling, weakness, problems with  balance or coordination  Eyes-sensitive to light  ENT-negative    OBJECTIVE:  not currently breastfeeding.  BP (!) 160/93   Pulse 83   Temp 98.1  F (36.7  C) (Oral)   SpO2 96%     General: Sitting in a dark room with sunglasses on.  Alert and oriented  HEENT:  EOMI, PERRL, sclera and conjunctiva normal.   Neck:  supple, no lymphadenopathy  Chest:  Lungs clear to auscultation bilaterally.  Cardiovascular: regular rate and rhythm, no murmurs.  Musculoskeletal: no gross deformities, normal muscle tone  Psychological:  Affect and mentation normal.  Speech fluent.  Judgement and insight intact.  Neurological:    Cranial nerves 2-12 are grossly normal.    Strength 5/5  and symmetric in upper and lower extremities.     Sensation to light touch grossly intact throughout    Reflexes 2+ and symmetrical at biceps, knees.    Gait is normal.  Negative pronator drift.  Smile symmetric.    ASSESSMENT:    ICD-10-CM    1. Intractable migraine without status migrainosus, unspecified migraine type G43.919 HYDROcodone-acetaminophen (NORCO) 5-325 MG tablet       PLAN: Chart reviewed.  Last had 18 tablets of Norco in May.  We will do the same this time.  Do not take within 4 hours of Xanax.  If this does not work with this episode of migraine consider referral back to neurology.  Birgit Camp PA-C

## 2019-09-30 DIAGNOSIS — E55.9 VITAMIN D DEFICIENCY: ICD-10-CM

## 2019-09-30 RX ORDER — CHOLECALCIFEROL (VITAMIN D3) 50 MCG
TABLET ORAL
Qty: 180 TABLET | Refills: 2 | Status: SHIPPED | OUTPATIENT
Start: 2019-09-30 | End: 2020-06-17

## 2019-10-23 ENCOUNTER — OFFICE VISIT (OUTPATIENT)
Dept: FAMILY MEDICINE | Facility: CLINIC | Age: 49
End: 2019-10-23
Payer: COMMERCIAL

## 2019-10-23 VITALS
SYSTOLIC BLOOD PRESSURE: 160 MMHG | WEIGHT: 255 LBS | RESPIRATION RATE: 16 BRPM | TEMPERATURE: 98.2 F | HEART RATE: 80 BPM | OXYGEN SATURATION: 96 % | DIASTOLIC BLOOD PRESSURE: 93 MMHG | BODY MASS INDEX: 38.77 KG/M2

## 2019-10-23 DIAGNOSIS — G43.919 INTRACTABLE MIGRAINE WITHOUT STATUS MIGRAINOSUS, UNSPECIFIED MIGRAINE TYPE: ICD-10-CM

## 2019-10-23 DIAGNOSIS — I10 HYPERTENSION GOAL BP (BLOOD PRESSURE) < 140/90: Primary | ICD-10-CM

## 2019-10-23 PROCEDURE — 99213 OFFICE O/P EST LOW 20 MIN: CPT | Performed by: NURSE PRACTITIONER

## 2019-10-23 RX ORDER — HYDROCODONE BITARTRATE AND ACETAMINOPHEN 5; 325 MG/1; MG/1
1 TABLET ORAL EVERY 6 HOURS PRN
Qty: 18 TABLET | Refills: 0 | Status: SHIPPED | OUTPATIENT
Start: 2019-10-23 | End: 2020-07-09

## 2019-10-23 ASSESSMENT — PAIN SCALES - GENERAL: PAINLEVEL: SEVERE PAIN (7)

## 2019-10-23 NOTE — PROGRESS NOTES
Subjective     Carissa Spears is a 49 year old female who presents to clinic today for the following health issues:    HPI   Migraine     Since your last clinic visit, how have your headaches changed?  No change    How often are you getting headaches or migraines? Every 2 weeks     Are you able to do normal daily activities when you have a migraine? No    Are you taking rescue/relief medications? (Select all that apply) Other: Fioricet    How helpful is your rescue/relief medication?  I get only a small amount of relief    Are you taking any medications to prevent migraines? (Select all that apply)  No    In the past 4 weeks, how often have you gone to urgent care or the emergency room because of your headaches?  0        How many servings of fruits and vegetables do you eat daily?  2-3    On average, how many sweetened beverages do you drink each day (soda, juice, sweet tea, etc)?   0    How many days per week do you miss taking your medication? 0      49 year old female presents with the above concerns. She has had migraines since age 12, better now that she doesn't get menses. Worse in spring and fall. She has had 6 migraines since Aug. She has been to emergency department,  and primary care and received different types of treatments. States toradol doesn't work. Katelint was working but has had this headache for 3 days and not helping today. Took last acetaminophen/hydrocodone (Norco) and here to get refill. Headaches usually last 5-6 days. This is like her other migraines. No thunderclap. This is not the worst headache of her life. No nausea or vomiting. States when it gets bad she does vomit. Doesn't feel like she needs emergency department yet but here to get acetaminophen/hydrocodone (Norco) so doesn't progress. 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    Takes xanax couple times per year for manic episodes of bipolar.   primary care provider is Dr. Beach but he's out on leave.   Previously  referred to neurology but she hasn't gone yet. She has seen neurology years ago.    Patient Active Problem List   Diagnosis     HYPERLIPIDEMIA LDL GOAL <100     Hypertension goal BP (blood pressure) < 140/90     Bipolar disorder (H)     Polycystic ovarian syndrome     BMI 45.0-49.9, adult (H)     Tobacco abuse     Mild major depression (H)     Insomnia     Chronic abdominal pain     Hyperglycemia     Myofascial pain     Migraine with aura and without status migrainosus, not intractable     Non-allergic rhinitis     Bipolar affective disorder, remission status unspecified (H)     Diabetes mellitus, type 2 (H)     Type 2 diabetes mellitus without complication, without long-term current use of insulin (H)     Past Surgical History:   Procedure Laterality Date     AS KNEE SCOPE, ALLOGRAFT IMPANT Left      C/SECTION, CLASSICAL      X2     HYSTERECTOMY, PAP NO LONGER INDICATED       HYSTERECTOMY, KAMILLA  4/8/08     LAPAROSCOPIC CHOLECYSTECTOMY  7/18/2012    Procedure: LAPAROSCOPIC CHOLECYSTECTOMY;  Laparoscopic cholecystectomy;  Surgeon: Miguel Fuentes MD;  Location: [facility] OR     SALPINGO OOPHORECTOMY,R/L/YANELIS  4/8/08    L     TONSILLECTOMY  1983     TUBAL LIGATION         Social History     Tobacco Use     Smoking status: Current Every Day Smoker     Packs/day: 0.25     Years: 20.00     Pack years: 5.00     Types: Cigarettes     Smokeless tobacco: Never Used   Substance Use Topics     Alcohol use: No     Family History   Problem Relation Age of Onset     Cancer Mother         skin cancer - non-melanoma     Hypertension Mother      Lipids Father          Current Outpatient Medications   Medication Sig Dispense Refill     albuterol (PROAIR HFA/PROVENTIL HFA/VENTOLIN HFA) 108 (90 Base) MCG/ACT inhaler Inhale 2 puffs into the lungs every 4 hours as needed for shortness of breath / dyspnea, wheezing or other (cough) 1 Inhaler 0     ALPRAZolam (XANAX) 0.5 MG tablet Take 0.5 mg by mouth       aspirin 81 MG tablet Take 1  tablet (81 mg) by mouth daily 1 tablet 0     blood glucose monitoring (CONTOUR NEXT MONITOR W/DEVICE KIT) meter device kit USE TO TEST BLOOD SUGAR ONCE DAILY OR AS DIRECTED 1 kit 0     BusPIRone HCl (BUSPAR PO) Take 15 mg by mouth 2 times daily       butalbital-acetaminophen-caffeine (FIORICET/ESGIC) -40 MG tablet Take 1 tablet by mouth every 6 hours as needed for headaches Do not take with vicodin or xanax 12 tablet 0     citalopram (CELEXA) 40 MG tablet        CONTOUR NEXT TEST test strip USE TO TEST BLOOD SUGAR ONCE DAILY OR AS DIRECTED 100 strip 3     fluticasone (FLONASE) 50 MCG/ACT nasal spray Spray 2 sprays into both nostrils daily 48 mL 0     HYDROcodone-acetaminophen (NORCO) 5-325 MG tablet Take 1 tablet by mouth every 6 hours as needed for pain or moderate to severe pain Do not take within 4 hours of xanax 18 tablet 0     IBUPROFEN PO Take 200-400 mg by mouth every 4 hours as needed for moderate pain       LATUDA 80 MG TABS tablet        losartan-hydrochlorothiazide (HYZAAR) 100-12.5 MG tablet 1/2 pill twice daily for blood pressure (replaces cozaar with added hydrochlorothiazide) 90 tablet 1     metFORMIN (GLUCOPHAGE-XR) 500 MG 24 hr tablet Take 2 tablets (1,000 mg) by mouth daily (with dinner) For diabetes Pharmacy ok to hold prescription until due 180 tablet 1     MICROLET LANCETS MISC USE TO TEST BLOOD SUGAR ONCE DAILY OR AS DIRECTED 100 each 3     naloxone (NARCAN) 4 MG/0.1ML nasal spray Spray 1 spray (4 mg) into one nostril alternating nostrils once as needed for opioid reversal every 2-3 minutes until assistance arrives 0.2 mL 0     ondansetron (ZOFRAN-ODT) 4 MG ODT tab Take 1-2 tablets (4-8 mg) by mouth every 8 hours as needed for nausea 30 tablet 0     TRAZODONE HCL PO Take 100 mg by mouth At Bedtime Patient reports: Takes 100-200 MG at bedtime       vitamin D3 (CHOLECALCIFEROL) 2000 units (50 mcg) tablet TAKE 1 TABLET BY MOUTH TWICE DAILY 180 tablet 2      isometheptene-dichloralphenazone-acetaminophen (MIDRIN) -325 MG per capsule Take 1 capsule by mouth every 4 hours as needed for headaches (Patient not taking: Reported on 8/31/2019) 40 capsule 1     metoprolol tartrate (LOPRESSOR) 100 MG tablet TAKE ONE AND ONE-HALF TABLETS BY MOUTH TWICE DAILY FOR BLOOD PRESSURE (Patient not taking: Reported on 8/31/2019) 270 tablet 1     simvastatin (ZOCOR) 40 MG tablet TAKE 1 TABLET BY MOUTH EVERY NIGHT AT BEDTIME FOR CHOLESTEROL (Patient not taking: Reported on 8/31/2019) 90 tablet 1     Allergies   Allergen Reactions     Sumatriptan Anaphylaxis and Other (See Comments)     Blood pressure high/ she was in hospital  Chest pressure     Calcium Channel Blockers      Augmentin Diarrhea     Codeine      Sick to stomach     Food Nausea and Vomiting     Kidney beans- mold.     Latex      Puffy, rash      Sulfa Drugs Nausea     Lisinopril Nausea     BP Readings from Last 3 Encounters:   10/23/19 (!) 160/93   09/03/19 (!) 160/93   08/31/19 (!) 165/87    Wt Readings from Last 3 Encounters:   10/23/19 115.7 kg (255 lb)   08/31/19 116.6 kg (257 lb)   05/21/19 116.6 kg (257 lb)            Reviewed and updated as needed this visit by Provider  Tobacco  Allergies  Meds  Problems  Med Hx  Surg Hx  Fam Hx         Review of Systems   ROS COMP: Constitutional, HEENT, cardiovascular, pulmonary, gi and gu systems are negative, except as otherwise noted.      Objective    BP (!) 160/93   Pulse 80   Temp 98.2  F (36.8  C) (Oral)   Resp 16   Wt 115.7 kg (255 lb)   LMP  (LMP Unknown)   SpO2 96%   Breastfeeding? No   BMI 38.77 kg/m    Body mass index is 38.77 kg/m .  Physical Exam   GENERAL: healthy, alert and no distress  EYES: Eyes grossly normal to inspection, PERRL and conjunctivae and sclerae normal  HENT: ear canals and TM's normal, nose and mouth without ulcers or lesions  NECK: no adenopathy, no asymmetry, masses, or scars and thyroid normal to palpation  RESP: lungs clear to  auscultation - no rales, rhonchi or wheezes  CV: regular rate and rhythm, normal S1 S2, no S3 or S4, no murmur, click or rub, no peripheral edema and peripheral pulses strong  MS: no gross musculoskeletal defects noted, no edema  NEURO: Normal strength and tone, mentation intact and speech normal. CN II-VII intact. BUE/BLE strength 5/5, normal. DTRs 2+ throughout. Rapid alternating hand movements normal. Normal romberg and heel to toe walking. Normal ringer to nose.  PSYCH: mentation appears normal, affect normal/bright    Diagnostic Test Results:  none         Assessment & Plan     1. Intractable migraine without status migrainosus, unspecified migraine type  Discussed not appropriate for long term use for migraines. Will refill x1 to prevent emergency department visit for migraine as this seems to be the only thing that works for her. Gave her scheduling number for neurology as she was referred by primary care provider in May of this year. Neuro intact. No red flags.  - HYDROcodone-acetaminophen (NORCO) 5-325 MG tablet; Take 1 tablet by mouth every 6 hours as needed for pain or moderate to severe pain Do not take within 4 hours of xanax  Dispense: 18 tablet; Refill: 0    2. Hypertension goal BP (blood pressure) < 140/90  Has been elevated at last few visits. States it's due to migraine and has been taking BP medications. She will follow up with primary care provider. Discussed emergency department precautions.        See Patient Instructions    Return in about 2 days (around 10/25/2019), or if symptoms worsen or fail to improve.     The benefits, risks and potential side effects were discussed in detail. Black box warnings discussed as relevant. All patient questions were answered. The patient was instructed to follow up immediately if any adverse reactions develop.    Return precautions discussed, including when to seek urgent/emergent care.    Patient verbalizes understanding and agrees with plan of care.  Patient stable for discharge.      BEKAH [facility]

## 2019-10-23 NOTE — PATIENT INSTRUCTIONS
Refill for acetaminophen/hydrocodone (Norco) - no driving, operating machinery or drinking alcohol while taking. Don't take with your xanax or fiorcet  If worsening, go to emergency department  Follow up with neurology     Your provider has referred you for the following:   Consult at [facility]: [facility] [place] [phone]   [email or web address]  [facility]: [facility] [place] [phone]   [email or web address]    Please be aware that coverage of these services is subject to the terms and limitations of your health insurance plan.  Call member services at your health plan with any benefit or coverage questions.      Please bring the following with you to your appointment:    (1) Any X-Rays, CTs or MRIs which have been performed.  Contact the facility where they were done to arrange for  prior to your scheduled appointment.    (2) List of current medications  (3) This referral request   (4) Any documents/labs given to you for this referral    At [facility], we strive to deliver an exceptional experience to you, every time we see you.  If you receive a survey in the mail, please send us back your thoughts. We really do value your feedback.    Based on your medical history, these are the current health maintenance/preventive care services that you are due for (some may have been done at this visit.)  Health Maintenance Due   Topic Date Due     PREVENTIVE CARE VISIT  1969     FIT  04/13/1979     HIV SCREENING  04/13/1984     URINE DRUG SCREEN  11/08/2013     EYE EXAM  06/01/2019     NORMA ASSESSMENT  06/07/2019     MAMMO SCREENING  06/07/2019     INFLUENZA VACCINE (1) 09/01/2019     ZOSTER IMMUNIZATION (1 of 2) 04/13/2019         Suggested websites for health information:  Www.[employer].org : Up to date and easily searchable information on multiple topics.  Www.medlineplus.gov : medication info, interactive  tutorials, watch real surgeries online  Www.familydoctor.org : good info from the Academy of Family Physicians  Www.cdc.gov : public health info, travel advisories, epidemics (H1N1)  Www.aap.org : children's health info, normal development, vaccinations  Www.health.state.[place].us : [place] dept of health, public health issues in [place], N1N1    Your care team:                            Family Medicine Internal Medicine   MD Tyrone Valentine MD Shantel Branch-Fleming, MD Katya Georgiev PA-C Nam Ho, MD Pediatrics   DAVID Warner, JEREMIAS Sargent APRN MD Cindy Rodriguez MD Deborah Mielke, MD Kim Thein, APRN CNP      Clinic hours: Monday - Thursday 7 am-7 pm; Fridays 7 am-5 pm.   Urgent care: Monday - Friday 11 am-9 pm; Saturday and Sunday 9 am-5 pm.  Pharmacy : Monday -Thursday 8 am-8 pm; Friday 8 am-6 pm; Saturday and Sunday 9 am-5 pm.     Clinic: [phone]   Pharmacy: [phone]    Patient Education     Migraine Headache  This often severe type of headache is different from other types of headaches in that symptoms other than pain occur with the headache. Nausea and vomiting, lightheadedness, sensitivity to light (photophobia), and other visual disturbances are common migraine symptoms. The pain may last from a few hours to several days. It is not clear why migraines occur but certain factors called  triggers  can raise the risk of having a migraine attack. A migraine may be triggered by emotional stress or depression, or by hormone changes during the menstrual cycle. Other triggers include birth control pills, overuse of migraine medicines, alcohol or caffeine, foods with tyramine (such as aged cheese and wine), eyestrain, weather changes, missed meals, or too little or too much sleep.  Home care  Follow these tips when taking care of yourself at home:    Don t drive yourself home if you were given pain medicine for your headache or are having  visual symptoms. Instead, have someone else drive you home. Try to sleep when you get home. You should feel much better when you wake up.    Cold can help ease migraine symptoms. Put an ice pack on your forehead or at the base of your skull. Put heat on the back of your neck to help ease any neck spasm.    Drink only clear liquids or eat a light diet until your symptoms get better. This will help you avoid nausea and vomiting.  How to prevent migraines  Pay attention to what seems to trigger your headache. Try to avoid the triggers when you can. If you have frequent headaches, consider keeping a headache diary. In it, write down what you were doing, feeling, or eating in the hours before each headache. Show this to your healthcare provider to help find the cause of your headaches.  If stress seems to be a trigger for your headaches, figure out what is causing stress in your life. Learn new ways to handle your stress. Ideas include regular exercise, biofeedback, self-hypnosis, yoga, and meditation. Talk with your healthcare provider to find out more information about managing stress. Many books and digital media are also available on this subject.  Tyramine is a substance found in many foods. It can trigger a migraine in some people. These foods contain tyramine:    Chocolate    Yogurt    All cheeses, but especially aged cheeses    Smoked or pickled fish and meat, including herring, caviar, bologna, pepperoni, and salami    Liver    Avocados    Bananas    Figs    Raisins    Red wine  Try staying away from these foods for 1 to 2 months to see if you have fewer headaches.  How to treat future headaches    Take time out at the first sign of a headache, if possible. Find a quiet, dark, comfortable place to sit or lie down. Let yourself relax or sleep.    Put an ice pack on your forehead or on the area of greatest pain. A heating pad and massage may help if you are having a muscle spasm and tightness in your neck.    If  you have been prescribed a medicine to stop a migraine headache, use this at the first warning sign of the headache for best results. First signs may be an aura or pain.    If you need to take medicine often for your migraine, talk with your healthcare provider about other ways to prevent your headaches.  Follow-up care  Follow up with your healthcare provider, or as advised. Talk with your provider if you have frequent headaches. He or she can figure out a treatment plan. Ask if you can have medicine to take at home the next time you get a bad headache. This may keep you from having to visit the emergency department in the future. You may need to see a headache specialist (neurologist) if you continue to have headaches.  When to seek medical advice  Call your healthcare provider right away if any of these occur:    Your head pain gets worse, or doesn t get better within 24 hours    You can t keep liquids down (repeated vomiting)    Pain in your sinuses, ears, or throat    Fever of 100.4  F (38  C) or higher, or as directed by your healthcare provider    Stiff neck    Extreme drowsiness, confusion, or fainting    Dizziness, or dizziness with spinning sensation (vertigo)    Weakness in an arm or leg, or on one side of your face    Difficulty talking or seeing  Date Last Reviewed: 8/1/2016 2000-2018 [employer]. [address] [place] [address], [place] [address]. All rights reserved. This information is not intended as a substitute for professional medical care. Always follow your healthcare professional's instructions.

## 2019-11-07 ENCOUNTER — ALLIED HEALTH/NURSE VISIT (OUTPATIENT)
Dept: NURSING | Facility: CLINIC | Age: 49
End: 2019-11-07
Payer: COMMERCIAL

## 2019-11-07 DIAGNOSIS — Z23 NEED FOR PROPHYLACTIC VACCINATION AND INOCULATION AGAINST INFLUENZA: Primary | ICD-10-CM

## 2019-11-07 PROCEDURE — 90682 RIV4 VACC RECOMBINANT DNA IM: CPT

## 2019-11-07 PROCEDURE — 90471 IMMUNIZATION ADMIN: CPT

## 2019-11-20 DIAGNOSIS — I10 HYPERTENSION GOAL BP (BLOOD PRESSURE) < 140/90: ICD-10-CM

## 2019-11-20 RX ORDER — LOSARTAN POTASSIUM AND HYDROCHLOROTHIAZIDE 12.5; 1 MG/1; MG/1
TABLET ORAL
Qty: 30 TABLET | Refills: 0 | Status: SHIPPED | OUTPATIENT
Start: 2019-11-20 | End: 2019-12-18

## 2019-11-20 NOTE — LETTER
November 21, 2019    Carissa Spears  [address]  [place] [address]    Dear Carissa,       We recently received a refill request for losartan-hydrochlorothiazide (HYZAAR) 100-12.5 MG tablet.  We have refilled this for a one time 30 day supply only because you are due for a:    Hypertension and diabetes office visit and fasting lab appointment      Please schedule this lab appointment 4-5 days prior to the office visit.     Please call at your earliest convenience so that there will not be a delay with your future refills.          Thank you,   Your [facility] Team/  [phone]

## 2019-11-21 RX ORDER — LOSARTAN POTASSIUM AND HYDROCHLOROTHIAZIDE 12.5; 1 MG/1; MG/1
TABLET ORAL
Qty: 90 TABLET | Refills: 0 | OUTPATIENT
Start: 2019-11-21

## 2019-11-21 NOTE — TELEPHONE ENCOUNTER
Medication is being filled for 1 time refill only due to:  Patient needs to be seen for fasting lab appointment and appointment with the provider for further refills. Hypertension and diabetes.  Jesenia PEREYRAN, RN

## 2019-12-14 DIAGNOSIS — E11.9 TYPE 2 DIABETES MELLITUS WITHOUT COMPLICATION, WITHOUT LONG-TERM CURRENT USE OF INSULIN (H): ICD-10-CM

## 2019-12-16 DIAGNOSIS — E11.9 TYPE 2 DIABETES MELLITUS WITHOUT COMPLICATION, WITHOUT LONG-TERM CURRENT USE OF INSULIN (H): ICD-10-CM

## 2019-12-16 RX ORDER — METFORMIN HCL 500 MG
TABLET, EXTENDED RELEASE 24 HR ORAL
Qty: 60 TABLET | Refills: 0 | OUTPATIENT
Start: 2019-12-16

## 2019-12-16 RX ORDER — METFORMIN HCL 500 MG
TABLET, EXTENDED RELEASE 24 HR ORAL
Qty: 60 TABLET | Refills: 0 | Status: SHIPPED | OUTPATIENT
Start: 2019-12-16 | End: 2020-01-28

## 2019-12-16 NOTE — TELEPHONE ENCOUNTER
BP Readings from Last 6 Encounters:   10/23/19 (!) 160/93   09/03/19 (!) 160/93   08/31/19 (!) 165/87   05/21/19 116/77   05/09/19 120/77   05/06/19 148/84       Next 5 appointments (look out 90 days)    Jan 06, 2020 11:15 AM CST  [facility] [address] [facility] [address] [phone]        Prescription approved per [facility] Refill Protocol #30  Need to keep upcoming appointment for further refills  Radha Skelton RN

## 2019-12-18 DIAGNOSIS — I10 HYPERTENSION GOAL BP (BLOOD PRESSURE) < 140/90: ICD-10-CM

## 2019-12-18 RX ORDER — LOSARTAN POTASSIUM AND HYDROCHLOROTHIAZIDE 12.5; 1 MG/1; MG/1
TABLET ORAL
Qty: 15 TABLET | Refills: 0 | Status: SHIPPED | OUTPATIENT
Start: 2019-12-18 | End: 2020-01-28

## 2019-12-18 NOTE — TELEPHONE ENCOUNTER
Patient has upcoming/ pending appointment:    Next 5 appointments (look out 90 days)    Jan 06, 2020 11:15 AM CST  Office Visit with Silvino [facility] MD  [facility] [address] [facility] [address] [phone]          Rx refilled per [facility] refill protocol.    Jackie Mckeon BSN, RN

## 2019-12-19 ENCOUNTER — TELEPHONE (OUTPATIENT)
Dept: FAMILY MEDICINE | Facility: CLINIC | Age: 49
End: 2019-12-19

## 2019-12-19 NOTE — TELEPHONE ENCOUNTER
Left message on answering machine for patient to call back to [phone].  Pt has cancelled upcoming lab and ov.  We have been sending refills to her pharmacy recently.  Advised it may be best for her to contact the pharmacy for the specific refills she needs and they will send us a request if it is needed, the other option would be to call me back with the names of the medications she needs refills of.  Advised we never just refill all medications on a med list, we always require the names of the medications.  Jesenia PEREYRAN, RN

## 2019-12-19 NOTE — TELEPHONE ENCOUNTER
Patient states she need refills on all of her medication.  She would not give me  names for the medication. Her Son is in the hospital and he will be in there for weeks.  Thank You.

## 2020-01-16 ENCOUNTER — DOCUMENTATION ONLY (OUTPATIENT)
Dept: FAMILY MEDICINE | Facility: CLINIC | Age: 50
End: 2020-01-16

## 2020-01-16 DIAGNOSIS — E11.9 TYPE 2 DIABETES MELLITUS WITHOUT COMPLICATION, WITHOUT LONG-TERM CURRENT USE OF INSULIN (H): ICD-10-CM

## 2020-01-16 DIAGNOSIS — I10 HYPERTENSION GOAL BP (BLOOD PRESSURE) < 140/90: Primary | ICD-10-CM

## 2020-01-16 NOTE — PROGRESS NOTES
This patient has a lab only appointment on 1/21/2020 but does not have future orders. Please review, associate diagnosis and sign pending lab orders for the upcoming appointment.The System assigned HIV is due but not ordered. Health maintenance is also indicating this patient is also due for a drug screen. If you would like a drug screen performed please place an order in EPIC.      She has an appointment with Dr. Beach on 1/28/2020.    Thank you,    Moe Patel MLT ([facility])  [facility]

## 2020-01-17 NOTE — PROGRESS NOTES
Please review lab orders sign and close encounter. Angela Mullen MA/DEBBY    Med check BP appt 1/28/20

## 2020-01-21 DIAGNOSIS — I10 HYPERTENSION GOAL BP (BLOOD PRESSURE) < 140/90: ICD-10-CM

## 2020-01-21 DIAGNOSIS — E11.9 TYPE 2 DIABETES MELLITUS WITHOUT COMPLICATION, WITHOUT LONG-TERM CURRENT USE OF INSULIN (H): ICD-10-CM

## 2020-01-21 LAB
ALBUMIN SERPL-MCNC: 4 G/DL (ref 3.4–5)
ANION GAP SERPL CALCULATED.3IONS-SCNC: 4 MMOL/L (ref 3–14)
BUN SERPL-MCNC: 15 MG/DL (ref 7–30)
CALCIUM SERPL-MCNC: 9.3 MG/DL (ref 8.5–10.1)
CHLORIDE SERPL-SCNC: 106 MMOL/L (ref 94–109)
CO2 SERPL-SCNC: 27 MMOL/L (ref 20–32)
CREAT SERPL-MCNC: 0.76 MG/DL (ref 0.52–1.04)
GFR SERPL CREATININE-BSD FRML MDRD: >90 ML/MIN/{1.73_M2}
GLUCOSE SERPL-MCNC: 98 MG/DL (ref 70–99)
HBA1C MFR BLD: 5.4 % (ref 0–5.6)
PHOSPHATE SERPL-MCNC: 3.2 MG/DL (ref 2.5–4.5)
POTASSIUM SERPL-SCNC: 3.9 MMOL/L (ref 3.4–5.3)
SODIUM SERPL-SCNC: 137 MMOL/L (ref 133–144)

## 2020-01-21 PROCEDURE — 80069 RENAL FUNCTION PANEL: CPT | Performed by: FAMILY MEDICINE

## 2020-01-21 PROCEDURE — 36415 COLL VENOUS BLD VENIPUNCTURE: CPT | Performed by: FAMILY MEDICINE

## 2020-01-21 PROCEDURE — 83036 HEMOGLOBIN GLYCOSYLATED A1C: CPT | Performed by: FAMILY MEDICINE

## 2020-01-27 DIAGNOSIS — I10 HYPERTENSION GOAL BP (BLOOD PRESSURE) < 140/90: ICD-10-CM

## 2020-01-27 DIAGNOSIS — E11.9 TYPE 2 DIABETES MELLITUS WITHOUT COMPLICATION, WITHOUT LONG-TERM CURRENT USE OF INSULIN (H): ICD-10-CM

## 2020-01-28 ENCOUNTER — OFFICE VISIT (OUTPATIENT)
Dept: FAMILY MEDICINE | Facility: CLINIC | Age: 50
End: 2020-01-28
Payer: COMMERCIAL

## 2020-01-28 ENCOUNTER — TELEPHONE (OUTPATIENT)
Dept: FAMILY MEDICINE | Facility: CLINIC | Age: 50
End: 2020-01-28

## 2020-01-28 VITALS
HEART RATE: 102 BPM | DIASTOLIC BLOOD PRESSURE: 78 MMHG | TEMPERATURE: 98.1 F | BODY MASS INDEX: 38.65 KG/M2 | WEIGHT: 255 LBS | RESPIRATION RATE: 16 BRPM | SYSTOLIC BLOOD PRESSURE: 139 MMHG | HEIGHT: 68 IN

## 2020-01-28 DIAGNOSIS — Z12.31 ENCOUNTER FOR SCREENING MAMMOGRAM FOR BREAST CANCER: ICD-10-CM

## 2020-01-28 DIAGNOSIS — I10 HYPERTENSION GOAL BP (BLOOD PRESSURE) < 140/90: Primary | ICD-10-CM

## 2020-01-28 DIAGNOSIS — I10 HYPERTENSION GOAL BP (BLOOD PRESSURE) < 140/90: ICD-10-CM

## 2020-01-28 DIAGNOSIS — E11.9 TYPE 2 DIABETES MELLITUS WITHOUT COMPLICATION, WITHOUT LONG-TERM CURRENT USE OF INSULIN (H): Primary | ICD-10-CM

## 2020-01-28 DIAGNOSIS — E78.5 HYPERLIPIDEMIA LDL GOAL <100: ICD-10-CM

## 2020-01-28 DIAGNOSIS — G43.919 INTRACTABLE MIGRAINE WITHOUT STATUS MIGRAINOSUS, UNSPECIFIED MIGRAINE TYPE: ICD-10-CM

## 2020-01-28 PROCEDURE — 99214 OFFICE O/P EST MOD 30 MIN: CPT | Performed by: FAMILY MEDICINE

## 2020-01-28 RX ORDER — BUTALBITAL, ACETAMINOPHEN AND CAFFEINE 50; 325; 40 MG/1; MG/1; MG/1
1 TABLET ORAL EVERY 6 HOURS PRN
Qty: 12 TABLET | Refills: 0 | Status: SHIPPED | OUTPATIENT
Start: 2020-01-28 | End: 2020-09-08

## 2020-01-28 RX ORDER — LOSARTAN POTASSIUM AND HYDROCHLOROTHIAZIDE 12.5; 1 MG/1; MG/1
TABLET ORAL
Qty: 90 TABLET | Refills: 0 | Status: SHIPPED | OUTPATIENT
Start: 2020-01-28 | End: 2020-01-29

## 2020-01-28 RX ORDER — LURASIDONE HYDROCHLORIDE 120 MG/1
TABLET, FILM COATED ORAL DAILY
COMMUNITY

## 2020-01-28 RX ORDER — SIMVASTATIN 20 MG
20 TABLET ORAL AT BEDTIME
Qty: 90 TABLET | Refills: 1 | Status: SHIPPED | OUTPATIENT
Start: 2020-01-28 | End: 2020-07-17

## 2020-01-28 RX ORDER — METOPROLOL TARTRATE 50 MG
50 TABLET ORAL 2 TIMES DAILY
Qty: 180 TABLET | Refills: 1 | Status: SHIPPED | OUTPATIENT
Start: 2020-01-28 | End: 2020-06-17

## 2020-01-28 RX ORDER — METFORMIN HCL 500 MG
TABLET, EXTENDED RELEASE 24 HR ORAL
Qty: 180 TABLET | Refills: 0 | Status: SHIPPED | OUTPATIENT
Start: 2020-01-28 | End: 2020-04-20

## 2020-01-28 ASSESSMENT — MIFFLIN-ST. JEOR: SCORE: 1825.17

## 2020-01-28 NOTE — PROGRESS NOTES
"SUBJECTIVE:  Carissa Spears, a 49 year old female scheduled an appointment to discuss the following issues:  Follow-up dm, htn, high cholesterol, bipolar and lumbar radiculopathy. exercise weight loss. Off zocor but no major side effects. Eye exam in June - ok.  No feet changes.   No chest pain or shortness of breath. Good exercise tolerance. Emotionally doing ok.   Taking vitaminD. No breast lumps. No family history early breast cancer.   Willing to do colo-guard. No family history of colon cancer. No urine or hematuria.   Medical, social, surgical, and family histories reviewed.    ROS:  All other ROS negative.     OBJECTIVE:  /78   Pulse 102   Temp 98.1  F (36.7  C) (Oral)   Resp 16   Ht 1.727 m (5' 8\")   Wt 115.7 kg (255 lb)   LMP  (LMP Unknown)   BMI 38.77 kg/m    EXAM:  GENERAL APPEARANCE: healthy, alert and no distress  EYES: EOMI,  PERRL  HENT: ear canals and TM's normal and nose and mouth without ulcers or lesions  NECK: no adenopathy, no asymmetry, masses, or scars and thyroid normal to palpation  RESP: lungs clear to auscultation - no rales, rhonchi or wheezes  CV: regular rates and rhythm, normal S1 S2, no S3 or S4 and no murmur, click or rub -  ABDOMEN:  soft, nontender, no HSM or masses and bowel sounds normal  MS: extremities normal- no gross deformities noted, no evidence of inflammation in joints, FROM in all extremities.  PSYCH: mentation appears normal and affect normal/bright    ASSESSMENT / PLAN:  (Z12.31) Encounter for screening mammogram for breast cancer  (primary encounter diagnosis)  Plan: MA Screening Digital Bilateral        Set-up    (G43.919) Intractable migraine without status migrainosus, unspecified migraine type  Comment: worse after stopping metoprolol  Plan: butalbital-acetaminophen-caffeine         (FIORICET/ESGIC) -40 MG tablet,         metoprolol tartrate (LOPRESSOR) 50 MG tablet        Reveiwed risks and side effects of medication  Restart metoprolol and " monitor and continue exercise. Chest pain or shortness of breath to er. Recheck in 6 months  Sooner if worse    (I10) Hypertension goal BP (blood pressure) < 140/90    Plan: metoprolol tartrate (LOPRESSOR) 50 MG tablet        As above with metoprolol.    (E11.9) Type 2 diabetes mellitus without complication, without long-term current use of insulin (H)  Comment: stable  Plan: continue metformin/diet and exercise. Recheck in 6 months      (E78.5) Hyperlipidemia LDL goal <100  Comment: off med  Plan: simvastatin (ZOCOR) 20 MG tablet        Restart at 1/2 dosage. Recheck in 6 months      Silvino Beach MD

## 2020-01-28 NOTE — TELEPHONE ENCOUNTER
"Patient has upcoming/ pending appointment:    Next 5 appointments (look out 90 days)    Jan 28, 2020  4:30 PM CST  [facility] Visit with Silvino Beach MD  [facility] [address] [facility] [address] [phone]          Rx refilled per [facility] refill protocol.    Jackie PEREYRAN, RN      Requested Prescriptions   Signed Prescriptions Disp Refills    losartan-hydrochlorothiazide (HYZAAR) 100-12.5 MG tablet 90 tablet 0     Sig: TAKE 1/2 TABLET BY MOUTH TWICE DAILY FOR BLOOD PRESSURE       Angiotensin-II Receptors Failed - 1/27/2020  5:45 PM        Failed - Last blood pressure under 140/90 in past 12 months     BP Readings from Last 3 Encounters:   10/23/19 (!) 160/93   09/03/19 (!) 160/93   08/31/19 (!) 165/87                 Passed - Recent (12 mo) or future (30 days) visit within the authorizing provider's specialty     Patient has had an office visit with the authorizing provider or a provider within the authorizing providers department within the previous 12 mos or has a future within next 30 days. See \"Patient Info\" tab in inbasket, or \"Choose Columns\" in Meds & Orders section of the refill encounter.              Passed - Medication is active on med list        Passed - Patient is age 18 or older        Passed - No active pregnancy on record        Passed - Normal serum creatinine on file in past 12 months     Recent Labs   Lab Test 01/21/20  0738   CR 0.76             Passed - Normal serum potassium on file in past 12 months     Recent Labs   Lab Test 01/21/20  0738   POTASSIUM 3.9                    Passed - No positive pregnancy test in past 12 months       metFORMIN (GLUCOPHAGE-XR) 500 MG 24 hr tablet 180 tablet 0     Sig: TAKE 2 TABLETS BY MOUTH ONCE DAILY WITH DINNER FOR DIABETES       Biguanide Agents Failed - 1/27/2020  5:45 PM        Failed - Blood pressure less than 140/90 in past 6 months     BP Readings from Last 3 Encounters:   10/23/19 " "(!) 160/93   09/03/19 (!) 160/93   08/31/19 (!) 165/87                 Passed - Patient is age 10 or older        Passed - Recent (12 mo) or future (30 days) visit within the authorizing provider's specialty      Patient has had an office visit with the authorizing provider or a provider within the authorizing providers department within the previous 12 mos or has a future within next 30 days. See \"Patient Info\" tab in inbasket, or \"Choose Columns\" in Meds & Orders section of the refill encounter.              Passed - Patient does NOT have a diagnosis of CHF.        Passed - Medication is active on med list        Passed - Patient is not pregnant        Passed - Patient has not had a positive pregnancy test within the past 12 mos.           "

## 2020-01-28 NOTE — NURSING NOTE
"Chief Complaint   Patient presents with     Diabetes     eye     Hypertension       Initial BP (!) 160/90   Pulse 102   Temp 98.1  F (36.7  C) (Oral)   Resp 16   Ht 1.727 m (5' 8\")   Wt 115.7 kg (255 lb)   LMP  (LMP Unknown)   BMI 38.77 kg/m   Estimated body mass index is 38.77 kg/m  as calculated from the following:    Height as of this encounter: 1.727 m (5' 8\").    Weight as of this encounter: 115.7 kg (255 lb).    Jesenia Lester, AMY    "

## 2020-01-29 ENCOUNTER — TELEPHONE (OUTPATIENT)
Dept: FAMILY MEDICINE | Facility: CLINIC | Age: 50
End: 2020-01-29

## 2020-01-29 DIAGNOSIS — I10 HYPERTENSION GOAL BP (BLOOD PRESSURE) < 140/90: ICD-10-CM

## 2020-01-29 RX ORDER — LOSARTAN POTASSIUM 100 MG/1
100 TABLET ORAL DAILY
Qty: 90 TABLET | Refills: 0 | Status: SHIPPED | OUTPATIENT
Start: 2020-01-29 | End: 2020-04-20

## 2020-01-29 RX ORDER — HYDROCHLOROTHIAZIDE 12.5 MG/1
25 TABLET ORAL DAILY
Qty: 90 TABLET | Refills: 0 | Status: SHIPPED | OUTPATIENT
Start: 2020-01-29 | End: 2020-01-30

## 2020-01-29 NOTE — TELEPHONE ENCOUNTER
Message to Provider.  Drug: Losartan/hctz 100/12.5mg tab  Pharmacy notes: Please clarify. We have been unable to get this medicine for some time. Would you consider sending 2 separate products?Thanks.

## 2020-01-29 NOTE — TELEPHONE ENCOUNTER
I called Carissa and let her know I call [facility] and she has not been in since 2018 so she is due to see the eye Doctor. Carissa will have them fax a copy of exam when done.    Jesenia Lester, CMA

## 2020-01-29 NOTE — TELEPHONE ENCOUNTER
Prescriptions are sent to pharmacy.  Need to keep upcoming appointment for further refills    Radha Skelton RN

## 2020-01-30 ENCOUNTER — ANCILLARY PROCEDURE (OUTPATIENT)
Dept: MAMMOGRAPHY | Facility: CLINIC | Age: 50
End: 2020-01-30
Payer: COMMERCIAL

## 2020-01-30 DIAGNOSIS — Z12.31 ENCOUNTER FOR SCREENING MAMMOGRAM FOR BREAST CANCER: ICD-10-CM

## 2020-01-30 PROCEDURE — 77063 BREAST TOMOSYNTHESIS BI: CPT | Mod: TC

## 2020-01-30 PROCEDURE — 77067 SCR MAMMO BI INCL CAD: CPT | Mod: TC

## 2020-01-30 RX ORDER — HYDROCHLOROTHIAZIDE 12.5 MG/1
TABLET ORAL
Qty: 180 TABLET | Refills: 0 | Status: SHIPPED | OUTPATIENT
Start: 2020-01-30 | End: 2020-04-20

## 2020-01-30 NOTE — TELEPHONE ENCOUNTER
"Requested Prescriptions   Signed Prescriptions Disp Refills    hydrochlorothiazide (HYDRODIURIL) 12.5 MG tablet 180 tablet 0     Sig: TAKE 2 TABLETS(25 MG) BY MOUTH DAILY       Diuretics (Including Combos) Protocol Passed - 1/29/2020 10:11 AM        Passed - Blood pressure under 140/90 in past 12 months     BP Readings from Last 3 Encounters:   01/28/20 139/78   10/23/19 (!) 160/93   09/03/19 (!) 160/93                 Passed - Recent (12 mo) or future (30 days) visit within the authorizing provider's specialty     Patient has had an office visit with the authorizing provider or a provider within the authorizing providers department within the previous 12 mos or has a future within next 30 days. See \"Patient Info\" tab in inbasket, or \"Choose Columns\" in Meds & Orders section of the refill encounter.              Passed - Medication is active on med list        Passed - Patient is age 18 or older        Passed - No active pregancy on record        Passed - Normal serum creatinine on file in past 12 months     Recent Labs   Lab Test 01/21/20  0738   CR 0.76              Passed - Normal serum potassium on file in past 12 months     Recent Labs   Lab Test 01/21/20  0738   POTASSIUM 3.9                    Passed - Normal serum sodium on file in past 12 months     Recent Labs   Lab Test 01/21/20  0738                 Passed - No positive pregnancy test in past 12 months          "

## 2020-01-31 ENCOUNTER — TELEPHONE (OUTPATIENT)
Dept: FAMILY MEDICINE | Facility: CLINIC | Age: 50
End: 2020-01-31

## 2020-01-31 NOTE — TELEPHONE ENCOUNTER
Reason for Call:  Other call back    Detailed comments: pharmacy is calling for clarification on direction and if any dose change to patients RX: losartan combination HTZ. Please call to discuss. Thank you.    Phone Number Patient can be reached at: [phone]    Best Time:     Can we leave a detailed message on this number? YES    Call taken on 1/31/2020 at 8:17 AM by Angeles Villanueva

## 2020-01-31 NOTE — TELEPHONE ENCOUNTER
Previous prescription for losartan-hydrochlorothiazide 100-12.5 mg pill take one half pill bid.    Prescription's sent to pharmacy are for losartan 100 mg once daily and hydrochlorothiazide 12.5 mg two tablets by mouth once daily.    Did you want pt taking more hydrochlorothiazide daily?  Is once daily dosing for both medications ok?  Jesenia PEREYRAN, RN

## 2020-01-31 NOTE — TELEPHONE ENCOUNTER
Reviewed with pharmacy that Dr. Beach wants pt to have losartan 100 mg once daily and hydrochlorothiazide 25 mg once daily in the morning, advised ok to dispense a 25 mg pill if they would like.    Left message on answering machine for patient to call back to [phone], I will review this information with pt also.  Jesenia PEREYRAN, RN

## 2020-01-31 NOTE — TELEPHONE ENCOUNTER
Reviewed medication changes with pt.  She verbalized understanding of doses and what to take.  Jesenia PEREYRAN, RN

## 2020-01-31 NOTE — TELEPHONE ENCOUNTER
Her blood pressure was a little high so let's stick with the 25mg total of hydrochlorothiazide (ok for 25mg too if not filled yet), can take both hydrochlorothiazide in am Silvino Beach MD

## 2020-02-14 ENCOUNTER — TRANSFERRED RECORDS (OUTPATIENT)
Dept: HEALTH INFORMATION MANAGEMENT | Facility: CLINIC | Age: 50
End: 2020-02-14

## 2020-02-23 ENCOUNTER — HEALTH MAINTENANCE LETTER (OUTPATIENT)
Age: 50
End: 2020-02-23

## 2020-04-18 DIAGNOSIS — I10 HYPERTENSION GOAL BP (BLOOD PRESSURE) < 140/90: ICD-10-CM

## 2020-04-18 DIAGNOSIS — E11.9 TYPE 2 DIABETES MELLITUS WITHOUT COMPLICATION, WITHOUT LONG-TERM CURRENT USE OF INSULIN (H): ICD-10-CM

## 2020-04-20 RX ORDER — HYDROCHLOROTHIAZIDE 25 MG/1
TABLET ORAL
Qty: 90 TABLET | Refills: 0 | Status: SHIPPED | OUTPATIENT
Start: 2020-04-20 | End: 2020-07-17

## 2020-04-20 RX ORDER — METFORMIN HCL 500 MG
TABLET, EXTENDED RELEASE 24 HR ORAL
Qty: 180 TABLET | Refills: 0 | Status: SHIPPED | OUTPATIENT
Start: 2020-04-20 | End: 2020-06-17

## 2020-04-20 RX ORDER — LOSARTAN POTASSIUM 100 MG/1
TABLET ORAL
Qty: 90 TABLET | Refills: 0 | Status: SHIPPED | OUTPATIENT
Start: 2020-04-20 | End: 2020-07-17

## 2020-06-04 ENCOUNTER — TRANSFERRED RECORDS (OUTPATIENT)
Dept: HEALTH INFORMATION MANAGEMENT | Facility: CLINIC | Age: 50
End: 2020-06-04

## 2020-06-17 DIAGNOSIS — E11.9 TYPE 2 DIABETES MELLITUS WITHOUT COMPLICATION, WITHOUT LONG-TERM CURRENT USE OF INSULIN (H): ICD-10-CM

## 2020-06-17 DIAGNOSIS — G43.919 INTRACTABLE MIGRAINE WITHOUT STATUS MIGRAINOSUS, UNSPECIFIED MIGRAINE TYPE: ICD-10-CM

## 2020-06-17 DIAGNOSIS — E55.9 VITAMIN D DEFICIENCY: ICD-10-CM

## 2020-06-17 DIAGNOSIS — I10 HYPERTENSION GOAL BP (BLOOD PRESSURE) < 140/90: ICD-10-CM

## 2020-06-17 RX ORDER — CHOLECALCIFEROL (VITAMIN D3) 50 MCG
TABLET ORAL
Qty: 180 TABLET | Refills: 0 | Status: SHIPPED | OUTPATIENT
Start: 2020-06-17 | End: 2020-09-14

## 2020-06-17 RX ORDER — METOPROLOL TARTRATE 50 MG
TABLET ORAL
Qty: 180 TABLET | Refills: 0 | Status: SHIPPED | OUTPATIENT
Start: 2020-06-17 | End: 2020-07-24

## 2020-06-17 RX ORDER — METFORMIN HCL 500 MG
TABLET, EXTENDED RELEASE 24 HR ORAL
Qty: 180 TABLET | Refills: 0 | Status: SHIPPED | OUTPATIENT
Start: 2020-06-17 | End: 2020-09-08

## 2020-07-09 ENCOUNTER — TELEPHONE (OUTPATIENT)
Dept: FAMILY MEDICINE | Facility: CLINIC | Age: 50
End: 2020-07-09

## 2020-07-09 ENCOUNTER — MYC MEDICAL ADVICE (OUTPATIENT)
Dept: FAMILY MEDICINE | Facility: CLINIC | Age: 50
End: 2020-07-09

## 2020-07-09 DIAGNOSIS — G43.919 INTRACTABLE MIGRAINE WITHOUT STATUS MIGRAINOSUS, UNSPECIFIED MIGRAINE TYPE: ICD-10-CM

## 2020-07-09 RX ORDER — HYDROCODONE BITARTRATE AND ACETAMINOPHEN 5; 325 MG/1; MG/1
1 TABLET ORAL EVERY 6 HOURS PRN
Qty: 18 TABLET | Refills: 0 | Status: SHIPPED | OUTPATIENT
Start: 2020-07-09 | End: 2020-08-05

## 2020-07-09 NOTE — TELEPHONE ENCOUNTER
Pt has migraine that will not resolve and is requesting refill of this medication to use.  See mychart message from today the Fioricet is not working.     Controlled Substance Refill Request for norco  Problem List Complete:    No     Last Written Prescription Date:  10/23/19  Last Fill Quantity: 28,   # refills: 0      Last Office Visit with [facility] primary care provider: 5/21/19    Future Office visit:     Controlled substance agreement:   Encounter-Level CSA:    There are no encounter-level csa.     Patient-Level CSA:    There are no patient-level csa.         Last Urine Drug Screen:   Pain Drug SCR UR W RPTD Meds   Date Value Ref Range Status   11/08/2012   Final    FINAL  (Note)  Test                         Result      Flag        UNITS  ====================================================================   Creatinine                 67                      mg/dL    Drugs Present and Declared for Prescription Verification    Citalopram                PRESENT    Desmethylcitalopram       PRESENT    Drugs Present but Not Declared for Prescription Verification    Gabapentin                PRESENT    UNEXPECTED    Acetaminophen             PRESENT    UNEXPECTED    Metoprolol                PRESENT    UNEXPECTED    Drugs Absent but Declared for Prescription Verification    Oxycodone                 Not Detected UNEXPECTED ng/mg creat     Declared Medications:  The flagging and interpretation on this report are based on  the following declared medications. Unexpected results may  arise from inaccuracies in the declared medications.    Celexa (citalopram)  Oxycodone    **Note:  The testing scope of this panel does not include          small to moderate amounts of these reported          medications:    Zolpidem    For further interpretive information, please call our  [employer] Hotline, [phone].    Analysis performed by [employer] [place] [address]   , No results found for: COMDAT, No results  found for: THC13, PCP13, COC13, MAMP13, OPI13, AMP13, BZO13, TCA13, MTD13, BAR13, OXY13, PPX13, BUP13     Processing:  Rx to be electronically transmitted to pharmacy by provider      [email or web address]       checked in past 3 months?  Yes 7/9/2020   Pt on alprazolam 0.5 mg bid.  Jesenia PEREYRAN, RN

## 2020-07-09 NOTE — TELEPHONE ENCOUNTER
Called and spoke to patient and informed her that her RX was sent in. Appointment made with Dr Beach for 8/4/20.Angela Mullen MA/DEBBY

## 2020-07-09 NOTE — TELEPHONE ENCOUNTER
Reason for Call:  Medication or medication refill:    Do you use a [facility]?  Name of the pharmacy and phone number for the current request:  [facility] [phone]    Name of the medication requested: Vicodin     Other request: patient stated she is having a migraine an its not going away  Please call to advice  Thank you     Can we leave a detailed message on this number? YES    Phone number patient can be reached at: Home number on file [phone] (home)    Best Time: any    Call taken on 7/9/2020 at 11:20 AM by Jocelyne Padron

## 2020-07-09 NOTE — TELEPHONE ENCOUNTER
Pleases help set-up md appointment in next 3-4 weeks. Can do labs at appointment. Silvino Beach MD

## 2020-07-17 DIAGNOSIS — I10 HYPERTENSION GOAL BP (BLOOD PRESSURE) < 140/90: ICD-10-CM

## 2020-07-17 DIAGNOSIS — E78.5 HYPERLIPIDEMIA LDL GOAL <100: ICD-10-CM

## 2020-07-17 RX ORDER — SIMVASTATIN 20 MG
TABLET ORAL
Qty: 30 TABLET | Refills: 0 | OUTPATIENT
Start: 2020-07-17

## 2020-07-17 RX ORDER — HYDROCHLOROTHIAZIDE 25 MG/1
TABLET ORAL
Qty: 90 TABLET | Refills: 0 | Status: SHIPPED | OUTPATIENT
Start: 2020-07-17 | End: 2020-10-12

## 2020-07-17 RX ORDER — LOSARTAN POTASSIUM 100 MG/1
TABLET ORAL
Qty: 90 TABLET | OUTPATIENT
Start: 2020-07-17

## 2020-07-17 NOTE — TELEPHONE ENCOUNTER
Next 5 appointments (look out 90 days)    Aug 04, 2020 10:00 AM CDT  Office Visit with Silvino Beach MD  [facility] [address] [facility] [address] [phone]        Rx refilled per [facility] refill protocol.    Jackie PEREYRAN, RN

## 2020-07-23 ENCOUNTER — TRANSFERRED RECORDS (OUTPATIENT)
Dept: HEALTH INFORMATION MANAGEMENT | Facility: CLINIC | Age: 50
End: 2020-07-23

## 2020-07-24 DIAGNOSIS — G43.919 INTRACTABLE MIGRAINE WITHOUT STATUS MIGRAINOSUS, UNSPECIFIED MIGRAINE TYPE: ICD-10-CM

## 2020-07-24 DIAGNOSIS — I10 HYPERTENSION GOAL BP (BLOOD PRESSURE) < 140/90: ICD-10-CM

## 2020-07-24 RX ORDER — METOPROLOL TARTRATE 50 MG
TABLET ORAL
Qty: 60 TABLET | Refills: 0 | Status: SHIPPED | OUTPATIENT
Start: 2020-07-24 | End: 2020-08-18

## 2020-07-27 RX ORDER — METOPROLOL TARTRATE 50 MG
TABLET ORAL
Qty: 180 TABLET | OUTPATIENT
Start: 2020-07-27

## 2020-08-05 ENCOUNTER — MYC MEDICAL ADVICE (OUTPATIENT)
Dept: FAMILY MEDICINE | Facility: CLINIC | Age: 50
End: 2020-08-05

## 2020-08-05 DIAGNOSIS — G43.919 INTRACTABLE MIGRAINE WITHOUT STATUS MIGRAINOSUS, UNSPECIFIED MIGRAINE TYPE: ICD-10-CM

## 2020-08-05 RX ORDER — HYDROCODONE BITARTRATE AND ACETAMINOPHEN 5; 325 MG/1; MG/1
1 TABLET ORAL EVERY 6 HOURS PRN
Qty: 18 TABLET | Refills: 0 | Status: SHIPPED | OUTPATIENT
Start: 2020-08-05 | End: 2020-09-23

## 2020-08-05 NOTE — TELEPHONE ENCOUNTER
Controlled Substance Refill Request for norco  Problem List Complete:    No      Last Written Prescription Date:  7/9/20  Last Fill Quantity: 18,   # refills: 0        Last Office Visit with [facility] primary care provider: 5/21/19     Future Office visit:      Controlled substance agreement:   Encounter-Level CSA:    There are no encounter-level csa.      Patient-Level CSA:    There are no patient-level csa.           Last Urine Drug Screen:          Pain Drug SCR UR W RPTD Meds   Date Value Ref Range Status   11/08/2012     Final     FINAL  (Note)  Test                         Result      Flag        UNITS  ====================================================================   Creatinine                 67                      mg/dL     Drugs Present and Declared for Prescription Verification    Citalopram                PRESENT    Desmethylcitalopram       PRESENT     Drugs Present but Not Declared for Prescription Verification    Gabapentin                PRESENT    UNEXPECTED    Acetaminophen             PRESENT    UNEXPECTED    Metoprolol                PRESENT    UNEXPECTED     Drugs Absent but Declared for Prescription Verification    Oxycodone                 Not Detected UNEXPECTED ng/mg creat      Declared Medications:  The flagging and interpretation on this report are based on  the following declared medications. Unexpected results may  arise from inaccuracies in the declared medications.     Celexa (citalopram)  Oxycodone     **Note:  The testing scope of this panel does not include          small to moderate amounts of these reported          medications:     Zolpidem     For further interpretive information, please call our  [employer] Hotline, [phone].     Analysis performed by [employer] [place] [address]   , No results found for: COMDAT, No results found for: THC13, PCP13, COC13, MAMP13, OPI13, AMP13, BZO13, TCA13, MTD13, BAR13, OXY13, PPX13, BUP13     Processing:  Rx to be  electronically transmitted to pharmacy by provider       [email or web address]         checked in past 3 months?  Yes 7/9/2020   Pt on alprazolam 0.5 mg bid.  Jesenia PEREYRAN, RN

## 2020-08-11 DIAGNOSIS — E78.5 HYPERLIPIDEMIA LDL GOAL <100: ICD-10-CM

## 2020-08-11 DIAGNOSIS — I10 HYPERTENSION GOAL BP (BLOOD PRESSURE) < 140/90: ICD-10-CM

## 2020-08-11 RX ORDER — LOSARTAN POTASSIUM 100 MG/1
TABLET ORAL
Qty: 90 TABLET | Refills: 0 | Status: SHIPPED | OUTPATIENT
Start: 2020-08-11 | End: 2020-11-10

## 2020-08-11 RX ORDER — SIMVASTATIN 20 MG
TABLET ORAL
Qty: 90 TABLET | Refills: 0 | Status: SHIPPED | OUTPATIENT
Start: 2020-08-11 | End: 2020-11-10

## 2020-08-11 NOTE — TELEPHONE ENCOUNTER
There are 2 different strengths of simvastatin on medication list.   Routing to PCP to clarify which dose to refill.    Jackie Mckeon BSN, RN

## 2020-08-27 ENCOUNTER — TRANSFERRED RECORDS (OUTPATIENT)
Dept: HEALTH INFORMATION MANAGEMENT | Facility: CLINIC | Age: 50
End: 2020-08-27

## 2020-08-28 LAB — PHQ9 SCORE: 14

## 2020-09-08 ENCOUNTER — OFFICE VISIT (OUTPATIENT)
Dept: FAMILY MEDICINE | Facility: CLINIC | Age: 50
End: 2020-09-08
Payer: COMMERCIAL

## 2020-09-08 VITALS
BODY MASS INDEX: 42.13 KG/M2 | WEIGHT: 278 LBS | TEMPERATURE: 97.9 F | DIASTOLIC BLOOD PRESSURE: 75 MMHG | OXYGEN SATURATION: 97 % | HEIGHT: 68 IN | HEART RATE: 77 BPM | RESPIRATION RATE: 18 BRPM | SYSTOLIC BLOOD PRESSURE: 133 MMHG

## 2020-09-08 DIAGNOSIS — G43.109 MIGRAINE WITH AURA AND WITHOUT STATUS MIGRAINOSUS, NOT INTRACTABLE: ICD-10-CM

## 2020-09-08 DIAGNOSIS — E11.9 TYPE 2 DIABETES MELLITUS WITHOUT COMPLICATION, WITHOUT LONG-TERM CURRENT USE OF INSULIN (H): Primary | ICD-10-CM

## 2020-09-08 DIAGNOSIS — I10 HYPERTENSION GOAL BP (BLOOD PRESSURE) < 140/90: ICD-10-CM

## 2020-09-08 DIAGNOSIS — E78.5 HYPERLIPIDEMIA LDL GOAL <100: ICD-10-CM

## 2020-09-08 LAB
ALBUMIN SERPL-MCNC: 4.1 G/DL (ref 3.4–5)
ANION GAP SERPL CALCULATED.3IONS-SCNC: 9 MMOL/L (ref 3–14)
BUN SERPL-MCNC: 10 MG/DL (ref 7–30)
CALCIUM SERPL-MCNC: 9.1 MG/DL (ref 8.5–10.1)
CHLORIDE SERPL-SCNC: 103 MMOL/L (ref 94–109)
CHOLEST SERPL-MCNC: 206 MG/DL
CO2 SERPL-SCNC: 26 MMOL/L (ref 20–32)
CREAT SERPL-MCNC: 0.8 MG/DL (ref 0.52–1.04)
GFR SERPL CREATININE-BSD FRML MDRD: 86 ML/MIN/{1.73_M2}
GLUCOSE SERPL-MCNC: 104 MG/DL (ref 70–99)
HBA1C MFR BLD: 5.9 % (ref 0–5.6)
HDLC SERPL-MCNC: 54 MG/DL
LDLC SERPL CALC-MCNC: 118 MG/DL
NONHDLC SERPL-MCNC: 152 MG/DL
PHOSPHATE SERPL-MCNC: 3.1 MG/DL (ref 2.5–4.5)
POTASSIUM SERPL-SCNC: 3.8 MMOL/L (ref 3.4–5.3)
SODIUM SERPL-SCNC: 138 MMOL/L (ref 133–144)
TRIGL SERPL-MCNC: 172 MG/DL

## 2020-09-08 PROCEDURE — 36415 COLL VENOUS BLD VENIPUNCTURE: CPT | Performed by: FAMILY MEDICINE

## 2020-09-08 PROCEDURE — 99214 OFFICE O/P EST MOD 30 MIN: CPT | Performed by: FAMILY MEDICINE

## 2020-09-08 PROCEDURE — 83036 HEMOGLOBIN GLYCOSYLATED A1C: CPT | Performed by: FAMILY MEDICINE

## 2020-09-08 PROCEDURE — 80061 LIPID PANEL: CPT | Performed by: FAMILY MEDICINE

## 2020-09-08 PROCEDURE — 80069 RENAL FUNCTION PANEL: CPT | Performed by: FAMILY MEDICINE

## 2020-09-08 RX ORDER — ONDANSETRON 4 MG/1
4-8 TABLET, ORALLY DISINTEGRATING ORAL EVERY 8 HOURS PRN
Qty: 30 TABLET | Refills: 0 | Status: SHIPPED | OUTPATIENT
Start: 2020-09-08 | End: 2022-09-26

## 2020-09-08 RX ORDER — METFORMIN HCL 500 MG
TABLET, EXTENDED RELEASE 24 HR ORAL
Qty: 180 TABLET | Refills: 1 | Status: SHIPPED | OUTPATIENT
Start: 2020-09-08 | End: 2021-03-10

## 2020-09-08 RX ORDER — SIMVASTATIN 40 MG
TABLET ORAL
Qty: 90 TABLET | Refills: 1 | Status: SHIPPED | OUTPATIENT
Start: 2020-09-08 | End: 2021-03-11 | Stop reason: ALTCHOICE

## 2020-09-08 ASSESSMENT — PATIENT HEALTH QUESTIONNAIRE - PHQ9: SUM OF ALL RESPONSES TO PHQ QUESTIONS 1-9: 8

## 2020-09-08 ASSESSMENT — MIFFLIN-ST. JEOR: SCORE: 1924.5

## 2020-09-08 NOTE — PROGRESS NOTES
"SUBJECTIVE:  Carissa Spears, a 50 year old female scheduled an appointment to discuss the following issues:     Hyperlipidemia LDL goal <100  Hypertension goal BP (blood pressure) < 140/90  Type 2 diabetes mellitus without complication, without long-term current use of insulin (H)  Follow-up dm, htn, high cholesterol, bipolar and lumbar radiculopathy.  Weight gain. Taking metformin at dinner - no side effects. Needs more exercise. No chest pain or shortness of breath. No nausea, vomiting or diarrhea or black/bloody stools. No urine changes or hematuria. Eye exam in spring. No oustide blood pressure reading. Seeing psych. No SUICIAL IDEATION OR HOMOCIDAL IDEATION OR [place]RA[place].   History migraines average once/month. No menses. vicodin prn rare for migraines - fiorcet not helpful. Has blood pressure cuff at home.   Medical, social, surgical, and family histories reviewed.    ROS:  All other ROS negative.    OBJECTIVE:  /75   Pulse 77   Temp 97.9  F (36.6  C) (Tympanic)   Resp 18   Ht 1.727 m (5' 8\")   Wt 126.1 kg (278 lb)   LMP  (LMP Unknown)   SpO2 97%   BMI 42.27 kg/m      EXAM:  GENERAL APPEARANCE: healthy, alert and no distress  EYES: EOMI,  PERRL  HENT: ear canals and TM's normal and nose and mouth without ulcers or lesions  RESP: lungs clear to auscultation - no rales, rhonchi or wheezes  CV: regular rates and rhythm, normal S1 S2, no S3 or S4 and no murmur, click or rub -  ABDOMEN:  soft, nontender, no HSM or masses and bowel sounds normal  MS: extremities normal- no gross deformities noted, no evidence of inflammation in joints, FROM in all extremities.  NEURO: Normal strength and tone, sensory exam grossly normal, mentation intact and speech normal  PSYCH: mentation appears normal and affect normal/bright    ASSESSMENT/PLAN:  (E11.9) Type 2 diabetes mellitus without complication, without long-term current use of insulin (H)  (primary encounter diagnosis)  Comment: worse and weight gain  Plan: " Hemoglobin A1c, Renal panel (Alb, BUN, Ca, Cl,         CO2, Creat, Gluc, Phos, K, Na)        Back to diet/more exercise and weight loss. Continue metformin. Recheck in 6 months  Sooner if worse/new issues. Expected course and warning signs reviewed. Call/email with questions/concerns     (E78.5) Hyperlipidemia LDL goal <100  Comment: stable in pastg  Plan: Lipid Profile (Chol, Trig, HDL, LDL calc)        continue statin. Chest pain or shortness of breath to er.     (I10) Hypertension goal BP (blood pressure) < 140/90  Comment: stable  Plan: Renal panel (Alb, BUN, Ca, Cl, CO2, Creat,         Gluc, Phos, K, Na)        Exercise and self-monitor. Continue meds. Lower sodium.     Silvino Beach MD

## 2020-09-10 ENCOUNTER — TRANSFERRED RECORDS (OUTPATIENT)
Dept: HEALTH INFORMATION MANAGEMENT | Facility: CLINIC | Age: 50
End: 2020-09-10

## 2020-09-10 DIAGNOSIS — E55.9 VITAMIN D DEFICIENCY: ICD-10-CM

## 2020-09-10 DIAGNOSIS — I10 HYPERTENSION GOAL BP (BLOOD PRESSURE) < 140/90: ICD-10-CM

## 2020-09-10 DIAGNOSIS — G43.919 INTRACTABLE MIGRAINE WITHOUT STATUS MIGRAINOSUS, UNSPECIFIED MIGRAINE TYPE: ICD-10-CM

## 2020-09-14 RX ORDER — CHOLECALCIFEROL (VITAMIN D3) 50 MCG
TABLET ORAL
Qty: 180 TABLET | Refills: 0 | Status: SHIPPED | OUTPATIENT
Start: 2020-09-14 | End: 2020-12-10

## 2020-09-14 RX ORDER — METOPROLOL TARTRATE 50 MG
TABLET ORAL
Qty: 180 TABLET | Refills: 0 | Status: SHIPPED | OUTPATIENT
Start: 2020-09-14 | End: 2020-10-12

## 2020-09-15 ENCOUNTER — MEDICAL CORRESPONDENCE (OUTPATIENT)
Dept: HEALTH INFORMATION MANAGEMENT | Facility: CLINIC | Age: 50
End: 2020-09-15

## 2020-09-15 ENCOUNTER — TRANSFERRED RECORDS (OUTPATIENT)
Dept: HEALTH INFORMATION MANAGEMENT | Facility: CLINIC | Age: 50
End: 2020-09-15

## 2020-09-16 DIAGNOSIS — G43.909 MIGRAINE: Primary | ICD-10-CM

## 2020-09-23 ENCOUNTER — MYC MEDICAL ADVICE (OUTPATIENT)
Dept: FAMILY MEDICINE | Facility: CLINIC | Age: 50
End: 2020-09-23

## 2020-09-23 DIAGNOSIS — G43.919 INTRACTABLE MIGRAINE WITHOUT STATUS MIGRAINOSUS, UNSPECIFIED MIGRAINE TYPE: ICD-10-CM

## 2020-09-23 RX ORDER — HYDROCODONE BITARTRATE AND ACETAMINOPHEN 5; 325 MG/1; MG/1
1 TABLET ORAL EVERY 6 HOURS PRN
Qty: 15 TABLET | Refills: 0 | Status: SHIPPED | OUTPATIENT
Start: 2020-09-23 | End: 2020-11-06

## 2020-09-23 RX ORDER — DIVALPROEX SODIUM 250 MG/1
TABLET, EXTENDED RELEASE ORAL
COMMUNITY
Start: 2020-09-15 | End: 2021-03-11

## 2020-09-23 NOTE — TELEPHONE ENCOUNTER
1) pt advised the norco refill is out to Dr. Beach.    2) pt states she was calling again to update Dr. Beach that her psychiatrist started her on Dupixent and advised her a side effect is to lessen migraines.  She started it 2-3 days ago and got a migraine before it could start working.  She said the psychiatrist advised she would need monthly liver tests done, she will send orders to our lab.   This part is fyi.  Jesenia PEREYRAN, RN

## 2020-09-23 NOTE — TELEPHONE ENCOUNTER
Controlled Substance Refill Request for norco  Problem List Complete:    No      Last Written Prescription Date:  8/5/20  Last Fill Quantity: 18,   # refills: 0        Last Office Visit with [facility] primary care provider: 9/8/20     Future Office visit:      Controlled substance agreement:   Encounter-Level CSA:    There are no encounter-level csa.      Patient-Level CSA:    There are no patient-level csa.            Last Urine Drug Screen:               Pain Drug SCR UR W RPTD Meds   Date Value Ref Range Status   11/08/2012     Final     FINAL  (Note)  Test                         Result      Flag        UNITS  ====================================================================   Creatinine                 67                      mg/dL     Drugs Present and Declared for Prescription Verification    Citalopram                PRESENT    Desmethylcitalopram       PRESENT     Drugs Present but Not Declared for Prescription Verification    Gabapentin                PRESENT    UNEXPECTED    Acetaminophen             PRESENT    UNEXPECTED    Metoprolol                PRESENT    UNEXPECTED     Drugs Absent but Declared for Prescription Verification    Oxycodone                 Not Detected UNEXPECTED ng/mg creat      Declared Medications:  The flagging and interpretation on this report are based on  the following declared medications. Unexpected results may  arise from inaccuracies in the declared medications.     Celexa (citalopram)  Oxycodone     **Note:  The testing scope of this panel does not include          small to moderate amounts of these reported          medications:     Zolpidem     For further interpretive information, please call our  [employer] Hotline, [phone].     Analysis performed by [employer] [place] [address]   , No results found for: COMDAT, No results found for: THC13, PCP13, COC13, MAMP13, OPI13, AMP13, BZO13, TCA13, MTD13, BAR13, OXY13, PPX13, BUP13     Processing:  Rx to be  electronically transmitted to pharmacy by provider       [email or web address]         checked in past 3 months?  Yes 7/9/2020   Pt on alprazolam 0.5 mg bid.  Jesenia PEREYRAN, RN

## 2020-09-23 NOTE — TELEPHONE ENCOUNTER
Reason for call:  Other   Patient called regarding (reason for call): prescription  Additional comments: Patient has just started the medication Dupixent and she is still having migraines please call back.     Phone number to reach patient:  Home number on file [phone] (home)    Best Time:  any    Can we leave a detailed message on this number?  YES    Travel screening: Negative

## 2020-09-30 ENCOUNTER — TRANSFERRED RECORDS (OUTPATIENT)
Dept: HEALTH INFORMATION MANAGEMENT | Facility: CLINIC | Age: 50
End: 2020-09-30

## 2020-09-30 DIAGNOSIS — Z79.899 ENCOUNTER FOR LONG-TERM (CURRENT) USE OF OTHER MEDICATIONS: Primary | ICD-10-CM

## 2020-10-07 ENCOUNTER — TRANSFERRED RECORDS (OUTPATIENT)
Dept: HEALTH INFORMATION MANAGEMENT | Facility: CLINIC | Age: 50
End: 2020-10-07

## 2020-10-13 ENCOUNTER — TRANSFERRED RECORDS (OUTPATIENT)
Dept: HEALTH INFORMATION MANAGEMENT | Facility: CLINIC | Age: 50
End: 2020-10-13

## 2020-10-22 DIAGNOSIS — G43.909 MIGRAINE: ICD-10-CM

## 2020-10-22 DIAGNOSIS — Z79.899 ENCOUNTER FOR LONG-TERM (CURRENT) USE OF OTHER MEDICATIONS: ICD-10-CM

## 2020-10-22 LAB
ALBUMIN SERPL-MCNC: 3.7 G/DL (ref 3.4–5)
ALP SERPL-CCNC: 72 U/L (ref 40–150)
ALT SERPL W P-5'-P-CCNC: 38 U/L (ref 0–50)
AMYLASE SERPL-CCNC: 44 U/L (ref 30–110)
AST SERPL W P-5'-P-CCNC: 15 U/L (ref 0–45)
BASOPHILS # BLD AUTO: 0 10E9/L (ref 0–0.2)
BASOPHILS NFR BLD AUTO: 0.3 %
BILIRUB DIRECT SERPL-MCNC: 0.1 MG/DL (ref 0–0.2)
BILIRUB SERPL-MCNC: 0.4 MG/DL (ref 0.2–1.3)
DIFFERENTIAL METHOD BLD: NORMAL
EOSINOPHIL # BLD AUTO: 0.2 10E9/L (ref 0–0.7)
EOSINOPHIL NFR BLD AUTO: 1.9 %
ERYTHROCYTE [DISTWIDTH] IN BLOOD BY AUTOMATED COUNT: 13.6 % (ref 10–15)
HCT VFR BLD AUTO: 44 % (ref 35–47)
HGB BLD-MCNC: 14.6 G/DL (ref 11.7–15.7)
LYMPHOCYTES # BLD AUTO: 3.2 10E9/L (ref 0.8–5.3)
LYMPHOCYTES NFR BLD AUTO: 32.7 %
MCH RBC QN AUTO: 31.7 PG (ref 26.5–33)
MCHC RBC AUTO-ENTMCNC: 33.2 G/DL (ref 31.5–36.5)
MCV RBC AUTO: 96 FL (ref 78–100)
MONOCYTES # BLD AUTO: 0.3 10E9/L (ref 0–1.3)
MONOCYTES NFR BLD AUTO: 2.8 %
NEUTROPHILS # BLD AUTO: 6 10E9/L (ref 1.6–8.3)
NEUTROPHILS NFR BLD AUTO: 62.3 %
PLATELET # BLD AUTO: 241 10E9/L (ref 150–450)
PROT SERPL-MCNC: 7 G/DL (ref 6.8–8.8)
RBC # BLD AUTO: 4.6 10E12/L (ref 3.8–5.2)
VALPROATE SERPL-MCNC: 13 MG/L (ref 50–100)
WBC # BLD AUTO: 9.7 10E9/L (ref 4–11)

## 2020-10-22 PROCEDURE — 82150 ASSAY OF AMYLASE: CPT | Performed by: PHYSICIAN ASSISTANT

## 2020-10-22 PROCEDURE — 80164 ASSAY DIPROPYLACETIC ACD TOT: CPT | Performed by: PHYSICIAN ASSISTANT

## 2020-10-22 PROCEDURE — 85025 COMPLETE CBC W/AUTO DIFF WBC: CPT | Performed by: PHYSICIAN ASSISTANT

## 2020-10-22 PROCEDURE — 80076 HEPATIC FUNCTION PANEL: CPT | Performed by: PHYSICIAN ASSISTANT

## 2020-10-28 ENCOUNTER — TRANSFERRED RECORDS (OUTPATIENT)
Dept: HEALTH INFORMATION MANAGEMENT | Facility: CLINIC | Age: 50
End: 2020-10-28

## 2020-11-05 ENCOUNTER — MYC MEDICAL ADVICE (OUTPATIENT)
Dept: FAMILY MEDICINE | Facility: CLINIC | Age: 50
End: 2020-11-05

## 2020-11-05 DIAGNOSIS — G43.919 INTRACTABLE MIGRAINE WITHOUT STATUS MIGRAINOSUS, UNSPECIFIED MIGRAINE TYPE: ICD-10-CM

## 2020-11-05 NOTE — TELEPHONE ENCOUNTER
Controlled Substance Refill Request for norco  Problem List Complete:    No      Last Written Prescription Date:  9/23/20  Last Fill Quantity: 15,   # refills: 0        Last Office Visit with [facility] primary care provider: 9/8/20     Future Office visit:      Controlled substance agreement:   Encounter-Level CSA:    There are no encounter-level csa.      Patient-Level CSA:    There are no patient-level csa.            Last Urine Drug Screen:               Pain Drug SCR UR W RPTD Meds   Date Value Ref Range Status   11/08/2012     Final     FINAL  (Note)  Test                         Result      Flag        UNITS  ====================================================================   Creatinine                 67                      mg/dL     Drugs Present and Declared for Prescription Verification    Citalopram                PRESENT    Desmethylcitalopram       PRESENT     Drugs Present but Not Declared for Prescription Verification    Gabapentin                PRESENT    UNEXPECTED    Acetaminophen             PRESENT    UNEXPECTED    Metoprolol                PRESENT    UNEXPECTED     Drugs Absent but Declared for Prescription Verification    Oxycodone                 Not Detected UNEXPECTED ng/mg creat      Declared Medications:  The flagging and interpretation on this report are based on  the following declared medications. Unexpected results may  arise from inaccuracies in the declared medications.     Celexa (citalopram)  Oxycodone     **Note:  The testing scope of this panel does not include          small to moderate amounts of these reported          medications:     Zolpidem     For further interpretive information, please call our  [employer] Hotline, [phone].     Analysis performed by [employer] [place] [address]   , No results found for: COMDAT, No results found for: THC13, PCP13, COC13, MAMP13, OPI13, AMP13, BZO13, TCA13, MTD13, BAR13, OXY13, PPX13, BUP13     Processing:  Rx to be  electronically transmitted to pharmacy by provider       [email or web address]         checked in past 3 months?  Yes 11/5/2020   Pt on alprazolam 0.5 mg bid.  Jesenia PEREYRAN, RN

## 2020-11-06 RX ORDER — HYDROCODONE BITARTRATE AND ACETAMINOPHEN 5; 325 MG/1; MG/1
1 TABLET ORAL EVERY 6 HOURS PRN
Qty: 15 TABLET | Refills: 0 | Status: SHIPPED | OUTPATIENT
Start: 2020-11-06 | End: 2021-07-30

## 2020-11-09 ENCOUNTER — TRANSFERRED RECORDS (OUTPATIENT)
Dept: HEALTH INFORMATION MANAGEMENT | Facility: CLINIC | Age: 50
End: 2020-11-09

## 2020-11-09 DIAGNOSIS — I10 HYPERTENSION GOAL BP (BLOOD PRESSURE) < 140/90: ICD-10-CM

## 2020-11-09 DIAGNOSIS — E78.5 HYPERLIPIDEMIA LDL GOAL <100: ICD-10-CM

## 2020-11-10 RX ORDER — LOSARTAN POTASSIUM 100 MG/1
TABLET ORAL
Qty: 90 TABLET | Refills: 1 | Status: SHIPPED | OUTPATIENT
Start: 2020-11-10 | End: 2021-04-03

## 2020-11-10 RX ORDER — SIMVASTATIN 20 MG
TABLET ORAL
Qty: 90 TABLET | Refills: 1 | Status: SHIPPED | OUTPATIENT
Start: 2020-11-10 | End: 2021-05-10

## 2020-11-10 NOTE — TELEPHONE ENCOUNTER
There are 2 different strengths of zocor on patient's medication list.   Routing to provider to clarify current dose.     Jackie Mckeon BSN, RN

## 2020-11-29 DIAGNOSIS — G43.909 MIGRAINE: ICD-10-CM

## 2020-11-29 LAB
BASOPHILS # BLD AUTO: 0 10E9/L (ref 0–0.2)
BASOPHILS NFR BLD AUTO: 0.4 %
DIFFERENTIAL METHOD BLD: NORMAL
EOSINOPHIL # BLD AUTO: 0.1 10E9/L (ref 0–0.7)
EOSINOPHIL NFR BLD AUTO: 1.1 %
ERYTHROCYTE [DISTWIDTH] IN BLOOD BY AUTOMATED COUNT: 13.4 % (ref 10–15)
HCT VFR BLD AUTO: 44.9 % (ref 35–47)
HGB BLD-MCNC: 14.8 G/DL (ref 11.7–15.7)
LYMPHOCYTES # BLD AUTO: 2.2 10E9/L (ref 0.8–5.3)
LYMPHOCYTES NFR BLD AUTO: 26.6 %
MCH RBC QN AUTO: 31.6 PG (ref 26.5–33)
MCHC RBC AUTO-ENTMCNC: 33 G/DL (ref 31.5–36.5)
MCV RBC AUTO: 96 FL (ref 78–100)
MONOCYTES # BLD AUTO: 0.4 10E9/L (ref 0–1.3)
MONOCYTES NFR BLD AUTO: 4.5 %
NEUTROPHILS # BLD AUTO: 5.7 10E9/L (ref 1.6–8.3)
NEUTROPHILS NFR BLD AUTO: 67.4 %
PLATELET # BLD AUTO: 248 10E9/L (ref 150–450)
RBC # BLD AUTO: 4.69 10E12/L (ref 3.8–5.2)
VALPROATE SERPL-MCNC: 40 MG/L (ref 50–100)
WBC # BLD AUTO: 8.4 10E9/L (ref 4–11)

## 2020-11-29 PROCEDURE — 82150 ASSAY OF AMYLASE: CPT | Performed by: PHYSICIAN ASSISTANT

## 2020-11-29 PROCEDURE — 80164 ASSAY DIPROPYLACETIC ACD TOT: CPT | Performed by: PHYSICIAN ASSISTANT

## 2020-11-29 PROCEDURE — 85025 COMPLETE CBC W/AUTO DIFF WBC: CPT | Performed by: PHYSICIAN ASSISTANT

## 2020-11-30 LAB — AMYLASE SERPL-CCNC: 45 U/L (ref 30–110)

## 2020-12-01 ENCOUNTER — TRANSFERRED RECORDS (OUTPATIENT)
Dept: HEALTH INFORMATION MANAGEMENT | Facility: CLINIC | Age: 50
End: 2020-12-01

## 2020-12-10 DIAGNOSIS — E55.9 VITAMIN D DEFICIENCY: ICD-10-CM

## 2020-12-11 RX ORDER — CHOLECALCIFEROL (VITAMIN D3) 50 MCG
1 TABLET ORAL 2 TIMES DAILY
Qty: 180 TABLET | Refills: 0 | Status: SHIPPED | OUTPATIENT
Start: 2020-12-11 | End: 2021-03-10

## 2020-12-11 NOTE — TELEPHONE ENCOUNTER
Prescription approved per [facility] Refill Protocol.  Radha Skelton RN

## 2021-02-01 ENCOUNTER — TRANSFERRED RECORDS (OUTPATIENT)
Dept: HEALTH INFORMATION MANAGEMENT | Facility: CLINIC | Age: 51
End: 2021-02-01

## 2021-02-10 ENCOUNTER — TRANSFERRED RECORDS (OUTPATIENT)
Dept: HEALTH INFORMATION MANAGEMENT | Facility: CLINIC | Age: 51
End: 2021-02-10

## 2021-03-08 ENCOUNTER — TRANSFERRED RECORDS (OUTPATIENT)
Dept: HEALTH INFORMATION MANAGEMENT | Facility: CLINIC | Age: 51
End: 2021-03-08

## 2021-03-09 DIAGNOSIS — E11.9 TYPE 2 DIABETES MELLITUS WITHOUT COMPLICATION, WITHOUT LONG-TERM CURRENT USE OF INSULIN (H): ICD-10-CM

## 2021-03-09 DIAGNOSIS — E55.9 VITAMIN D DEFICIENCY: ICD-10-CM

## 2021-03-10 RX ORDER — METFORMIN HCL 500 MG
TABLET, EXTENDED RELEASE 24 HR ORAL
Qty: 180 TABLET | Refills: 1 | Status: SHIPPED | OUTPATIENT
Start: 2021-03-10 | End: 2021-10-10

## 2021-03-10 RX ORDER — CHOLECALCIFEROL (VITAMIN D3) 50 MCG
TABLET ORAL
Qty: 180 TABLET | Refills: 0 | Status: SHIPPED | OUTPATIENT
Start: 2021-03-10 | End: 2021-06-11

## 2021-03-11 ENCOUNTER — VIRTUAL VISIT (OUTPATIENT)
Dept: FAMILY MEDICINE | Facility: CLINIC | Age: 51
End: 2021-03-11
Payer: COMMERCIAL

## 2021-03-11 DIAGNOSIS — G43.109 MIGRAINE WITH AURA AND WITHOUT STATUS MIGRAINOSUS, NOT INTRACTABLE: Primary | ICD-10-CM

## 2021-03-11 PROBLEM — M17.10 ARTHROSIS OF KNEE: Status: ACTIVE | Noted: 2017-12-06

## 2021-03-11 PROBLEM — M17.11 PRIMARY OSTEOARTHRITIS OF RIGHT KNEE: Status: ACTIVE | Noted: 2017-02-27

## 2021-03-11 PROCEDURE — 99213 OFFICE O/P EST LOW 20 MIN: CPT | Mod: GT | Performed by: NURSE PRACTITIONER

## 2021-03-11 RX ORDER — RAMELTEON 8 MG/1
8 TABLET ORAL AT BEDTIME
COMMUNITY
Start: 2021-02-02 | End: 2021-03-11

## 2021-03-11 RX ORDER — DIVALPROEX SODIUM 500 MG/1
500 TABLET, EXTENDED RELEASE ORAL DAILY
COMMUNITY
Start: 2021-02-01 | End: 2021-03-11

## 2021-03-11 RX ORDER — HYDROCODONE BITARTRATE AND ACETAMINOPHEN 5; 325 MG/1; MG/1
1 TABLET ORAL EVERY 6 HOURS PRN
Qty: 15 TABLET | Refills: 0 | Status: SHIPPED | OUTPATIENT
Start: 2021-03-11 | End: 2021-06-10

## 2021-03-11 NOTE — PROGRESS NOTES
"Carissa is a 50 year old who is being evaluated via a billable video visit.      How would you like to obtain your AVS? MyChart  If the video visit is dropped, the invitation should be resent by: Text to cell phone: [phone]  Will anyone else be joining your video visit? No    Video Start Time: 1200    Assessment & Plan     Migraine with aura and without status migrainosus, not intractable  Has required limited supply of Norco in the past for migraines not relieved by Fioricet and OTC medications.  She is allergic to Imitrex.   Discussed not mixing with alcohol or other sedating medications.   Return if worsening or not improving.   - HYDROcodone-acetaminophen (NORCO) 5-325 MG tablet; Take 1 tablet by mouth every 6 hours as needed for moderate to severe pain Do not take within 4 hours of xanax.     Tobacco Cessation:   reports that she has been smoking cigarettes. She has a 5.00 pack-year smoking history. She has never used smokeless tobacco.      BMI:   Estimated body mass index is 42.27 kg/m  as calculated from the following:    Height as of 9/8/20: 1.727 m (5' 8\").    Weight as of 9/8/20: 126.1 kg (278 lb).       Return in about 2 days (around 3/13/2021) for If failure to improve.    Estefanía Khan, [facility]    Subjective   Carissa is a 50 year old who presents for the following health issues:    HPI     Migraine for the last 2 days.    Taking Fioricet, not working.   Asks for rx for Norco- gets rare limited supply for migraines from PCP to use when other measures ineffective.   She is allergic to Imitrex.   States she gets migraines about once per month. Not on a prophylactic medication.   States her current symptoms are typical for her migraines- denies any numbness, tingling, motor weakness, confusion, troubles with speech or facial asymmetry.       Review of Systems   Constitutional, HEENT, cardiovascular, pulmonary, gi and gu systems are negative, except as otherwise noted.   "    Objective           Vitals:  No vitals were obtained today due to virtual visit.    Physical Exam   GENERAL: alert, answers appropriately  RESP: No audible wheeze or cough  PSYCH: Mentation appears normal, affect normal/bright, judgement and insight intact, normal speech        Video-Visit Details    Type of service:  Video Visit    Video End Time:1208    Originating Location (pt. Location): Home    Distant Location (provider location):  [facility] [place]     Platform used for Video Visit: Hyglos

## 2021-03-26 ENCOUNTER — TRANSFERRED RECORDS (OUTPATIENT)
Dept: HEALTH INFORMATION MANAGEMENT | Facility: CLINIC | Age: 51
End: 2021-03-26

## 2021-03-26 LAB — RETINOPATHY: NORMAL

## 2021-04-03 DIAGNOSIS — I10 HYPERTENSION GOAL BP (BLOOD PRESSURE) < 140/90: ICD-10-CM

## 2021-04-05 NOTE — TELEPHONE ENCOUNTER
Patient checking on the status of the refill only has 2 pills left.  Patient [phone] can leave a message  Josephine Kerns [facility]  2nd Floor  Primary Care

## 2021-04-06 RX ORDER — LOSARTAN POTASSIUM 100 MG/1
100 TABLET ORAL DAILY
Qty: 90 TABLET | Refills: 0 | Status: SHIPPED | OUTPATIENT
Start: 2021-04-06 | End: 2021-08-02

## 2021-04-08 DIAGNOSIS — I10 HYPERTENSION GOAL BP (BLOOD PRESSURE) < 140/90: ICD-10-CM

## 2021-04-08 DIAGNOSIS — G43.919 INTRACTABLE MIGRAINE WITHOUT STATUS MIGRAINOSUS, UNSPECIFIED MIGRAINE TYPE: ICD-10-CM

## 2021-04-08 RX ORDER — METOPROLOL TARTRATE 50 MG
TABLET ORAL
Qty: 180 TABLET | Refills: 0 | Status: SHIPPED | OUTPATIENT
Start: 2021-04-08 | End: 2021-07-11

## 2021-04-11 ENCOUNTER — HEALTH MAINTENANCE LETTER (OUTPATIENT)
Age: 51
End: 2021-04-11

## 2021-05-08 DIAGNOSIS — E78.5 HYPERLIPIDEMIA LDL GOAL <100: ICD-10-CM

## 2021-05-10 RX ORDER — SIMVASTATIN 20 MG
TABLET ORAL
Qty: 90 TABLET | Refills: 1 | Status: SHIPPED | OUTPATIENT
Start: 2021-05-10 | End: 2021-11-08

## 2021-05-13 ENCOUNTER — TRANSFERRED RECORDS (OUTPATIENT)
Dept: HEALTH INFORMATION MANAGEMENT | Facility: CLINIC | Age: 51
End: 2021-05-13

## 2021-06-10 ENCOUNTER — VIRTUAL VISIT (OUTPATIENT)
Dept: FAMILY MEDICINE | Facility: CLINIC | Age: 51
End: 2021-06-10
Payer: COMMERCIAL

## 2021-06-10 DIAGNOSIS — G43.109 MIGRAINE WITH AURA AND WITHOUT STATUS MIGRAINOSUS, NOT INTRACTABLE: ICD-10-CM

## 2021-06-10 PROCEDURE — 99214 OFFICE O/P EST MOD 30 MIN: CPT | Mod: TEL | Performed by: FAMILY MEDICINE

## 2021-06-10 RX ORDER — HYDROXYZINE PAMOATE 25 MG/1
25 CAPSULE ORAL 3 TIMES DAILY PRN
Qty: 30 CAPSULE | Refills: 0 | Status: SHIPPED | OUTPATIENT
Start: 2021-06-10 | End: 2022-09-26

## 2021-06-10 RX ORDER — HYDROCODONE BITARTRATE AND ACETAMINOPHEN 5; 325 MG/1; MG/1
1 TABLET ORAL EVERY 6 HOURS PRN
Qty: 15 TABLET | Refills: 0 | Status: SHIPPED | OUTPATIENT
Start: 2021-06-10 | End: 2021-06-30

## 2021-06-10 NOTE — PROGRESS NOTES
Carissa is a 51 year old who is being evaluated via a billable telephone visit.    Migraine started this AM. Worse by noon.   fiorcet usually helpful but not today. No fevers or chills. Emesis x1 last night. Some blurry vision - typical. No focal weakness/tingling. No ALCOHOL. Hot weather makes worse. Emotionally doing ok. No illicit drugs. vicodin rare helpful. No vistaril in past but would like to try.    What phone number would you like to be contacted at? [phone]  How would you like to obtain your AVS? MyChart    ASSESSMENT / PLAN:  (G43.109) Migraine with aura and without status migrainosus, not intractable  Comment: acute failed fiorcet  Plan: HYDROcodone-acetaminophen (NORCO) 5-325 MG         tablet, hydrOXYzine (VISTARIL) 25 MG capsule        Reveiwed risks and side effects of medication  To ER if worse. Expected course and warning signs reviewed. Rest/dark room and keep well hydrated. Call/email with questions/concerns         Subjective   Carissa is a 51 year old who presents for the following health issues {    HPI     Migraine started this morning would like to have medicine       Review of Systems   All other ROS negative.       Objective           Vitals:  No vitals were obtained today due to virtual visit.    Physical Exam   healthy, alert and no distress  PSYCH: Alert and oriented times 3; coherent speech, normal   rate and volume, able to articulate logical thoughts, able   to abstract reason, no tangential thoughts, no hallucinations   or delusions  Her affect is normal  RESP: No cough, no audible wheezing, able to talk in full sentences  Remainder of exam unable to be completed due to telephone visits        Phone call duration: 11 minutes

## 2021-06-30 ENCOUNTER — VIRTUAL VISIT (OUTPATIENT)
Dept: FAMILY MEDICINE | Facility: CLINIC | Age: 51
End: 2021-06-30
Payer: COMMERCIAL

## 2021-06-30 DIAGNOSIS — G43.109 MIGRAINE WITH AURA AND WITHOUT STATUS MIGRAINOSUS, NOT INTRACTABLE: ICD-10-CM

## 2021-06-30 PROBLEM — E11.9 DIABETES MELLITUS, TYPE 2 (H): Status: RESOLVED | Noted: 2018-09-19 | Resolved: 2021-06-30

## 2021-06-30 PROCEDURE — 99213 OFFICE O/P EST LOW 20 MIN: CPT | Mod: TEL | Performed by: PHYSICIAN ASSISTANT

## 2021-06-30 RX ORDER — HYDROCODONE BITARTRATE AND ACETAMINOPHEN 5; 325 MG/1; MG/1
1 TABLET ORAL 3 TIMES DAILY PRN
Qty: 15 TABLET | Refills: 0 | Status: SHIPPED | OUTPATIENT
Start: 2021-06-30 | End: 2021-07-30

## 2021-06-30 NOTE — PROGRESS NOTES
Carissa is a 51 year old who has a history of diabetes, high cholesterol, hypertension, bipolar, depression who is being evaluated via a billable telephone visit.      What phone number would you like to be contacted at? [phone]  How would you like to obtain your AVS? MyChart    Assessment & Plan       ICD-10-CM    1. Migraine with aura and without status migrainosus, not intractable  G43.109 HYDROcodone-acetaminophen (NORCO) 5-325 MG tablet   Talk to patient about her concerns over a phone visit. At this point her Norco was refilled. Warning signs of side effects were discussed. She will continue care with her primary provider in the future.    Return in about 1 week (around 7/7/2021) for recheck.    Lucho Alvarez PA-C  [facility]   Carissa is a 51 year old who presents for the following health issues     HPI       Concern - Migraine  Onset: 24 hours  Description: pounding headache  Intensity: severe  Progression of Symptoms:  same  Accompanying Signs & Symptoms: nausea  Previous history of similar problem: yes  Precipitating factors:        Worsened by: heat  Alleviating factors:        Improved by: none  Therapies tried and outcome: fioricet    She has a history of migraines. She states she gets 4 or 5 bad ones a year. She is out of her Norco and can get into see her primary provider for couple days. She states she will take that with hydroxyzine and that helps her quite a bit. She also has Zofran that she uses at times. She has allergies to Imitrex. She states this is her typical migraine that she usually gets in the past.    Review of Systems   Constitutional, HEENT, cardiovascular, pulmonary, gi and gu systems are negative, except as otherwise noted.      Objective           Vitals:  No vitals were obtained today due to virtual visit.    Physical Exam   healthy, alert and no distress  PSYCH: Alert and oriented times 3; coherent speech, normal   rate and volume, able  to articulate logical thoughts, able   to abstract reason, no tangential thoughts, no hallucinations   or delusions  Her affect is normal  RESP: No cough, no audible wheezing, able to talk in full sentences  Remainder of exam unable to be completed due to telephone visits        Phone call duration: 5 minutes

## 2021-07-11 DIAGNOSIS — I10 HYPERTENSION GOAL BP (BLOOD PRESSURE) < 140/90: ICD-10-CM

## 2021-07-11 DIAGNOSIS — G43.919 INTRACTABLE MIGRAINE WITHOUT STATUS MIGRAINOSUS, UNSPECIFIED MIGRAINE TYPE: ICD-10-CM

## 2021-07-11 RX ORDER — METOPROLOL TARTRATE 50 MG
TABLET ORAL
Qty: 180 TABLET | Refills: 0 | Status: SHIPPED | OUTPATIENT
Start: 2021-07-11 | End: 2021-07-12

## 2021-07-12 DIAGNOSIS — I10 HYPERTENSION GOAL BP (BLOOD PRESSURE) < 140/90: ICD-10-CM

## 2021-07-12 DIAGNOSIS — G43.919 INTRACTABLE MIGRAINE WITHOUT STATUS MIGRAINOSUS, UNSPECIFIED MIGRAINE TYPE: ICD-10-CM

## 2021-07-12 RX ORDER — METOPROLOL TARTRATE 50 MG
TABLET ORAL
Qty: 180 TABLET | Refills: 0 | Status: SHIPPED | OUTPATIENT
Start: 2021-07-12 | End: 2021-08-02

## 2021-07-29 ENCOUNTER — ANCILLARY PROCEDURE (OUTPATIENT)
Dept: MAMMOGRAPHY | Facility: CLINIC | Age: 51
End: 2021-07-29
Attending: FAMILY MEDICINE
Payer: COMMERCIAL

## 2021-07-29 ENCOUNTER — OFFICE VISIT (OUTPATIENT)
Dept: URGENT CARE | Facility: URGENT CARE | Age: 51
End: 2021-07-29
Payer: COMMERCIAL

## 2021-07-29 VITALS
OXYGEN SATURATION: 98 % | DIASTOLIC BLOOD PRESSURE: 90 MMHG | TEMPERATURE: 97.6 F | SYSTOLIC BLOOD PRESSURE: 153 MMHG | HEART RATE: 102 BPM

## 2021-07-29 DIAGNOSIS — Z12.31 SCREENING MAMMOGRAM FOR HIGH-RISK PATIENT: ICD-10-CM

## 2021-07-29 DIAGNOSIS — G43.819 OTHER MIGRAINE WITHOUT STATUS MIGRAINOSUS, INTRACTABLE: Primary | ICD-10-CM

## 2021-07-29 DIAGNOSIS — I10 HYPERTENSION GOAL BP (BLOOD PRESSURE) < 140/90: ICD-10-CM

## 2021-07-29 PROCEDURE — 77067 SCR MAMMO BI INCL CAD: CPT | Mod: TC | Performed by: RADIOLOGY

## 2021-07-29 PROCEDURE — 77063 BREAST TOMOSYNTHESIS BI: CPT | Mod: TC | Performed by: RADIOLOGY

## 2021-07-29 PROCEDURE — 99214 OFFICE O/P EST MOD 30 MIN: CPT | Performed by: FAMILY MEDICINE

## 2021-07-29 RX ORDER — HYDROCODONE BITARTRATE AND ACETAMINOPHEN 5; 325 MG/1; MG/1
1 TABLET ORAL 2 TIMES DAILY PRN
Qty: 4 TABLET | Refills: 0 | Status: SHIPPED | OUTPATIENT
Start: 2021-07-29 | End: 2021-07-30

## 2021-07-29 NOTE — PROGRESS NOTES
Chief complaint: migraine    Patient took advil this morning     Patient known migraines  Per patient the only thing that helps when she has these episodes she needs hydrocodein    Severity of pain: moderate  Associated symptoms: nausea and photophobia  Warning signs: None    Alleviating factors: unable to obtain relief with OTC meds.     History of trauma to head or neck: no  Fever: No  Sinus or cold symptoms: No   Thoughts of harming self or others: No  Skin: no rash.  Possibility of pregnancy: No     Problem list, Medication list, Allergies, and Medical/Social/Surgical histories reviewed in [facility] and updated as appropriate.    REVIEW OF SYSTEMS :     Constitutional, HEENT, cardiovascular, pulmonary, gi and gu systems are negative, except as otherwise noted.  No fevers or chills chest pain or shortness of breath       OBJECTIVE:                                                    BP (!) 153/90   Pulse 102   Temp 97.6  F (36.4  C) (Tympanic)   LMP  (LMP Unknown)   SpO2 98%   There is no height or weight on file to calculate BMI.  GENERAL: healthy, alert and no distress  EYES: Eyes grossly normal to inspection, PERRL and conjunctivae and sclerae normal  ENT: midline nasal septum. No sinus congestion.  rest of ENT exam normal.  NECK: no adenopathy, no asymmetry, masses, or scars and thyroid normal to palpation  RESP: lungs clear to auscultation - no rales, rhonchi or wheezes  CV: regular rate and rhythm, normal S1 S2, no S3 or S4, no murmur, click or rub, no peripheral edema and peripheral pulses strong  MS: no gross musculoskeletal defects noted, no edema. No paraspinal muscle spasm.  No trapezius muscle spasm.  Neurologic Exam: CN's were intact. MMT 5/5 bilateral upper and lower extremities. Sensory was intact. Normal Gait. Normal Cerebellar Function. No focal or gross neurologic deficits. No meningeal signs  Psych: Pleasant. Appropriate mood and affect.  Skin: no obvious rash or lesions.     Diagnostic Test  Results:  none      ASSESSMENT/PLAN:                                                        ICD-10-CM    1. Other migraine without status migrainosus, intractable  G43.819 HYDROcodone-acetaminophen (NORCO) 5-325 MG tablet   2. Hypertension goal BP (blood pressure) < 140/90  I10        mnpmp reviewed  We discussed that I am concerned about medication overuse and rebound headaches  Patient had used vicodin two times in June with more frequent exacerbations.  Given prescribed only 4 tablets of vicodin for as needed use  Alarm signs or symptoms discussed, if present recommend go to ER     Recommend follow up with primary care provider if no relief in 3 days, sooner if worse  Adverse reactions of medications discussed.  Over the counter medications discussed.   Aware to come back in if with worsening symptoms or if no relief despite treatment plan  Patient voiced understanding and had no further questions.     Alarm symptoms discussed. Imaging offered patient declined.  If with any worsening symptoms or concerns proceed to ER, especially if with any headache, fever, blurring of vision, numbness weakness or gait instability, persistent nausea or vomiting or no relief.    BP elevated, patient with headache  Recommend re-evaluation with primary care provider   Alarm signs or symptoms discussed, if present recommend go to ER       MD Leisa Iyer MD  [facility] URGENT CARE [place]

## 2021-07-30 ENCOUNTER — VIRTUAL VISIT (OUTPATIENT)
Dept: FAMILY MEDICINE | Facility: CLINIC | Age: 51
End: 2021-07-30
Payer: COMMERCIAL

## 2021-07-30 DIAGNOSIS — I10 HYPERTENSION GOAL BP (BLOOD PRESSURE) < 140/90: ICD-10-CM

## 2021-07-30 DIAGNOSIS — G43.809 OTHER MIGRAINE WITHOUT STATUS MIGRAINOSUS, NOT INTRACTABLE: Primary | ICD-10-CM

## 2021-07-30 PROCEDURE — 99213 OFFICE O/P EST LOW 20 MIN: CPT | Mod: TEL | Performed by: NURSE PRACTITIONER

## 2021-07-30 RX ORDER — HYDROCODONE BITARTRATE AND ACETAMINOPHEN 5; 325 MG/1; MG/1
1 TABLET ORAL 3 TIMES DAILY PRN
Qty: 10 TABLET | Refills: 0 | Status: SHIPPED | OUTPATIENT
Start: 2021-07-30 | End: 2022-01-10

## 2021-07-30 ASSESSMENT — ENCOUNTER SYMPTOMS
NAUSEA: 1
PHOTOPHOBIA: 1
SHORTNESS OF BREATH: 0
HEADACHES: 1
APPETITE CHANGE: 1

## 2021-07-30 NOTE — PROGRESS NOTES
Carissa is a 51 year old who is being evaluated via a billable telephone visit.      What phone number would you like to be contacted at? [phone]  How would you [facility] to [facility]    Assessment & Plan     1. Other migraine without status migrainosus, not intractable  - refilled.  reviewed.    - advised ED if she develops the worse migraine/headache of her life or any changes outside of her normal migraine symptoms.   - HYDROcodone-acetaminophen (NORCO) 5-325 MG tablet; Take 1 tablet by mouth 3 times daily as needed for moderate to severe pain  Dispense: 10 tablet; Refill: 0    2. Hypertension goal BP (blood pressure) < 140/90  - chronic problem.  Recommend monitoring at home.     Return in about 3 days (around 8/2/2021) for worsening symptoms or failure to improve.    BEKAH Nichols CNP  [facility] [place]    Subjective   Carissa is a 51 year old who presents for the following health issues     HPI     ED/UC Followup:  Facility:  [facility] [place]  Date of visit: 7/29/2021  Reason for visit: Other migraine without status migrainosus, intractable + Hypertension  Current Status: blood pressure down     Pt presents for follow-up of migraine headache.  She was seen at Urgent Care yesterday for migraine headache as she was out of her pain medication.  BP noted to be elevated during visit at 153/90.  She was prescribed Norco 4 tabs, which is what Carissa states she normally takes for her bad migraines.  Today she reports ongoing migraine.  This migraine started yesterday morning around 2:30 am.  She reports her bad migraines normally last 4-5 days.  States she typically has to take Norco TID as the Fioricet does not given relief of the bad migraines.  Associated nausea, light, and sound sensitivity.  States she has already taken 3 Norco and only has 1 left and does not think this will get her through the weekend.   Reports she has not taken her BP this morning.        Review of Systems   Constitutional: Positive for appetite change.   Eyes: Positive for photophobia.   Respiratory: Negative for shortness of breath.    Cardiovascular: Negative for chest pain.   Gastrointestinal: Positive for nausea.   Neurological: Positive for headaches.            Objective           Vitals:  No vitals were obtained today due to virtual visit.    Physical Exam   healthy, alert and no distress  PSYCH: Alert and oriented times 3; coherent speech, normal   rate and volume, able to articulate logical thoughts, able   to abstract reason, no tangential thoughts, no hallucinations   or delusions  Her affect is normal  RESP: No cough, no audible wheezing, able to talk in full sentences  Remainder of exam unable to be completed due to telephone visits                Phone call duration: 6 minutes

## 2021-08-01 ENCOUNTER — TRANSFERRED RECORDS (OUTPATIENT)
Dept: HEALTH INFORMATION MANAGEMENT | Facility: CLINIC | Age: 51
End: 2021-08-01

## 2021-08-02 ENCOUNTER — OFFICE VISIT (OUTPATIENT)
Dept: FAMILY MEDICINE | Facility: CLINIC | Age: 51
End: 2021-08-02
Payer: COMMERCIAL

## 2021-08-02 ENCOUNTER — TRANSFERRED RECORDS (OUTPATIENT)
Dept: HEALTH INFORMATION MANAGEMENT | Facility: CLINIC | Age: 51
End: 2021-08-02

## 2021-08-02 VITALS
OXYGEN SATURATION: 97 % | HEART RATE: 87 BPM | BODY MASS INDEX: 40.92 KG/M2 | SYSTOLIC BLOOD PRESSURE: 138 MMHG | TEMPERATURE: 97.7 F | DIASTOLIC BLOOD PRESSURE: 88 MMHG | HEIGHT: 68 IN | WEIGHT: 270 LBS

## 2021-08-02 DIAGNOSIS — F31.9 BIPOLAR AFFECTIVE DISORDER, REMISSION STATUS UNSPECIFIED (H): ICD-10-CM

## 2021-08-02 DIAGNOSIS — I10 HYPERTENSION GOAL BP (BLOOD PRESSURE) < 140/90: ICD-10-CM

## 2021-08-02 DIAGNOSIS — G43.919 INTRACTABLE MIGRAINE WITHOUT STATUS MIGRAINOSUS, UNSPECIFIED MIGRAINE TYPE: ICD-10-CM

## 2021-08-02 DIAGNOSIS — E11.9 TYPE 2 DIABETES MELLITUS WITHOUT COMPLICATION, WITHOUT LONG-TERM CURRENT USE OF INSULIN (H): ICD-10-CM

## 2021-08-02 DIAGNOSIS — R00.2 PALPITATIONS: Primary | ICD-10-CM

## 2021-08-02 DIAGNOSIS — F32.0 MILD MAJOR DEPRESSION (H): ICD-10-CM

## 2021-08-02 PROCEDURE — 99214 OFFICE O/P EST MOD 30 MIN: CPT | Performed by: FAMILY MEDICINE

## 2021-08-02 RX ORDER — LOSARTAN POTASSIUM 100 MG/1
100 TABLET ORAL DAILY
Qty: 90 TABLET | Refills: 0 | Status: SHIPPED | OUTPATIENT
Start: 2021-08-02 | End: 2021-09-29

## 2021-08-02 RX ORDER — METOPROLOL TARTRATE 100 MG
100 TABLET ORAL 2 TIMES DAILY
Qty: 180 TABLET | Refills: 1 | Status: SHIPPED | OUTPATIENT
Start: 2021-08-02 | End: 2022-02-09

## 2021-08-02 RX ORDER — FUROSEMIDE 20 MG
TABLET ORAL
Qty: 20 TABLET | Refills: 1 | Status: SHIPPED | OUTPATIENT
Start: 2021-08-02 | End: 2022-09-26

## 2021-08-02 ASSESSMENT — MIFFLIN-ST. JEOR: SCORE: 1883.21

## 2021-08-02 NOTE — PROGRESS NOTES
"SUBJECTIVE:  Carissa Spears, a 51 year old female scheduled an appointment to discuss the following issues:  [facility] for palpitation/ High Bp. Having Headaache - history migraines.   In ER had normal ekg/xray chest and labs -trop, renal panel, cbc, magnesium.   Seen cardiology years ago. Outside blood pressure a little high.  No heart rate watch.   No chest pain. Skipped beats. Occasionally LIGHTHEADED. Worse with sitting.   Coffee x2 and one pop. Limited fiorcet. Some ALCOHOL. Sleep hit/miss.   Seeing psych. Outside blood pressure readings a little high. Blood sugars a little high. Not < 50 or LOSS OF CONSCIENCE or >400.  No sudafed. Limited sodium in diet.   Medical, social, surgical, and family histories reviewed.    ROS:  All other ROS negative  OBJECTIVE:  /88   Pulse 87   Temp 97.7  F (36.5  C) (Tympanic)   Ht 1.727 m (5' 8\")   Wt 122.5 kg (270 lb)   LMP  (LMP Unknown)   SpO2 97%   BMI 41.05 kg/m   EXAM:  GENERAL APPEARANCE: healthy, alert and no distress  EYES: EOMI,  PERRL  HENT: ear canals and TM's normal and nose and mouth without ulcers or lesions  NECK: no adenopathy, no asymmetry, masses, or scars and thyroid normal to palpation  RESP: lungs clear to auscultation - no rales, rhonchi or wheezes  CV: regular rates and rhythm, normal S1 S2, no S3 or S4 and no murmur, click or rub -  ABDOMEN:  soft, nontender, no HSM or masses and bowel sounds normal  MS: extremities normal- no gross deformities noted, no evidence of inflammation in joints, FROM in all extremities.  PSYCH: mentation appears normal and affect normal/bright  PSYCH: mildly anxious    ASSESSMENT / PLAN:  (R00.2) Palpitations  (primary encounter diagnosis)  Comment: anxiety vs possible issues with her psych meds  Plan: Adult Cardiology Eval Referral        Avoid caffeine and ALCOHOL. Increase exercise. Get a fit bit watch to monitor. To ER if prolonged or chest pain or shortness of breath. Patient would like to see a " cardiologist.     (I10) Hypertension goal BP (blood pressure) < 140/90  Comment: a little high  Plan: losartan (COZAAR) 100 MG tablet, metoprolol         tartrate (LOPRESSOR) 100 MG tablet, furosemide         (LASIX) 20 MG tablet, Adult Cardiology Eval         Referral        Will double metoprolol and add prn lasix. Reveiwed risks and side effects of medication  Self-monitor and limit sodium. Return to clinic 2 months recheck. Sooner if worse.    (E11.9) Type 2 diabetes mellitus without complication, without long-term current use of insulin (H)  Comment: stable?  Plan: lab in 2months. Weight loss/ diet. Continue metformin.     (G43.919) Intractable migraine without status migrainosus, unspecified migraine type  Comment: needs help  Plan: metoprolol tartrate (LOPRESSOR) 100 MG tablet        Double dosage. Exercise and diet. Neurology input if needed. Avoid ALCOHOL.       (F31.9) Bipolar affective disorder, remission status unspecified (H)  Comment: stable  Plan: per psych    (F32.0) Mild major depression (H)  Comment: stable  Plan: per psych. If SUICIAL IDEATION OR HOMOCIDAL IDEATION OR [place] TO ROSELIA [place] MD

## 2021-09-26 ENCOUNTER — HEALTH MAINTENANCE LETTER (OUTPATIENT)
Age: 51
End: 2021-09-26

## 2021-09-29 DIAGNOSIS — I10 HYPERTENSION GOAL BP (BLOOD PRESSURE) < 140/90: ICD-10-CM

## 2021-09-29 RX ORDER — LOSARTAN POTASSIUM 100 MG/1
TABLET ORAL
Qty: 90 TABLET | Refills: 0 | Status: SHIPPED | OUTPATIENT
Start: 2021-09-29 | End: 2021-11-08

## 2021-09-30 ENCOUNTER — TELEPHONE (OUTPATIENT)
Dept: CARDIOLOGY | Facility: CLINIC | Age: 51
End: 2021-09-30

## 2021-09-30 NOTE — TELEPHONE ENCOUNTER
Patient reports zio fell off after two days , and zio at home and will mail zio tomorrow. Patient preferred to reschedule visit .Jabari Yu L.P.N.,[facility] Per Jaclyn Id. # [id number], zio rep . Updated of status via phone .Jabari Yu L.P.N.,[facility]

## 2021-10-06 ENCOUNTER — TRANSFERRED RECORDS (OUTPATIENT)
Dept: HEALTH INFORMATION MANAGEMENT | Facility: CLINIC | Age: 51
End: 2021-10-06

## 2021-10-09 DIAGNOSIS — I10 HYPERTENSION GOAL BP (BLOOD PRESSURE) < 140/90: ICD-10-CM

## 2021-10-09 DIAGNOSIS — E11.9 TYPE 2 DIABETES MELLITUS WITHOUT COMPLICATION, WITHOUT LONG-TERM CURRENT USE OF INSULIN (H): ICD-10-CM

## 2021-10-09 NOTE — LETTER
October 11, 2021    Carissa Spears  [address]  [place] [address]    Dear Carissa,       We recently received a refill request for hydrochlorothiazide (HYDRODIURIL) 25 MG tablet and metFORMIN (GLUCOPHAGE-XR) 500 MG 24 hr tablet.  We have refilled this for a one time 90 day supply only because you are due for a:    Diabetic and hypertension video or office visit and pre-visit lab appointment      Please schedule this lab appointment 4-5 days prior to the office visit.     Please call at your earliest convenience so that there will not be a delay with your future refills.          Thank you,   Your [facility] Team/sp  [phone]

## 2021-10-10 RX ORDER — METFORMIN HCL 500 MG
TABLET, EXTENDED RELEASE 24 HR ORAL
Qty: 180 TABLET | Refills: 0 | Status: SHIPPED | OUTPATIENT
Start: 2021-10-10 | End: 2022-01-07

## 2021-10-10 RX ORDER — HYDROCHLOROTHIAZIDE 25 MG/1
TABLET ORAL
Qty: 90 TABLET | Refills: 3 | Status: SHIPPED | OUTPATIENT
Start: 2021-10-10 | End: 2022-11-29

## 2021-11-08 DIAGNOSIS — I10 HYPERTENSION GOAL BP (BLOOD PRESSURE) < 140/90: ICD-10-CM

## 2021-11-08 DIAGNOSIS — E78.5 HYPERLIPIDEMIA LDL GOAL <100: ICD-10-CM

## 2021-11-08 RX ORDER — SIMVASTATIN 20 MG
TABLET ORAL
Qty: 90 TABLET | Refills: 1 | Status: SHIPPED | OUTPATIENT
Start: 2021-11-08 | End: 2022-05-09

## 2021-11-08 RX ORDER — LOSARTAN POTASSIUM 100 MG/1
TABLET ORAL
Qty: 90 TABLET | Refills: 0 | Status: SHIPPED | OUTPATIENT
Start: 2021-11-08 | End: 2022-05-09

## 2021-11-21 ENCOUNTER — HEALTH MAINTENANCE LETTER (OUTPATIENT)
Age: 51
End: 2021-11-21

## 2021-11-22 ENCOUNTER — OFFICE VISIT (OUTPATIENT)
Dept: CARDIOLOGY | Facility: CLINIC | Age: 51
End: 2021-11-22
Attending: FAMILY MEDICINE
Payer: COMMERCIAL

## 2021-11-22 VITALS
OXYGEN SATURATION: 98 % | SYSTOLIC BLOOD PRESSURE: 142 MMHG | BODY MASS INDEX: 43.03 KG/M2 | DIASTOLIC BLOOD PRESSURE: 62 MMHG | HEART RATE: 65 BPM | WEIGHT: 283 LBS

## 2021-11-22 DIAGNOSIS — I10 HYPERTENSION GOAL BP (BLOOD PRESSURE) < 140/90: ICD-10-CM

## 2021-11-22 DIAGNOSIS — R00.2 PALPITATIONS: Primary | ICD-10-CM

## 2021-11-22 PROCEDURE — 99203 OFFICE O/P NEW LOW 30 MIN: CPT | Performed by: INTERNAL MEDICINE

## 2021-11-22 PROCEDURE — 93000 ELECTROCARDIOGRAM COMPLETE: CPT | Performed by: INTERNAL MEDICINE

## 2021-11-22 NOTE — LETTER
11/22/2021      RE: Carissa Spears  [id number] [facility]  [place] [address]       Dear Colleague,    Thank you for the opportunity to participate in the care of your patient, Carissa Spears, at the [facility] HEART [facility]INIC [facility]. Please see a copy of my visit note below.    HPI: Purpose of visit: I was requested by Dr. Silvino Beach to consult on palpitations    Patient is a 51-year-old woman with history of hypertension and type 2 diabetes and without known history of coronary heart disease, arrhythmias, and heart failure.    Patient had a recent hospitalization at [facility] for palpitations and after discharge from the hospital patient was seen by Dr. Silvino Beach who increased her dose of metoprolol.  Since the increase in metoprolol, these palpitations have subsided.  A 2-day heart rhythm monitor showed only 2 short episodes of SVT that lasted a few seconds without any other significant arrhythmias.    Patient did not report any symptoms of prolonged palpitations, irregular heartbeat sensation, frequent lightheadedness, presyncope or syncope.    PAST MEDICAL HISTORY:  Past Medical History:   Diagnosis Date     Allergies      Bipolar disorder, unspecified (H)      DM II (diabetes mellitus, type II)     diet controlled     Endometriosis      Herniated lumbar intervertebral disc     age 22     High cholesterol      HTN      Liver disease     fatty liver     Obesity      Other disorder of menstruation and other abnormal bleeding from female genital tract      Polycystic ovarian syndrome      Sleep disorder      Toxemia        CURRENT MEDICATIONS:  Current Outpatient Medications   Medication Sig Dispense Refill     ALPRAZolam (XANAX) 0.5 MG tablet Take 0.5 mg by mouth        blood glucose monitoring (CONTOUR NEXT MONITOR W/DEVICE KIT) meter device kit USE TO TEST BLOOD SUGAR ONCE DAILY OR AS DIRECTED 1 kit 0     Butalbital-APAP-Caffeine (FIORICET PO)         Cholecalciferol (VITAMIN D3) 50 MCG (2000 UT) TABS TAKE 1 TABLET BY MOUTH TWICE DAILY 180 tablet 3     citalopram (CELEXA) 40 MG tablet        furosemide (LASIX) 20 MG tablet One tab daily as needed for blood pressure >140/90. Get extra potassium in diet with fruits/veggies. 20 tablet 1     hydrochlorothiazide (HYDRODIURIL) 25 MG tablet TAKE 1 TABLET(25 MG) BY MOUTH DAILY 90 tablet 3     hydrOXYzine (VISTARIL) 25 MG capsule Take 1 capsule (25 mg) by mouth 3 times daily as needed (migraine/nausea. can take with vicodin) 30 capsule 0     losartan (COZAAR) 100 MG tablet TAKE 1 TABLET(100 MG) BY MOUTH DAILY 90 tablet 0     lurasidone (LATUDA) 120 MG TABS tablet Take by mouth daily       metFORMIN (GLUCOPHAGE-XR) 500 MG 24 hr tablet TAKE 2 TABLETS BY MOUTH EVERY DAY WITH DINNER FOR DIABETES 180 tablet 0     metoprolol tartrate (LOPRESSOR) 100 MG tablet Take 1 tablet (100 mg) by mouth 2 times daily This is double dosage (for blood pressure, slows down heart rate and prevents migraines) 180 tablet 1     ondansetron (ZOFRAN-ODT) 4 MG ODT tab Take 1-2 tablets (4-8 mg) by mouth every 8 hours as needed for nausea 30 tablet 0     simvastatin (ZOCOR) 20 MG tablet TAKE 1 TABLET BY MOUTH AT BEDTIME FOR CHOLESTEROL 90 tablet 1     TRAZODONE HCL PO Take 100 mg by mouth At Bedtime Patient reports: Takes 100-200 MG at bedtime       aspirin 81 MG tablet Take 81 mg by mouth daily  (Patient not taking: Reported on 11/22/2021) 1 tablet 0     BusPIRone HCl (BUSPAR PO) Take 15 mg by mouth 2 times daily  (Patient not taking: Reported on 11/22/2021)       CONTOUR NEXT TEST test strip USE TO TEST BLOOD SUGAR ONCE DAILY OR AS DIRECTED 100 strip 3     HYDROcodone-acetaminophen (NORCO) 5-325 MG tablet Take 1 tablet by mouth 3 times daily as needed for moderate to severe pain (Patient not taking: Reported on 11/22/2021) 10 tablet 0     MICROLET LANCETS MISC USE TO TEST BLOOD SUGAR ONCE DAILY OR AS DIRECTED 100 each 3     naloxone (NARCAN) 4  MG/0.1ML nasal spray Spray 1 spray (4 mg) into one nostril alternating nostrils once as needed for opioid reversal every 2-3 minutes until assistance arrives 0.2 mL 0       PAST SURGICAL HISTORY:  Past Surgical History:   Procedure Laterality Date     AS KNEE SCOPE, ALLOGRAFT IMPANT Left      C/SECTION, CLASSICAL      X2     HYSTERECTOMY, PAP NO LONGER INDICATED       HYSTERECTOMY, KAMILLA  4/8/08     LAPAROSCOPIC CHOLECYSTECTOMY  7/18/2012    Procedure: LAPAROSCOPIC CHOLECYSTECTOMY;  Laparoscopic cholecystectomy;  Surgeon: Miguel Fuentes MD;  Location: [facility] OR     SALPINGO OOPHORECTOMY,R/L/YANELIS  4/8/08    L     TONSILLECTOMY  1983     TUBAL LIGATION         ALLERGIES:     Allergies   Allergen Reactions     Sumatriptan Anaphylaxis and Other (See Comments)     Blood pressure high/ she was in hospital  Chest pressure     Calcium Channel Blockers      Augmentin Diarrhea     Codeine      Sick to stomach     Food Nausea and Vomiting     Kidney beans- mold.     Latex      Puffy, rash      Sulfa Drugs Nausea     Lisinopril Nausea       FAMILY HISTORY:  - Premature coronary artery disease  - Atrial fibrillation  - Sudden cardiac death     SOCIAL HISTORY:  Social History     Tobacco Use     Smoking status: Current Every Day Smoker     Packs/day: 1.00     Years: 20.00     Pack years: 20.00     Types: Cigarettes     Smokeless tobacco: Never Used   Substance Use Topics     Alcohol use: No     Drug use: No       ROS:   Constitutional: No fever, chills, or sweats. Weight stable.   ENT: No visual disturbance, ear ache, epistaxis, sore throat.   Cardiovascular: As per HPI.   Respiratory: No cough, hemoptysis.    GI: No nausea, vomiting, hematemesis, melena, or hematochezia.   : No hematuria.   Integument: Negative.   Psychiatric: Negative.   Hematologic:  Easy bruising, no easy bleeding.  Neuro: Negative.   Endocrinology: No significant heat or cold intolerance   Musculoskeletal: No myalgia.    Exam:  BP (!) 142/62   Pulse 65    Wt 128.4 kg (283 lb)   LMP  (LMP Unknown)   SpO2 98%   BMI 43.03 kg/m    GENERAL APPEARANCE: healthy, alert and no distress  HEENT: no icterus, no xanthelasmas, normal pupil size and reaction, normal palate, mucosa moist, no central cyanosis  NECK: no adenopathy, no asymmetry, masses, or scars, thyroid normal to palpation and no bruits, JVP not elevated  RESPIRATORY: lungs clear to auscultation - no rales, rhonchi or wheezes, no use of accessory muscles, no retractions, respirations are unlabored, normal respiratory rate  CARDIOVASCULAR: regular rhythm, normal S1 with physiologic split S2, no S3 or S4 and no murmur, click or rub, precordium quiet with normal PMI.  ABDOMEN: soft, non tender, without hepatosplenomegaly, no masses palpable, bowel sounds normal, aorta not enlarged by palpation, no abdominal bruits  EXTREMITIES: peripheral pulses normal, no edema, no bruits  NEURO: alert and oriented to person/place/time, normal speech, gait and affect  VASC: Radial, femoral, dorsalis pedis and posterior tibialis pulses are normal in volumes and symmetric bilaterally. No bruits are heard.  SKIN: no ecchymoses, no rashes    Labs:  CBC RESULTS:   Lab Results   Component Value Date    WBC 8.4 11/29/2020    RBC 4.69 11/29/2020    HGB 14.8 11/29/2020    HCT 44.9 11/29/2020    MCV 96 11/29/2020    MCH 31.6 11/29/2020    MCHC 33.0 11/29/2020    RDW 13.4 11/29/2020     11/29/2020       BMP RESULTS:  Lab Results   Component Value Date     09/08/2020    POTASSIUM 3.8 09/08/2020    CHLORIDE 103 09/08/2020    CO2 26 09/08/2020    ANIONGAP 9 09/08/2020     (H) 09/08/2020    BUN 10 09/08/2020    CR 0.80 09/08/2020    GFRESTIMATED 86 09/08/2020    GFRESTBLACK >90 09/08/2020    ANABELLE 9.1 09/08/2020        INR RESULTS:  No results found for: INR    Procedures:      Assessment and Plan:     Palpitations-resolved after increasing metoprolol dose    It is encouraging that the symptoms of palpitations have resolved.   To exclude any underlying structural heart disease we will schedule a transthoracic echocardiogram and communicate the results to the patient.  There is no follow-up in the heart rhythm clinic and I will be happy to see patient again if the need arises.    All questions and concerns were addressed and patient is happy with the plan.      CC  Patient Care Team:  Silvino Beach MD as PCP - General          Please do not hesitate to contact me if you have any questions/concerns.     Sincerely,     Lillie Ring MD

## 2021-11-22 NOTE — NURSING NOTE
"Chief Complaint   Patient presents with     Palpitations     NEW Dr. Ring 11/22//21 referral placed due to palpitations and HTN. Pt reports SOB, palpitations, dizziness.      Hypertension       Initial BP (!) 150/89 (BP Location: Left arm, Patient Position: Chair, Cuff Size: Adult Large)   Pulse 65   Wt 128.4 kg (283 lb)   LMP  (LMP Unknown)   SpO2 98%   BMI 43.03 kg/m   Estimated body mass index is 43.03 kg/m  as calculated from the following:    Height as of 8/2/21: 1.727 m (5' 8\").    Weight as of this encounter: 128.4 kg (283 lb)..  BP completed using cuff size: large    EKG done.   Mai Johnson MA  "

## 2021-11-22 NOTE — PROGRESS NOTES
HPI: Purpose of visit: I was requested by Dr. Silvino Beach to consult on palpitations    Patient is a 51-year-old woman with history of hypertension and type 2 diabetes and without known history of coronary heart disease, arrhythmias, and heart failure.    Patient had a recent hospitalization at [facility] for palpitations and after discharge from the hospital patient was seen by Dr. Silvino Beach who increased her dose of metoprolol.  Since the increase in metoprolol, these palpitations have subsided.  A 2-day heart rhythm monitor showed only 2 short episodes of SVT that lasted a few seconds without any other significant arrhythmias.    Patient did not report any symptoms of prolonged palpitations, irregular heartbeat sensation, frequent lightheadedness, presyncope or syncope.    PAST MEDICAL HISTORY:  Past Medical History:   Diagnosis Date     Allergies      Bipolar disorder, unspecified (H)      DM II (diabetes mellitus, type II)     diet controlled     Endometriosis      Herniated lumbar intervertebral disc     age 22     High cholesterol      HTN      Liver disease     fatty liver     Obesity      Other disorder of menstruation and other abnormal bleeding from female genital tract      Polycystic ovarian syndrome      Sleep disorder      Toxemia        CURRENT MEDICATIONS:  Current Outpatient Medications   Medication Sig Dispense Refill     ALPRAZolam (XANAX) 0.5 MG tablet Take 0.5 mg by mouth        blood glucose monitoring (CONTOUR NEXT MONITOR W/DEVICE KIT) meter device kit USE TO TEST BLOOD SUGAR ONCE DAILY OR AS DIRECTED 1 kit 0     Butalbital-APAP-Caffeine (FIORICET PO)        Cholecalciferol (VITAMIN D3) 50 MCG (2000 UT) TABS TAKE 1 TABLET BY MOUTH TWICE DAILY 180 tablet 3     citalopram (CELEXA) 40 MG tablet        furosemide (LASIX) 20 MG tablet One tab daily as needed for blood pressure >140/90. Get extra potassium in diet with fruits/veggies. 20 tablet 1     hydrochlorothiazide (HYDRODIURIL) 25 MG tablet  TAKE 1 TABLET(25 MG) BY MOUTH DAILY 90 tablet 3     hydrOXYzine (VISTARIL) 25 MG capsule Take 1 capsule (25 mg) by mouth 3 times daily as needed (migraine/nausea. can take with vicodin) 30 capsule 0     losartan (COZAAR) 100 MG tablet TAKE 1 TABLET(100 MG) BY MOUTH DAILY 90 tablet 0     lurasidone (LATUDA) 120 MG TABS tablet Take by mouth daily       metFORMIN (GLUCOPHAGE-XR) 500 MG 24 hr tablet TAKE 2 TABLETS BY MOUTH EVERY DAY WITH DINNER FOR DIABETES 180 tablet 0     metoprolol tartrate (LOPRESSOR) 100 MG tablet Take 1 tablet (100 mg) by mouth 2 times daily This is double dosage (for blood pressure, slows down heart rate and prevents migraines) 180 tablet 1     ondansetron (ZOFRAN-ODT) 4 MG ODT tab Take 1-2 tablets (4-8 mg) by mouth every 8 hours as needed for nausea 30 tablet 0     simvastatin (ZOCOR) 20 MG tablet TAKE 1 TABLET BY MOUTH AT BEDTIME FOR CHOLESTEROL 90 tablet 1     TRAZODONE HCL PO Take 100 mg by mouth At Bedtime Patient reports: Takes 100-200 MG at bedtime       aspirin 81 MG tablet Take 81 mg by mouth daily  (Patient not taking: Reported on 11/22/2021) 1 tablet 0     BusPIRone HCl (BUSPAR PO) Take 15 mg by mouth 2 times daily  (Patient not taking: Reported on 11/22/2021)       CONTOUR NEXT TEST test strip USE TO TEST BLOOD SUGAR ONCE DAILY OR AS DIRECTED 100 strip 3     HYDROcodone-acetaminophen (NORCO) 5-325 MG tablet Take 1 tablet by mouth 3 times daily as needed for moderate to severe pain (Patient not taking: Reported on 11/22/2021) 10 tablet 0     MICROLET LANCETS MISC USE TO TEST BLOOD SUGAR ONCE DAILY OR AS DIRECTED 100 each 3     naloxone (NARCAN) 4 MG/0.1ML nasal spray Spray 1 spray (4 mg) into one nostril alternating nostrils once as needed for opioid reversal every 2-3 minutes until assistance arrives 0.2 mL 0       PAST SURGICAL HISTORY:  Past Surgical History:   Procedure Laterality Date     AS KNEE SCOPE, ALLOGRAFT IMPANT Left      C/SECTION, CLASSICAL      X2     HYSTERECTOMY, PAP  NO LONGER INDICATED       HYSTERECTOMY, KAMILLA  4/8/08     LAPAROSCOPIC CHOLECYSTECTOMY  7/18/2012    Procedure: LAPAROSCOPIC CHOLECYSTECTOMY;  Laparoscopic cholecystectomy;  Surgeon: Miguel Fuentes MD;  Location: [facility] OR     SALPINGO OOPHORECTOMY,R/L/YANELIS  4/8/08    L     TONSILLECTOMY  1983     TUBAL LIGATION         ALLERGIES:     Allergies   Allergen Reactions     Sumatriptan Anaphylaxis and Other (See Comments)     Blood pressure high/ she was in hospital  Chest pressure     Calcium Channel Blockers      Augmentin Diarrhea     Codeine      Sick to stomach     Food Nausea and Vomiting     Kidney beans- mold.     Latex      Puffy, rash      Sulfa Drugs Nausea     Lisinopril Nausea       FAMILY HISTORY:  - Premature coronary artery disease  - Atrial fibrillation  - Sudden cardiac death     SOCIAL HISTORY:  Social History     Tobacco Use     Smoking status: Current Every Day Smoker     Packs/day: 1.00     Years: 20.00     Pack years: 20.00     Types: Cigarettes     Smokeless tobacco: Never Used   Substance Use Topics     Alcohol use: No     Drug use: No       ROS:   Constitutional: No fever, chills, or sweats. Weight stable.   ENT: No visual disturbance, ear ache, epistaxis, sore throat.   Cardiovascular: As per HPI.   Respiratory: No cough, hemoptysis.    GI: No nausea, vomiting, hematemesis, melena, or hematochezia.   : No hematuria.   Integument: Negative.   Psychiatric: Negative.   Hematologic:  Easy bruising, no easy bleeding.  Neuro: Negative.   Endocrinology: No significant heat or cold intolerance   Musculoskeletal: No myalgia.    Exam:  BP (!) 142/62   Pulse 65   Wt 128.4 kg (283 lb)   LMP  (LMP Unknown)   SpO2 98%   BMI 43.03 kg/m    GENERAL APPEARANCE: healthy, alert and no distress  HEENT: no icterus, no xanthelasmas, normal pupil size and reaction, normal palate, mucosa moist, no central cyanosis  NECK: no adenopathy, no asymmetry, masses, or scars, thyroid normal to palpation and no bruits,  JVP not elevated  RESPIRATORY: lungs clear to auscultation - no rales, rhonchi or wheezes, no use of accessory muscles, no retractions, respirations are unlabored, normal respiratory rate  CARDIOVASCULAR: regular rhythm, normal S1 with physiologic split S2, no S3 or S4 and no murmur, click or rub, precordium quiet with normal PMI.  ABDOMEN: soft, non tender, without hepatosplenomegaly, no masses palpable, bowel sounds normal, aorta not enlarged by palpation, no abdominal bruits  EXTREMITIES: peripheral pulses normal, no edema, no bruits  NEURO: alert and oriented to person/place/time, normal speech, gait and affect  VASC: Radial, femoral, dorsalis pedis and posterior tibialis pulses are normal in volumes and symmetric bilaterally. No bruits are heard.  SKIN: no ecchymoses, no rashes    Labs:  CBC RESULTS:   Lab Results   Component Value Date    WBC 8.4 11/29/2020    RBC 4.69 11/29/2020    HGB 14.8 11/29/2020    HCT 44.9 11/29/2020    MCV 96 11/29/2020    MCH 31.6 11/29/2020    MCHC 33.0 11/29/2020    RDW 13.4 11/29/2020     11/29/2020       BMP RESULTS:  Lab Results   Component Value Date     09/08/2020    POTASSIUM 3.8 09/08/2020    CHLORIDE 103 09/08/2020    CO2 26 09/08/2020    ANIONGAP 9 09/08/2020     (H) 09/08/2020    BUN 10 09/08/2020    CR 0.80 09/08/2020    GFRESTIMATED 86 09/08/2020    GFRESTBLACK >90 09/08/2020    ANABELLE 9.1 09/08/2020        INR RESULTS:  No results found for: INR    Procedures:      Assessment and Plan:     Palpitations-resolved after increasing metoprolol dose    It is encouraging that the symptoms of palpitations have resolved.  To exclude any underlying structural heart disease we will schedule a transthoracic echocardiogram and communicate the results to the patient.  There is no follow-up in the heart rhythm clinic and I will be happy to see patient again if the need arises.    All questions and concerns were addressed and patient is happy with the  plan.      CC  Patient Care Team:  Harika Davison MD as PCP - General  Harika Davison MD as Assigned PCP  HARIKA DAVISON

## 2021-11-22 NOTE — PATIENT INSTRUCTIONS
Thank you for coming to the [facility]; please note the following instructions:    1. echocardiogram        If you have any questions regarding your visit please contact your care team:     Cardiology  Telephone Number   Phuong COMBS, RN  Tova FELIPE, RN   Kaelyn MILLER, KUMAR ADAME, KUMAR MOORE, LPN   [phone] (option [phone])   For scheduling appts:     [phone] (select option 1)       For the Device Clinic (Pacemakers and ICD's)  RN's :  Carissa Khan   During business hours: [phone]    *After business hours:  [phone] (select option 4)      Normal test result notifications will be released via "[employer] or mailed within 7 business days.  All other test results, will be communicated via telephone once reviewed by your cardiologist.    If you need a medication refill please contact your pharmacy.  Please allow 3 business days for your refill to be completed.    As always, thank you for trusting us with your health care needs!

## 2021-11-23 ASSESSMENT — PATIENT HEALTH QUESTIONNAIRE - PHQ9: SUM OF ALL RESPONSES TO PHQ QUESTIONS 1-9: 0

## 2021-12-17 ENCOUNTER — ANCILLARY PROCEDURE (OUTPATIENT)
Dept: CARDIOLOGY | Facility: CLINIC | Age: 51
End: 2021-12-17
Attending: INTERNAL MEDICINE
Payer: COMMERCIAL

## 2021-12-17 DIAGNOSIS — R00.2 PALPITATIONS: ICD-10-CM

## 2021-12-17 DIAGNOSIS — I10 HYPERTENSION GOAL BP (BLOOD PRESSURE) < 140/90: ICD-10-CM

## 2021-12-17 LAB — LVEF ECHO: NORMAL

## 2021-12-17 PROCEDURE — 93306 TTE W/DOPPLER COMPLETE: CPT | Performed by: INTERNAL MEDICINE

## 2021-12-27 ENCOUNTER — DOCUMENTATION ONLY (OUTPATIENT)
Dept: LAB | Facility: CLINIC | Age: 51
End: 2021-12-27
Payer: COMMERCIAL

## 2021-12-27 DIAGNOSIS — E11.9 TYPE 2 DIABETES MELLITUS WITHOUT COMPLICATION, WITHOUT LONG-TERM CURRENT USE OF INSULIN (H): ICD-10-CM

## 2021-12-27 DIAGNOSIS — I10 HYPERTENSION GOAL BP (BLOOD PRESSURE) < 140/90: ICD-10-CM

## 2021-12-27 DIAGNOSIS — E78.5 HYPERLIPIDEMIA LDL GOAL <100: Primary | ICD-10-CM

## 2021-12-27 NOTE — PROGRESS NOTES
.Carissa Spears has an upcoming lab appointment:    Future Appointments   Date Time Provider Department Center   12/29/2021  7:30 AM [facility]   1/4/2022  8:00 AM [facility]     Patient is scheduled for the following lab.    There is no order available. Please review and place either future orders or HMPO (Review of Health Maintenance Protocol Orders), as appropriate.    Health Maintenance Due   Topic     ANNUAL REVIEW OF HM ORDERS      HIV SCREENING      HEPATITIS C SCREENING      MICROALBUMIN      A1C      BMP      LIPID      Kaylin Lee

## 2021-12-28 ENCOUNTER — E-VISIT (OUTPATIENT)
Dept: FAMILY MEDICINE | Facility: CLINIC | Age: 51
End: 2021-12-28
Payer: COMMERCIAL

## 2021-12-28 DIAGNOSIS — Z20.822 CLOSE EXPOSURE TO 2019 NOVEL CORONAVIRUS: Primary | ICD-10-CM

## 2021-12-28 PROCEDURE — 99421 OL DIG E/M SVC 5-10 MIN: CPT | Performed by: FAMILY MEDICINE

## 2021-12-28 NOTE — PATIENT INSTRUCTIONS
Dear Carissa,    Based on your exposure to COVID-19 (coronavirus), we would like to test you for this virus. I have placed an order for this test. The best time for testing is 5-7 days after the exposure.    How to schedule:  Go to your [facility] home page and scroll down to the section that says  You have an appointment that needs to be scheduled  and click the large green button that says  Schedule Now  and follow the steps to find the next available opening.     If you are unable to complete these [facility] scheduling steps, please call [phone] to schedule your testing.     Monoclonal antibody treatment after exposure:  Because you have been exposed to COVID-19, you may be able to receive a treatment with monoclonal antibodies. This treatment can lower your risk of severe illness and going to the hospital. It is given through an IV or under your skin (subcutaneous) and must be given at an infusion center.   To be eligible, you must be 12 years or older, at least 88 pounds and:    Are not fully vaccinated against COVID-19, OR    Are immunocompromised     If you would like to sign up to be considered to receive the monoclonal antibody medicine, please complete a participation form through the [facility] here:  MNRAP ([email or web address]). You may also call the [facility] COVID-19 Public Hotline at [phone] (open Mon-Fri: 9am-7pm and Sat: 10am-6pm).     Not all people who are eligible will receive the medicine since supply is limited. You will be contacted in the next 1 to 2 business days only if you are selected. If you do not receive a call, you have not been selected to receive the medicine. If you have any questions about this medication, please contact your primary care provider. For more information, see [email or web address]    Return to work/school/ guidance:   Please let your workplace manager and  staffing office know when your quarantine ends. We cannot give you an exact date as it depends on the information below. You can calculate this on your own or work with your manager/staffing office to calculate this.    Quarantine Guidelines:  You are considered exposed if you have been within 6 feet of an infected person(s) for 15 minutes or more over a 24-hour period. Quarantine will start after the LAST time you had contact with the infected person while they were contagious (for example, if you saw someone on Monday and Wednesday, your quarantine would start after Wednesday).     If you have NO symptoms (asymptomatic):    Stay home for 14 days (quarantine) after your LAST contact with a person who has COVID-19 (this remains the CDC recommendation for greatest protection against spread of COVID-19), OR    10-day quarantine with no test, OR    Minimum 7-day quarantine with negative RT-PCR test collected on day 5 or later    Quarantine Guideline exceptions:    People who have been fully vaccinated do not need to quarantine unless they have symptoms. You are considered fully vaccinated 2 weeks after your 2nd dose in a 2-dose series or 2 weeks after a single-dose series. This includes vaccinated health care workers.  o Fully vaccinated people should still get tested 5-7 days after exposure, even if they don t have symptoms.   Note: If you have ongoing exposure to the COVID-positive person, this quarantine period may be more than 14 days. For example, if you continue to be exposed to your child who tested positive and cannot isolate from them, then the quarantine of 7-14 days can't start until your child is no longer contagious. This is typically 10 days from onset of the child's symptoms. So, the total duration may be 17-24 days in this case.   Please contact your doctor if you have questions or call the [facility] Public Hotline: [phone] (Mon-Fri: 9am-7pm and Sat: 10am-6pm).     How to Quarantine:     Monitor your  symptoms until 14 days after your last exposure. If you develop signs or symptoms, isolate and get tested (even if you have been tested already).    Stay home and away from others. Don't go to school or anywhere else. Generally, quarantine means staying home from work but there are some exceptions to this. Please contact your workplace. Cover your mouth and nose with a face covering if you must be around other people.     Wash your hands and face often. Use soap and water.    What are the symptoms of COVID-19?  The most common symptoms are cough, fever and trouble breathing. Less common symptoms include headache, body aches, fatigue (feeling very tired), chills, sore throat, stuffy or runny nose, diarrhea (loose poop), loss of taste or smell, belly pain, and nausea or vomiting (feeling sick to your stomach or throwing up).  If you develop symptoms, follow these guidelines:    If you're normally healthy: Please start another eVisit.    If you have a serious health problem (like cancer, heart failure, an organ transplant or kidney disease): Call your specialty clinic. Let them know that you might have COVID-19.    Where can I get more information?    [facility] - About COVID-19: [email or web address]     CDC - What to Do If You're Sick:     www.cdc.gov/coronavirus/2019-ncov/about/steps-when-sick.html    CDC - Ending Home Isolation:  https://www.cdc.gov/coronavirus/2019-ncov/your-health/quarantine-isolation.html    CDC - Caring for Someone:  www.cdc.gov/coronavirus/2019-ncov/if-you-are-sick/care-for-someone.html    [facility] clinical trials (COVID-19 research studies): [email or web address].[email or web address]/umn-[email or web address]-trials    Below are the COVID-19 hotlines at the [facility] ([facility]). Interpreters are available.  o For health questions: Call 761-864-1260 or [phone] (7 am to 7 pm)  o For questions about schools and childcare: Call 615-527-8264 or [phone] (7 a.m. to 7  p.m.)

## 2021-12-29 ENCOUNTER — LAB (OUTPATIENT)
Dept: LAB | Facility: CLINIC | Age: 51
End: 2021-12-29
Payer: COMMERCIAL

## 2021-12-29 DIAGNOSIS — E11.9 TYPE 2 DIABETES MELLITUS WITHOUT COMPLICATION, WITHOUT LONG-TERM CURRENT USE OF INSULIN (H): ICD-10-CM

## 2021-12-29 DIAGNOSIS — I10 HYPERTENSION GOAL BP (BLOOD PRESSURE) < 140/90: ICD-10-CM

## 2021-12-29 DIAGNOSIS — Z20.822 CLOSE EXPOSURE TO 2019 NOVEL CORONAVIRUS: ICD-10-CM

## 2021-12-29 DIAGNOSIS — E78.5 HYPERLIPIDEMIA LDL GOAL <100: ICD-10-CM

## 2021-12-29 LAB
ALBUMIN SERPL-MCNC: 3.7 G/DL (ref 3.4–5)
ANION GAP SERPL CALCULATED.3IONS-SCNC: 5 MMOL/L (ref 3–14)
BUN SERPL-MCNC: 15 MG/DL (ref 7–30)
CALCIUM SERPL-MCNC: 9.3 MG/DL (ref 8.5–10.1)
CHLORIDE BLD-SCNC: 104 MMOL/L (ref 94–109)
CHOLEST SERPL-MCNC: 148 MG/DL
CO2 SERPL-SCNC: 27 MMOL/L (ref 20–32)
CREAT SERPL-MCNC: 0.88 MG/DL (ref 0.52–1.04)
FASTING STATUS PATIENT QL REPORTED: YES
GFR SERPL CREATININE-BSD FRML MDRD: 79 ML/MIN/1.73M2
GLUCOSE BLD-MCNC: 140 MG/DL (ref 70–99)
HBA1C MFR BLD: 6.4 % (ref 0–5.6)
HDLC SERPL-MCNC: 45 MG/DL
LDLC SERPL CALC-MCNC: 60 MG/DL
NONHDLC SERPL-MCNC: 103 MG/DL
PHOSPHATE SERPL-MCNC: 3.7 MG/DL (ref 2.5–4.5)
POTASSIUM BLD-SCNC: 3.7 MMOL/L (ref 3.4–5.3)
SARS-COV-2 RNA RESP QL NAA+PROBE: NEGATIVE
SODIUM SERPL-SCNC: 136 MMOL/L (ref 133–144)
TRIGL SERPL-MCNC: 216 MG/DL

## 2021-12-29 PROCEDURE — 80069 RENAL FUNCTION PANEL: CPT

## 2021-12-29 PROCEDURE — U0005 INFEC AGEN DETEC AMPLI PROBE: HCPCS

## 2021-12-29 PROCEDURE — 83036 HEMOGLOBIN GLYCOSYLATED A1C: CPT

## 2021-12-29 PROCEDURE — 80061 LIPID PANEL: CPT

## 2021-12-29 PROCEDURE — 36415 COLL VENOUS BLD VENIPUNCTURE: CPT

## 2021-12-29 PROCEDURE — U0003 INFECTIOUS AGENT DETECTION BY NUCLEIC ACID (DNA OR RNA); SEVERE ACUTE RESPIRATORY SYNDROME CORONAVIRUS 2 (SARS-COV-2) (CORONAVIRUS DISEASE [COVID-19]), AMPLIFIED PROBE TECHNIQUE, MAKING USE OF HIGH THROUGHPUT TECHNOLOGIES AS DESCRIBED BY CMS-2020-01-R: HCPCS

## 2021-12-31 ENCOUNTER — TELEPHONE (OUTPATIENT)
Dept: FAMILY MEDICINE | Facility: CLINIC | Age: 51
End: 2021-12-31

## 2021-12-31 ENCOUNTER — VIRTUAL VISIT (OUTPATIENT)
Dept: FAMILY MEDICINE | Facility: CLINIC | Age: 51
End: 2021-12-31
Payer: COMMERCIAL

## 2021-12-31 DIAGNOSIS — J01.90 ACUTE SINUSITIS WITH SYMPTOMS > 10 DAYS: ICD-10-CM

## 2021-12-31 DIAGNOSIS — R50.9 FEVER, UNSPECIFIED FEVER CAUSE: Primary | ICD-10-CM

## 2021-12-31 PROCEDURE — 99214 OFFICE O/P EST MOD 30 MIN: CPT | Mod: TEL | Performed by: FAMILY MEDICINE

## 2021-12-31 RX ORDER — DOXYCYCLINE 100 MG/1
100 CAPSULE ORAL 2 TIMES DAILY
Qty: 20 CAPSULE | Refills: 0 | Status: SHIPPED | OUTPATIENT
Start: 2021-12-31 | End: 2022-01-10

## 2021-12-31 NOTE — PROGRESS NOTES
Carissa is a 51 year old who is being evaluated via a billable telephone visit.      What phone number would you like to be contacted at? [phone]  How would you like to obtain your AVS? MyChart    Assessment & Plan   1. Fever, unspecified fever cause: Likely viral illness.  Will check Covid and flu.  Inform of results when available.  - Symptomatic; Yes COVID-19 Virus (Coronavirus) by PCR Nose; Future  - Influenza A & B Antigen - Clinic Collect; Future    2. Acute sinusitis with symptoms > 10 days: We will offer doxycycline for treatment.  Has Augmentin and Bactrim/sulfa allergy.  Recommend waiting Covid and flu test results prior to starting.  Also recommend nasal saline rinses/Jodee pot and Flonase.  - doxycycline hyclate (VIBRAMYCIN) 100 MG capsule; Take 1 capsule (100 mg) by mouth 2 times daily for 10 days  Dispense: 20 capsule; Refill: 0    3. BMI 45.0-49.9, adult (H): Noticed on chart review.  Risk factor for complications with Covid as above.  Appears to be vaccinated but may consider referral to MD for monoclonal antibody treatment if positive.  Is also a smoker.  But is not over age 55 or age 65.      Return in about 1 week (around 1/7/2022), or if symptoms worsen or fail to improve.    Zaki Martell MD  [facility]    This chart is completed utilizing dictation software; typos and/or incorrect word substitutions may unintentionally occur.      Subjective   Carissa is a 51 year old who presents for the following health issues:    HPI     Had negative covid test on 12/29    Concern for COVID-19  About how many days ago did these symptoms start? 5 days  Is this your first visit for this illness? Yes  In the 14 days before your symptoms started, have you had close contact with someone with COVID-19 (Coronavirus)? Yes, I have been in contact with someone who has COVID-19/Coronavirus (confirmed by lab test).  Do you have a fever or chills? Yes, I felt feverish or had  chills  Are you having new or worsening difficulty breathing? Yes   Please describe what kind of difficulty you are having breathing:Moderate dyspnea (short of breath with ADLs, shortness of breath that limits the ability to walk up one flight of stairs without needing to rest)  Do you have new or worsening cough? No  Have you had any new or unexplained body aches? YES    Have you experienced any of the following NEW symptoms?    Headache: YES    Sore throat: YES    Loss of taste or smell: No    Chest pain: No    Diarrhea: No    Rash: No  What treatments have you tried? Tylenol, saline nasal spray  Who do you live with?  and son  Are you, or a household member, a healthcare worker or a ? YES  Do you live in a nursing home, group home, or shelter? No  Do you have a way to get food/medications if quarantined? Yes, I have a friend or family member who can help me.    Patient reports start of runny nose and sneezing on Sunday.  Thought it might be a sinus infection.  However she was notified that she was exposed to a positive Covid case on Saturday for the holiday.  She tested negative for Covid on Wednesday.  However she has not developed fever, body aches, headache and is wondering if this is a false negative test.  She is okay with repeat testing.    She feels she is doing well at home.    Patient does smoke.    Review of Systems   Constitutional, HEENT, lymph, derm, msk, cardiovascular, pulmonary, gi and gu systems are negative, except as otherwise noted.      Objective         Vitals:  No vitals were obtained today due to virtual visit.    Physical Exam   healthy, alert and no distress  PSYCH: Alert and oriented times 3; coherent speech, normal   rate and volume, able to articulate logical thoughts, able   to abstract reason, no tangential thoughts, no hallucinations   or delusions  Her affect is normal  RESP: No cough, able to talk in full sentences  Remainder of exam unable to be completed  due to telephone visits    Labs: pending        Phone call duration: 6 minutes

## 2021-12-31 NOTE — PATIENT INSTRUCTIONS
Patient Education     For Patients Who Have Been Tested for COVID-19 (Coronavirus)  You have been tested for COVID-19 (coronavirus). Results are typically available in 1 to 3 days. Our testing sites do not have access to your test results.  If you have not received your results in 5 days, please do the following:    If you are being tested before surgery: Call your surgeon's office or call [phone] ([phone]) and ask to speak with our COVID-19 results team.    If you are being treated at an infusion center: Call your infusion center directly.    All others: Call [phone] ([phone]) and ask to speak with our COVID-19 results team.  What you should do if you have COVID-19 symptoms  Please stay home and away from others (self-isolate) until:    You've had no fever--and no medicine that reduces fever--for 3 full days (72 hours), AND    Your other symptoms have gotten better. For example, your cough or breathing has improved, AND    At least 7 days have passed since your symptoms first started.  During this time:    Don't go to work, school or anywhere else.    Stay away from others in your home. No hugging, kissing or shaking hands.    Don't let anyone visit.    Cover your mouth and nose with a mask, tissue or washcloth to avoid spreading germs.    Wash your hands and face often. Use soap and water.  When to call for 911 for medical help  If you have any of these emergency warning signs for COVID-19, call for medical help right away:    Trouble breathing    Pain or pressure in the chest all the time    Blue color to your lips or face  These are not all the symptoms you can have with COVID-19. Call your health provider for any other symptoms that are severe or concerning to you.  When you call 911, tell the  that you have -- or think you might have--COVID-19.   Where can I get more information?  To learn more about COVID-19 and how to care for yourself at home, please visit the CDC website  at https://www.cdc.gov/coronavirus/2019-ncov/about/steps-when-sick.html  For more about your care at [facility], please visit [email or web address]  For informational purposes only. Not to replace the advice of your health care provider.   Clinically reviewed by Infection Prevention and the [facility] COVID-19 Clinical Team.  Copyright   2020 [facility]. All rights reserved. [employer] [id number] - 05/20.

## 2021-12-31 NOTE — TELEPHONE ENCOUNTER
"Patient has a sinus infection per patient. She has eye pain, post nasal drip and yellow drainage for about 6 days. Headache and a fever last night.  Feels like \"she is loosing the bhatt\".  Immunized and boosted for COVID.  Her son has COVID tested Wednesday and she tested negative on PCR.     Nursing advice: Patient is to have a virtual visit of some sort today.  The patient was warm transferred to central scheduling and they were asked to assist patient in making an appointment today at the patient's preferred location.  They patient can generate an E-visit today if there are not virtual appointments. They verified they understood. Patient verbalizes good understanding, agrees with plan and states she needs no further support. Denisa Mejia R.N.                "

## 2022-01-01 ENCOUNTER — NURSE TRIAGE (OUTPATIENT)
Dept: NURSING | Facility: CLINIC | Age: 52
End: 2022-01-01
Payer: COMMERCIAL

## 2022-01-01 DIAGNOSIS — U07.1 INFECTION DUE TO 2019 NOVEL CORONAVIRUS: Primary | ICD-10-CM

## 2022-01-01 RX ORDER — ALBUTEROL SULFATE 0.83 MG/ML
2.5 SOLUTION RESPIRATORY (INHALATION)
Qty: 252 ML | Refills: 0 | Status: SHIPPED | OUTPATIENT
Start: 2022-01-01 | End: 2022-01-01

## 2022-01-01 RX ORDER — ALBUTEROL SULFATE 0.83 MG/ML
2.5 SOLUTION RESPIRATORY (INHALATION)
Qty: 90 ML | Refills: 0
Start: 2022-01-01 | End: 2022-09-26

## 2022-01-01 NOTE — TELEPHONE ENCOUNTER
Symptoms started on 12/26          .  Covid test done on 12/30 at home came back positive.    Current symptoms are:    Felt warm (Tylenol)    Chills    Cough     Shortness of breath    Generalized body aches    Headache    Sore throat    Runny nose    Stuffy nose (12/31 stated on doxycycline for sinus infection)    Diarrhea    Chest pain with cough    Fatigue    Dizzy    Wheezing    163/73 HR 94 using a Microlife machine    Had Moderna vaccines and the booster.  Used son's nebulizer of Albuterol x 1 today.  States it help for a few hours.    Symptoms started on 12/26          .  Covid test done on 12/30 at home came back positive.  Tobacco use quite on 12/30.     Writer paged on call Dr. GAGE Ureña at [phone]. Provider would like patient to use Albuterol neb every 2-4 as needed for SOB.  Writer called patient back.  Will call in RX to the [facility] in [place].    Patient able to teach back medication directions.     Gertrudis Davis RN, [facility] Triage Nurse Advisor    Reason for Disposition    MILD difficulty breathing (e.g., minimal/no SOB at rest, SOB with walking, pulse <100)    Additional Information    Negative: SEVERE difficulty breathing (e.g., struggling for each breath, speaks in single words)    Negative: Difficult to awaken or acting confused (e.g., disoriented, slurred speech)    Negative: Bluish (or gray) lips or face now    Negative: Shock suspected (e.g., cold/pale/clammy skin, too weak to stand, low BP, rapid pulse)    Negative: Sounds like a life-threatening emergency to the triager    Negative: SEVERE or constant chest pain or pressure (Exception: mild central chest pain, present only when coughing)    Negative: MODERATE difficulty breathing (e.g., speaks in phrases, SOB even at rest, pulse 100-120)    Negative: [1] Headache AND [2] stiff neck (can't touch chin to chest)    Protocols used: CORONAVIRUS (COVID-19) DIAGNOSED OR [id number]-RY 8.25.2021

## 2022-01-06 ENCOUNTER — MYC MEDICAL ADVICE (OUTPATIENT)
Dept: FAMILY MEDICINE | Facility: CLINIC | Age: 52
End: 2022-01-06
Payer: COMMERCIAL

## 2022-01-06 DIAGNOSIS — G43.109 MIGRAINE WITH AURA AND WITHOUT STATUS MIGRAINOSUS, NOT INTRACTABLE: ICD-10-CM

## 2022-01-06 DIAGNOSIS — G43.809 OTHER MIGRAINE WITHOUT STATUS MIGRAINOSUS, NOT INTRACTABLE: ICD-10-CM

## 2022-01-06 NOTE — TELEPHONE ENCOUNTER
Reason for Call:  Medication or medication refill:    Do you use a [facility]?  Name of the pharmacy and phone number for the current request:  [id number] - [place], [place] - [address] [address] [facility] [address]    Name of the medication requested: naloxone (NARCAN) 4 MG/0.1ML nasal spray    Other request: n/a    Can we leave a detailed message on this number? YES    Phone number patient can be reached at: Cell number on file:    Telephone Information:   Mobile [phone]       Best Time: Any    Call taken on 1/6/2022 at 2:05 PM by Rishi Lei

## 2022-01-07 DIAGNOSIS — E11.9 TYPE 2 DIABETES MELLITUS WITHOUT COMPLICATION, WITHOUT LONG-TERM CURRENT USE OF INSULIN (H): ICD-10-CM

## 2022-01-07 RX ORDER — METFORMIN HCL 500 MG
TABLET, EXTENDED RELEASE 24 HR ORAL
Qty: 180 TABLET | Refills: 0 | Status: SHIPPED | OUTPATIENT
Start: 2022-01-07 | End: 2022-03-01

## 2022-01-10 RX ORDER — HYDROCODONE BITARTRATE AND ACETAMINOPHEN 5; 325 MG/1; MG/1
1 TABLET ORAL 2 TIMES DAILY PRN
Qty: 8 TABLET | Refills: 0 | Status: SHIPPED | OUTPATIENT
Start: 2022-01-10 | End: 2022-03-01

## 2022-01-11 ENCOUNTER — TRANSFERRED RECORDS (OUTPATIENT)
Dept: HEALTH INFORMATION MANAGEMENT | Facility: CLINIC | Age: 52
End: 2022-01-11
Payer: COMMERCIAL

## 2022-01-24 ENCOUNTER — TELEPHONE (OUTPATIENT)
Dept: FAMILY MEDICINE | Facility: CLINIC | Age: 52
End: 2022-01-24
Payer: COMMERCIAL

## 2022-01-24 NOTE — TELEPHONE ENCOUNTER
Lyubov from [employer] calling with an FYI for Dr Beach.   Lyubov will be working with patient over the phone with any inquiries until she no longer needs the assistance.    Angelica Andrews

## 2022-02-09 DIAGNOSIS — G43.919 INTRACTABLE MIGRAINE WITHOUT STATUS MIGRAINOSUS, UNSPECIFIED MIGRAINE TYPE: ICD-10-CM

## 2022-02-09 DIAGNOSIS — I10 HYPERTENSION GOAL BP (BLOOD PRESSURE) < 140/90: ICD-10-CM

## 2022-02-09 RX ORDER — METOPROLOL TARTRATE 100 MG
TABLET ORAL
Qty: 180 TABLET | Refills: 0 | Status: SHIPPED | OUTPATIENT
Start: 2022-02-09 | End: 2022-05-11

## 2022-02-09 NOTE — TELEPHONE ENCOUNTER
Routing refill request to provider for review/approval because:  BP Readings from Last 3 Encounters:   11/22/21 (!) 142/62   08/02/21 138/88   07/29/21 (!) 153/90     Jesenia PEREYRAN, RN

## 2022-02-22 ASSESSMENT — ENCOUNTER SYMPTOMS
JOINT SWELLING: 0
HEMATURIA: 0
SORE THROAT: 0
DIARRHEA: 0
SHORTNESS OF BREATH: 0
DIZZINESS: 0
COUGH: 0
HEADACHES: 1
NERVOUS/ANXIOUS: 1
ARTHRALGIAS: 0
ABDOMINAL PAIN: 0
PALPITATIONS: 0
BREAST MASS: 0
HEMATOCHEZIA: 0
PARESTHESIAS: 0
CONSTIPATION: 0
EYE PAIN: 0
FREQUENCY: 0
DYSURIA: 0
MYALGIAS: 0
HEARTBURN: 0
CHILLS: 0
WEAKNESS: 0
FEVER: 0
NAUSEA: 0

## 2022-02-28 ASSESSMENT — ENCOUNTER SYMPTOMS
SORE THROAT: 0
BREAST MASS: 0
PALPITATIONS: 0
ABDOMINAL PAIN: 0
SHORTNESS OF BREATH: 0
DIARRHEA: 0
NAUSEA: 0
DIZZINESS: 0
FREQUENCY: 0
NERVOUS/ANXIOUS: 1
CHILLS: 0
JOINT SWELLING: 0
HEARTBURN: 0
WEAKNESS: 0
COUGH: 0
FEVER: 0
HEMATOCHEZIA: 0
MYALGIAS: 0
EYE PAIN: 0
PARESTHESIAS: 0
DYSURIA: 0
HEADACHES: 1
HEMATURIA: 0
CONSTIPATION: 0
ARTHRALGIAS: 0

## 2022-02-28 NOTE — PROGRESS NOTES
SUBJECTIVE:   CC: Carissa Spears is an 51 year old woman who presents for preventive health visit.   Follow-up dm, htn, high cholesterol, bipolar, migraines, morbid obesity and lumbar radiculopathy  Not interested in colonoscopy but willing to  Do colo-guard.  No family history colo-guard.  Would like prescription for elipical.   Home blood pressure overall ok. Quit smoking.    No chest pain or shortness of breath. No nausea, vomiting or diarrhea or constiapation. No urine changes. Emotionally doing ok.    sleep overall stable. No isidro noted. Seeing psych.   No rashes/mole changes. Taking vitamind.   Likes  [place].   Eye exam last summer. Dentist due.   No breast lumps.     Patient has been advised of split billing requirements and indicates understanding: Yes  Healthy Habits:     Getting at least 3 servings of Calcium per day:  Yes    Bi-annual eye exam:  Yes    Dental care twice a year:  Yes    Sleep apnea or symptoms of sleep apnea:  None    Diet:  Other    Frequency of exercise:  2-3 days/week    Taking medications regularly:  Yes    Medication side effects:  None    PHQ-2 Total Score: 0    Additional concerns today:  No      Today's PHQ-2 Score:   PHQ-2 ( 1999 Pfizer) 2/22/2022   Q1: Little interest or pleasure in doing things 0   Q2: Feeling down, depressed or hopeless 0   PHQ-2 Score 0   Q1: Little interest or pleasure in doing things Not at all   Q2: Feeling down, depressed or hopeless Not at all   PHQ-2 Score 0       Abuse: Current or Past (Physical, Sexual or Emotional) - No  Do you feel safe in your environment? Yes    Have you ever done Advance Care Planning? (For example, a Health Directive, POLST, or a discussion with a medical provider or your loved ones about your wishes): No, advance care planning information given to patient to review.  Advanced care planning was discussed at today's visit.    Social History     Tobacco Use     Smoking status: Current Every Day Smoker     Packs/day: 1.00      Years: 20.00     Pack years: 20.00     Types: Cigarettes     Smokeless tobacco: Never Used   Substance Use Topics     Alcohol use: No         Alcohol Use 2/22/2022   Prescreen: >3 drinks/day or >7 drinks/week? No       Reviewed orders with patient.  Reviewed health maintenance and updated orders accordingly - Yes  Labs reviewed in EPIC    Breast Cancer Screening:    FHS-7:   Breast CA Risk Assessment (FHS-7) 7/29/2021 2/22/2022   Did any of your first-degree relatives have breast or ovarian cancer? No Yes   Did any of your relatives have bilateral breast cancer? No No   Did any man in your family have breast cancer? No No   Did any woman in your family have breast and ovarian cancer? Yes No   Did any woman in your family have breast cancer before age 50 y? No No   Do you have 2 or more relatives with breast and/or ovarian cancer? No No   Do you have 2 or more relatives with breast and/or bowel cancer? No No         History of abnormal Pap smear: Status post benign hysterectomy. Health Maintenance and Surgical History updated.     Reviewed and updated as needed this visit by clinical staff                  Reviewed and updated as needed this visit by Provider                     Review of Systems   Constitutional: Negative for chills and fever.   HENT: Negative for congestion, ear pain, hearing loss and sore throat.    Eyes: Negative for pain and visual disturbance.   Respiratory: Negative for cough and shortness of breath.    Cardiovascular: Negative for chest pain, palpitations and peripheral edema.   Gastrointestinal: Negative for abdominal pain, constipation, diarrhea, heartburn, hematochezia and nausea.   Breasts:  Negative for tenderness, breast mass and discharge.   Genitourinary: Negative for dysuria, frequency, genital sores, hematuria, pelvic pain, urgency, vaginal bleeding and vaginal discharge.   Musculoskeletal: Negative for arthralgias, joint swelling and myalgias.   Skin: Negative for rash.  "  Neurological: Positive for headaches. Negative for dizziness, weakness and paresthesias.   Psychiatric/Behavioral: Negative for mood changes. The patient is nervous/anxious.           OBJECTIVE:   LMP  (LMP Unknown)    /84   Pulse 80   Temp 98.8  F (37.1  C) (Oral)   Ht 1.727 m (5' 8\")   Wt 133 kg (293 lb 3.2 oz)   LMP  (LMP Unknown)   SpO2 97%   BMI 44.58 kg/m     Physical Exam  GENERAL: healthy, alert and no distress  EYES: Eyes grossly normal to inspection, PERRL and conjunctivae and sclerae normal  HENT: ear canals and TM's normal, nose and mouth without ulcers or lesions  NECK: no adenopathy, no asymmetry, masses, or scars and thyroid normal to palpation  RESP: lungs clear to auscultation - no rales, rhonchi or wheezes  BREAST: patient deferred exam  CV: regular rate and rhythm, normal S1 S2, no S3 or S4, no murmur, click or rub, no peripheral edema and peripheral pulses strong  ABDOMEN: soft, nontender, no hepatosplenomegaly, no masses and bowel sounds normal   (female): patient deferred exam  MS: no gross musculoskeletal defects noted, no edema  SKIN: no suspicious lesions or rashes  NEURO: Normal strength and tone, mentation intact and speech normal  PSYCH: mentation appears normal, affect normal/bright  PSYCH: mildly anxious  LYMPH: no cervical, supraclavicular, axillary adenopathy    ASSESSMENT/PLAN:   ASSESSMENT / PLAN:  (Z00.00) Routine history and physical examination of adult  (primary encounter diagnosis)  Comment: generally healthy and normal exam  Plan: Reviewed self mole/breast check handout.  Continue vitaminD. Mammogram again in summer  Follow-up eye exam  (E11.9) Type 2 diabetes mellitus without complication, without long-term current use of insulin (H)  Comment: stable  Plan: empagliflozin (JARDIANCE) 10 MG TABS tablet,         Miscellaneous Order for DME - ONLY FOR DME        Will changes metformin to jardiance for weight loss. Reveiwed risks and side effects of " "medication  Diet/exercise. Recheck in 6 months  Sooner if worse/newissues.    (Z68.42) BMI 45.0-49.9, adult (H)  Comment: needs help  Plan: empagliflozin (JARDIANCE) 10 MG TABS tablet,         Miscellaneous Order for DME - ONLY FOR DME        epilitical prescription for credit. Recheck in 6 months  Consider weight loss referral.    (F31.9) Bipolar affective disorder, remission status unspecified (H)  Comment: stable  Plan: per psych    (F33.9) Episode of recurrent major depressive disorder, unspecified depression episode severity (H)  Comment: stable  Plan: per psych. If SUICIAL IDEATION OR HOMOCIDAL IDEATION OR [place] TO ER.     (Z12.11) Screen for colon cancer    Plan: ROMERO([employer])        Patient willing to do colonoscopy    Patient has been advised of split billing requirements and indicates understanding: Yes    COUNSELING:  Reviewed preventive health counseling, as reflected in patient instructions       Regular exercise       Healthy diet/nutrition       Vision screening       Alcohol Use       Osteoporosis prevention/bone health       Colorectal Cancer Screening    Estimated body mass index is 43.03 kg/m  as calculated from the following:    Height as of 8/2/21: 1.727 m (5' 8\").    Weight as of 11/22/21: 128.4 kg (283 lb).    Weight management plan: Discussed healthy diet and exercise guidelines          Counseling Resources:  ATP IV Guidelines  Pooled Cohorts Equation Calculator  Breast Cancer Risk Calculator  BRCA-Related Cancer Risk Assessment: FHS-7 Tool  FRAX Risk Assessment  ICSI Preventive Guidelines  Dietary Guidelines for Americans, 2010  USDA's MyPlate  ASA Prophylaxis  Lung CA Screening    Silvino Beach MD  [facility]  "

## 2022-03-01 ENCOUNTER — OFFICE VISIT (OUTPATIENT)
Dept: FAMILY MEDICINE | Facility: CLINIC | Age: 52
End: 2022-03-01
Payer: COMMERCIAL

## 2022-03-01 VITALS
TEMPERATURE: 98.8 F | HEIGHT: 68 IN | SYSTOLIC BLOOD PRESSURE: 129 MMHG | WEIGHT: 293 LBS | OXYGEN SATURATION: 97 % | DIASTOLIC BLOOD PRESSURE: 84 MMHG | HEART RATE: 80 BPM | BODY MASS INDEX: 44.41 KG/M2

## 2022-03-01 DIAGNOSIS — E11.9 TYPE 2 DIABETES MELLITUS WITHOUT COMPLICATION, WITHOUT LONG-TERM CURRENT USE OF INSULIN (H): ICD-10-CM

## 2022-03-01 DIAGNOSIS — Z00.00 ROUTINE HISTORY AND PHYSICAL EXAMINATION OF ADULT: Primary | ICD-10-CM

## 2022-03-01 DIAGNOSIS — F31.9 BIPOLAR AFFECTIVE DISORDER, REMISSION STATUS UNSPECIFIED (H): ICD-10-CM

## 2022-03-01 DIAGNOSIS — Z12.11 SCREEN FOR COLON CANCER: ICD-10-CM

## 2022-03-01 DIAGNOSIS — F33.9 EPISODE OF RECURRENT MAJOR DEPRESSIVE DISORDER, UNSPECIFIED DEPRESSION EPISODE SEVERITY (H): ICD-10-CM

## 2022-03-01 PROCEDURE — 99214 OFFICE O/P EST MOD 30 MIN: CPT | Mod: 25 | Performed by: FAMILY MEDICINE

## 2022-03-01 PROCEDURE — 99396 PREV VISIT EST AGE 40-64: CPT | Performed by: FAMILY MEDICINE

## 2022-03-12 DIAGNOSIS — G43.919 INTRACTABLE MIGRAINE WITHOUT STATUS MIGRAINOSUS, UNSPECIFIED MIGRAINE TYPE: ICD-10-CM

## 2022-03-12 DIAGNOSIS — I10 HYPERTENSION GOAL BP (BLOOD PRESSURE) < 140/90: ICD-10-CM

## 2022-03-14 RX ORDER — METOPROLOL TARTRATE 100 MG
TABLET ORAL
Qty: 180 TABLET | Refills: 0 | OUTPATIENT
Start: 2022-03-14

## 2022-03-15 ENCOUNTER — TELEPHONE (OUTPATIENT)
Dept: FAMILY MEDICINE | Facility: CLINIC | Age: 52
End: 2022-03-15
Payer: COMMERCIAL

## 2022-03-15 NOTE — TELEPHONE ENCOUNTER
Reason for Call:  Form, our goal is to have forms completed with 72 hours, however, some forms may require a visit or additional information.    Type of letter, form or note:  medical    Who is the form from?: Patient    Where did the form come from: Patient or family brought in       What clinic location was the form placed at?: [facility]    Where the form was placed: Given to MA/RN    What number is listed as a contact on the form?:        Additional comments:    Call taken on 3/15/2022 at 12:51 PM by Trena Bridges

## 2022-03-15 NOTE — TELEPHONE ENCOUNTER
Called and spoke to patient. The medical necessity form is for a sit down elliptical, she said that you prescribed this for her.Angela REAGAN      Form placed in your basket.Angela REAGAN

## 2022-03-17 ENCOUNTER — MEDICAL CORRESPONDENCE (OUTPATIENT)
Dept: HEALTH INFORMATION MANAGEMENT | Facility: CLINIC | Age: 52
End: 2022-03-17
Payer: COMMERCIAL

## 2022-03-17 NOTE — TELEPHONE ENCOUNTER
Dr Beach completed the form. Brought to the  for . Called and informed patient that this was done.Angela Mullen MA/DEBBY

## 2022-03-21 ENCOUNTER — MEDICAL CORRESPONDENCE (OUTPATIENT)
Dept: HEALTH INFORMATION MANAGEMENT | Facility: CLINIC | Age: 52
End: 2022-03-21
Payer: COMMERCIAL

## 2022-03-22 ENCOUNTER — TELEPHONE (OUTPATIENT)
Dept: FAMILY MEDICINE | Facility: CLINIC | Age: 52
End: 2022-03-22
Payer: COMMERCIAL

## 2022-03-22 NOTE — TELEPHONE ENCOUNTER
Reason for Call:  Form, our goal is to have forms completed with 72 hours, however, some forms may require a visit or additional information.    Type of letter, form or note:  medical    Who is the form from?: Patient    Where did the form come from: Patient or family brought in       What clinic location was the form placed at?: [place]    Where the form was placed: Given to MA/RN    What number is listed as a contact on the form?: Please call [phone] when form is completed to pickup from .        Additional comments: Pt stated to please check the diagnosis of form. It is suppose to be for diabetes and to correct the date on form to 02/15/22. [facility] would not take form with info crossed out without initials next to it.     Call taken on 3/22/2022 at 11:29 AM by Tatiana Zarate CNA

## 2022-03-22 NOTE — TELEPHONE ENCOUNTER
Form brought to the  for . Called and informed patient that this was done.Angela Mullen MA/DEBBY

## 2022-03-22 NOTE — TELEPHONE ENCOUNTER
form was picked up from  by Robert- Patients spouse. ID was checked and patient  label was attached to patient  log.

## 2022-03-24 ENCOUNTER — MYC MEDICAL ADVICE (OUTPATIENT)
Dept: FAMILY MEDICINE | Facility: CLINIC | Age: 52
End: 2022-03-24
Payer: COMMERCIAL

## 2022-03-24 DIAGNOSIS — G43.809 OTHER MIGRAINE WITHOUT STATUS MIGRAINOSUS, NOT INTRACTABLE: ICD-10-CM

## 2022-03-24 RX ORDER — HYDROCODONE BITARTRATE AND ACETAMINOPHEN 5; 325 MG/1; MG/1
1 TABLET ORAL 3 TIMES DAILY PRN
Qty: 8 TABLET | Refills: 0 | Status: SHIPPED | OUTPATIENT
Start: 2022-03-24 | End: 2022-05-19

## 2022-03-28 ENCOUNTER — TRANSFERRED RECORDS (OUTPATIENT)
Dept: HEALTH INFORMATION MANAGEMENT | Facility: CLINIC | Age: 52
End: 2022-03-28
Payer: COMMERCIAL

## 2022-05-02 ENCOUNTER — TRANSFERRED RECORDS (OUTPATIENT)
Dept: HEALTH INFORMATION MANAGEMENT | Facility: CLINIC | Age: 52
End: 2022-05-02
Payer: COMMERCIAL

## 2022-05-06 ENCOUNTER — TRANSFERRED RECORDS (OUTPATIENT)
Dept: HEALTH INFORMATION MANAGEMENT | Facility: CLINIC | Age: 52
End: 2022-05-06
Payer: COMMERCIAL

## 2022-05-07 DIAGNOSIS — I10 HYPERTENSION GOAL BP (BLOOD PRESSURE) < 140/90: ICD-10-CM

## 2022-05-07 DIAGNOSIS — E78.5 HYPERLIPIDEMIA LDL GOAL <100: ICD-10-CM

## 2022-05-09 RX ORDER — SIMVASTATIN 20 MG
TABLET ORAL
Qty: 90 TABLET | Refills: 1 | Status: SHIPPED | OUTPATIENT
Start: 2022-05-09 | End: 2022-11-03

## 2022-05-09 RX ORDER — LOSARTAN POTASSIUM 100 MG/1
TABLET ORAL
Qty: 90 TABLET | Refills: 1 | Status: SHIPPED | OUTPATIENT
Start: 2022-05-09 | End: 2022-10-04

## 2022-05-19 ENCOUNTER — MYC MEDICAL ADVICE (OUTPATIENT)
Dept: FAMILY MEDICINE | Facility: CLINIC | Age: 52
End: 2022-05-19
Payer: COMMERCIAL

## 2022-05-19 DIAGNOSIS — G43.809 OTHER MIGRAINE WITHOUT STATUS MIGRAINOSUS, NOT INTRACTABLE: ICD-10-CM

## 2022-05-19 RX ORDER — HYDROCODONE BITARTRATE AND ACETAMINOPHEN 5; 325 MG/1; MG/1
1 TABLET ORAL 3 TIMES DAILY PRN
Qty: 8 TABLET | Refills: 0 | Status: SHIPPED | OUTPATIENT
Start: 2022-05-19 | End: 2022-08-12

## 2022-05-19 NOTE — TELEPHONE ENCOUNTER
Provider:  Please see MyChart message from the patient. Are you willing to refill her HYDROcodone-acetaminophen (NORCO) 5-325 MG tablet and increase the quantity?   Thank you. Denisa Mejia R.N.

## 2022-05-28 ENCOUNTER — LAB (OUTPATIENT)
Dept: LAB | Facility: CLINIC | Age: 52
End: 2022-05-28
Payer: COMMERCIAL

## 2022-05-28 DIAGNOSIS — Z79.899 ENCOUNTER FOR LONG-TERM (CURRENT) USE OF HIGH-RISK MEDICATION: Primary | ICD-10-CM

## 2022-05-28 DIAGNOSIS — Z13.220 SCREENING FOR LIPOID DISORDERS: ICD-10-CM

## 2022-05-28 LAB
ALBUMIN SERPL-MCNC: 3.7 G/DL (ref 3.4–5)
ALP SERPL-CCNC: 62 U/L (ref 40–150)
ALT SERPL W P-5'-P-CCNC: 57 U/L (ref 0–50)
AMYLASE SERPL-CCNC: 38 U/L (ref 30–110)
AST SERPL W P-5'-P-CCNC: 25 U/L (ref 0–45)
BASOPHILS # BLD AUTO: 0 10E3/UL (ref 0–0.2)
BASOPHILS NFR BLD AUTO: 0 %
BILIRUB DIRECT SERPL-MCNC: 0.1 MG/DL (ref 0–0.2)
BILIRUB SERPL-MCNC: 0.4 MG/DL (ref 0.2–1.3)
CHOLEST SERPL-MCNC: 172 MG/DL
EOSINOPHIL # BLD AUTO: 0.1 10E3/UL (ref 0–0.7)
EOSINOPHIL NFR BLD AUTO: 2 %
ERYTHROCYTE [DISTWIDTH] IN BLOOD BY AUTOMATED COUNT: 13.6 % (ref 10–15)
ETHANOL SERPL-MCNC: <0.01 G/DL
FASTING STATUS PATIENT QL REPORTED: YES
FASTING STATUS PATIENT QL REPORTED: YES
GLUCOSE BLD-MCNC: 146 MG/DL (ref 70–99)
HBA1C MFR BLD: 6.5 % (ref 0–5.6)
HCT VFR BLD AUTO: 48.8 % (ref 35–47)
HDLC SERPL-MCNC: 41 MG/DL
HGB BLD-MCNC: 15.8 G/DL (ref 11.7–15.7)
LDLC SERPL CALC-MCNC: 107 MG/DL
LYMPHOCYTES # BLD AUTO: 2 10E3/UL (ref 0.8–5.3)
LYMPHOCYTES NFR BLD AUTO: 27 %
Lab: NORMAL
MCH RBC QN AUTO: 31.7 PG (ref 26.5–33)
MCHC RBC AUTO-ENTMCNC: 32.4 G/DL (ref 31.5–36.5)
MCV RBC AUTO: 98 FL (ref 78–100)
MONOCYTES # BLD AUTO: 0.4 10E3/UL (ref 0–1.3)
MONOCYTES NFR BLD AUTO: 5 %
NEUTROPHILS # BLD AUTO: 4.9 10E3/UL (ref 1.6–8.3)
NEUTROPHILS NFR BLD AUTO: 66 %
NONHDLC SERPL-MCNC: 131 MG/DL
PERFORMING LABORATORY: NORMAL
PLATELET # BLD AUTO: 213 10E3/UL (ref 150–450)
PROLACTIN SERPL-MCNC: 18 UG/L (ref 3–27)
PROT SERPL-MCNC: 7.1 G/DL (ref 6.8–8.8)
RBC # BLD AUTO: 4.99 10E6/UL (ref 3.8–5.2)
SPECIMEN STATUS: NORMAL
TEST NAME: NORMAL
TRIGL SERPL-MCNC: 118 MG/DL
VALPROATE SERPL-MCNC: 15 MG/L
WBC # BLD AUTO: 7.4 10E3/UL (ref 4–11)

## 2022-05-28 PROCEDURE — 80349 CANNABINOIDS NATURAL: CPT

## 2022-05-28 PROCEDURE — 80076 HEPATIC FUNCTION PANEL: CPT

## 2022-05-28 PROCEDURE — 82150 ASSAY OF AMYLASE: CPT

## 2022-05-28 PROCEDURE — 84146 ASSAY OF PROLACTIN: CPT

## 2022-05-28 PROCEDURE — 80307 DRUG TEST PRSMV CHEM ANLYZR: CPT | Mod: 90

## 2022-05-28 PROCEDURE — 82947 ASSAY GLUCOSE BLOOD QUANT: CPT

## 2022-05-28 PROCEDURE — 85025 COMPLETE CBC W/AUTO DIFF WBC: CPT

## 2022-05-28 PROCEDURE — 82077 ASSAY SPEC XCP UR&BREATH IA: CPT

## 2022-05-28 PROCEDURE — 80164 ASSAY DIPROPYLACETIC ACD TOT: CPT

## 2022-05-28 PROCEDURE — 80061 LIPID PANEL: CPT

## 2022-05-28 PROCEDURE — 36415 COLL VENOUS BLD VENIPUNCTURE: CPT

## 2022-05-28 PROCEDURE — 99000 SPECIMEN HANDLING OFFICE-LAB: CPT

## 2022-05-28 PROCEDURE — 83036 HEMOGLOBIN GLYCOSYLATED A1C: CPT

## 2022-05-31 LAB
AMPHETAMINES SERPL QL SCN: NEGATIVE NG/ML
ANNOTATION COMMENT IMP: NORMAL
BARBITURATES SERPL QL SCN: NEGATIVE NG/ML
BENZODIAZ SERPL QL SCN: NEGATIVE NG/ML
BUPRENORPHINE SERPL-MCNC: NEGATIVE NG/ML
CANNABINOIDS SERPL QL SCN: NEGATIVE NG/ML
COCAINE SERPL QL SCN: NEGATIVE NG/ML
METHADONE SERPL QL SCN: NEGATIVE NG/ML
METHAMPHET SERPL QL: NEGATIVE NG/ML
MISCELLANEOUS TEST 1 (ARUP): NORMAL
OPIATES SERPL QL SCN: NEGATIVE NG/ML
OXYCODONE SERPL QL: NEGATIVE NG/ML
PCP SERPL QL SCN: NEGATIVE NG/ML

## 2022-06-02 ENCOUNTER — OFFICE VISIT (OUTPATIENT)
Dept: FAMILY MEDICINE | Facility: CLINIC | Age: 52
End: 2022-06-02
Payer: COMMERCIAL

## 2022-06-02 VITALS
WEIGHT: 293 LBS | SYSTOLIC BLOOD PRESSURE: 130 MMHG | DIASTOLIC BLOOD PRESSURE: 84 MMHG | HEART RATE: 81 BPM | TEMPERATURE: 97.8 F | OXYGEN SATURATION: 97 % | RESPIRATION RATE: 20 BRPM | BODY MASS INDEX: 44.85 KG/M2

## 2022-06-02 DIAGNOSIS — E11.9 TYPE 2 DIABETES MELLITUS WITHOUT COMPLICATION, WITHOUT LONG-TERM CURRENT USE OF INSULIN (H): ICD-10-CM

## 2022-06-02 DIAGNOSIS — I10 HYPERTENSION GOAL BP (BLOOD PRESSURE) < 140/90: Primary | ICD-10-CM

## 2022-06-02 DIAGNOSIS — F31.9 BIPOLAR AFFECTIVE DISORDER, REMISSION STATUS UNSPECIFIED (H): ICD-10-CM

## 2022-06-02 DIAGNOSIS — F33.9 EPISODE OF RECURRENT MAJOR DEPRESSIVE DISORDER, UNSPECIFIED DEPRESSION EPISODE SEVERITY (H): ICD-10-CM

## 2022-06-02 PROCEDURE — 99214 OFFICE O/P EST MOD 30 MIN: CPT | Performed by: FAMILY MEDICINE

## 2022-06-02 PROCEDURE — 93000 ELECTROCARDIOGRAM COMPLETE: CPT | Performed by: FAMILY MEDICINE

## 2022-06-02 PROCEDURE — 96127 BRIEF EMOTIONAL/BEHAV ASSMT: CPT | Performed by: FAMILY MEDICINE

## 2022-06-02 RX ORDER — DIVALPROEX SODIUM 250 MG/1
TABLET, EXTENDED RELEASE ORAL
COMMUNITY
Start: 2022-05-14 | End: 2022-10-20

## 2022-06-02 RX ORDER — METFORMIN HCL 500 MG
TABLET, EXTENDED RELEASE 24 HR ORAL
Qty: 180 TABLET | Refills: 1 | Status: SHIPPED | OUTPATIENT
Start: 2022-06-02 | End: 2022-11-29

## 2022-06-02 ASSESSMENT — ANXIETY QUESTIONNAIRES
GAD7 TOTAL SCORE: 15
2. NOT BEING ABLE TO STOP OR CONTROL WORRYING: MORE THAN HALF THE DAYS
7. FEELING AFRAID AS IF SOMETHING AWFUL MIGHT HAPPEN: MORE THAN HALF THE DAYS
1. FEELING NERVOUS, ANXIOUS, OR ON EDGE: NEARLY EVERY DAY
GAD7 TOTAL SCORE: 15
4. TROUBLE RELAXING: MORE THAN HALF THE DAYS
8. IF YOU CHECKED OFF ANY PROBLEMS, HOW DIFFICULT HAVE THESE MADE IT FOR YOU TO DO YOUR WORK, TAKE CARE OF THINGS AT HOME, OR GET ALONG WITH OTHER PEOPLE?: VERY DIFFICULT
GAD7 TOTAL SCORE: 15
3. WORRYING TOO MUCH ABOUT DIFFERENT THINGS: MORE THAN HALF THE DAYS
5. BEING SO RESTLESS THAT IT IS HARD TO SIT STILL: MORE THAN HALF THE DAYS
6. BECOMING EASILY ANNOYED OR IRRITABLE: MORE THAN HALF THE DAYS
7. FEELING AFRAID AS IF SOMETHING AWFUL MIGHT HAPPEN: MORE THAN HALF THE DAYS

## 2022-06-02 ASSESSMENT — PATIENT HEALTH QUESTIONNAIRE - PHQ9
SUM OF ALL RESPONSES TO PHQ QUESTIONS 1-9: 10
SUM OF ALL RESPONSES TO PHQ QUESTIONS 1-9: 10
10. IF YOU CHECKED OFF ANY PROBLEMS, HOW DIFFICULT HAVE THESE PROBLEMS MADE IT FOR YOU TO DO YOUR WORK, TAKE CARE OF THINGS AT HOME, OR GET ALONG WITH OTHER PEOPLE: SOMEWHAT DIFFICULT

## 2022-06-02 ASSESSMENT — PAIN SCALES - GENERAL: PAINLEVEL: NO PAIN (0)

## 2022-06-02 NOTE — PROGRESS NOTES
ASSESSMENT / PLAN:  (I10) Hypertension goal BP (blood pressure) < 140/90  (primary encounter diagnosis)  Comment: stable  Plan: EKG 12-lead complete w/read - Clinics        Continue meds. Exercise and self-monitor. Chest pain or shortness of breath to er. Recheck in 6 months      (Z68.42) BMI 45.0-49.9, adult (H)  Comment: needs help  Plan: patient restarted keto-diet. Exercise. No help with jardiance. Recheck in 6 months      (F31.9) Bipolar affective disorder, remission status unspecified (H)  Comment: stable  Plan: EKG 12-lead complete w/read - Clinics        Per psych. If SUICIAL IDEATION OR HOMOCIDAL IDEATION OR [place] TO ER     (E11.9) Type 2 diabetes mellitus without complication, without long-term current use of insulin (H)  Comment: stable  Plan: metFORMIN (GLUCOPHAGE XR) 500 MG 24 hr tablet        Change back to meformin for cost. Recheck in 6 months          Monique Ferrer is a 52 year old who presents for the following health issues  Follow-up dm, htn, high cholesterol, bipolar, migraines, morbid obesity and lumbar radiculopathy  Blood sugars stable. Seeing psych and needs ekg.   jardiance expensive and not noted improvement.   Metformin changed to jardiance for weight loss.     EKG needed for Jyoti      Heart Problem    History of Present Illness       Mental Health Follow-up:  Patient presents to follow-up on Depression & Anxiety.Patient's depression since last visit has been:  Worse  The patient is having other symptoms associated with depression.  Patient's anxiety since last visit has been:  Worse  The patient is having other symptoms associated with anxiety.  Any significant life events: No  Patient is feeling anxious or having panic attacks.  Patient has no concerns about alcohol or drug use.    She eats 4 or more servings of fruits and vegetables daily.She consumes 0 sweetened beverage(s) daily.She exercises with enough effort to increase her heart rate 30 to 60 minutes per day.  She  exercises with enough effort to increase her heart rate 6 days per week.   She is taking medications regularly.    Today's PHQ-9         PHQ-9 Total Score: 10    PHQ-9 Q9 Thoughts of better off dead/self-harm past 2 weeks :   Several days  Thoughts of suicide or self harm: (P) Yes  Self-harm Plan:   (P) No  Self-harm Action:     (P) No  Safety concerns for self or others: (P) No    How difficult have these problems made it for you to do your work, take care of things at home, or get along with other people: Somewhat difficult  Today's NORMA-7 Score: 15       Review of Systems   All other ROS negative      Objective    LMP  (LMP Unknown)   There is no height or weight on file to calculate BMI.   /84   Pulse 81   Temp 97.8  F (36.6  C) (Oral)   Resp 20   Wt 133.8 kg (295 lb)   LMP  (LMP Unknown)   SpO2 97%   BMI 44.85 kg/m     Physical Exam   GENERAL: healthy, alert and no distress  NECK: no adenopathy, no asymmetry, masses, or scars and thyroid normal to palpation  RESP: lungs clear to auscultation - no rales, rhonchi or wheezes  CV: regular rate and rhythm, normal S1 S2, no S3 or S4, no murmur, click or rub, no peripheral edema and peripheral pulses strong  ABDOMEN: soft, nontender, no hepatosplenomegaly, no masses and bowel sounds normal  MS: no gross musculoskeletal defects noted, no edema  NEURO: Normal strength and tone, mentation intact and speech normal  PSYCH: mentation appears normal, affect normal/bright

## 2022-06-13 ENCOUNTER — TRANSFERRED RECORDS (OUTPATIENT)
Dept: HEALTH INFORMATION MANAGEMENT | Facility: CLINIC | Age: 52
End: 2022-06-13
Payer: COMMERCIAL

## 2022-06-29 ENCOUNTER — TRANSFERRED RECORDS (OUTPATIENT)
Dept: HEALTH INFORMATION MANAGEMENT | Facility: CLINIC | Age: 52
End: 2022-06-29

## 2022-08-12 ENCOUNTER — MYC MEDICAL ADVICE (OUTPATIENT)
Dept: FAMILY MEDICINE | Facility: CLINIC | Age: 52
End: 2022-08-12

## 2022-08-12 DIAGNOSIS — G43.809 OTHER MIGRAINE WITHOUT STATUS MIGRAINOSUS, NOT INTRACTABLE: ICD-10-CM

## 2022-08-12 RX ORDER — HYDROCODONE BITARTRATE AND ACETAMINOPHEN 5; 325 MG/1; MG/1
1 TABLET ORAL 3 TIMES DAILY PRN
Qty: 8 TABLET | Refills: 0 | Status: SHIPPED | OUTPATIENT
Start: 2022-08-12 | End: 2022-09-26

## 2022-08-16 ENCOUNTER — TRANSFERRED RECORDS (OUTPATIENT)
Dept: HEALTH INFORMATION MANAGEMENT | Facility: CLINIC | Age: 52
End: 2022-08-16

## 2022-09-23 ENCOUNTER — ANCILLARY PROCEDURE (OUTPATIENT)
Dept: ULTRASOUND IMAGING | Facility: CLINIC | Age: 52
End: 2022-09-23
Attending: NURSE PRACTITIONER
Payer: COMMERCIAL

## 2022-09-23 ENCOUNTER — OFFICE VISIT (OUTPATIENT)
Dept: URGENT CARE | Facility: URGENT CARE | Age: 52
End: 2022-09-23
Payer: COMMERCIAL

## 2022-09-23 ENCOUNTER — NURSE TRIAGE (OUTPATIENT)
Dept: FAMILY MEDICINE | Facility: CLINIC | Age: 52
End: 2022-09-23

## 2022-09-23 ENCOUNTER — TELEPHONE (OUTPATIENT)
Dept: FAMILY MEDICINE | Facility: CLINIC | Age: 52
End: 2022-09-23

## 2022-09-23 VITALS
HEART RATE: 83 BPM | BODY MASS INDEX: 45.01 KG/M2 | TEMPERATURE: 97.9 F | SYSTOLIC BLOOD PRESSURE: 155 MMHG | OXYGEN SATURATION: 95 % | DIASTOLIC BLOOD PRESSURE: 88 MMHG | WEIGHT: 293 LBS

## 2022-09-23 DIAGNOSIS — M79.661 PAIN OF RIGHT LOWER LEG: ICD-10-CM

## 2022-09-23 DIAGNOSIS — M79.661 PAIN OF RIGHT LOWER LEG: Primary | ICD-10-CM

## 2022-09-23 PROCEDURE — 99214 OFFICE O/P EST MOD 30 MIN: CPT | Performed by: NURSE PRACTITIONER

## 2022-09-23 PROCEDURE — 93971 EXTREMITY STUDY: CPT | Mod: TC | Performed by: RADIOLOGY

## 2022-09-23 NOTE — TELEPHONE ENCOUNTER
Pt notified of provider message as written.  Pt verbalized good understanding.  Pt will come to urgent care for evaluation.  Jesenia PEREYRAN, RN

## 2022-09-23 NOTE — TELEPHONE ENCOUNTER
"S-(situation): Pt transferred to RN due to concerns of possible blood clot.     B-(background): Pt states have had right knee pain x1 week, pt is flying next week and \"wants to make sure it is safe.\"     A-(assessment): Pt states pain radiated from right knee into thigh, no redness or swelling noted. Pt believed pain was from a muscle strain but realized that she was sitting for long periods of time. No SOB, no chest pain, no difficulty breathing. No hx of DVT or PE.     R-(recommendations): Reviewed advice under care tab, will route to provider to determine if ED, UC, or in clinic is appropriate for pt. Pt verbalized understanding and agreeable to plan, will wait for call back.     Reason for Disposition    Thigh or calf pain and only 1 side and present > 1 hour    Additional Information    Negative: Sounds like a life-threatening emergency to the triager    Negative: Followed a knee injury    Negative: Swollen knee joint and fever    Protocols used: KNEE PAIN-A-[place]    Noemí BARON RN    "

## 2022-09-23 NOTE — PROGRESS NOTES
SUBJECTIVE:  Carissa Spears is a 52 year old female who reports right leg pain X 1 week. 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 No injury. Has had bilateral knee replacements  Pain is inner knee and behind. No calf pain tenderness swelling or warmth. No bruising or signs of infection  Pain is moderate taking aleve  Flying next week and wants to rule out dvt  No hx dvt sob chest pain    Past Medical History:   Diagnosis Date     Allergies      Bipolar disorder, unspecified (H)      DM II (diabetes mellitus, type II)     diet controlled     Endometriosis      Herniated lumbar intervertebral disc     age 22     High cholesterol      HTN      Liver disease     fatty liver     Obesity      Other disorder of menstruation and other abnormal bleeding from female genital tract      Polycystic ovarian syndrome      Sleep disorder      Toxemia      Current Outpatient Medications   Medication     aspirin 81 MG tablet     blood glucose monitoring (CONTOUR NEXT MONITOR W/DEVICE KIT) meter device kit     Butalbital-APAP-Caffeine (FIORICET PO)     citalopram (CELEXA) 40 MG tablet     CONTOUR NEXT TEST test strip     divalproex sodium extended-release (DEPAKOTE ER) 250 MG 24 hr tablet     hydrochlorothiazide (HYDRODIURIL) 25 MG tablet     losartan (COZAAR) 100 MG tablet     lurasidone (LATUDA) 120 MG TABS tablet     metFORMIN (GLUCOPHAGE XR) 500 MG 24 hr tablet     metoprolol tartrate (LOPRESSOR) 100 MG tablet     MICROLET LANCETS MISC     simvastatin (ZOCOR) 20 MG tablet     TRAZODONE HCL PO     albuterol (PROVENTIL) (2.5 MG/3ML) 0.083% neb solution     ALPRAZolam (XANAX) 0.5 MG tablet     furosemide (LASIX) 20 MG tablet     HYDROcodone-acetaminophen (NORCO) 5-325 MG tablet     hydrOXYzine (VISTARIL) 25 MG capsule     ondansetron (ZOFRAN-ODT) 4 MG ODT tab     VITAMIN D3 50 MCG (2000 UT) tablet     No current facility-administered medications for this visit.        Allergies   Allergen Reactions     Sumatriptan Anaphylaxis  and Other (See Comments)     Blood pressure high/ she was in hospital  Chest pressure     Calcium Channel Blockers      Augmentin Diarrhea     Codeine      Sick to stomach     Food Nausea and Vomiting     Kidney beans- mold.     Latex      Puffy, rash      Sulfa Drugs Nausea     Lisinopril Nausea       OBJECTIVE:  BP (!) 155/88   Pulse 83   Temp 97.9  F (36.6  C) (Tympanic)   Wt 134.3 kg (296 lb)   LMP  (LMP Unknown)   SpO2 95%   BMI 45.01 kg/m    Appearance: in no apparent distress.  LEG: Right pain inner knee and behind. No swelling normal color      ASSESSMENT:  (M79.661) Pain of right lower leg  (primary encounter diagnosis)    Plan: US Lower Extremity Venous Duplex Right  Will call with results  To ER if emergent symptoms as reviewed     Carissa to follow up with Primary Care provider regarding elevated blood pressure.    BEKAH Honeycutt CNP

## 2022-09-23 NOTE — TELEPHONE ENCOUNTER
I did put a note in and forwarded it to our urgent care staff and they did call her and she is going to BEKAH Jauregui CNP

## 2022-09-23 NOTE — TELEPHONE ENCOUNTER
Patient needs appointment for exam or urgent care. If chest pain or shortness of breath or pain worsening/into groin to er. Silvino Beach MD

## 2022-09-23 NOTE — TELEPHONE ENCOUNTER
Patient calling as she was seen in urgent care today and saw NP Kinga Velasquez. She got an ultrasound and was notified of a superficial blood clot.     Wondering what her next steps are and what to do about it. Also curious about pain medication as it is a 7/10 pain.     Routing to COSMO Velasquez and PCP team for review.    Roxi Espinosa RN

## 2022-09-26 ENCOUNTER — OFFICE VISIT (OUTPATIENT)
Dept: FAMILY MEDICINE | Facility: CLINIC | Age: 52
End: 2022-09-26
Payer: COMMERCIAL

## 2022-09-26 VITALS
HEART RATE: 92 BPM | WEIGHT: 293 LBS | DIASTOLIC BLOOD PRESSURE: 74 MMHG | HEIGHT: 68 IN | BODY MASS INDEX: 44.41 KG/M2 | TEMPERATURE: 98.2 F | OXYGEN SATURATION: 96 % | SYSTOLIC BLOOD PRESSURE: 146 MMHG | RESPIRATION RATE: 18 BRPM

## 2022-09-26 DIAGNOSIS — I10 HYPERTENSION GOAL BP (BLOOD PRESSURE) < 140/90: ICD-10-CM

## 2022-09-26 DIAGNOSIS — I80.01 THROMBOPHLEBITIS OF LEG, RIGHT, SUPERFICIAL: Primary | ICD-10-CM

## 2022-09-26 PROCEDURE — 99214 OFFICE O/P EST MOD 30 MIN: CPT | Performed by: FAMILY MEDICINE

## 2022-09-26 RX ORDER — RIVAROXABAN 10 MG/1
TABLET, FILM COATED ORAL
COMMUNITY
Start: 2022-09-24 | End: 2022-09-26

## 2022-09-26 RX ORDER — RIVAROXABAN 10 MG/1
TABLET, FILM COATED ORAL
Qty: 30 TABLET | Refills: 0 | Status: SHIPPED | OUTPATIENT
Start: 2022-09-26 | End: 2022-10-17

## 2022-09-26 RX ORDER — HYDROCODONE BITARTRATE AND ACETAMINOPHEN 5; 325 MG/1; MG/1
1 TABLET ORAL EVERY 6 HOURS PRN
Qty: 10 TABLET | Refills: 0 | Status: SHIPPED | OUTPATIENT
Start: 2022-09-26 | End: 2022-09-29

## 2022-09-26 ASSESSMENT — PATIENT HEALTH QUESTIONNAIRE - PHQ9
SUM OF ALL RESPONSES TO PHQ QUESTIONS 1-9: 5
10. IF YOU CHECKED OFF ANY PROBLEMS, HOW DIFFICULT HAVE THESE PROBLEMS MADE IT FOR YOU TO DO YOUR WORK, TAKE CARE OF THINGS AT HOME, OR GET ALONG WITH OTHER PEOPLE: NOT DIFFICULT AT ALL
SUM OF ALL RESPONSES TO PHQ QUESTIONS 1-9: 5

## 2022-09-26 ASSESSMENT — ENCOUNTER SYMPTOMS
SHORTNESS OF BREATH: 1
DIZZINESS: 1

## 2022-09-26 NOTE — PATIENT INSTRUCTIONS
Continue xarelto.    Norco for severe pain      Increases metoprolol to 1 1/2 tablets twice a day.

## 2022-09-26 NOTE — PROGRESS NOTES
"  Assessment & Plan   (I80.01) Thrombophlebitis of leg, right, superficial  (primary encounter diagnosis)  Comment: No red flags. No redness, warmth or edema to right leg.Wheezing on left upper and lower lobe. Carissa is requesting for stronger pain medication due to tylenol not able to control pain.  Plan: XARELTO ANTICOAGULANT 10 MG TABS tablet,         HYDROcodone-acetaminophen (NORCO) 5-325 MG         tablet         (I10) Hypertension goal BP (blood pressure) < 140/90  Comment: Worsening. Blood pressure at clinic today 162/106. Rechecked 146/74  Plan: Increase to 1 1/2 tablets twice a day. Will recheck effect of dose increase at next clinic visit on 10/17/2022.             BMI:   Estimated body mass index is 45.31 kg/m  as calculated from the following:    Height as of this encounter: 1.727 m (5' 8\").    Weight as of this encounter: 135.2 kg (298 lb).   Weight management plan: Discussed healthy diet and exercise guidelines    Patient Instructions     Continue xarelto.    Norco for severe pain      Increases metoprolol to 1 1/2 tablets twice a day.      Return in about 3 weeks (around 10/17/2022) for Preventive exam.    Isabelle Long MD  [facility]   Carissa is a 52 year old accompanied by her self, presenting for the following health issues: ED follow-up 9/23/22 for superficial thrombophlebitis of right leg.  RECHECK (ER visit 09/23/2022)      History of Present Illness       Reason for visit:  Blood clot in right leg    She eats 2-3 servings of fruits and vegetables daily.She consumes 0 sweetened beverage(s) daily.She exercises with enough effort to increase her heart rate 30 to 60 minutes per day.  She exercises with enough effort to increase her heart rate 4 days per week.   She is taking medications regularly.    Today's PHQ-9         PHQ-9 Total Score: 5    PHQ-9 Q9 Thoughts of better off dead/self-harm past 2 weeks :   Not at all    How difficult have these problems made " "it for you to do your work, take care of things at home, or get along with other people: Not difficult at all     Thrombophlebitis of right leg  Rates right leg pain 8/10.  Pain is worse when walking. Hot pack helpful, tylenol 1000 mg every 6 hours not helpful. Carissa has shortness of breath with activity,and dizziness - improves with rest. Finding it hard to do things.    ED/UC Followup:    Facility:  [facility]  Date of visit: 09/23/2022  Reason for visit: leg  Current Status: Pain is worst         Review of Systems   Respiratory: Positive for shortness of breath.    Musculoskeletal:        Pain with ambulation   Neurological: Positive for dizziness.      Constitutional, HEENT, cardiovascular, pulmonary, gi and gu systems are negative, except as otherwise noted.        Objective    BP (!) 146/74   Pulse 92   Temp 98.2  F (36.8  C) (Tympanic)   Resp 18   Ht 1.727 m (5' 8\")   Wt 135.2 kg (298 lb)   LMP  (LMP Unknown)   SpO2 96%   BMI 45.31 kg/m    Body mass index is 45.31 kg/m .  Physical Exam   GENERAL: healthy, alert and no distress  RESP: lungs clear to auscultation - no rales, rhonchi.  Wheezes on left upper and lower lobe.  CV: regular rate and rhythm, normal S1 S2, no S3 or S4, no murmur, click or rub, no peripheral edema and peripheral pulses strong  MS: no gross musculoskeletal defects noted, no edema  NEURO: Normal strength and tone, mentation intact and speech normal  PSYCH: mentation appears normal, affect normal/bright                    "

## 2022-10-04 DIAGNOSIS — I10 HYPERTENSION GOAL BP (BLOOD PRESSURE) < 140/90: ICD-10-CM

## 2022-10-04 RX ORDER — LOSARTAN POTASSIUM 100 MG/1
TABLET ORAL
Qty: 90 TABLET | Refills: 1 | Status: SHIPPED | OUTPATIENT
Start: 2022-10-04 | End: 2023-06-14

## 2022-10-06 ENCOUNTER — TRANSFERRED RECORDS (OUTPATIENT)
Dept: HEALTH INFORMATION MANAGEMENT | Facility: CLINIC | Age: 52
End: 2022-10-06

## 2022-10-11 ENCOUNTER — VIRTUAL VISIT (OUTPATIENT)
Dept: FAMILY MEDICINE | Facility: CLINIC | Age: 52
End: 2022-10-11
Payer: COMMERCIAL

## 2022-10-11 DIAGNOSIS — U07.1 INFECTION DUE TO 2019 NOVEL CORONAVIRUS: Primary | ICD-10-CM

## 2022-10-11 DIAGNOSIS — Z11.59 NEED FOR HEPATITIS C SCREENING TEST: ICD-10-CM

## 2022-10-11 DIAGNOSIS — Z12.11 SCREEN FOR COLON CANCER: ICD-10-CM

## 2022-10-11 DIAGNOSIS — Z11.4 SCREENING FOR HIV (HUMAN IMMUNODEFICIENCY VIRUS): ICD-10-CM

## 2022-10-11 DIAGNOSIS — Z12.31 VISIT FOR SCREENING MAMMOGRAM: ICD-10-CM

## 2022-10-11 DIAGNOSIS — E11.9 TYPE 2 DIABETES MELLITUS WITHOUT COMPLICATION, WITHOUT LONG-TERM CURRENT USE OF INSULIN (H): ICD-10-CM

## 2022-10-11 DIAGNOSIS — Z79.899 ENCOUNTER FOR LONG-TERM (CURRENT) USE OF MEDICATIONS: ICD-10-CM

## 2022-10-11 DIAGNOSIS — Z12.31 SCREENING MAMMOGRAM FOR HIGH-RISK PATIENT: ICD-10-CM

## 2022-10-11 PROCEDURE — 99213 OFFICE O/P EST LOW 20 MIN: CPT | Mod: CS | Performed by: FAMILY MEDICINE

## 2022-10-11 RX ORDER — OLANZAPINE 10 MG/1
10 TABLET ORAL AT BEDTIME
COMMUNITY
Start: 2022-08-16

## 2022-10-11 RX ORDER — ALBUTEROL SULFATE 90 UG/1
2 AEROSOL, METERED RESPIRATORY (INHALATION) EVERY 6 HOURS
Qty: 18 G | Refills: 0 | Status: SHIPPED | OUTPATIENT
Start: 2022-10-11 | End: 2022-11-01

## 2022-10-11 NOTE — PROGRESS NOTES
Carissa is a 52 year old who is being evaluated via a billable video visit.      How would you like to obtain your AVS? MyChart  If the video visit is dropped, the invitation should be resent by: Text to cell phone: [phone]  Will anyone else be joining your video visit? No          Assessment & Plan       Infection due to 2019 novel coronavirus    Patient reports a home positive test for COVID today  Onset of symptoms was yesterday  Reviewed options  Paxil bid would be contraindicated given that she is treated with Xarelto and requires ongoing treatment of a deep venous thrombosis  There would be possible interactions as well with other medications including olanzapine  Discussed other options including monoclonal antibodies  Given that patient is clinically stable her preference is not to seek any additional treatment at this time  Prescribed an albuterol inhaler per her request  Reviewed warning signs recommend immediate follow-up with Emergency Department if she develops shortness of breath or if she has other concerns  She will follow-up with her primary provider if there are ongoing concerns if needed    - albuterol (PROAIR HFA/PROVENTIL HFA/VENTOLIN HFA) 108 (90 Base) MCG/ACT inhaler  Dispense: 18 g; Refill: 0      Review of external notes as documented elsewhere in note             No follow-ups on file.    Mathew Knight MD  [facility]    Subjective   Carissa is a 52 year old, presenting for the following health issues:  covid treatment options  (Tested positive todat 10/11, symptoms started 10/10 SOB, coughing, sneezing, )    This is a pleasant 52-year-old female who presents via video visit given recent diagnosis of a COVID-19 infection.  Her primary physician is Dr. Beach.  She reports that symptom onset was yesterday and she has had a cough and sore throat.  She has felt mildly short of breath.  She tested positive for COVID today with a home test.  She has not had loss  of taste or smell and denies a fever.  She has not been laboring to breathe.      Her medical history is notable for type 2 diabetes mellitus, polycystic ovarian syndrome, bipolar depression as well as a DVT recently diagnosed of the lower extremity.  She was started on Xarelto.  She does use tobacco and smokes 1 pack/day of cigarettes.    Her son tested positive yesterday and was started on Paxlovid.    HPI           Review of Systems         Objective           Vitals:  No vitals were obtained today due to virtual visit.    Physical Exam   GENERAL: Healthy, alert and no distress  EYES: Eyes grossly normal to inspection.  No discharge or erythema, or obvious scleral/conjunctival abnormalities.  RESP: No audible wheeze, cough, or visible cyanosis.  No visible retractions or increased work of breathing.    SKIN: Visible skin clear. No significant rash, abnormal pigmentation or lesions.  NEURO: Cranial nerves grossly intact.  Mentation and speech appropriate for age.  PSYCH: Mentation appears normal, affect normal/bright, judgement and insight intact, normal speech and appearance well-groomed.                Video-Visit Details    Video Start Time: 11:12    Type of service:  Video Visit    Video End Time:11:20    Originating Location (pt. Location): Home    Distant Location (provider location):  [facility]     Platform used for Video Visit: Ceci

## 2022-10-17 ENCOUNTER — VIRTUAL VISIT (OUTPATIENT)
Dept: FAMILY MEDICINE | Facility: CLINIC | Age: 52
End: 2022-10-17
Payer: COMMERCIAL

## 2022-10-17 DIAGNOSIS — E11.9 TYPE 2 DIABETES MELLITUS WITHOUT COMPLICATION, WITHOUT LONG-TERM CURRENT USE OF INSULIN (H): ICD-10-CM

## 2022-10-17 DIAGNOSIS — I80.01 THROMBOPHLEBITIS OF LEG, RIGHT, SUPERFICIAL: ICD-10-CM

## 2022-10-17 PROCEDURE — 99213 OFFICE O/P EST LOW 20 MIN: CPT | Mod: TEL | Performed by: FAMILY MEDICINE

## 2022-10-17 RX ORDER — RIVAROXABAN 10 MG/1
TABLET, FILM COATED ORAL
Qty: 30 TABLET | Refills: 1 | Status: SHIPPED | OUTPATIENT
Start: 2022-10-17 | End: 2023-02-18

## 2022-10-17 NOTE — PROGRESS NOTES
Carissa is a 52 year old who is being evaluated via a billable telephone visit.      What phone number would you like to be contacted at? [phone]  How would you like to obtain your AVS? MyChart    ASSESSMENT / PLAN:  (E11.9) Type 2 diabetes mellitus without complication, without long-term current use of insulin (H)  Comment: stable  Plan: follow-up 2months for blood pressure check/labs/etc.     (I80.01) Thrombophlebitis of leg, right, superficial  Comment: improving with swelling/pain  Plan: XARELTO ANTICOAGULANT 10 MG TABS tablet        Continue xarelto 2 months more. To ER if worsening pain/swelling or chest pain or shortness of breath. Likely from sitting ackward.         Subjective   Carissa is a 52 year old presenting for the following health issues:  Follow-up superficial vein thrombosis on 9/23/2022. Had covid diagnosis last week too. Started on xarelto, given some norco and blood pressure med increased.   Normal inr/renal panel and cbc.   No history dvt in past. Was at [facility] hours away and was sitting differently.   Swelling and pain improving. Heat pack.    No history dvt in past and had children and knee surgery.   Breathing overall ok.   Outside blood pressure readings - needs to check.   No nausea, vomiting or diarrhea or black/bloody stools.   No chief complaint on file.      HPI     ED/UC Followup:  Facility:  [facility]  Date of visit: 9/23/2022  Reason for visit: Superficial thrombophlebitis of right leg (Primary Dx)  Current Status: improved      Review of Systems         Objective           Vitals:  No vitals were obtained today due to virtual visit.    Physical Exam   healthy, alert and no distress  PSYCH: Alert and oriented times 3; coherent speech, normal   rate and volume, able to articulate logical thoughts, able   to abstract reason, no tangential thoughts, no hallucinations   or delusions  Her affect is normal  RESP: No cough, no audible wheezing, able to  talk in full sentences  Remainder of exam unable to be completed due to telephone visits        Phone call duration: 11 minutes

## 2022-10-20 ENCOUNTER — VIRTUAL VISIT (OUTPATIENT)
Dept: FAMILY MEDICINE | Facility: CLINIC | Age: 52
End: 2022-10-20
Payer: COMMERCIAL

## 2022-10-20 DIAGNOSIS — G43.109 MIGRAINE WITH AURA AND WITHOUT STATUS MIGRAINOSUS, NOT INTRACTABLE: Primary | ICD-10-CM

## 2022-10-20 PROCEDURE — 99213 OFFICE O/P EST LOW 20 MIN: CPT | Mod: GT | Performed by: FAMILY MEDICINE

## 2022-10-20 RX ORDER — HYDROCODONE BITARTRATE AND ACETAMINOPHEN 5; 325 MG/1; MG/1
1 TABLET ORAL 2 TIMES DAILY PRN
Qty: 10 TABLET | Refills: 0 | Status: SHIPPED | OUTPATIENT
Start: 2022-10-20 | End: 2023-01-02

## 2022-10-20 NOTE — PROGRESS NOTES
"Carissa is a 52 year old who is being evaluated via a billable video visit.      How would you like to obtain your AVS? MyChart  If the video visit is dropped, the invitation should be resent by: Text to cell phone: [phone]  Will anyone else be joining your video visit? No          Assessment & Plan     Migraine with aura and without status migrainosus, not intractable  Limited supply of hydrocodone-acetaminophen has been prescribed.  This is consistent with use in the past.  PDMP reviewed  - HYDROcodone-acetaminophen (NORCO) 5-325 MG tablet; Take 1 tablet by mouth 2 times daily as needed for pain                 No follow-ups on file.    Karlos Gallo MD  [facility]    Subjective   Carissa is a 52 year old accompanied by her self, presenting for the following health issues:  Headache (Verbal consent given for video visit.   Discuss migraines/headaches)      History of Present Illness       Headaches:   Since the patient's last clinic visit, headaches are: worsened  The patient is getting headaches:  Monthly  She is not able to do normal daily activities when she has a migraine.  The patient is taking the following rescue/relief medications:  Other   Patient states \"The relief is inconsistent\" from the rescue/relief medications.   The patient is taking the following medications to prevent migraines:  No medications to prevent migraines  In the past 4 weeks, the patient has gone to an Urgent Care or Emergency Room 0 times times due to headaches.    She eats 2-3 servings of fruits and vegetables daily.She consumes 0 sweetened beverage(s) daily.She exercises with enough effort to increase her heart rate 30 to 60 minutes per day.  She exercises with enough effort to increase her heart rate 3 or less days per week.   She is taking medications regularly.     Patient reports onset of migraine last couple of days.  Migraines occur every 2 to 3 months.  In order to prevent a disabling " headache she is prescribed limited courses of Norco.  Her chart has been reviewed.  She recently had COVID 19.  She appears to be doing well in her recovery.  She also was recently diagnosed with a lower extremity DVT      Review of Systems   Constitutional, HEENT, cardiovascular, pulmonary, gi and gu systems are negative, except as otherwise noted.      Objective           Vitals:  No vitals were obtained today due to virtual visit.    Physical Exam   GENERAL: Healthy, alert and no distress  EYES: Eyes grossly normal to inspection.  No discharge or erythema, or obvious scleral/conjunctival abnormalities.  RESP: No audible wheeze, cough, or visible cyanosis.  No visible retractions or increased work of breathing.    SKIN: Visible skin clear. No significant rash, abnormal pigmentation or lesions.  NEURO: Cranial nerves grossly intact.  Mentation and speech appropriate for age.  PSYCH: Mentation appears normal, affect normal/bright, judgement and insight intact, normal speech and appearance well-groomed.                Video-Visit Details    Video Start Time: 3:33 PM    Type of service:  Video Visit    Video End Time:3:40 PM    Originating Location (pt. Location): Home        Distant Location (provider location):  On-site    Platform used for Video Visit: JamesLumoid

## 2022-10-21 ENCOUNTER — VIRTUAL VISIT (OUTPATIENT)
Dept: FAMILY MEDICINE | Facility: CLINIC | Age: 52
End: 2022-10-21
Payer: COMMERCIAL

## 2022-10-21 DIAGNOSIS — Z91.199 NO-SHOW FOR APPOINTMENT: Primary | ICD-10-CM

## 2022-10-21 NOTE — PROGRESS NOTES
"Carissa is a 52 year old who is being evaluated via a billable video visit.      How would you like to obtain your AVS? MyChart  If the video visit is dropped, the invitation should be resent by: Text to cell phone: [phone]  Will anyone else be joining your video visit? No  {If patient encounters technical issues they should call [phone] :989587}        {PROVIDER CHARTING PREFERENCE:183386}    Subjective   Carissa is a 52 year old, presenting for the following health issues:  Follow-up migraines.   Migraine      History of Present Illness       Headaches:   Since the patient's last clinic visit, headaches are: worsened  The patient is getting headaches:  Monthly  She is not able to do normal daily activities when she has a migraine.  The patient is taking the following rescue/relief medications:  Other   Patient states \"The relief is inconsistent\" from the rescue/relief medications.   The patient is taking the following medications to prevent migraines:  No medications to prevent migraines  In the past 4 weeks, the patient has gone to an Urgent Care or Emergency Room 0 times times due to headaches.    She eats 2-3 servings of fruits and vegetables daily.She consumes 0 sweetened beverage(s) daily.She exercises with enough effort to increase her heart rate 30 to 60 minutes per day.  She exercises with enough effort to increase her heart rate 3 or less days per week.   She is taking medications regularly.       {SUPERLIST (Optional):549978}  {additonal problems for provider to add (Optional):578522}    Review of Systems   {ROS COMP (Optional):906306}      Objective           Vitals:  No vitals were obtained today due to virtual visit.    Physical Exam   {video visit exam brief selected:757884::\"GENERAL: Healthy, alert and no distress\",\"EYES: Eyes grossly normal to inspection.  No discharge or erythema, or obvious scleral/conjunctival abnormalities.\",\"RESP: No audible wheeze, cough, or visible cyanosis.  No " "visible retractions or increased work of breathing.  \",\"SKIN: Visible skin clear. No significant rash, abnormal pigmentation or lesions.\",\"NEURO: Cranial nerves grossly intact.  Mentation and speech appropriate for age.\",\"PSYCH: Mentation appears normal, affect normal/bright, judgement and insight intact, normal speech and appearance well-groomed.\"}    {Diagnostic Test Results (Optional):114883}    {AMBULATORY ATTESTATION (Optional):848680}        Video-Visit Details    Video Start Time: {video visit start/end time for provider to select:239030}    Type of service:  Video Visit    Video End Time:{video visit start/end time for provider to select:526339}    Originating Location (pt. Location): {video visit patient location:099753::\"Home\"}    {PROVIDER LOCATION On-site should be selected for visits conducted from your clinic location or adjoining [facility] hospital, academic office, or other nearby [facility] building. Off-site should be selected for all other provider locations, including home:675351}    Distant Location (provider location):  {virtual location provider:529178}    Platform used for Video Visit: {Virtual Visit Platforms:606466::\"[email or web address]\"}    "

## 2022-10-21 NOTE — PROGRESS NOTES
This patient was a no show for this scheduled appointment. Both provider and MA made several phone attempts.  Nicky Martin CMA

## 2022-10-31 DIAGNOSIS — U07.1 INFECTION DUE TO 2019 NOVEL CORONAVIRUS: ICD-10-CM

## 2022-11-01 RX ORDER — ALBUTEROL SULFATE 90 UG/1
AEROSOL, METERED RESPIRATORY (INHALATION)
Qty: 18 G | Refills: 0 | Status: SHIPPED | OUTPATIENT
Start: 2022-11-01 | End: 2023-03-24

## 2022-11-01 NOTE — TELEPHONE ENCOUNTER
"Former patient of Beach & has not established care with another provider.  Please assign refill request to covering provider per clinic standard process.    Routing refill request to provider for review/approval because:  System reports patient not taking medication.  No PCP    Last Written Prescription Date:  10/11/22  Last Fill Quantity: 18 g,  # refills: 0   Last office visit provider:  10/17/22     Requested Prescriptions   Pending Prescriptions Disp Refills     VENTOLIN  (90 Base) MCG/ACT inhaler [Pharmacy Med Name: VENTOLIN HFA INH W/DOS CTR [id number]] 18 g 0     Sig: INHALE 2 PUFFS INTO THE LUNGS EVERY 6 HOURS       Asthma Maintenance Inhalers - Anticholinergics Passed - 11/1/2022  9:53 AM        Passed - Patient is age 12 years or older        Passed - Recent (12 mo) or future (30 days) visit within the authorizing provider's specialty     Patient has had an office visit with the authorizing provider or a provider within the authorizing providers department within the previous 12 mos or has a future within next 30 days. See \"Patient Info\" tab in inbasket, or \"Choose Columns\" in Meds & Orders section of the refill encounter.              Passed - Medication is active on med list       Short-Acting Beta Agonist Inhalers Protocol  Passed - 11/1/2022  9:53 AM        Passed - Patient is age 12 or older        Passed - Recent (12 mo) or future (30 days) visit within the authorizing provider's specialty     Patient has had an office visit with the authorizing provider or a provider within the authorizing providers department within the previous 12 mos or has a future within next 30 days. See \"Patient Info\" tab in inbasket, or \"Choose Columns\" in Meds & Orders section of the refill encounter.              Passed - Medication is active on med list             Abdoulaye Alfaro RN 11/01/22 9:53 AM  "

## 2022-11-03 DIAGNOSIS — E78.5 HYPERLIPIDEMIA LDL GOAL <100: ICD-10-CM

## 2022-11-03 RX ORDER — SIMVASTATIN 20 MG
TABLET ORAL
Qty: 90 TABLET | Refills: 1 | Status: SHIPPED | OUTPATIENT
Start: 2022-11-03 | End: 2023-04-24

## 2022-11-16 ENCOUNTER — TELEPHONE (OUTPATIENT)
Dept: FAMILY MEDICINE | Facility: CLINIC | Age: 52
End: 2022-11-16

## 2022-11-16 DIAGNOSIS — G43.919 INTRACTABLE MIGRAINE WITHOUT STATUS MIGRAINOSUS, UNSPECIFIED MIGRAINE TYPE: ICD-10-CM

## 2022-11-16 DIAGNOSIS — I10 HYPERTENSION GOAL BP (BLOOD PRESSURE) < 140/90: ICD-10-CM

## 2022-11-16 RX ORDER — METOPROLOL TARTRATE 100 MG
TABLET ORAL
Qty: 270 TABLET | Refills: 1 | Status: SHIPPED | OUTPATIENT
Start: 2022-11-16 | End: 2023-04-13

## 2022-11-16 NOTE — TELEPHONE ENCOUNTER
Pt called because on 9/26 she saw Dr. Long and  Her metoprolol dose was increased to 150mg.    Pt states that provider did not send in updated prescription and she is out of med.              Routing to provider.      Arianna Fisher RN  [facility]

## 2022-11-29 DIAGNOSIS — I10 HYPERTENSION GOAL BP (BLOOD PRESSURE) < 140/90: ICD-10-CM

## 2022-11-29 DIAGNOSIS — E11.9 TYPE 2 DIABETES MELLITUS WITHOUT COMPLICATION, WITHOUT LONG-TERM CURRENT USE OF INSULIN (H): ICD-10-CM

## 2022-11-29 RX ORDER — METFORMIN HCL 500 MG
TABLET, EXTENDED RELEASE 24 HR ORAL
Qty: 180 TABLET | Refills: 0 | Status: SHIPPED | OUTPATIENT
Start: 2022-11-29 | End: 2023-01-02

## 2022-11-29 RX ORDER — HYDROCHLOROTHIAZIDE 25 MG/1
TABLET ORAL
Qty: 90 TABLET | Refills: 3 | Status: SHIPPED | OUTPATIENT
Start: 2022-11-29 | End: 2023-08-24

## 2022-12-09 ENCOUNTER — TRANSFERRED RECORDS (OUTPATIENT)
Dept: HEALTH INFORMATION MANAGEMENT | Facility: CLINIC | Age: 52
End: 2022-12-09

## 2022-12-30 NOTE — PROGRESS NOTES
ASSESSMENT / PLAN:  (E11.9) Type 2 diabetes mellitus without complication, without long-term current use of insulin (H)  (primary encounter diagnosis)  Comment: needs help  Plan: HEMOGLOBIN A1C, BASIC METABOLIC PANEL, COVID-19        VACCINE BIVALENT BOOSTER 12+ (PFIZER),         metFORMIN (GLUCOPHAGE XR) 500 MG 24 hr tablet        Double metformin. Back to keto and join gym. Recheck in 3 months  Sooner if worse. Back to dm ed if not imrpoving    (Z12.11) Screen for colon cancer  Plan: patient will in [place]-guard    (Z12.31) Visit for screening mammogram  Plan: MA SCREENING DIGITAL BILAT - Future  (s+30)        Set-up    (I10) Hypertension goal BP (blood pressure) < 140/90  Comment: stable  Plan: continue meds/self-monitor. Exercise. Chest pain or shortness of breath to er.     (E78.5) Hyperlipidemia LDL goal <100  Comment: stable in past  Plan: Recheck in 6 months      (G43.109) Migraine with aura and without status migrainosus, not intractable  Comment: stable  Plan: HYDROcodone-acetaminophen (NORCO) 5-325 MG         tablet        Rare prn. Reveiwed risks and side effects of medication  Exercises.         Subjective   Carissa is a 52 year old presenting for the following health issues:  Follow-up dm, htn, high cholesterol, bipolar, migraines, morbid obesity and lumbar radiculopathy  On jardiance in past- not helpful.  Will start keto diet. Exercise- join gym - [place]mile away.   Exercise bike at home. Hand weights.   No feet changes. No nausea, vomiting or diarrhea or black/bloody stools.   [place]-guard at home. No family history colon cancer.  Emotionally doing ok.   Migraines stable. No chest pain or soob.    No chief complaint on file.      History of Present Illness       Reason for visit:  Med check    She eats 0-1 servings of fruits and vegetables daily.She consumes 0 sweetened beverage(s) daily.She exercises with enough effort to increase her heart rate 10 to 19 minutes per day.  She exercises with enough effort to  "increase her heart rate 3 or less days per week.   She is taking medications regularly.         Review of Systems         Objective    LMP  (LMP Unknown)   There is no height or weight on file to calculate BMI.   /81   Pulse 82   Temp 98  F (36.7  C) (Oral)   Resp 20   Ht 1.74 m (5' 8.5\")   Wt 137.1 kg (302 lb 3.2 oz)   LMP  (LMP Unknown)   SpO2 97%   BMI 45.28 kg/m     Physical Exam   GENERAL: healthy, alert and no distress  EYES: Eyes grossly normal to inspection, PERRL and conjunctivae and sclerae normal  NECK: no adenopathy, no asymmetry, masses, or scars and thyroid normal to palpation  RESP: lungs clear to auscultation - no rales, rhonchi or wheezes  CV: regular rate and rhythm, normal S1 S2, no S3 or S4, no murmur, click or rub, no peripheral edema and peripheral pulses strong  ABDOMEN: soft, nontender, no hepatosplenomegaly, no masses and bowel sounds normal  MS: no gross musculoskeletal defects noted, no edema  NEURO: Normal strength and tone, mentation intact and speech normal  PSYCH: mentation appears normal, affect normal/bright          "

## 2023-01-02 ENCOUNTER — OFFICE VISIT (OUTPATIENT)
Dept: FAMILY MEDICINE | Facility: CLINIC | Age: 53
End: 2023-01-02
Payer: COMMERCIAL

## 2023-01-02 VITALS
HEIGHT: 69 IN | WEIGHT: 293 LBS | HEART RATE: 82 BPM | TEMPERATURE: 98 F | BODY MASS INDEX: 43.4 KG/M2 | OXYGEN SATURATION: 97 % | SYSTOLIC BLOOD PRESSURE: 133 MMHG | DIASTOLIC BLOOD PRESSURE: 81 MMHG | RESPIRATION RATE: 20 BRPM

## 2023-01-02 DIAGNOSIS — I10 HYPERTENSION GOAL BP (BLOOD PRESSURE) < 140/90: ICD-10-CM

## 2023-01-02 DIAGNOSIS — F31.9 BIPOLAR AFFECTIVE DISORDER, REMISSION STATUS UNSPECIFIED (H): ICD-10-CM

## 2023-01-02 DIAGNOSIS — Z12.31 VISIT FOR SCREENING MAMMOGRAM: ICD-10-CM

## 2023-01-02 DIAGNOSIS — E78.5 HYPERLIPIDEMIA LDL GOAL <100: ICD-10-CM

## 2023-01-02 DIAGNOSIS — G43.109 MIGRAINE WITH AURA AND WITHOUT STATUS MIGRAINOSUS, NOT INTRACTABLE: ICD-10-CM

## 2023-01-02 DIAGNOSIS — Z12.11 SCREEN FOR COLON CANCER: ICD-10-CM

## 2023-01-02 DIAGNOSIS — E11.9 TYPE 2 DIABETES MELLITUS WITHOUT COMPLICATION, WITHOUT LONG-TERM CURRENT USE OF INSULIN (H): Primary | ICD-10-CM

## 2023-01-02 LAB
ANION GAP SERPL CALCULATED.3IONS-SCNC: 7 MMOL/L (ref 3–14)
BUN SERPL-MCNC: 16 MG/DL (ref 7–30)
CALCIUM SERPL-MCNC: 9.1 MG/DL (ref 8.5–10.1)
CHLORIDE BLD-SCNC: 103 MMOL/L (ref 94–109)
CO2 SERPL-SCNC: 27 MMOL/L (ref 20–32)
CREAT SERPL-MCNC: 0.84 MG/DL (ref 0.52–1.04)
GFR SERPL CREATININE-BSD FRML MDRD: 83 ML/MIN/1.73M2
GLUCOSE BLD-MCNC: 252 MG/DL (ref 70–99)
HBA1C MFR BLD: 9.3 % (ref 0–5.6)
POTASSIUM BLD-SCNC: 4.3 MMOL/L (ref 3.4–5.3)
SODIUM SERPL-SCNC: 137 MMOL/L (ref 133–144)

## 2023-01-02 PROCEDURE — 99214 OFFICE O/P EST MOD 30 MIN: CPT | Mod: 25 | Performed by: FAMILY MEDICINE

## 2023-01-02 PROCEDURE — 0134A COVID-19 VACCINE BIVALENT BOOSTER 18+ (MODERNA): CPT | Performed by: FAMILY MEDICINE

## 2023-01-02 PROCEDURE — 36415 COLL VENOUS BLD VENIPUNCTURE: CPT | Performed by: FAMILY MEDICINE

## 2023-01-02 PROCEDURE — 90471 IMMUNIZATION ADMIN: CPT | Performed by: FAMILY MEDICINE

## 2023-01-02 PROCEDURE — 90750 HZV VACC RECOMBINANT IM: CPT | Performed by: FAMILY MEDICINE

## 2023-01-02 PROCEDURE — 80048 BASIC METABOLIC PNL TOTAL CA: CPT | Performed by: FAMILY MEDICINE

## 2023-01-02 PROCEDURE — 91313 COVID-19 VACCINE BIVALENT BOOSTER 18+ (MODERNA): CPT | Performed by: FAMILY MEDICINE

## 2023-01-02 PROCEDURE — 83036 HEMOGLOBIN GLYCOSYLATED A1C: CPT | Performed by: FAMILY MEDICINE

## 2023-01-02 RX ORDER — METFORMIN HCL 500 MG
TABLET, EXTENDED RELEASE 24 HR ORAL
Qty: 360 TABLET | Refills: 1 | Status: SHIPPED | OUTPATIENT
Start: 2023-01-02 | End: 2023-08-24

## 2023-01-02 RX ORDER — HYDROCODONE BITARTRATE AND ACETAMINOPHEN 5; 325 MG/1; MG/1
1 TABLET ORAL DAILY PRN
Qty: 14 TABLET | Refills: 0 | Status: SHIPPED | OUTPATIENT
Start: 2023-01-02 | End: 2023-01-09

## 2023-01-02 ASSESSMENT — PAIN SCALES - GENERAL: PAINLEVEL: EXTREME PAIN (9)

## 2023-01-02 NOTE — NURSING NOTE
Prior to immunization administration, verified patients identity using patient s name and date of birth. Please see Immunization Activity for additional information.     Screening Questionnaire for Adult Immunization    Are you sick today?   No   Do you have allergies to medications, food, a vaccine component or latex?   No   Have you ever had a serious reaction after receiving a vaccination?   No   Do you have a long-term health problem with heart, lung, kidney, or metabolic disease (e.g., diabetes), asthma, a blood disorder, no spleen, complement component deficiency, a cochlear implant, or a spinal fluid leak?  Are you on long-term aspirin therapy?   No   Do you have cancer, leukemia, HIV/AIDS, or any other immune system problem?   No   Do you have a parent, brother, or sister with an immune system problem?   No   In the past 3 months, have you taken medications that affect  your immune system, such as prednisone, other steroids, or anticancer drugs; drugs for the treatment of rheumatoid arthritis, Crohn s disease, or psoriasis; or have you had radiation treatments?   No   Have you had a seizure, or a brain or other nervous system problem?   No   During the past year, have you received a transfusion of blood or blood    products, or been given immune (gamma) globulin or antiviral drug?   No   For women: Are you pregnant or is there a chance you could become       pregnant during the next month?   No   Have you received any vaccinations in the past 4 weeks?   No     Immunization questionnaire answers were all negative.        Per orders of Dr. Beach, injection of Shingrix given by Sanjuanita Adair. Patient instructed to remain in clinic for 15 minutes afterwards, and to report any adverse reaction to me immediately.       Screening performed by Sanjuanita Adair on 1/2/2023 at 10:33 AM.

## 2023-01-04 ENCOUNTER — NURSE TRIAGE (OUTPATIENT)
Dept: FAMILY MEDICINE | Facility: CLINIC | Age: 53
End: 2023-01-04

## 2023-01-04 NOTE — TELEPHONE ENCOUNTER
"Patient calling with concern of immunization site reaction after having shingles immunization on her left arm.    Patient states on Monday night before bed, noticed redness develop, swelling, and pain. Pt states the pain was the worst on Monday night and has since improved, but is still painful to touch.     Pt states redness is about 4\" around and does seem to be getting worse.     No fever.     Writer advised trying warm compress to injection site for increased blood flow and speed up release of vaccine into the system. If not improving with home care advice, advise being seen in clinic or urgent care for evaluation.  Patient verbalized understanding and agreement to plan.     Estefanía Torres RN    [facility]      Reason for Disposition    Mild immunization reaction    Additional Information    Negative: Difficulty breathing or swallowing starts within 2 hours after injection    Negative: Difficult to awaken or acting confused (e.g., disoriented, slurred speech)    Negative: Unresponsive, passed out, or very weak    Negative: Sounds like a life-threatening emergency to the triager    Negative: COVID-19 vaccine reaction suspected (e.g., fever, headache, muscle aches occurring during days 13 after getting vaccine)    Negative: COVID-19 vaccine, questions about    Negative: Fever > 104 F (40 C)    Negative: Measles vaccine rash (onset day 6-12) and purple or blood-colored    Negative: Sounds like a severe, unusual reaction to the triager    Negative: Fever > 101 F (38.3 C) begins > 48 hours after getting vaccine and over 60 years of age    Negative: Fever > 100.0 F (37.8 C) and has diabetes mellitus or a weak immune system (e.g., HIV positive, cancer chemotherapy, organ transplant, splenectomy, chronic steroids)    Negative: Fever > 100.0 F (37.8 C) and bedridden (e.g., nursing home patient, stroke, chronic illness, recovering from surgery)    Negative: Redness or red " "streak around the injection site begins > 48 hours after shot    Negative: Fever present > 3 days (72 hours)    Negative: Smallpox vaccine and eye pain, eye redness, or rash on eyelids    Negative: Deep lump follows (in 2 to 8 weeks) Td or TDaP shot, and becomes tender to the touch    Negative: Patient wants to be seen    Answer Assessment - Initial Assessment Questions  1. SYMPTOMS: \"What is the main symptom?\" (e.g., redness, swelling, pain)       Redness and its spreading, about 4\". Painful and warm. Was bad Monday night and today it hurts when you touch it.   2. ONSET: \"When was the vaccine (shot) given?\" \"How much later did the pain and redness begin?\" (e.g., hours, days ago)       Monday at bed time  3. SEVERITY: \"How bad is it?\"       Kept her awake at night  4. FEVER: \"Is there a fever?\" If Yes, ask: \"What is it, how was it measured, and when did it start?\"       None now  5. IMMUNIZATIONS GIVEN: \"What shots have you recently received?\"      Shingles on left which has redness, covid booster.   6. PAST REACTIONS: \"Have you reacted to immunizations before?\" If Yes, ask: \"What happened?\"      Once in the 90's.   7. OTHER SYMPTOMS: \"Do you have any other symptoms?\"      No    Protocols used: IMMUNIZATION [id number]      "

## 2023-01-08 ENCOUNTER — HEALTH MAINTENANCE LETTER (OUTPATIENT)
Age: 53
End: 2023-01-08

## 2023-01-09 ENCOUNTER — MYC MEDICAL ADVICE (OUTPATIENT)
Dept: FAMILY MEDICINE | Facility: CLINIC | Age: 53
End: 2023-01-09

## 2023-01-09 DIAGNOSIS — G43.109 MIGRAINE WITH AURA AND WITHOUT STATUS MIGRAINOSUS, NOT INTRACTABLE: ICD-10-CM

## 2023-01-09 RX ORDER — HYDROCODONE BITARTRATE AND ACETAMINOPHEN 5; 325 MG/1; MG/1
TABLET ORAL
Qty: 7 TABLET | Refills: 0 | Status: SHIPPED | OUTPATIENT
Start: 2023-01-09 | End: 2023-01-13

## 2023-01-09 NOTE — TELEPHONE ENCOUNTER
Fax also recvd for this.     Message: Pt recvd only 7 TS of [facility] on 1/2/23 due to insurance. Can you please send new RX for the remainder 7 TS? Thanks.

## 2023-01-13 DIAGNOSIS — G43.109 MIGRAINE WITH AURA AND WITHOUT STATUS MIGRAINOSUS, NOT INTRACTABLE: ICD-10-CM

## 2023-01-13 RX ORDER — HYDROCODONE BITARTRATE AND ACETAMINOPHEN 5; 325 MG/1; MG/1
1 TABLET ORAL DAILY PRN
Qty: 7 TABLET | Refills: 0 | Status: SHIPPED | OUTPATIENT
Start: 2023-01-15 | End: 2023-02-20

## 2023-01-17 ENCOUNTER — TELEPHONE (OUTPATIENT)
Dept: FAMILY MEDICINE | Facility: CLINIC | Age: 53
End: 2023-01-17
Payer: COMMERCIAL

## 2023-01-17 NOTE — TELEPHONE ENCOUNTER
Prior Authorization Retail Medication Request    Medication/Dose:   HYDROcodone-acetaminophen (NORCO) 5-325 MG tablet 7 tablet 0 1/15/2023  No   Sig - Route: Take 1 tablet by mouth daily as needed for severe pain (7-10) - Oral       ICD code (if different than what is on RX):  Migraine with aura and without status migrainosus, not intractable [G43.109]   Previously Tried and Failed:    Rationale:      Insurance Name:  [employer]  Insurance ID:  [id number]  Phone #:  [phone]      Pharmacy Information (if different than what is on RX)  Name:  [facility]  Phone:  [phone]

## 2023-01-20 NOTE — TELEPHONE ENCOUNTER
Prior Authorization Approval    Authorization Effective Date: 12/21/2022  Authorization Expiration Date: 1/20/2024  Medication: HYDROcodone-acetaminophen (NORCO) 5-325 MG tablet  Approved Dose/Quantity:    Reference #:     Insurance Company: [employer] - Phone [phone] Fax [phone]  Expected CoPay:       CoPay Card Available:      Foundation Assistance Needed:    Which Pharmacy is filling the prescription (Not needed for infusion/clinic administered): [id number] - [place], [place] - [address]  Pharmacy Notified: Yes  Patient Notified: Yes  **Instructed pharmacy to notify patient when script is ready to /ship.**

## 2023-01-20 NOTE — TELEPHONE ENCOUNTER
Central Prior Authorization Team   Phone: [phone]    PA Initiation    Medication: HYDROcodone-acetaminophen (NORCO) 5-325 MG tablet  Insurance Company: [employer] - Phone [phone] Fax [phone]  Pharmacy Filling the Rx: [id number] - [place], [place] - [address]  Filling Pharmacy Phone: [phone]  Filling Pharmacy Fax:    Start Date: 1/20/2023

## 2023-02-11 ENCOUNTER — OFFICE VISIT (OUTPATIENT)
Dept: URGENT CARE | Facility: URGENT CARE | Age: 53
End: 2023-02-11
Payer: COMMERCIAL

## 2023-02-11 VITALS
SYSTOLIC BLOOD PRESSURE: 120 MMHG | OXYGEN SATURATION: 96 % | DIASTOLIC BLOOD PRESSURE: 80 MMHG | WEIGHT: 293 LBS | HEIGHT: 68 IN | RESPIRATION RATE: 16 BRPM | BODY MASS INDEX: 44.41 KG/M2 | TEMPERATURE: 97 F | HEART RATE: 85 BPM

## 2023-02-11 DIAGNOSIS — G47.00 INSOMNIA, UNSPECIFIED TYPE: ICD-10-CM

## 2023-02-11 DIAGNOSIS — I10 HYPERTENSION GOAL BP (BLOOD PRESSURE) < 140/90: Primary | ICD-10-CM

## 2023-02-11 DIAGNOSIS — R06.83 SNORING: ICD-10-CM

## 2023-02-11 PROCEDURE — 99214 OFFICE O/P EST MOD 30 MIN: CPT | Performed by: FAMILY MEDICINE

## 2023-02-11 ASSESSMENT — PAIN SCALES - GENERAL: PAINLEVEL: NO PAIN (0)

## 2023-02-11 NOTE — PROGRESS NOTES
Chief complaint: elevated blood pressure    Per patient her BP has been elevated at bedtime  Last night her bp she states was 240's systolic   She isn't sure if her BP machine is not accuate - she states measurements have been high since she started using this - she also checked her 's BP and was higher by about 40 mmHg than his usual    Patient takes her BP meds   Metoprolol twice a day 5 am and then 5pm  Takes losartan in the morning and hydrochlorothiazide in the morning  She states her BP is usually ok in the morning after taking her meds and during the day  And higher in the evenings    No headache no blurring of vision no chest pain no shortness of breath no dizziness no unsteadiness no numbness no weakness      Allergies   Allergen Reactions     Sumatriptan Anaphylaxis and Other (See Comments)     Blood pressure high/ she was in hospital  Chest pressure     Calcium Channel Blockers      Augmentin Diarrhea     Codeine      Sick to stomach     Food Nausea and Vomiting     Kidney beans- mold.     Latex      Puffy, rash      Sulfa Drugs Nausea     Lisinopril Nausea       Past Medical History:   Diagnosis Date     Allergies      Bipolar disorder, unspecified (H)      DM II (diabetes mellitus, type II)     diet controlled     Endometriosis      Herniated lumbar intervertebral disc     age 22     High cholesterol      HTN      Liver disease     fatty liver     Obesity      Other disorder of menstruation and other abnormal bleeding from female genital tract      Polycystic ovarian syndrome      Sleep disorder      Toxemia        aspirin 81 MG tablet, Take 81 mg by mouth daily  blood glucose monitoring (CONTOUR NEXT MONITOR W/DEVICE KIT) meter device kit, USE TO TEST BLOOD SUGAR ONCE DAILY OR AS DIRECTED  Butalbital-APAP-Caffeine (FIORICET PO),   citalopram (CELEXA) 40 MG tablet,   CONTOUR NEXT TEST test strip, USE TO TEST BLOOD SUGAR ONCE DAILY OR AS DIRECTED  hydrochlorothiazide (HYDRODIURIL) 25 MG tablet,  "TAKE 1 TABLET(25 MG) BY MOUTH DAILY  HYDROcodone-acetaminophen (NORCO) 5-325 MG tablet, Take 1 tablet by mouth daily as needed for severe pain (7-10)  losartan (COZAAR) 100 MG tablet, TAKE 1 TABLET(100 MG) BY MOUTH DAILY  lurasidone (LATUDA) 120 MG TABS tablet, Take by mouth daily  metFORMIN (GLUCOPHAGE XR) 500 MG 24 hr tablet, TAKE 2 TABLETS BY MOUTH TWICE DAILY FOR DIABETES Strength: 500 mg  metoprolol tartrate (LOPRESSOR) 100 MG tablet, Take 1.5 tablets (150mg)  twice a day.  MICROLET LANCETS MISC, USE TO TEST BLOOD SUGAR ONCE DAILY OR AS DIRECTED  OLANZapine (ZYPREXA) 10 MG tablet, Take 1 tablet (10 mg) by mouth At Bedtime  simvastatin (ZOCOR) 20 MG tablet, TAKE 1 TABLET BY MOUTH AT BEDTIME FOR CHOLESTEROL  TRAZODONE HCL PO, Take 100 mg by mouth At Bedtime Patient reports: Takes 100-200 MG at bedtime  VENTOLIN  (90 Base) MCG/ACT inhaler, INHALE 2 PUFFS INTO THE LUNGS EVERY 6 HOURS  XARELTO ANTICOAGULANT 10 MG TABS tablet, TAKE 1 TABLET BY MOUTH ONCE DAILY WITH EVENING MEAL.    No current facility-administered medications on file prior to visit.      Social History     Tobacco Use     Smoking status: Some Days     Packs/day: 1.00     Years: 20.00     Pack years: 20.00     Types: Cigarettes     Smokeless tobacco: Never   Vaping Use     Vaping Use: Never used   Substance Use Topics     Alcohol use: Not Currently     Drug use: No       ROS:  review of systems negative except for noted above.       OBJECTIVE:  /80   Pulse 85   Temp 97  F (36.1  C) (Tympanic)   Resp 16   Ht 1.727 m (5' 8\")   Wt 132.9 kg (293 lb)   LMP  (LMP Unknown)   SpO2 96%   BMI 44.55 kg/m     General:   awake, alert, and cooperative.  NAD.   Head: Normocephalic, atraumatic.  Eyes: Conjunctiva clear,   Heart: Regular rate and rhythm. No murmur.  Lungs: Chest is clear; no wheezes or rales.   Neuro: Alert and oriented - normal speech.  MS: Using extremities freely  PSYCH:  Normal affect, normal speech  SKIN: no obvious " ellen    ASSESSMENT:    ICD-10-CM    1. Hypertension goal BP (blood pressure) < 140/90  I10       2. Insomnia, unspecified type  G47.00 Adult Sleep Eval & Management  Referral      3. Snoring  R06.83 Adult Sleep Eval & Management  Referral          PLAN:   BP is normal here   Concerns however that it is elevated at night - even if BP machine might be inaccurate.  She plans to check this at her local [facility] near her house today to see if accurate  If truly elevated in the evenings recommend taking her losartan medication in the evening instead of in the morning. That way she is not taking all of her BP meds in the morning.   We discussed the possibility of sleep apnea causing uncontrolled nocturnal hypertension.   Referred to sleep study  If with any symptoms of chest pain or shortness of breath, lightheadedness, palpitations, feeling like passing out or change and worsening in the quality of your symptoms, please proceed to ER. Recommend follow up with PCP in 1-2 weeks  Your blood pressure reading was elevated today.  Recommend that you have it re-checked either at home or at our clinic within a week  If your blood pressure top number (systolic) 180 and above, or the bottom number (diastolic) 120 and above, you need to be seen immediately.  If persistently elevated top number 140 and above, or the bottom number 90 and above, please schedule an appointment to see a provider in clinic within a week.  If you have very elevated blood pressures, accompanied by headache, chest pain, numbness, weakness, slurring of speech, confusion, difficulty walking, call 911 and go to the ER    Advised about symptoms which might herald more serious problems.        Leisa Murphy MD

## 2023-02-17 ENCOUNTER — DOCUMENTATION ONLY (OUTPATIENT)
Dept: LAB | Facility: CLINIC | Age: 53
End: 2023-02-17
Payer: COMMERCIAL

## 2023-02-17 DIAGNOSIS — E11.9 TYPE 2 DIABETES MELLITUS WITHOUT COMPLICATION, WITHOUT LONG-TERM CURRENT USE OF INSULIN (H): ICD-10-CM

## 2023-02-17 DIAGNOSIS — E78.5 HYPERLIPIDEMIA LDL GOAL <100: Primary | ICD-10-CM

## 2023-02-17 NOTE — PROGRESS NOTES
Carissa Spears has an upcoming lab appointment:    Future Appointments   Date Time Provider Department Center   2/20/2023  9:45 AM ANDMA1 [facility]   2/23/2023  9:45 AM AN LAB [facility]   3/2/2023 11:30 AM Silvino Beach MD [facility]   6/19/2023 10:00 AM Jose Mason DO [facility]         There is no order available. Please review and place either future orders or HMPO (Review of Health Maintenance Protocol Orders), as appropriate.    Health Maintenance Due   Topic     ANNUAL REVIEW OF HM ORDERS      HIV SCREENING      HEPATITIS C SCREENING      MICROALBUMIN      Wally PATELT

## 2023-02-18 ENCOUNTER — NURSE TRIAGE (OUTPATIENT)
Dept: NURSING | Facility: CLINIC | Age: 53
End: 2023-02-18
Payer: COMMERCIAL

## 2023-02-18 ENCOUNTER — OFFICE VISIT (OUTPATIENT)
Dept: URGENT CARE | Facility: URGENT CARE | Age: 53
End: 2023-02-18
Payer: COMMERCIAL

## 2023-02-18 ENCOUNTER — MYC MEDICAL ADVICE (OUTPATIENT)
Dept: URGENT CARE | Facility: URGENT CARE | Age: 53
End: 2023-02-18

## 2023-02-18 VITALS
HEART RATE: 97 BPM | SYSTOLIC BLOOD PRESSURE: 168 MMHG | DIASTOLIC BLOOD PRESSURE: 91 MMHG | OXYGEN SATURATION: 96 % | RESPIRATION RATE: 22 BRPM | WEIGHT: 293 LBS | BODY MASS INDEX: 45.04 KG/M2 | TEMPERATURE: 98.2 F

## 2023-02-18 DIAGNOSIS — M79.604 PAIN OF RIGHT LOWER EXTREMITY: Primary | ICD-10-CM

## 2023-02-18 DIAGNOSIS — I10 HYPERTENSION GOAL BP (BLOOD PRESSURE) < 140/90: ICD-10-CM

## 2023-02-18 PROCEDURE — 99214 OFFICE O/P EST MOD 30 MIN: CPT | Performed by: INTERNAL MEDICINE

## 2023-02-18 NOTE — TELEPHONE ENCOUNTER
No injury    History of knee replacement in both legs    Right knee pain and she rates her pain at an 8/10     Pain is in the back of the knee    The pain started a week ago    Denies any swelling in the leg    Denies any chest pain    Low back pain for months now    Denies any shortness of breath    Denies any rash    Denies any fever    Denies any numbness or tingling    Her leg feels like it is going to give out on her    Advised that she be seen in urgent care today    Angelica Hernandez RN  [place] Nurse Advisor  12:06 PM  2/18/2023        Reason for Disposition    [1] SEVERE pain (e.g., excruciating, unable to do any normal activities) AND [2] not improved after 2 hours of pain medicine    Additional Information    Negative: Looks like a broken bone or dislocated joint (e.g., crooked or deformed)    Negative: Sounds like a life-threatening emergency to the triager    Negative: Chest pain    Negative: Difficulty breathing    Negative: Entire foot is cool or blue in comparison to other side    Negative: Unable to walk    Negative: [1] Red area or streak AND [2] fever    Negative: [1] Swollen joint AND [2] fever    Negative: [1] Cast on leg or ankle AND [2] now increased pain    Negative: Patient sounds very sick or weak to the triager    Protocols used: LEG PAIN-A-AH

## 2023-02-18 NOTE — PROGRESS NOTES
SUBJECTIVE:  Carissa Spears is an 52 year old female who presents for concern about blood clot.  Had a blood clot last fall and was on xarelto for 3 months for that.  Has had bilateral knee replacements in 2017.  Over past couple weeks having some knee pain.  Now over the past three days the pain in leg is waking her up at night.  Pain feels the same as when had clot.   Behind knee up into high thigh on back side.  No new swelling noted but has some chronic swelling.      PMH:   has a past medical history of Allergies, Bipolar disorder, unspecified (H), DM II (diabetes mellitus, type II), Endometriosis, Herniated lumbar intervertebral disc, High cholesterol, HTN, Liver disease, Obesity, Other disorder of menstruation and other abnormal bleeding from female genital tract, Polycystic ovarian syndrome, Sleep disorder, and Toxemia.  Patient Active Problem List   Diagnosis     HYPERLIPIDEMIA LDL GOAL <100     Hypertension goal BP (blood pressure) < 140/90     Polycystic ovarian syndrome     BMI 45.0-49.9, adult (H)     Tobacco abuse     Mild major depression (H)     Insomnia     Chronic abdominal pain     Myofascial pain     Migraine with aura and without status migrainosus, not intractable     Non-allergic rhinitis     Bipolar affective disorder, remission status unspecified (H)     Type 2 diabetes mellitus without complication, without long-term current use of insulin (H)     Torn cartilage     Primary osteoarthritis of right knee     Arthrosis of knee     Social History     Socioeconomic History     Marital status:      Spouse name: None     Number of children: None     Years of education: None     Highest education level: None   Occupational History     Employer: [employer]   Tobacco Use     Smoking status: Some Days     Packs/day: 1.00     Years: 20.00     Pack years: 20.00     Types: Cigarettes     Smokeless tobacco: Never   Vaping Use     Vaping Use: Never used   Substance and Sexual  Activity     Alcohol use: Not Currently     Drug use: No     Sexual activity: Yes     Partners: Male   Other Topics Concern     Parent/sibling w/ CABG, MI or angioplasty before 65F 55M? No     Family History   Problem Relation Age of Onset     Cancer Mother         skin cancer - non-melanoma     Hypertension Mother      Lipids Father        ALLERGIES:  Sumatriptan, Calcium channel blockers, Augmentin, Codeine, Food, Latex, Sulfa drugs, and Lisinopril    Current Outpatient Medications   Medication     aspirin 81 MG tablet     blood glucose monitoring (CONTOUR NEXT MONITOR W/DEVICE KIT) meter device kit     Butalbital-APAP-Caffeine (FIORICET PO)     citalopram (CELEXA) 40 MG tablet     CONTOUR NEXT TEST test strip     hydrochlorothiazide (HYDRODIURIL) 25 MG tablet     losartan (COZAAR) 100 MG tablet     lurasidone (LATUDA) 120 MG TABS tablet     metFORMIN (GLUCOPHAGE XR) 500 MG 24 hr tablet     metoprolol tartrate (LOPRESSOR) 100 MG tablet     MICROLET LANCETS MISC     OLANZapine (ZYPREXA) 10 MG tablet     simvastatin (ZOCOR) 20 MG tablet     TRAZODONE HCL PO     HYDROcodone-acetaminophen (NORCO) 5-325 MG tablet     VENTOLIN  (90 Base) MCG/ACT inhaler     XARELTO ANTICOAGULANT 10 MG TABS tablet     No current facility-administered medications for this visit.         ROS:  ROS is done and is negative for general/constitutional, eye, ENT, Respiratory, cardiovascular, GI, , Skin, musculoskeletal except as noted elsewhere.  All other review of systems negative except as noted elsewhere.      OBJECTIVE:  BP (!) 168/91   Pulse 97   Temp 98.2  F (36.8  C) (Tympanic)   Resp 22   Wt 134.4 kg (296 lb 3.2 oz)   LMP  (LMP Unknown)   SpO2 96%   BMI 45.04 kg/m    GENERAL APPEARANCE: Alert, in no acute distress  EYES: normal  NECK:No adenopathy,masses or thyromegaly  RESP: normal and clear to auscultation  CV:regular rate and rhythm and no murmurs, clicks, or gallops  ABDOMEN: Abdomen soft, non-tender. BS normal. No  masses, organomegaly  SKIN: no ulcers, lesions or rash  MUSCULOSKELETAL:right leg tender from posterior knee through posterior thigh      RESULTS  No results found for any visits on 02/18/23..  No results found for this or any previous visit (from the past 48 hour(s)).    ASSESSMENT/PLAN:    ASSESSMENT / PLAN:  (M79.824) Pain of right lower extremity  (primary encounter diagnosis)  Comment: as has hx of prior blood clot and is not on xarelto any longer, advised to go to ER where can get ultrasound or other evaluation done, as feels same as her prior blood clot to her.  Plan: advised pt to go to ER as untreated dvt can become life threatening and more evaluation needed than what can be done here.   Pt opted for [facility] ER and staff called ER to inform pt coming by car.      (I10) Hypertension goal BP (blood pressure) < 140/90  Comment: bp above goal,may be due to pain  Plan: continue current meds. Recheck BP in 2-3 weeks with a nurse visit, [facility] pharmacy visit, or a visit to primary care doctor.      See [facility] for orders, medications, letters, patient instructions    Vera Leggett M.D.

## 2023-02-18 NOTE — PATIENT INSTRUCTIONS
Go to the Emergency Room now for concern about a blood clot.        Carissa to follow up with Primary Care provider regarding elevated blood pressure.

## 2023-02-20 ENCOUNTER — E-VISIT (OUTPATIENT)
Dept: FAMILY MEDICINE | Facility: CLINIC | Age: 53
End: 2023-02-20

## 2023-02-20 DIAGNOSIS — M79.661 PAIN OF RIGHT LOWER LEG: Primary | ICD-10-CM

## 2023-02-20 DIAGNOSIS — G43.109 MIGRAINE WITH AURA AND WITHOUT STATUS MIGRAINOSUS, NOT INTRACTABLE: ICD-10-CM

## 2023-02-20 PROCEDURE — 99421 OL DIG E/M SVC 5-10 MIN: CPT | Performed by: FAMILY MEDICINE

## 2023-02-20 RX ORDER — HYDROCODONE BITARTRATE AND ACETAMINOPHEN 5; 325 MG/1; MG/1
1 TABLET ORAL 2 TIMES DAILY PRN
Qty: 14 TABLET | Refills: 0 | Status: SHIPPED | OUTPATIENT
Start: 2023-02-20 | End: 2023-03-02

## 2023-02-28 ENCOUNTER — TRANSFERRED RECORDS (OUTPATIENT)
Dept: HEALTH INFORMATION MANAGEMENT | Facility: CLINIC | Age: 53
End: 2023-02-28

## 2023-02-28 NOTE — PROGRESS NOTES
ASSESSMENT / PLAN:  (I80.01) Thrombophlebitis of superficial veins of right lower extremity  (primary encounter diagnosis)  Comment: reoccent  Plan: Vascular Surgery Referral,         HYDROcodone-acetaminophen (NORCO) 5-325 MG         tablet, rivaroxaban ANTICOAGULANT (XARELTO) 20         MG TABS tablet        Patient would like to see vascular surgery for second opinion. Continue xarelto and elevated/lidocain patch and one vicodin at bedtime prn. Reveiwed risks and side effects of medication  Back to ER if worsening swelling (especially into groin) or chest pain or shortness of breath.     (F31.9) Bipolar affective disorder, remission status unspecified (H)  Comment: stable  Plan: per psych    (Z68.42) BMI 45.0-49.9, adult (H)  Comment: improvign  Plan: continue diet    (E11.9) Type 2 diabetes mellitus without complication, without long-term current use of insulin (H)  Plan: likely improving with weight loss/diet. Recheck in 3 months          Subjective   Carissa is a 52 year old{ presenting for the following health issues:  Follow-up Nonocclusive superficial thrombophlebitis involving the right greater saphenous vein from the proximal thigh through the proximal calf  No DVT seen and placed back on xarelto.   No history dvt in   Follow-up dm, htn, high cholesterol, bipolar, migraines, morbid obesity and lumbar radiculopathy.  10 lbs weight loss in past couple months.   Blood sugar improving. Diet improving.   No active bleeding or black/bloody stools.   No hormone replacement and was taking asa.   Poor sleep vicodin. No ALCOHOL.   Ice.   Diabetes and ER F/U (Blood clot/)      History of Present Illness       Reason for visit:  Check up & blood clot    She eats 2-3 servings of fruits and vegetables daily.She consumes 0 sweetened beverage(s) daily.She exercises with enough effort to increase her heart rate 20 to 29 minutes per day.  She exercises with enough effort to increase her heart rate 3 or less days per week.  "  She is taking medications regularly.         Objective    BP (!) 152/87   Pulse 83   Temp 97.7  F (36.5  C) (Oral)   Resp 16   Ht 1.727 m (5' 8\")   Wt 132.7 kg (292 lb 9.6 oz)   LMP  (LMP Unknown)   SpO2 98%   BMI 44.49 kg/m    Body mass index is 44.49 kg/m .  Physical Exam   GENERAL: healthy, alert and no distress  RESP: lungs clear to auscultation - no rales, rhonchi or wheezes  CV: regular rate and rhythm, normal S1 S2, no S3 or S4, no murmur, click or rub, no peripheral edema and peripheral pulses strong  ABDOMEN: soft, nontender, no hepatosplenomegaly, no masses and bowel sounds normal  MS: some tenderness and mild swelling right inner thigh. No redness/warmth  PSYCH: mentation appears normal, affect normal/bright          "

## 2023-03-02 ENCOUNTER — OFFICE VISIT (OUTPATIENT)
Dept: FAMILY MEDICINE | Facility: CLINIC | Age: 53
End: 2023-03-02
Payer: COMMERCIAL

## 2023-03-02 ENCOUNTER — TELEPHONE (OUTPATIENT)
Dept: OTHER | Facility: CLINIC | Age: 53
End: 2023-03-02

## 2023-03-02 VITALS
BODY MASS INDEX: 44.34 KG/M2 | RESPIRATION RATE: 16 BRPM | DIASTOLIC BLOOD PRESSURE: 87 MMHG | WEIGHT: 292.6 LBS | HEART RATE: 83 BPM | HEIGHT: 68 IN | OXYGEN SATURATION: 98 % | SYSTOLIC BLOOD PRESSURE: 152 MMHG | TEMPERATURE: 97.7 F

## 2023-03-02 DIAGNOSIS — M79.661 PAIN OF RIGHT LOWER LEG: ICD-10-CM

## 2023-03-02 DIAGNOSIS — F31.9 BIPOLAR AFFECTIVE DISORDER, REMISSION STATUS UNSPECIFIED (H): ICD-10-CM

## 2023-03-02 DIAGNOSIS — I80.01 THROMBOPHLEBITIS OF SUPERFICIAL VEINS OF RIGHT LOWER EXTREMITY: Primary | ICD-10-CM

## 2023-03-02 DIAGNOSIS — E11.9 TYPE 2 DIABETES MELLITUS WITHOUT COMPLICATION, WITHOUT LONG-TERM CURRENT USE OF INSULIN (H): ICD-10-CM

## 2023-03-02 PROCEDURE — 99214 OFFICE O/P EST MOD 30 MIN: CPT | Performed by: FAMILY MEDICINE

## 2023-03-02 RX ORDER — HYDROCODONE BITARTRATE AND ACETAMINOPHEN 5; 325 MG/1; MG/1
1 TABLET ORAL
Qty: 28 TABLET | Refills: 0 | Status: SHIPPED | OUTPATIENT
Start: 2023-03-02 | End: 2023-03-24

## 2023-03-02 ASSESSMENT — PAIN SCALES - GENERAL: PAINLEVEL: EXTREME PAIN (8)

## 2023-03-02 NOTE — TELEPHONE ENCOUNTER
Pt referred to [facility] by Silvino Beach MD for thrombophlebitis of superficial veins of right lower extremity.    Pt needs to be scheduled for consult with any Vascular Medicine provider.  Will route to scheduling to coordinate an appointment at next available.    Appt note: Ref by Dr. Silvino Beach for thrombophlebitis of superficial veins of right lower extremity; notes in Epic.    RODDY ChadwickN, RN-[facility]

## 2023-03-08 NOTE — TELEPHONE ENCOUNTER
Future Appointments   Date Time Provider Department Center   3/24/2023  8:00 AM Angel Mike MD [facility]    Scheduled by [place].    RODDY ChadwickN, RN-[facility]

## 2023-03-21 ENCOUNTER — TRANSFERRED RECORDS (OUTPATIENT)
Dept: HEALTH INFORMATION MANAGEMENT | Facility: CLINIC | Age: 53
End: 2023-03-21

## 2023-03-24 ENCOUNTER — TELEPHONE (OUTPATIENT)
Dept: OTHER | Facility: CLINIC | Age: 53
End: 2023-03-24
Payer: COMMERCIAL

## 2023-03-24 ENCOUNTER — OFFICE VISIT (OUTPATIENT)
Dept: OTHER | Facility: CLINIC | Age: 53
End: 2023-03-24
Attending: INTERNAL MEDICINE
Payer: COMMERCIAL

## 2023-03-24 VITALS
HEART RATE: 83 BPM | DIASTOLIC BLOOD PRESSURE: 89 MMHG | WEIGHT: 293 LBS | OXYGEN SATURATION: 96 % | HEIGHT: 68 IN | BODY MASS INDEX: 44.41 KG/M2 | SYSTOLIC BLOOD PRESSURE: 147 MMHG

## 2023-03-24 DIAGNOSIS — I83.893 VARICOSE VEINS OF BILATERAL LOWER EXTREMITIES WITH OTHER COMPLICATIONS: ICD-10-CM

## 2023-03-24 DIAGNOSIS — E11.9 TYPE 2 DIABETES MELLITUS WITHOUT COMPLICATION, WITHOUT LONG-TERM CURRENT USE OF INSULIN (H): ICD-10-CM

## 2023-03-24 DIAGNOSIS — I80.01 THROMBOPHLEBITIS OF SUPERFICIAL VEINS OF RIGHT LOWER EXTREMITY: Primary | ICD-10-CM

## 2023-03-24 DIAGNOSIS — F17.218 CIGARETTE NICOTINE DEPENDENCE WITH OTHER NICOTINE-INDUCED DISORDER: ICD-10-CM

## 2023-03-24 DIAGNOSIS — I10 BENIGN ESSENTIAL HYPERTENSION: ICD-10-CM

## 2023-03-24 DIAGNOSIS — E66.01 MORBID OBESITY (H): ICD-10-CM

## 2023-03-24 PROCEDURE — 99205 OFFICE O/P NEW HI 60 MIN: CPT | Mod: 25 | Performed by: INTERNAL MEDICINE

## 2023-03-24 PROCEDURE — G0463 HOSPITAL OUTPT CLINIC VISIT: HCPCS | Mod: 25

## 2023-03-24 PROCEDURE — 99406 BEHAV CHNG SMOKING 3-10 MIN: CPT | Performed by: INTERNAL MEDICINE

## 2023-03-24 RX ORDER — NICOTINE 21 MG/24HR
1 PATCH, TRANSDERMAL 24 HOURS TRANSDERMAL EVERY 24 HOURS
Qty: 30 PATCH | Refills: 1 | Status: SHIPPED | OUTPATIENT
Start: 2023-03-24 | End: 2023-04-03

## 2023-03-24 NOTE — PROGRESS NOTES
[facility] INITIAL VASCULAR MEDICINE CONSULT    ( New Patient visit)       PRIMARY HEALTH CARE PROVIDER:  Silvino Beach MD      REFERRING HEALTH CARE PROVIDER;  Silvino Beach MD      REASON FOR CONSULT: Evaluation and management of superficial vein thrombosis long segment starting from right SFJ  to knee area developed in September 2022 treated with Xarelto for few weeks then followed by flareup and restarted currently taking 20 mg daily.      HPI: Carissa Spears is a 52 year old very pleasant morbidly obese female BMI greater than 44 with history of type 2 diabetes mellitus poorly controlled, bipolar disorder, hypertension, hyperlipidemia and nicotine dependence smoking 1 pack cigarettes daily for more than 20 to 30 years developed right leg mainly in the thigh area redness erythema and tender in September 2022 underwent venous duplex there is no DVT but long segment SVT noted from SFJ to knee area.  She took few weeks of Xarelto and got better then she stopped it.  It recurred with the pain discomfort afterwards again she was restarted Xarelto 20 mg daily.  No personal history of malignancy.  No previous history of DVTs.  No recent travel.  She says up-to-date with age and gender appropriate cancer screening    She is new to this clinic reviewed available records in the epic and updated chart      PAST MEDICAL HISTORY  Past Medical History:   Diagnosis Date     Allergies      Bipolar disorder, unspecified (H)      DM II (diabetes mellitus, type II)     diet controlled     Endometriosis      Herniated lumbar intervertebral disc     age 22     High cholesterol      HTN      Liver disease     fatty liver     Obesity      Other disorder of menstruation and other abnormal bleeding from female genital tract      Polycystic ovarian syndrome      Sleep disorder      Toxemia        CURRENT MEDICATIONS  aspirin 81 MG tablet, Take 81 mg by mouth daily  blood glucose monitoring (CONTOUR  NEXT MONITOR W/DEVICE KIT) meter device kit, USE TO TEST BLOOD SUGAR ONCE DAILY OR AS DIRECTED  Butalbital-APAP-Caffeine (FIORICET PO),   citalopram (CELEXA) 40 MG tablet,   CONTOUR NEXT TEST test strip, USE TO TEST BLOOD SUGAR ONCE DAILY OR AS DIRECTED  hydrochlorothiazide (HYDRODIURIL) 25 MG tablet, TAKE 1 TABLET(25 MG) BY MOUTH DAILY  losartan (COZAAR) 100 MG tablet, TAKE 1 TABLET(100 MG) BY MOUTH DAILY  lurasidone (LATUDA) 120 MG TABS tablet, Take by mouth daily  metFORMIN (GLUCOPHAGE XR) 500 MG 24 hr tablet, TAKE 2 TABLETS BY MOUTH TWICE DAILY FOR DIABETES Strength: 500 mg  metoprolol tartrate (LOPRESSOR) 100 MG tablet, Take 1.5 tablets (150mg)  twice a day.  MICROLET LANCETS MISC, USE TO TEST BLOOD SUGAR ONCE DAILY OR AS DIRECTED  OLANZapine (ZYPREXA) 10 MG tablet, Take 1 tablet (10 mg) by mouth At Bedtime  rivaroxaban ANTICOAGULANT (XARELTO) 20 MG TABS tablet, Take 1 tablet (20 mg) by mouth daily (with dinner)  simvastatin (ZOCOR) 20 MG tablet, TAKE 1 TABLET BY MOUTH AT BEDTIME FOR CHOLESTEROL  TRAZODONE HCL PO, Take 100 mg by mouth At Bedtime Patient reports: Takes 100-200 MG at bedtime    No current facility-administered medications on file prior to visit.      PAST SURGICAL HISTORY:  Past Surgical History:   Procedure Laterality Date     AS KNEE SCOPE, ALLOGRAFT IMPANT Left      C/SECTION, CLASSICAL      X2     HYSTERECTOMY, PAP NO LONGER INDICATED       HYSTERECTOMY, KAMILLA  4/8/08     LAPAROSCOPIC CHOLECYSTECTOMY  7/18/2012    Procedure: LAPAROSCOPIC CHOLECYSTECTOMY;  Laparoscopic cholecystectomy;  Surgeon: Miguel Fuentes MD;  Location: [facility] OR     SALPINGO OOPHORECTOMY,R/L/YANELIS  4/8/08    L     TONSILLECTOMY  1983     TUBAL LIGATION         ALLERGIES     Allergies   Allergen Reactions     Sumatriptan Anaphylaxis and Other (See Comments)     Blood pressure high/ she was in hospital  Chest pressure     Calcium Channel Blockers      Augmentin Diarrhea     Codeine      Sick to stomach     Food Nausea and  Vomiting     Kidney beans- mold.     Latex      Puffy, rash      Sulfa Drugs Nausea     Lisinopril Nausea       FAMILY HISTORY  Family History   Problem Relation Age of Onset     Cancer Mother         skin cancer - non-melanoma     Hypertension Mother      Lipids Father        VASCULAR FAMILY HISTORY  1st order relative with atherosclerotic PAD: No  1st order relative with AAA: No  Family history of Familial Hyperlipidemia No  Family History of Hypercoagulable state:No    VASCULAR RISK FACTORS  1. Diabetes:Yes controlled  2. Smoking: currently smokes.  Advised about smoking cessation.  3. HTN: fair control  4.Hyperlipidemia: Yes - uncontrolled      SOCIAL HISTORY  Social History     Socioeconomic History     Marital status:      Spouse name: Not on file     Number of children: Not on file     Years of education: Not on file     Highest education level: Not on file   Occupational History     Employer: [employer]   Tobacco Use     Smoking status: Every Day     Packs/day: 1.00     Years: 20.00     Pack years: 20.00     Types: Cigarettes     Smokeless tobacco: Never   Vaping Use     Vaping Use: Never used   Substance and Sexual Activity     Alcohol use: Not Currently     Drug use: No     Sexual activity: Yes     Partners: Male   Other Topics Concern     Parent/sibling w/ CABG, MI or angioplasty before 65F 55M? No   Social History Narrative     Not on file     Social Determinants of Health     Financial Resource Strain: Not on file   Food Insecurity: Not on file   Transportation Needs: Not on file   Physical Activity: Not on file   Stress: Not on file   Social Connections: Not on file   Intimate Partner Violence: Not on file   Housing Stability: Not on file       ROS:   General: No change in weight, sleep or appetite.  Normal energy.  No fever or chills  Eyes: Negative for vision changes or eye problems  ENT: No problems with ears, nose or throat.  No difficulty swallowing.  Resp: No coughing,  "wheezing or shortness of breath  CV: No chest pains or palpitations  GI: No nausea, vomiting,  heartburn, abdominal pain, diarrhea, constipation or change in bowel habits  : No urinary frequency or dysuria, bladder or kidney problems  Musculoskeletal: No significant muscle or joint pains  Neurologic: No headaches, numbness, tingling, weakness, problems with balance or coordination  Psychiatric: No problems with anxiety, depression or mental health  Heme/immune/allergy: No history of bleeding or clotting problems or anemia.  No allergies or immune system problems  Endocrine: No history of thyroid disease, diabetes or other endocrine disorders  Skin: No rashes,worrisome lesions or skin problems  Vascular:  No claudication, lifestyle limiting or otherwise; no ischemic rest pain; no non-healing ulcers. No weakness, No loss of sensation .  History of right lower extremity superficial vein thrombosis long segment GSV from SFJ to knee area  No history of DVT    EXAM:  BP (!) 147/89 (BP Location: Left arm, Patient Position: Chair, Cuff Size: Adult Large)   Pulse 83   Ht 5' 8\" (1.727 m)   Wt 293 lb 6.4 oz (133.1 kg)   LMP  (LMP Unknown)   SpO2 96%   BMI 44.61 kg/m    In general, the patient is a pleasant female in no apparent distress.    HEENT: NC/AT.  PERRLA.  EOMI.  Sclerae white, not injected.  Nares clear.  Pharynx without erythema or exudate.  Dentition intact.    Neck: No adenopathy.  No thyromegaly. Carotids +2/2 bilaterally without bruits.  No jugular venous distension.   Heart: RRR. Normal S1, S2 splits physiologically. No murmur, rub, click, or gallop. The PMI is in the 5th ICS in the midclavicular line. There is no heave.    Lungs: CTA.  No ronchi, wheezes, rales.  No dullness to percussion.   Abdomen: Soft, nontender, nondistended. No organomegaly. No AAA.  No bruits.   Extremities: Vascular:  Bilateral lower extremities looks similar size  No palpable cord of GSV on the right leg  No signs of " inflammation or cellulitis  No edema  Bilateral lower extremity varicose veins right more than left side  No foot ulcers or leg ulcers    Labs:  LIPID RESULTS:  Lab Results   Component Value Date    CHOL 172 05/28/2022    CHOL 206 (H) 09/08/2020    HDL 41 (L) 05/28/2022    HDL 54 09/08/2020     (H) 05/28/2022     (H) 09/08/2020    TRIG 118 05/28/2022    TRIG 172 (H) 09/08/2020    CHOLHDLRATIO 4.5 09/23/2013       LIVER ENZYME RESULTS:  Lab Results   Component Value Date    AST 25 05/28/2022    AST 15 10/22/2020    ALT 57 (H) 05/28/2022    ALT 38 10/22/2020       CBC RESULTS:  Lab Results   Component Value Date    WBC 7.4 05/28/2022    WBC 8.4 11/29/2020    RBC 4.99 05/28/2022    RBC 4.69 11/29/2020    HGB 15.8 (H) 05/28/2022    HGB 14.8 11/29/2020    HCT 48.8 (H) 05/28/2022    HCT 44.9 11/29/2020    MCV 98 05/28/2022    MCV 96 11/29/2020    MCH 31.7 05/28/2022    MCH 31.6 11/29/2020    MCHC 32.4 05/28/2022    MCHC 33.0 11/29/2020    RDW 13.6 05/28/2022    RDW 13.4 11/29/2020     05/28/2022     11/29/2020       BMP RESULTS:  Lab Results   Component Value Date     01/02/2023     09/08/2020    POTASSIUM 4.3 01/02/2023    POTASSIUM 3.8 09/08/2020    CHLORIDE 103 01/02/2023    CHLORIDE 103 09/08/2020    CO2 27 01/02/2023    CO2 26 09/08/2020    ANIONGAP 7 01/02/2023    ANIONGAP 9 09/08/2020     (H) 01/02/2023     (H) 09/08/2020    BUN 16 01/02/2023    BUN 10 09/08/2020    CR 0.84 01/02/2023    CR 0.80 09/08/2020    GFRESTIMATED 83 01/02/2023    GFRESTIMATED 86 09/08/2020    GFRESTBLACK >90 09/08/2020    ANABELLE 9.1 01/02/2023    ANABELLE 9.1 09/08/2020        A1C RESULTS:  Lab Results   Component Value Date    A1C 9.3 (H) 01/02/2023    A1C 5.9 (H) 09/08/2020       THYROID RESULTS:  Lab Results   Component Value Date    TSH 1.49 05/15/2019       Procedures:     DATE: 9/23/2022     EXAM: RIGHT LOWER EXTREMITY VENOUS ULTRASOUND     INDICATION: Lower extremity  pain/swelling/tenderness     TECHNIQUE: Duplex imaging was performed utilizing gray-scale,  compression, augmentation as appropriate, color-flow, Doppler waveform  analysis, and spectral Doppler imaging.     COMPARISON: None.     FINDINGS: The common femoral, femoral, popliteal, and segmentally  visualized calf veins are patent and compressible, with appropriate  vascular waveforms. No deep venous thrombus is identified.     There is intraluminal echogenic material and noncompressibility of the  greater saphenous vein beginning at the saphenofemoral junction and  extending to the knee (length greater than 5 cm).                                                                      IMPRESSION:   1. Negative for deep venous thrombus.  2. Superficial vein thrombosis of the greater saphenous vein from the  saphenofemoral junction to the knee.     ARBEN FISCHER MD           Assessment and Plan:     1. Thrombophlebitis of superficial veins of right lower extremity GSV from SFJ to Knee area   2. Varicose veins of bilateral lower extremities with other complications    This is a very pleasant morbidly obese female developed first lifetime episode of superficial vein thrombosis of long segment GSV in the right leg because of the symptoms and pain treated with few weeks of Xarelto after stopping she has recurrent of symptoms and now back on Xarelto.  No DVT.  No injury.  No personal history of malignancy.  Risk factors are obesity, smoker and varicose veins  No HX of cellulitis   She is not using any compression stockings    Discussed with the patient importance of weight loss and quitting smoking.  Tight control of the diabetes  At present my recommendations  Continue Xarelto for now  Arrange bilateral lower extremity venous competency studies  Utilize compression stockings thigh-high 20 to 30 mmHg during the daytime and elevate the legs, New Rx given  Lose weight  Tight control of the diabetes  Quit smoking see  below  Virtual visit after venous competency studies at that time decide duration of anticoagulation vs other options      - Vascular Surgery Referral  - US Venous Competency Bilateral; Future  - Compression Sleeve/Stocking Order for DME - ONLY FOR DME        3. Type 2 diabetes mellitus without complication, without long-term current use of insulin (H)  Recent A1c greater than 9  Suggested lose weight work with primary care physician or see the endocrinologist for tight control of the diabetes  Follow the diet    4. Cigarette nicotine dependence with other nicotine-induced disorder  She has been smoking more than a pack a day for 20+ years  Previously tried gum did not help  Not a candidate for Zyban or Chantix she has a history of bipolar disorder  Initiate NicoDerm 21 mg patches daily for 30 days and quit smoking and work with primary care provider to get lower strength 14 mg patches a month later if successful then go down to 7 mg patches and eventually stop patches and completely quit smoking  Educated patient  - nicotine (NICODERM CQ) 21 MG/24HR 24 hr patch; Place 1 patch onto the skin every 24 hours  Dispense: 30 patch; Refill: 1  - SMOKING CESSATION COUNSELING, 3-10 MIN    5. Morbid obesity (H)  She denies any snoring or sleep apnea-like symptoms BMI is 44.6 suggested losing weight    6. Benign essential hypertension  Her blood pressure is elevated today she is taking metoprolol, losartan and hydrochlorothiazide and did not take this morning suggested to take as soon as she gets home and monitor blood pressure and goal is less than 130/80      65  minutes spent on the date of the encounter doing chart review, history and exam, documentation, and further activities as noted above.  She is new to this clinic reviewed available records in the epic and updated chart    AVS with written instructions given    This note was dictated by utilizing Dragon software    Thank you for the consultation !    Copy of this  note to primary care physician    Angel Mike MD, FAHA, FSVM, FNLA, FACP  Vascular Medicine  Clinical Hypertension specialist  Clinical Lipidologist

## 2023-03-24 NOTE — PROGRESS NOTES
"Patient is here to discuss consult    BP (!) 146/87 (BP Location: Right arm, Patient Position: Chair, Cuff Size: Adult Large)   Pulse 83   Ht 5' 8\" (1.727 m)   Wt 293 lb 6.4 oz (133.1 kg)   LMP  (LMP Unknown)   SpO2 96%   BMI 44.61 kg/m      Questions patient would like addressed today are: N/A.    Refills are needed: No    Has homecare services and agency name:  [facility]    "

## 2023-03-24 NOTE — TELEPHONE ENCOUNTER
Follow-up to 03/24/23      BLE venous competency ultrasound (prefers [facility])    Virtual visit one week later.

## 2023-03-24 NOTE — PATIENT INSTRUCTIONS
Please go for venous comp studies , our staff will schedule     Take xarelto with food     Use compression stockings thigh high 20-30 mm hg day time and elevate legs when able     Quit smoking use patches 21 mg daily for a jerry then get 14 mg patches RX from primary     Tight control of diabetes

## 2023-03-29 NOTE — TELEPHONE ENCOUNTER
Left voicemail with instructions for patient to call back to schedule their appointment(s)    March 29, 2023 , 10:54 AM

## 2023-04-02 ENCOUNTER — MYC MEDICAL ADVICE (OUTPATIENT)
Dept: FAMILY MEDICINE | Facility: CLINIC | Age: 53
End: 2023-04-02
Payer: COMMERCIAL

## 2023-04-02 DIAGNOSIS — F17.218 CIGARETTE NICOTINE DEPENDENCE WITH OTHER NICOTINE-INDUCED DISORDER: ICD-10-CM

## 2023-04-03 RX ORDER — NICOTINE 21 MG/24HR
1 PATCH, TRANSDERMAL 24 HOURS TRANSDERMAL EVERY 24 HOURS
Qty: 28 PATCH | Refills: 4 | Status: SHIPPED | OUTPATIENT
Start: 2023-04-03

## 2023-04-05 ENCOUNTER — ANCILLARY PROCEDURE (OUTPATIENT)
Dept: ULTRASOUND IMAGING | Facility: CLINIC | Age: 53
End: 2023-04-05
Attending: INTERNAL MEDICINE
Payer: COMMERCIAL

## 2023-04-05 DIAGNOSIS — I80.01 THROMBOPHLEBITIS OF SUPERFICIAL VEINS OF RIGHT LOWER EXTREMITY: ICD-10-CM

## 2023-04-05 PROCEDURE — 93970 EXTREMITY STUDY: CPT | Performed by: SURGERY

## 2023-04-11 ENCOUNTER — TRANSFERRED RECORDS (OUTPATIENT)
Dept: HEALTH INFORMATION MANAGEMENT | Facility: CLINIC | Age: 53
End: 2023-04-11

## 2023-04-11 ENCOUNTER — VIRTUAL VISIT (OUTPATIENT)
Dept: OTHER | Facility: CLINIC | Age: 53
End: 2023-04-11
Attending: INTERNAL MEDICINE
Payer: COMMERCIAL

## 2023-04-11 DIAGNOSIS — E66.01 MORBID OBESITY (H): ICD-10-CM

## 2023-04-11 DIAGNOSIS — I83.893 VARICOSE VEINS OF BILATERAL LOWER EXTREMITIES WITH OTHER COMPLICATIONS: Primary | ICD-10-CM

## 2023-04-11 DIAGNOSIS — E11.9 TYPE 2 DIABETES MELLITUS WITHOUT COMPLICATION, WITHOUT LONG-TERM CURRENT USE OF INSULIN (H): ICD-10-CM

## 2023-04-11 DIAGNOSIS — I10 BENIGN ESSENTIAL HYPERTENSION: ICD-10-CM

## 2023-04-11 DIAGNOSIS — F17.218 CIGARETTE NICOTINE DEPENDENCE WITH OTHER NICOTINE-INDUCED DISORDER: ICD-10-CM

## 2023-04-11 DIAGNOSIS — I80.01 THROMBOPHLEBITIS OF SUPERFICIAL VEINS OF RIGHT LOWER EXTREMITY: ICD-10-CM

## 2023-04-11 PROCEDURE — G0463 HOSPITAL OUTPT CLINIC VISIT: HCPCS | Mod: 25,TEL

## 2023-04-11 PROCEDURE — G0463 HOSPITAL OUTPT CLINIC VISIT: HCPCS | Mod: PN,TEL,25 | Performed by: INTERNAL MEDICINE

## 2023-04-11 PROCEDURE — 99406 BEHAV CHNG SMOKING 3-10 MIN: CPT | Mod: 93 | Performed by: INTERNAL MEDICINE

## 2023-04-11 PROCEDURE — 99213 OFFICE O/P EST LOW 20 MIN: CPT | Mod: 93 | Performed by: INTERNAL MEDICINE

## 2023-04-11 NOTE — PROGRESS NOTES
Carissa is a 52 year old who is being evaluated via a billable telephone visit.      What phone number would you like to be contacted at? [phone]  How would you like to obtain your AVS? [place]    Distant Location (provider location):  On-site    Objective       Vitals:  No vitals were obtained today due to virtual visit.    Serge Alba    Provider visit note:    Chief complaint:  Follow-up visit  Review of recent venous comp studies  Using patches and still continues to smoke  Her blood pressure machine broke and she is planning to get through her son's plan  Blood sugars are still elevated    History of present illness:  For full details please see my recent initial consult note March 2023    Carissa Spears is a 52 year old very pleasant morbidly obese female BMI greater than 44 with history of type 2 diabetes mellitus poorly controlled, bipolar disorder, hypertension, hyperlipidemia and nicotine dependence smoking 1 pack cigarettes daily for more than 20 to 30 years developed right leg mainly in the thigh area redness erythema and tender in September 2022 underwent venous duplex there is no DVT but long segment SVT noted from SFJ to knee area.  She took few weeks of Xarelto and got better then she stopped it.  It recurred with the pain discomfort afterwards again she was restarted Xarelto 20 mg daily.  No personal history of malignancy.  No previous history of DVTs.  No recent travel.  She says up-to-date with age and gender appropriate cancer screening    She underwent venous competency studies no DVT but SVT as delineated below  Still smokes and using patches  Not monitoring blood pressure at home  Taking medications as prescribed      Review of systems: Reviewed all 12 point review of systems as per HPI otherwise unremarkable    Physical exam:( no physical exam done this is virtual visit)    Reviewed recent laboratory tests, imaging studies in the epic and updated chart    Carissa AGUILAR  Tory                                                 Patient ID: [id number]  Date: 2023                                                      : 1970  Sex: female                                                                 Examined by: ANGELA Blackmon RVT  Age:  52 year old                                                         Reading MD: DEEP Bernal MD     INDICATION:  Bilateral varicose veins w swelling; hx of Rt  GSV thrombus from SFJ to knee; patient currently taking Xarelto     EXAM TYPE  BILATERAL LOWER EXTREMITY VENOUS DUPLEX FOR VENOUS INSUFFICIENCY  TECHNICAL SUMMARY     A duplex ultrasound study using color flow was performed, to evaluate the bilateral lower extremity veins for valvular incompetence with the patient in a steep reversed trendelenberg.      RIGHT:     The deep veins demonstrate phasic flow, compress and respond to augmentations.  There is no  DVT.  The common femoral vein is incompetent and free of thrombus. The remaining deep veins are competent and free of thrombus.      Chronic occlusive thrombus is seen in the GSV 5 cm below the SFJ to the knee.  Chronic non-occlusive thrombus is seen in the proximal calf.  The remainder of the patent GSV demonstrates phasic flow, compresses and responds to augmentations with no evidence of thrombus. The great saphenous vein measures 7.4 mm at the saphenofemoral junction and is occluded in the proximal thigh and knee.  The GSV measues 2.2 mm at the mid calf. The GSV is incompetent at the SFJ with a reflux time of 2491 milliseconds.       The AASV is incompetent ( 2.0 mm) draining into the saphenofemoral junction. The AASV is incompetent from the saphenofemoal junction to the proximal thigh with a reflux time of 2491 milliseconds. The AASV takes a straight course for 6 cm.  The AASV gives rise to a varicose branch measuring 1.7 mm off the Proximal Thigh that courses Medial with a reflux time of 2035 milliseconds.      The  Giacomini vein is competent( 1.0 mm) communicating with the small saphenous vein at the knee level.      The SSV demonstrates phasic flow, compresses and responds to augmentations from the popliteal space to the ankle.  No reflux or thrombus is seen. The saphenopopliteal junction is absent. The SSV is a bifid system in the distal calf.      Perforators: there is no evidence of incompetent  veins at any level.      LEFT:     The deep veins demonstrate phasic flow, compress and respond to augmentations.  There is no reflux or DVT.       The GSV demonstrates phasic flow, compresses and responds to augmentations from the saphenofemoral junction to the ankle with no evidence of reflux or thrombus. The GSV is a bifid system from the proximal to distal thigh. The great saphenous vein measures 8.6 mm at the saphenofemoral junction, 4.1 mm / 4.3 mm in the proximal thigh and 5.3 mm at the knee.      The AASV is competent( 2.7 mm) draining into the saphenofemoral junction.      The Giacomini vein is absent.     The SSV demonstrates phasic flow, compresses and responds to augmentations from the popliteal space to the ankle.  No reflux or thrombus is seen. The saphenopopliteal junction is absent.      Perforators: there is no evidence of incompetent  veins at any level.         FINAL SUMMARY:  1.   No deep vein thrombosis either lower extremity  2.  Right common femoral vein incompetence  3.  Chronic, occlusive thrombus right proximal thigh to the knee great saphenous vein with partially occlusive thrombus in proximal calf right great saphenous vein  4.  Right anterior accessory saphenous vein incompetence (vein quite small)  5.  Duplicated left thigh great saphenous vein   6.  Right saphenofemoral junction incompetence            Incompetence Criteria: Greater than 500 milliseconds reflux in the superficial and  veins and greater than 1000 milliseconds reflux in the deep veins.     WAI Garcia  MD Dolores, FACS    Assessment and plan:      1. Thrombophlebitis of superficial veins of right lower extremity GSV from SFJ to Knee area   2. Varicose veins of bilateral lower extremities with other complications     This is a very pleasant morbidly obese female developed first lifetime episode of superficial vein thrombosis of long segment GSV in the right leg because of the symptoms and pain treated with few weeks of Xarelto after stopping she has recurrent of symptoms and now back on Xarelto.  No DVT.  No injury.  No personal history of malignancy.  Risk factors are obesity, smoker and varicose veins  No HX of cellulitis   Started using any compression stockings  Reviewed venous comp studies as delineated above  Discussed with the patient importance of weight loss and quitting smoking.  Tight control of the diabetes  At present my recommendations  Continue Xarelto for now  Utilize compression stockings thigh-high 20 to 30 mmHg during the daytime and elevate the legs, New Rx given  Lose weight  Tight control of the diabetes  Quit smoking see below  Virtual visit or office visit in 3 to 4 months           3. Type 2 diabetes mellitus without complication, without long-term current use of insulin (H)  Recent A1c greater than 9  Suggested lose weight work with primary care physician or see the endocrinologist for tight control of the diabetes  Follow the diet     4. Cigarette nicotine dependence with other nicotine-induced disorder  She has been smoking more than a pack a day for 20+ years  Previously tried gum did not help  Not a candidate for Zyban or Chantix she has a history of bipolar disorder  Last visit initiated NicoDerm 21 mg patches daily for 30 days and quit smoking and work with primary care provider to get lower strength 14 mg patches a month later if successful then go down to 7 mg patches and eventually stop patches and completely quit smoking  Educated patient    - SMOKING CESSATION COUNSELING,  3-10 MIN     5. Morbid obesity (H)  She denies any snoring or sleep apnea-like symptoms BMI is 44.6 suggested losing weight     6. Benign essential hypertension  Suggested patient to take the blood pressure medications as prescribed and monitor at home if it is persistently elevated call our clinic or talk to the primary care physician for adjustment of the medications        25  minutes spent on the date of the encounter doing chart review, history and exam, documentation, and further activities as noted above.  She is new to this clinic reviewed available records in the epic and updated chart     AVS with written instructions done     This note was dictated by utilizing Dragon software       Copy of this note to primary care physician     This visit is being conducted as a virtual visit due to the emphasis on mitigation of the COVID-19 virus pandemic. The clinician has decided that the risk of an in-office visit outweighs the benefit for this patient.     Angel Mike MD, CHARI, FSVM, FNLA, FACP  Vascular Medicine  Clinical Hypertension specialist  Clinical Lipidologist

## 2023-04-11 NOTE — PATIENT INSTRUCTIONS
Please quit smoking and use patches    Utilize compression stockings and elevate the legs and lose weight    Take Xarelto for now    Monitor blood pressure at home with the large cuff and goal is less than 130/80    Continue current medications    Tight control of the diabetes    Follow-up with me in 3 to 4 months

## 2023-04-12 ENCOUNTER — MYC MEDICAL ADVICE (OUTPATIENT)
Dept: FAMILY MEDICINE | Facility: CLINIC | Age: 53
End: 2023-04-12
Payer: COMMERCIAL

## 2023-04-12 DIAGNOSIS — I80.01 THROMBOPHLEBITIS OF LEG, RIGHT, SUPERFICIAL: ICD-10-CM

## 2023-04-12 DIAGNOSIS — I80.01 THROMBOPHLEBITIS OF SUPERFICIAL VEINS OF RIGHT LOWER EXTREMITY: ICD-10-CM

## 2023-04-12 RX ORDER — HYDROCODONE BITARTRATE AND ACETAMINOPHEN 5; 325 MG/1; MG/1
1 TABLET ORAL
Qty: 14 TABLET | Refills: 0 | Status: SHIPPED | OUTPATIENT
Start: 2023-04-12 | End: 2023-04-20

## 2023-04-12 RX ORDER — HYDROCODONE BITARTRATE AND ACETAMINOPHEN 5; 325 MG/1; MG/1
1 TABLET ORAL EVERY 6 HOURS PRN
Refills: 0 | Status: CANCELLED | OUTPATIENT
Start: 2023-04-12

## 2023-04-13 DIAGNOSIS — I10 HYPERTENSION GOAL BP (BLOOD PRESSURE) < 140/90: ICD-10-CM

## 2023-04-13 DIAGNOSIS — G43.919 INTRACTABLE MIGRAINE WITHOUT STATUS MIGRAINOSUS, UNSPECIFIED MIGRAINE TYPE: ICD-10-CM

## 2023-04-13 RX ORDER — METOPROLOL TARTRATE 100 MG
TABLET ORAL
Qty: 270 TABLET | Refills: 1 | Status: SHIPPED | OUTPATIENT
Start: 2023-04-13 | End: 2023-08-24

## 2023-04-17 ENCOUNTER — ANCILLARY PROCEDURE (OUTPATIENT)
Dept: MAMMOGRAPHY | Facility: CLINIC | Age: 53
End: 2023-04-17
Attending: FAMILY MEDICINE
Payer: COMMERCIAL

## 2023-04-17 ENCOUNTER — ANCILLARY ORDERS (OUTPATIENT)
Dept: FAMILY MEDICINE | Facility: CLINIC | Age: 53
End: 2023-04-17

## 2023-04-17 DIAGNOSIS — Z12.31 VISIT FOR SCREENING MAMMOGRAM: ICD-10-CM

## 2023-04-17 PROCEDURE — 77063 BREAST TOMOSYNTHESIS BI: CPT | Mod: TC | Performed by: RADIOLOGY

## 2023-04-17 PROCEDURE — 77067 SCR MAMMO BI INCL CAD: CPT | Mod: TC | Performed by: RADIOLOGY

## 2023-04-20 ENCOUNTER — MYC MEDICAL ADVICE (OUTPATIENT)
Dept: FAMILY MEDICINE | Facility: CLINIC | Age: 53
End: 2023-04-20
Payer: COMMERCIAL

## 2023-04-20 DIAGNOSIS — I80.01 THROMBOPHLEBITIS OF SUPERFICIAL VEINS OF RIGHT LOWER EXTREMITY: ICD-10-CM

## 2023-04-20 RX ORDER — HYDROCODONE BITARTRATE AND ACETAMINOPHEN 5; 325 MG/1; MG/1
1 TABLET ORAL
Qty: 14 TABLET | Refills: 0 | Status: SHIPPED | OUTPATIENT
Start: 2023-04-25 | End: 2023-05-23

## 2023-04-20 NOTE — TELEPHONE ENCOUNTER
FYI to PCP:  14 tablets were refilled 8 days ago and are gone.  Patient asking for another refill.   Patient said she has been using 2 tabs daily instead of 1 as ordered due to leg pain from DVT. Also uses this medication for migraines.    Routing to provider to advise.  Jackie PEREYRAN, RN

## 2023-04-21 ENCOUNTER — MYC MEDICAL ADVICE (OUTPATIENT)
Dept: FAMILY MEDICINE | Facility: CLINIC | Age: 53
End: 2023-04-21
Payer: COMMERCIAL

## 2023-04-21 NOTE — TELEPHONE ENCOUNTER
Provider:  Please see the request from the patient. Is it ok to call the pharmacy and have her HYDROcodone-acetaminophen (NORCO) 5-325 MG tablet filled today? Thank you. Denisa Mejia R.N.

## 2023-04-23 ENCOUNTER — HEALTH MAINTENANCE LETTER (OUTPATIENT)
Age: 53
End: 2023-04-23

## 2023-05-08 ASSESSMENT — ENCOUNTER SYMPTOMS
PARESTHESIAS: 0
ABDOMINAL PAIN: 0
FREQUENCY: 1
DIZZINESS: 0
HEADACHES: 1
HEMATURIA: 0
HEARTBURN: 0
FEVER: 0
NAUSEA: 0
ARTHRALGIAS: 0
NERVOUS/ANXIOUS: 1
CHILLS: 0
BREAST MASS: 0
SORE THROAT: 0
MYALGIAS: 0
PALPITATIONS: 0
SHORTNESS OF BREATH: 0
HEMATOCHEZIA: 0
EYE PAIN: 0
COUGH: 0
DIARRHEA: 0
WEAKNESS: 0
DYSURIA: 0
JOINT SWELLING: 0
CONSTIPATION: 0

## 2023-05-10 ENCOUNTER — TRANSFERRED RECORDS (OUTPATIENT)
Dept: HEALTH INFORMATION MANAGEMENT | Facility: CLINIC | Age: 53
End: 2023-05-10
Payer: COMMERCIAL

## 2023-05-11 ENCOUNTER — OFFICE VISIT (OUTPATIENT)
Dept: FAMILY MEDICINE | Facility: CLINIC | Age: 53
End: 2023-05-11
Payer: COMMERCIAL

## 2023-05-11 VITALS
HEART RATE: 91 BPM | WEIGHT: 293 LBS | OXYGEN SATURATION: 98 % | TEMPERATURE: 97.9 F | SYSTOLIC BLOOD PRESSURE: 140 MMHG | BODY MASS INDEX: 44.41 KG/M2 | HEIGHT: 68 IN | RESPIRATION RATE: 22 BRPM | DIASTOLIC BLOOD PRESSURE: 84 MMHG

## 2023-05-11 DIAGNOSIS — Z00.00 ROUTINE HISTORY AND PHYSICAL EXAMINATION OF ADULT: Primary | ICD-10-CM

## 2023-05-11 DIAGNOSIS — E78.5 HYPERLIPIDEMIA LDL GOAL <100: ICD-10-CM

## 2023-05-11 DIAGNOSIS — Z12.11 COLON CANCER SCREENING: ICD-10-CM

## 2023-05-11 DIAGNOSIS — E11.9 TYPE 2 DIABETES MELLITUS WITHOUT COMPLICATION, WITHOUT LONG-TERM CURRENT USE OF INSULIN (H): ICD-10-CM

## 2023-05-11 DIAGNOSIS — I10 HYPERTENSION GOAL BP (BLOOD PRESSURE) < 140/90: ICD-10-CM

## 2023-05-11 LAB
CREAT UR-MCNC: 18 MG/DL
ERYTHROCYTE [DISTWIDTH] IN BLOOD BY AUTOMATED COUNT: 13.3 % (ref 10–15)
HBA1C MFR BLD: 10 % (ref 0–5.6)
HCT VFR BLD AUTO: 46.1 % (ref 35–47)
HGB BLD-MCNC: 15.4 G/DL (ref 11.7–15.7)
MCH RBC QN AUTO: 31.3 PG (ref 26.5–33)
MCHC RBC AUTO-ENTMCNC: 33.4 G/DL (ref 31.5–36.5)
MCV RBC AUTO: 94 FL (ref 78–100)
MICROALBUMIN UR-MCNC: <5 MG/L
MICROALBUMIN/CREAT UR: NORMAL MG/G{CREAT}
PLATELET # BLD AUTO: 253 10E3/UL (ref 150–450)
RBC # BLD AUTO: 4.92 10E6/UL (ref 3.8–5.2)
WBC # BLD AUTO: 8 10E3/UL (ref 4–11)

## 2023-05-11 PROCEDURE — 99396 PREV VISIT EST AGE 40-64: CPT | Performed by: FAMILY MEDICINE

## 2023-05-11 PROCEDURE — 80061 LIPID PANEL: CPT | Performed by: FAMILY MEDICINE

## 2023-05-11 PROCEDURE — 99214 OFFICE O/P EST MOD 30 MIN: CPT | Mod: 25 | Performed by: FAMILY MEDICINE

## 2023-05-11 PROCEDURE — 85027 COMPLETE CBC AUTOMATED: CPT | Performed by: FAMILY MEDICINE

## 2023-05-11 PROCEDURE — 36415 COLL VENOUS BLD VENIPUNCTURE: CPT | Performed by: FAMILY MEDICINE

## 2023-05-11 PROCEDURE — 82043 UR ALBUMIN QUANTITATIVE: CPT | Performed by: FAMILY MEDICINE

## 2023-05-11 PROCEDURE — 83036 HEMOGLOBIN GLYCOSYLATED A1C: CPT | Performed by: FAMILY MEDICINE

## 2023-05-11 PROCEDURE — 80069 RENAL FUNCTION PANEL: CPT | Performed by: FAMILY MEDICINE

## 2023-05-11 PROCEDURE — 82570 ASSAY OF URINE CREATININE: CPT | Performed by: FAMILY MEDICINE

## 2023-05-11 RX ORDER — SIMVASTATIN 20 MG
20 TABLET ORAL AT BEDTIME
Qty: 90 TABLET | Refills: 3 | Status: SHIPPED | OUTPATIENT
Start: 2023-05-11 | End: 2024-07-16

## 2023-05-11 RX ORDER — LOSARTAN POTASSIUM AND HYDROCHLOROTHIAZIDE 25; 100 MG/1; MG/1
1 TABLET ORAL DAILY
Qty: 90 TABLET | Refills: 1 | Status: SHIPPED | OUTPATIENT
Start: 2023-05-11 | End: 2023-08-24

## 2023-05-11 ASSESSMENT — ENCOUNTER SYMPTOMS
EYE PAIN: 0
WEAKNESS: 0
HEMATOCHEZIA: 0
HEMATURIA: 0
PARESTHESIAS: 0
DIARRHEA: 0
PALPITATIONS: 0
JOINT SWELLING: 0
HEADACHES: 1
NERVOUS/ANXIOUS: 1
DIZZINESS: 0
ARTHRALGIAS: 0
MYALGIAS: 0
BREAST MASS: 0
DYSURIA: 0
NAUSEA: 0
CONSTIPATION: 0
FREQUENCY: 1
SHORTNESS OF BREATH: 0
COUGH: 0
CHILLS: 0
ABDOMINAL PAIN: 0
SORE THROAT: 0
FEVER: 0
HEARTBURN: 0

## 2023-05-11 ASSESSMENT — PATIENT HEALTH QUESTIONNAIRE - PHQ9
10. IF YOU CHECKED OFF ANY PROBLEMS, HOW DIFFICULT HAVE THESE PROBLEMS MADE IT FOR YOU TO DO YOUR WORK, TAKE CARE OF THINGS AT HOME, OR GET ALONG WITH OTHER PEOPLE: SOMEWHAT DIFFICULT
SUM OF ALL RESPONSES TO PHQ QUESTIONS 1-9: 3
SUM OF ALL RESPONSES TO PHQ QUESTIONS 1-9: 3

## 2023-05-11 ASSESSMENT — PAIN SCALES - GENERAL: PAINLEVEL: SEVERE PAIN (7)

## 2023-05-11 NOTE — PROGRESS NOTES
SUBJECTIVE:   CC: Carissa is an 53 year old who presents for preventive health visit.     Follow-up dm, htn, high cholesterol, bipolar, migraines, morbid obesity and lumbar radiculopathy.   No family history  Colon cancer. No black/bloody stools.  Jardiance. Interested in omzepic.   Emotionally doing ok. Summer happy with. Exercise needs more.   No shortness of breath or chest pain. Using nicotine patch. No feet changes.   Eye exam - will set-up. No nausea, vomiting or diarrhea or abdominal pain. No urine changes or hematuria.  No ALCOHOL or illicit drugs.   Sleep overall ok. Seeing psych. Outside blood pressure   No mole changes. Taking vitaminD.  Doing sleep study in June.      5/11/2023    10:36 AM   Additional Questions   Roomed by DEEPA Adair CMA     Patient has been advised of split billing requirements and indicates understanding: Yes  Healthy Habits:     Getting at least 3 servings of Calcium per day:  Yes    Bi-annual eye exam:  Yes    Dental care twice a year:  Yes    Sleep apnea or symptoms of sleep apnea:  None    Diet:  Carbohydrate counting    Frequency of exercise:  2-3 days/week    Duration of exercise:  15-30 minutes    Taking medications regularly:  Yes    Medication side effects:  None    PHQ-2 Total Score: 0    Additional concerns today:  No        Today's PHQ-2 Score:       5/8/2023    10:16 AM   PHQ-2 ( 1999 [employer])   Q1: Little interest or pleasure in doing things 0   Q2: Feeling down, depressed or hopeless 0   PHQ-2 Score 0   Q1: Little interest or pleasure in doing things Not at all   Q2: Feeling down, depressed or hopeless Not at all   PHQ-2 Score 0           Social History     Tobacco Use     Smoking status: Every Day     Packs/day: 1.00     Years: 20.00     Pack years: 20.00     Types: Cigarettes     Smokeless tobacco: Never     Tobacco comments:     Arash patches   Vaping Use     Vaping status: Never Used   Substance Use Topics     Alcohol use: Not Currently     Comment: vito              "2023    10:16 AM   Alcohol Use   Prescreen: >3 drinks/day or >7 drinks/week? No     Reviewed orders with patient.  Reviewed health maintenance and updated orders accordingly - Yes  Lab work is in process    Breast Cancer Screenin/22/2022     7:54 AM 2023    10:17 AM   Breast CA Risk Assessment (FHS-7)   Do you have a family history of breast, colon, or ovarian cancer? Yes No / Unknown          Reviewed and updated as needed this visit by clinical staff   Tobacco  Allergies  Meds              Reviewed and updated as needed this visit by Provider                     Review of Systems   Constitutional: Negative for chills and fever.   HENT: Negative for congestion, ear pain, hearing loss and sore throat.    Eyes: Negative for pain and visual disturbance.   Respiratory: Negative for cough and shortness of breath.    Cardiovascular: Negative for chest pain, palpitations and peripheral edema.   Gastrointestinal: Negative for abdominal pain, constipation, diarrhea, heartburn, hematochezia and nausea.   Breasts:  Negative for tenderness, breast mass and discharge.   Genitourinary: Positive for frequency. Negative for dysuria, genital sores, hematuria, pelvic pain, urgency, vaginal bleeding and vaginal discharge.   Musculoskeletal: Negative for arthralgias, joint swelling and myalgias.   Skin: Negative for rash.   Neurological: Positive for headaches. Negative for dizziness, weakness and paresthesias.   Psychiatric/Behavioral: Negative for mood changes. The patient is nervous/anxious.           OBJECTIVE:   BP (!) 140/84   Pulse 91   Temp 97.9  F (36.6  C) (Oral)   Resp 22   Ht 1.727 m (5' 8\")   Wt 133.5 kg (294 lb 6.4 oz)   LMP  (LMP Unknown)   SpO2 98%   BMI 44.76 kg/m    Physical Exam  GENERAL: healthy, alert and no distress  EYES: Eyes grossly normal to inspection, PERRL and conjunctivae and sclerae normal  HENT: ear canals and TM's normal, nose and mouth without ulcers or lesions  NECK: no " adenopathy, no asymmetry, masses, or scars and thyroid normal to palpation  RESP: lungs clear to auscultation - no rales, rhonchi or wheezes  BREAST: patient deferred exam and just had mammogram  CV: regular rate and rhythm, normal S1 S2, no S3 or S4, no murmur, click or rub, no peripheral edema and peripheral pulses strong  ABDOMEN: soft, nontender, no hepatosplenomegaly, no masses and bowel sounds normal   (female): patient deferred  exam  MS: no gross musculoskeletal defects noted, no edema  SKIN: no suspicious lesions or rashes  NEURO: Normal strength and tone, mentation intact and speech normal  PSYCH: mentation appears normal, affect normal/bright  LYMPH: no cervical, supraclavicular, axillary adenopathy    ASSESSMENT/PLAN:   ASSESSMENT / PLAN:  (Z00.00) Routine history and physical examination of adult  (primary encounter diagnosis)  Comment: generally healthy and normal exam  Plan: CBC with platelets        Reviewed self mole/breast check handout.  VitmainD. exercise    (I10) Hypertension goal BP (blood pressure) < 140/90  Comment: a little high  Plan: Renal panel, losartan-hydrochlorothiazide         (HYZAAR) 100-25 MG tablet        Diet/exercise. Self-monitor. Chest pain or shortness of breath to er. Recheck in 6 months      (E78.5) Hyperlipidemia LDL goal <100  Comment: not fasting  Plan: simvastatin (ZOCOR) 20 MG tablet        Reveiwed risks and side effects of medication  Recheck in 6 months      (E11.9) Type 2 diabetes mellitus without complication, without long-term current use of insulin (H)  Comment: needs help  Plan: Hemoglobin A1c, Renal panel, semaglutide         (OZEMPIC) 2 MG/3ML pen        Add ozempic. Reveiwed risks and side effects of medication  Will double mefformin to 4 daily if not ad goal. Only taking 2 pills. Recheck in 4 months  Continue smoking cessation    (Z12.11) Colon cancer screening  Plan: ROMERO([employer])        Patient NOT interested in colonoscopy      Patient  "has been advised of split billing requirements and indicates understanding: Yes      COUNSELING:  Reviewed preventive health counseling, as reflected in patient instructions       Regular exercise       Healthy diet/nutrition       Vision screening       Hearing screening       Aspirin prophylaxis       Alcohol Use       Colorectal Cancer Screening      BMI:   Estimated body mass index is 44.76 kg/m  as calculated from the following:    Height as of this encounter: 1.727 m (5' 8\").    Weight as of this encounter: 133.5 kg (294 lb 6.4 oz).   Weight management plan: Discussed healthy diet and exercise guidelines      She reports that she has been smoking cigarettes. She has a 20.00 pack-year smoking history. She has never used smokeless tobacco.  Nicotine/Tobacco Cessation Plan:   Pharmacotherapies : Nicotine patch          Silvino Beach MD  [facility]  Answers for HPI/ROS submitted by the patient on 5/11/2023  If you checked off any problems, how difficult have these problems made it for you to do your work, take care of things at home, or get along with other people?: Somewhat difficult  PHQ9 TOTAL SCORE: 3      "

## 2023-05-12 LAB
ALBUMIN SERPL-MCNC: 3.8 G/DL (ref 3.4–5)
ANION GAP SERPL CALCULATED.3IONS-SCNC: 8 MMOL/L (ref 3–14)
BUN SERPL-MCNC: 13 MG/DL (ref 7–30)
CALCIUM SERPL-MCNC: 9.2 MG/DL (ref 8.5–10.1)
CHLORIDE BLD-SCNC: 102 MMOL/L (ref 94–109)
CHOLEST SERPL-MCNC: 155 MG/DL
CO2 SERPL-SCNC: 25 MMOL/L (ref 20–32)
CREAT SERPL-MCNC: 0.6 MG/DL (ref 0.52–1.04)
FASTING STATUS PATIENT QL REPORTED: NO
GFR SERPL CREATININE-BSD FRML MDRD: >90 ML/MIN/1.73M2
GLUCOSE BLD-MCNC: 275 MG/DL (ref 70–99)
HDLC SERPL-MCNC: 32 MG/DL
LDLC SERPL CALC-MCNC: 81 MG/DL
NONHDLC SERPL-MCNC: 123 MG/DL
PHOSPHATE SERPL-MCNC: 3.6 MG/DL (ref 2.5–4.5)
POTASSIUM BLD-SCNC: 4 MMOL/L (ref 3.4–5.3)
SODIUM SERPL-SCNC: 135 MMOL/L (ref 133–144)
TRIGL SERPL-MCNC: 210 MG/DL

## 2023-05-22 ENCOUNTER — MYC MEDICAL ADVICE (OUTPATIENT)
Dept: FAMILY MEDICINE | Facility: CLINIC | Age: 53
End: 2023-05-22
Payer: COMMERCIAL

## 2023-05-22 DIAGNOSIS — I80.01 THROMBOPHLEBITIS OF SUPERFICIAL VEINS OF RIGHT LOWER EXTREMITY: ICD-10-CM

## 2023-05-22 RX ORDER — RIVAROXABAN 20 MG/1
TABLET, FILM COATED ORAL
Qty: 90 TABLET | Refills: 0 | Status: SHIPPED | OUTPATIENT
Start: 2023-05-22 | End: 2023-07-25

## 2023-05-22 NOTE — TELEPHONE ENCOUNTER
See patient mychart message.     Please assist patient in setting up appointment for follow up with vascular.     Estefanía SOMERS  [facility]

## 2023-05-22 NOTE — TELEPHONE ENCOUNTER
Provider:  Please see girnarsofthart message from the patient. Please assess if patient should still be experiencing pain from the blood clot requiring hydrocodone for pain treatment. Patient was using hydrocodone prescription for her headaches for her DVT according to a 4/20/2023 MyChart note.  Thank you. Deinsa Mejia R.N.

## 2023-05-23 RX ORDER — HYDROCODONE BITARTRATE AND ACETAMINOPHEN 5; 325 MG/1; MG/1
1 TABLET ORAL
Qty: 10 TABLET | Refills: 0 | Status: SHIPPED | OUTPATIENT
Start: 2023-05-23 | End: 2023-06-07

## 2023-05-25 ENCOUNTER — LAB (OUTPATIENT)
Dept: FAMILY MEDICINE | Facility: CLINIC | Age: 53
End: 2023-05-25
Payer: COMMERCIAL

## 2023-05-25 DIAGNOSIS — Z12.11 COLON CANCER SCREENING: ICD-10-CM

## 2023-06-07 ENCOUNTER — OFFICE VISIT (OUTPATIENT)
Dept: FAMILY MEDICINE | Facility: CLINIC | Age: 53
End: 2023-06-07
Payer: COMMERCIAL

## 2023-06-07 ENCOUNTER — ANCILLARY PROCEDURE (OUTPATIENT)
Dept: GENERAL RADIOLOGY | Facility: CLINIC | Age: 53
End: 2023-06-07
Attending: PHYSICIAN ASSISTANT
Payer: COMMERCIAL

## 2023-06-07 ENCOUNTER — MYC MEDICAL ADVICE (OUTPATIENT)
Dept: FAMILY MEDICINE | Facility: CLINIC | Age: 53
End: 2023-06-07

## 2023-06-07 VITALS
BODY MASS INDEX: 43.8 KG/M2 | RESPIRATION RATE: 16 BRPM | DIASTOLIC BLOOD PRESSURE: 90 MMHG | TEMPERATURE: 97.6 F | HEIGHT: 68 IN | HEART RATE: 80 BPM | WEIGHT: 289 LBS | SYSTOLIC BLOOD PRESSURE: 136 MMHG | OXYGEN SATURATION: 98 %

## 2023-06-07 DIAGNOSIS — E11.65 TYPE 2 DIABETES MELLITUS WITH HYPERGLYCEMIA, WITHOUT LONG-TERM CURRENT USE OF INSULIN (H): ICD-10-CM

## 2023-06-07 DIAGNOSIS — M25.512 ACUTE PAIN OF LEFT SHOULDER: Primary | ICD-10-CM

## 2023-06-07 DIAGNOSIS — M25.512 ACUTE PAIN OF LEFT SHOULDER: ICD-10-CM

## 2023-06-07 DIAGNOSIS — I80.01 THROMBOPHLEBITIS OF SUPERFICIAL VEINS OF RIGHT LOWER EXTREMITY: ICD-10-CM

## 2023-06-07 PROCEDURE — 73030 X-RAY EXAM OF SHOULDER: CPT | Mod: TC | Performed by: RADIOLOGY

## 2023-06-07 PROCEDURE — 99214 OFFICE O/P EST MOD 30 MIN: CPT | Performed by: PHYSICIAN ASSISTANT

## 2023-06-07 RX ORDER — CYCLOBENZAPRINE HCL 10 MG
10 TABLET ORAL 3 TIMES DAILY PRN
Qty: 30 TABLET | Refills: 0 | Status: SHIPPED | OUTPATIENT
Start: 2023-06-07 | End: 2023-11-03

## 2023-06-07 RX ORDER — HYDROCODONE BITARTRATE AND ACETAMINOPHEN 5; 325 MG/1; MG/1
1 TABLET ORAL
Qty: 10 TABLET | Refills: 0 | Status: SHIPPED | OUTPATIENT
Start: 2023-06-07 | End: 2023-06-23

## 2023-06-07 ASSESSMENT — PAIN SCALES - GENERAL: PAINLEVEL: SEVERE PAIN (7)

## 2023-06-07 NOTE — PATIENT INSTRUCTIONS
For your shoulder, I suggest you try a muscle relaxer to help with pain. It may cause drowsiness or constipation.  Use it as needed (sometimes before bed is the best time).     I recommend massage to the area.  You could go to a chiropractic office and ask for a massage only.     It is important to get your sugars down to help you heal, continue working with your primary care provider on this.

## 2023-06-07 NOTE — PROGRESS NOTES
Assessment & Plan     Acute pain of left shoulder  For your shoulder, I suggest you try a muscle relaxer to help with pain. It may cause drowsiness or constipation.  Use it as needed (sometimes before bed is the best time).     I recommend massage to the area.  You could go to a chiropractic office and ask for a massage only.     It is important to get your sugars down to help you heal, continue working with your primary care provider on this.     - XR Shoulder Left 2 Views; Future  - Physical Therapy Referral; Future  - cyclobenzaprine (FLEXERIL) 10 MG tablet; Take 1 tablet (10 mg) by mouth 3 times daily as needed for muscle spasms    Type 2 diabetes mellitus with hyperglycemia, without long-term current use of insulin (H)    It is important to get your sugars down to help you heal, continue working with your primary care provider on this.   Recently started ozempic and due to increase dose to 0.5 mg this week.         21 minutes spent by me on the date of the encounter doing chart review, history and exam, documentation and further activities per the note           Kenzie Silva PA-C  [facility] MORALES Ferrer is a 53 year old, presenting for the following health issues:  Musculoskeletal Problem        5/11/2023    10:36 AM   Additional Questions   Roomed by DEEPA Adair CMA      Patient arrived to discuss new symptoms of Left-Sided Upper Back that radiates into Neck and Shoulder ongoing x 3 weeks. No known cause or injury per pt and no past history of recurrent back problems. She has been taking OTC Tylenol as needed with little to no relief.     Pain level today: 7/10    Musculoskeletal Problem    History of Present Illness       Back Pain:  She presents for follow up of back pain. Patient's back pain is a new problem.    Original cause of back pain: not sure  First noticed back pain: 1-4 weeks ago  Patient feels back pain: constantlyLocation of back pain:  Left upper back and left  side of neck  Description of back pain: cramping, sharp and shooting  Back pain spreads: left shoulder    Since patient first noticed back pain, pain is: always present, but gets better and worse  Does back pain interfere with her job:  Yes  On a scale of 1-10 (10 being the worst), patient describes pain as:  8  What makes back pain worse: certain positions and lying down  Acupuncture: not tried  Acetaminophen: not helpful  Activity or exercise: not tried  Chiropractor:  Not tried  Cold: helpful  Heat: not helpful  Massage: helpful  Muscle relaxants: not tried  NSAIDS: not tried  Opioids: not tried  Physical Therapy: not tried  Rest: not helpful  Steroid Injection: not tried  Stretching: not helpful  Surgery: not tried  TENS unit: not tried  Topical pain relievers: not helpful  Other healthcare providers patient is seeing for back pain: None    She eats 0-1 servings of fruits and vegetables daily.She consumes 0 sweetened beverage(s) daily.She exercises with enough effort to increase her heart rate 20 to 29 minutes per day.  She exercises with enough effort to increase her heart rate 3 or less days per week.   She is taking medications regularly.     Pain came on one morning.    When sitting still, pain worsens.   Worsened with heat, improved with ice short term.   Pain present for 3 weeks and not improving.  Pain was a 7/10, now an 9/10.   Feels nodules around left shoulder (she has a h/o nodules over her whole body when she was pregnant).    Pain radiated to left mid arm.  Feels better with massage.    No radiation past the elbow.  No numbness or tingling.     Not able to take NSAIDs due to being on xarelto.      Sugars have been running high.  Increasing ozempic to 0.5 mg this week.                 Review of Systems   Constitutional, HEENT, cardiovascular, pulmonary, gi and gu systems are negative, except as otherwise noted.      Objective    BP (!) 136/90 (BP Location: Left arm, Patient Position: Chair, Cuff  "Size: Adult Large)   Pulse 80   Temp 97.6  F (36.4  C) (Tympanic)   Resp 16   Ht 1.727 m (5' 8\")   Wt 131.1 kg (289 lb)   LMP  (LMP Unknown)   SpO2 98%   BMI 43.94 kg/m    Body mass index is 43.94 kg/m .  Physical Exam   GENERAL: healthy, alert and no distress  Inspection: No obvious deformity, asymmetry, muscle atrophy or abnormal motion noted.   Palpation:  No pain over AC or SC joint, acromion and subacromial space, bicipital groove and greater/less tubercles, she does have some pain along scapular spine and adjacent musculature, no pain down cervical spine.    External/Internal rotation strength:  full  Abduction/Adduction strength: full  Biceps/Triceps strength: normal  Neck examination: some tightness  ROM/crepitus? Full shoulder ROM  Capillary refills less than 2 seconds and radial pulses normal bilaterally.   Sensation intact bilateral fingers, hands and arms.  X-ray:                                                                  Impression:     1.  Normal joint alignment. No fracture or concerning bone lesion.  Calcific tendinopathy involving the infraspinatus tendon near the  posterior facet of the greater tuberosity. Mild degenerative arthrosis  at the AC joint.                      "

## 2023-06-07 NOTE — TELEPHONE ENCOUNTER
See [facility] message. Patient requesting refill of HYDROcodone-acetaminophen (NORCO) 5-325 MG tablet.    Med and pharmacy pended.    Estefanía Hernandez RN

## 2023-06-14 DIAGNOSIS — I10 HYPERTENSION GOAL BP (BLOOD PRESSURE) < 140/90: ICD-10-CM

## 2023-06-14 RX ORDER — LOSARTAN POTASSIUM 100 MG/1
TABLET ORAL
Qty: 90 TABLET | Refills: 1 | Status: SHIPPED | OUTPATIENT
Start: 2023-06-14 | End: 2023-08-24

## 2023-06-18 ASSESSMENT — SLEEP AND FATIGUE QUESTIONNAIRES
HOW LIKELY ARE YOU TO NOD OFF OR FALL ASLEEP WHEN YOU ARE A PASSENGER IN A CAR FOR AN HOUR WITHOUT A BREAK: WOULD NEVER DOZE
HOW LIKELY ARE YOU TO NOD OFF OR FALL ASLEEP WHILE WATCHING TV: WOULD NEVER DOZE
HOW LIKELY ARE YOU TO NOD OFF OR FALL ASLEEP WHILE LYING DOWN TO REST IN THE AFTERNOON WHEN CIRCUMSTANCES PERMIT: SLIGHT CHANCE OF DOZING
HOW LIKELY ARE YOU TO NOD OFF OR FALL ASLEEP WHILE SITTING QUIETLY AFTER LUNCH WITHOUT ALCOHOL: SLIGHT CHANCE OF DOZING
HOW LIKELY ARE YOU TO NOD OFF OR FALL ASLEEP WHILE SITTING AND READING: WOULD NEVER DOZE
HOW LIKELY ARE YOU TO NOD OFF OR FALL ASLEEP WHILE SITTING INACTIVE IN A PUBLIC PLACE: WOULD NEVER DOZE
HOW LIKELY ARE YOU TO NOD OFF OR FALL ASLEEP IN A CAR, WHILE STOPPED FOR A FEW MINUTES IN TRAFFIC: WOULD NEVER DOZE
HOW LIKELY ARE YOU TO NOD OFF OR FALL ASLEEP WHILE SITTING AND TALKING TO SOMEONE: WOULD NEVER DOZE

## 2023-06-19 ENCOUNTER — VIRTUAL VISIT (OUTPATIENT)
Dept: SLEEP MEDICINE | Facility: CLINIC | Age: 53
End: 2023-06-19
Attending: FAMILY MEDICINE
Payer: COMMERCIAL

## 2023-06-19 VITALS — HEIGHT: 68 IN | WEIGHT: 290 LBS | BODY MASS INDEX: 43.95 KG/M2

## 2023-06-19 DIAGNOSIS — G47.00 PERSISTENT INSOMNIA: Primary | ICD-10-CM

## 2023-06-19 DIAGNOSIS — I10 HYPERTENSION GOAL BP (BLOOD PRESSURE) < 140/90: ICD-10-CM

## 2023-06-19 DIAGNOSIS — R53.82 CHRONIC FATIGUE: ICD-10-CM

## 2023-06-19 DIAGNOSIS — R06.83 SNORING: ICD-10-CM

## 2023-06-19 PROCEDURE — 99204 OFFICE O/P NEW MOD 45 MIN: CPT | Mod: VID | Performed by: INTERNAL MEDICINE

## 2023-06-19 ASSESSMENT — PAIN SCALES - GENERAL: PAINLEVEL: NO PAIN (0)

## 2023-06-19 NOTE — PATIENT INSTRUCTIONS
Patient education: What is a sleep study?     What is a sleep study? -- A sleep study is a test that measures how well you sleep and checks for sleep problems. For some sleep studies, you stay overnight in a sleep lab at a hospital or sleep center.     What happens during a sleep study? -- Before you go to sleep, a technician attaches small, sticky patches called  electrodes  to your head, chest, and legs. He or she will also place a small tube beneath your nose and might wrap 1 or 2 belts around your chest.   Each of these items has wires that connect to monitors. The monitors record your movement, brain activity, breathing, and other body functions while you sleep.  If you have a history of trouble falling asleep, your doctor might prescribe a medicine to help you fall asleep in the lab. If you have never taken the medicine before, your doctor might ask you take it on a night before your sleep study to see how it affects you.   Why might my doctor order a sleep study? -- Your doctor will order a sleep study if he or she thinks you have sleep apnea or a different condition that makes you:   ?Have sudden jerking leg movements while you sleep, called  periodic limb movements.    ?Feel very sleepy during the day and fall asleep all of a sudden, called  narcolepsy.    ?Have trouble falling asleep or staying asleep over a long period of time, called  chronic insomnia.    ?Do odd things while you sleep, such as walking.  How should I prepare for a sleep study? -- On the day of your sleep study, you should:   ?Avoid alcohol   ?Avoid drinking coffee, tea, sodas, and other drinks that have caffeine in the afternoon and evening   ?Take all of your regular medicines     The cost of care estimate line is [phone]. They are able to give the patient an estimate of the charges and also an estimate of their insurance coverage/patient responsibility.   After your sleep study is performed, please call us at [phone] or  [phone]  to schedule for a follow up to review the results of the sleep study.    Please bring one tab of low dose melatonin 3 mg or less to the night of the study.    Melatonin intake is completely voluntary.    You may take own melatonin after arrival to sleep center. Do not drive or operate machinery after intake of melatonin.     Please make every effort you can to sleep on your back with one pillow behind your head.    Please also call your insurance company next week to see if your sleep study is approved and find out your co-pay.

## 2023-06-19 NOTE — PROGRESS NOTES
Virtual Visit Details    Type of service:  Video Visit     Originating Location (pt. Location): Home  Distant Location (provider location):  Off-site  Platform used for Video Visit: LogFire     Additional 15 minutes on the date of service was spent performing the following:    -Preparing to see the patient  -Obtaining and/or reviewing separately obtained history   -Ordering medications, tests, or procedures   -Documenting clinical information in the electronic or other health record     Thank you for the opportunity to participate in the care of  Carissa Spears.    Assessment and Plan:    In summary Carissa Spears is a 53 year old year old female here for sleep disturbance.  1. Persistent Insomnia/Chronic fatigue/Snoring/HTN/Morbid Obesity   Carissa Spears has high risk for obstructive sleep apnea based on the history of persistent insomnia, chronic fatigue, snoring and a crowded airway. I educated the patient on the underlying pathophysiology of obstructive sleep apnea. We reviewed the risks associated with sleep apnea, including increased cardiovascular risk and overall death. We talked about treatments briefly. I recommend getting a split-night nocturnal polysomnography. The patient should return to the clinic to discuss results and treatment option in a patient-centered approach.    Lab reviewed: Discussed with patient.    History of present illness:    She is a 53 year old female who comes to the virtual clinic with a chief complaint of persistent insomnia that has been going on since age 11. She reports that she would wake up in the middle of the night and have difficulty re-initiating sleep. Despite adequate hours of sleep she would still suffer from chronic fatigue during the day. While she denies any episodes of witness apnea, she does occasionally snore. She is being treated for HTN and morbid obesity.     Ideal Sleep-Wake Cycle(devoid of societal pressure):    Patient would try to initiate sleep at around  9-9:30 PM. Final wake up time is around 5 AM.    TIME IN BED:    1) Work/School Days:    Do you work or go to school? Yes   What time do you usually get into bed? 9pm   About how long does it take you to fall asleep? 25 to 2 hours   How often do you have trouble falling asleep? Every night   How often do you wake up during the night? Every hour   Do you work days/evenings/nights/rotating shifts? Rotating Shifts   What wakes you up at night? Pain    Uncertain   How often do you have trouble falling back to sleep? 4   About how long does it take to fall back to sleep? 25 minutes to 2 hours   What do you usually do if you have trouble getting back to sleep? Lay there   What time do you usually get out of bed to start your day? 5 to 530   Do you use an alarm? No   2) Weekends/Non-work Days/All Other Days    What time do you usually get into bed? 9pm   About how long does it take you to fall asleep? 25 minutes to 2 hours   What time do you usually get out of bed to start your day? 530   Do you use an alarm? No   SLEEP NEED    On average, about how much sleep do you think you get? 4 to 6 hours   About how much sleep do you think you need? 8 hours   SLEEP POSITION    Which sleep positions do you prefer? Side   Do you do any of the following activities in bed?    How often do you take a nap on purpose? 0   About how long are your naps?    Do you feel better after naps?    How often do you doze off unintentionally? 3   Have you ever had a driving accident or near-miss due to sleepiness/drowsiness? No     Stop Bang Questionnaire    Question 6/18/2023 11:04 AM CDT - Filed by Patient   Do you SNORE loudly (louder than talking or loud enough to be heard through closed doors)? No   Do you often feel TIRED, fatigued, or sleepy during daytime? Yes   Has anyone OBSERVED you stop breathing during your sleep? No   Do you have or are you being treated for high blood PRESSURE? Yes   BMI more than 35kg/m2? Yes   AGE over 50 years old?  Yes   NECK circumference > 16 inches (40cm)? No   GENDER: Male? No   STOP-BANG Total Score (range: 0 - 8) 5 (High risk of YASSINE) Critical        NELLIE:  NELLIE Total Score: 21  Total score - [place]: 2 (6/18/2023 11:03 AM)    Patient told to return in one week after the sleep study is interpreted.    Patient Active Problem List   Diagnosis     HYPERLIPIDEMIA LDL GOAL <100     Hypertension goal BP (blood pressure) < 140/90     Polycystic ovarian syndrome     BMI 45.0-49.9, adult (H)     Tobacco abuse     Mild major depression (H)     Insomnia     Chronic abdominal pain     Myofascial pain     Migraine with aura and without status migrainosus, not intractable     Non-allergic rhinitis     Bipolar affective disorder, remission status unspecified (H)     Type 2 diabetes mellitus without complication, without long-term current use of insulin (H)     Torn cartilage     Primary osteoarthritis of right knee     Arthrosis of knee     Acute pain of left shoulder     Past Medical History:   Diagnosis Date     Allergies      Bipolar disorder, unspecified (H)      DM II (diabetes mellitus, type II)     diet controlled     Endometriosis      Herniated lumbar intervertebral disc     age 22     High cholesterol      HTN      Liver disease     fatty liver     Obesity      Other disorder of menstruation and other abnormal bleeding from female genital tract      Polycystic ovarian syndrome      Sleep disorder      Toxemia      Past Surgical History:   Procedure Laterality Date     AS KNEE SCOPE, ALLOGRAFT IMPANT Left      C/SECTION, CLASSICAL      X2     HYSTERECTOMY, PAP NO LONGER INDICATED       HYSTERECTOMY, KAMILLA  4/8/08     LAPAROSCOPIC CHOLECYSTECTOMY  7/18/2012    Procedure: LAPAROSCOPIC CHOLECYSTECTOMY;  Laparoscopic cholecystectomy;  Surgeon: Miguel Fuentes MD;  Location: [facility] OR     SALPINGO OOPHORECTOMY,R/L/YANELIS  4/8/08    L     TONSILLECTOMY  1983     TUBAL LIGATION       Current Outpatient Medications   Medication Sig  Dispense Refill     aspirin 81 MG tablet Take 81 mg by mouth daily 1 tablet 0     blood glucose monitoring (CONTOUR NEXT MONITOR W/DEVICE KIT) meter device kit USE TO TEST BLOOD SUGAR ONCE DAILY OR AS DIRECTED 1 kit 0     Butalbital-APAP-Caffeine (FIORICET PO)        citalopram (CELEXA) 40 MG tablet        CONTOUR NEXT TEST test strip USE TO TEST BLOOD SUGAR ONCE DAILY OR AS DIRECTED 100 strip 3     cyclobenzaprine (FLEXERIL) 10 MG tablet Take 1 tablet (10 mg) by mouth 3 times daily as needed for muscle spasms 30 tablet 0     hydrochlorothiazide (HYDRODIURIL) 25 MG tablet TAKE 1 TABLET(25 MG) BY MOUTH DAILY 90 tablet 3     HYDROcodone-acetaminophen (NORCO) 5-325 MG tablet Take 1 tablet by mouth nightly as needed for severe pain Avoid with ALCOHOL or within 6 hours of xanax.Take 1 tablet by mouth nightly as needed for severe pain (7-10) Avoid with ALCOHOL or within 6 hours of xanax. 10 tablet 0     losartan (COZAAR) 100 MG tablet TAKE 1 TABLET(100 MG) BY MOUTH DAILY 90 tablet 1     losartan-hydrochlorothiazide (HYZAAR) 100-25 MG tablet Take 1 tablet by mouth daily In am for blood pressure (this is a combination of cozaar and hydrochlorothiazide - stop those individual medications) 90 tablet 1     lurasidone (LATUDA) 120 MG TABS tablet Take by mouth daily       metFORMIN (GLUCOPHAGE XR) 500 MG 24 hr tablet TAKE 2 TABLETS BY MOUTH TWICE DAILY FOR DIABETES Strength: 500 mg 360 tablet 1     metoprolol tartrate (LOPRESSOR) 100 MG tablet TAKE 1 AND 1/2 TABLETS(150 MG) BY MOUTH TWICE DAILY 270 tablet 1     MICROLET LANCETS MISC USE TO TEST BLOOD SUGAR ONCE DAILY OR AS DIRECTED 100 each 3     nicotine (NICODERM CQ) 14 MG/24HR 24 hr patch Place 1 patch onto the skin every 24 hours 28 patch 4     OLANZapine (ZYPREXA) 10 MG tablet Take 1 tablet (10 mg) by mouth At Bedtime       semaglutide (OZEMPIC) 2 MG/3ML pen Inject 0.25 mg Subcutaneous every 7 days Double dosage in 1 month. 3 mL 1     simvastatin (ZOCOR) 20 MG tablet Take 1  tablet (20 mg) by mouth At Bedtime for cholesterol 90 tablet 3     TRAZODONE HCL PO Take 100 mg by mouth At Bedtime Patient reports: Takes 100-200 MG at bedtime       XARELTO ANTICOAGULANT 20 MG TABS tablet TAKE 1 TABLET(20 MG) BY MOUTH DAILY WITH DINNER 90 tablet 0     Sumatriptan, Calcium channel blockers, Amoxicillin-pot clavulanate, Food, Latex, Morphine and related, Sulfa antibiotics, and Lisinopril  Social History     Socioeconomic History     Marital status:      Spouse name: Not on file     Number of children: Not on file     Years of education: Not on file     Highest education level: Not on file   Occupational History     Employer: [employer]   Tobacco Use     Smoking status: Every Day     Packs/day: 1.00     Years: 20.00     Pack years: 20.00     Types: Cigarettes     Smokeless tobacco: Never     Tobacco comments:     Arash patches   Vaping Use     Vaping status: Never Used   Substance and Sexual Activity     Alcohol use: Not Currently     Comment: socailly     Drug use: No     Sexual activity: Yes     Partners: Male     Birth control/protection: None   Other Topics Concern     Parent/sibling w/ CABG, MI or angioplasty before 65F 55M? No   Social History Narrative     Not on file     Social Determinants of Health     Financial Resource Strain: Not on file   Food Insecurity: Not on file   Transportation Needs: Not on file   Physical Activity: Not on file   Stress: Not on file   Social Connections: Not on file   Intimate Partner Violence: Not on file   Housing Stability: Not on file     Family History   Problem Relation Age of Onset     Cancer Mother         skin cancer - non-melanoma     Hypertension Mother      Lipids Father         Visual Exam:  GEN: NAD,   Head: Normocephalic.  EYES: EOMI  ENT: Oropharynx is clear, Hou class 4+ airway.   Psych: normal mood, normal affect  Snore test:(-)     Labs/Studies:     No results found for: PH, PHARTERIAL, PO2, [id number], SAT, PCO2,  HCO3, BASEEXCESS, CRISTÓBAL, BEB  Lab Results   Component Value Date    TSH 1.49 05/15/2019    TSH 0.77 01/02/2017     Lab Results   Component Value Date     (H) 05/11/2023     (H) 01/02/2023     Lab Results   Component Value Date    HGB 15.4 05/11/2023    HGB 15.8 (H) 05/28/2022     Lab Results   Component Value Date    BUN 13 05/11/2023    BUN 16 01/02/2023    CR 0.60 05/11/2023    CR 0.84 01/02/2023     Lab Results   Component Value Date    AST 25 05/28/2022    AST 15 10/22/2020    ALT 57 (H) 05/28/2022    ALT 38 10/22/2020    ALKPHOS 62 05/28/2022    ALKPHOS 72 10/22/2020    BILITOTAL 0.4 05/28/2022    BILITOTAL 0.4 10/22/2020    BILICONJ 0.0 06/06/2012    BILICONJ 0.0 10/19/2009     No results found for: UAMP, UBARB, BENZODIAZEUR, UCANN, UCOC, OPIT, UPCP    Recent Labs   Lab Test 05/11/23  1112 01/02/23  0953    137   POTASSIUM 4.0 4.3   CHLORIDE 102 103   CO2 25 27   ANIONGAP 8 7   * 252*   BUN 13 16   CR 0.60 0.84   ANABELLE 9.2 9.1       No results found for: FLAKITO Mason DO  Board Certified in Internal Medicine and Sleep Medicine    (Note created with Dragon voice recognition and unintended spelling errors and word substitutions may occur)

## 2023-06-19 NOTE — NURSING NOTE
Is the patient currently in the state of [place]? YES    Visit mode:VIDEO    If the visit is dropped, the patient can be reconnected by: VIDEO VISIT: Text to cell phone: [phone]    Will anyone else be joining the visit? NO      How would you like to obtain your AVS? MyChart    Are changes needed to the allergy or medication list? NO    Reason for visit: Consult      Pradeep Delaney

## 2023-06-22 ENCOUNTER — MYC MEDICAL ADVICE (OUTPATIENT)
Dept: FAMILY MEDICINE | Facility: CLINIC | Age: 53
End: 2023-06-22
Payer: COMMERCIAL

## 2023-06-22 DIAGNOSIS — I80.01 THROMBOPHLEBITIS OF SUPERFICIAL VEINS OF RIGHT LOWER EXTREMITY: ICD-10-CM

## 2023-06-23 RX ORDER — HYDROCODONE BITARTRATE AND ACETAMINOPHEN 5; 325 MG/1; MG/1
1 TABLET ORAL
Qty: 10 TABLET | Refills: 0 | Status: SHIPPED | OUTPATIENT
Start: 2023-06-23 | End: 2023-07-07

## 2023-06-23 NOTE — TELEPHONE ENCOUNTER
Routing refill request to provider for review/approval because:  Drug not on the [facility] refill protocol (controlled substance)    RODDY HongN, RN

## 2023-06-29 ENCOUNTER — MYC MEDICAL ADVICE (OUTPATIENT)
Dept: FAMILY MEDICINE | Facility: CLINIC | Age: 53
End: 2023-06-29
Payer: COMMERCIAL

## 2023-06-29 DIAGNOSIS — E11.9 TYPE 2 DIABETES MELLITUS WITHOUT COMPLICATION, WITHOUT LONG-TERM CURRENT USE OF INSULIN (H): ICD-10-CM

## 2023-06-29 NOTE — TELEPHONE ENCOUNTER
Can double to 0.5mg qweek and if already at 0.5mg than increase 1mg qweek. Follow-up 2months. Silvino Beach MD

## 2023-06-30 NOTE — TELEPHONE ENCOUNTER
Confirmed current dose of Ozempic with patient, taking 0.5 mg every week.  Advised to increase to 1 mg per week, as noted by PCP below.  Patient needing new Rx to pharmacy to reflect dosage change.     Rx que'd up for 4 mg/3 mL pen, inject 1 mg every 7 days. 6 ML dispense amount (2 months as this is when pt was advised to follow up).  Provider to review Rx.      Nickie Bolanos RN  Clinical Triage/Primary Care  [facility]

## 2023-07-01 ENCOUNTER — TRANSFERRED RECORDS (OUTPATIENT)
Dept: MULTI SPECIALTY CLINIC | Facility: CLINIC | Age: 53
End: 2023-07-01

## 2023-07-03 ENCOUNTER — MYC MEDICAL ADVICE (OUTPATIENT)
Dept: FAMILY MEDICINE | Facility: CLINIC | Age: 53
End: 2023-07-03
Payer: COMMERCIAL

## 2023-07-03 NOTE — TELEPHONE ENCOUNTER
Ok stop. Patient can do a video visit in a few weeks to discuss alternative medications. Silvino Beach MD

## 2023-07-03 NOTE — TELEPHONE ENCOUNTER
Routing to provider to review and advise, see MyChart message below in regards to patient taking Ozempic 1 mg dose.        Nickie Bolanos RN  Clinical Triage/Primary Care  [facility]

## 2023-07-07 ENCOUNTER — MYC MEDICAL ADVICE (OUTPATIENT)
Dept: FAMILY MEDICINE | Facility: CLINIC | Age: 53
End: 2023-07-07
Payer: COMMERCIAL

## 2023-07-07 DIAGNOSIS — I80.01 THROMBOPHLEBITIS OF SUPERFICIAL VEINS OF RIGHT LOWER EXTREMITY: ICD-10-CM

## 2023-07-07 RX ORDER — HYDROCODONE BITARTRATE AND ACETAMINOPHEN 5; 325 MG/1; MG/1
1 TABLET ORAL
Qty: 7 TABLET | Refills: 0 | Status: SHIPPED | OUTPATIENT
Start: 2023-07-08 | End: 2023-07-25

## 2023-07-07 NOTE — TELEPHONE ENCOUNTER
Provider: Please see MyChart message from the patient. Please assess if patient should still be experiencing pain from the blood clot requiring hydrocodone for pain treatment. Thank you. Denisa Mejia R.N.

## 2023-07-10 ENCOUNTER — TELEPHONE (OUTPATIENT)
Dept: FAMILY MEDICINE | Facility: CLINIC | Age: 53
End: 2023-07-10
Payer: COMMERCIAL

## 2023-07-10 ENCOUNTER — MYC MEDICAL ADVICE (OUTPATIENT)
Dept: FAMILY MEDICINE | Facility: CLINIC | Age: 53
End: 2023-07-10
Payer: COMMERCIAL

## 2023-07-10 NOTE — TELEPHONE ENCOUNTER
Patient Quality Outreach    Patient is due for the following:   Colon Cancer Screening    Next Steps:   patient to return cologuard    Type of outreach:    Sent [employer] message.      Questions for provider review:    None     Sanjuanita Adair

## 2023-07-10 NOTE — TELEPHONE ENCOUNTER
Should be out of system within a month.  Will need appointment/labs if not continue to improving in next 2-3 weeks sooner if worse/new issues. Silvino Beach MD

## 2023-07-19 LAB — RETINOPATHY: NORMAL

## 2023-07-25 ENCOUNTER — OFFICE VISIT (OUTPATIENT)
Dept: OTHER | Facility: CLINIC | Age: 53
End: 2023-07-25
Attending: INTERNAL MEDICINE
Payer: COMMERCIAL

## 2023-07-25 ENCOUNTER — MYC MEDICAL ADVICE (OUTPATIENT)
Dept: FAMILY MEDICINE | Facility: CLINIC | Age: 53
End: 2023-07-25

## 2023-07-25 VITALS
SYSTOLIC BLOOD PRESSURE: 135 MMHG | DIASTOLIC BLOOD PRESSURE: 87 MMHG | WEIGHT: 287.4 LBS | HEART RATE: 84 BPM | OXYGEN SATURATION: 96 % | BODY MASS INDEX: 43.56 KG/M2 | HEIGHT: 68 IN

## 2023-07-25 DIAGNOSIS — F17.218 CIGARETTE NICOTINE DEPENDENCE WITH OTHER NICOTINE-INDUCED DISORDER: ICD-10-CM

## 2023-07-25 DIAGNOSIS — E11.9 TYPE 2 DIABETES MELLITUS WITHOUT COMPLICATION, WITHOUT LONG-TERM CURRENT USE OF INSULIN (H): ICD-10-CM

## 2023-07-25 DIAGNOSIS — E66.01 MORBID OBESITY (H): ICD-10-CM

## 2023-07-25 DIAGNOSIS — I80.01 THROMBOPHLEBITIS OF SUPERFICIAL VEINS OF RIGHT LOWER EXTREMITY: ICD-10-CM

## 2023-07-25 DIAGNOSIS — I83.893 VARICOSE VEINS OF BILATERAL LOWER EXTREMITIES WITH OTHER COMPLICATIONS: Primary | ICD-10-CM

## 2023-07-25 DIAGNOSIS — I10 BENIGN ESSENTIAL HYPERTENSION: ICD-10-CM

## 2023-07-25 PROCEDURE — G0463 HOSPITAL OUTPT CLINIC VISIT: HCPCS

## 2023-07-25 PROCEDURE — 99214 OFFICE O/P EST MOD 30 MIN: CPT | Performed by: INTERNAL MEDICINE

## 2023-07-25 RX ORDER — HYDROCODONE BITARTRATE AND ACETAMINOPHEN 5; 325 MG/1; MG/1
1 TABLET ORAL
Qty: 7 TABLET | Refills: 0 | Status: SHIPPED | OUTPATIENT
Start: 2023-07-27 | End: 2023-11-03

## 2023-07-25 NOTE — PROGRESS NOTES
SUBJECTIVE:  CC: Carissa Spears is a 53 year old female here for follow up known SVT on xarelto, Obesity, smoker smokes Pk a day of cigarettes daily, HTN, HLP on xarelto etc   Requesting compression stockings refill  No history of a deep venous thrombosis  Requesting 7 mg NicoDerm patch refills  HPI:  For full details please see my recent initial consult note March  and April 2023 follow up note   Carissa Spears is a 53 year old very pleasant morbidly obese female BMI greater than 44 with history of type 2 diabetes mellitus poorly controlled, bipolar disorder, hypertension, hyperlipidemia and nicotine dependence smoking 1 pack cigarettes daily for more than 20 to 30 years developed right leg mainly in the thigh area redness erythema and tender in September 2022 underwent venous duplex there is no DVT but long segment SVT noted from SFJ to knee area.  She took few weeks of Xarelto and got better then she stopped it.  It recurred with the pain discomfort afterwards again she was restarted Xarelto 20 mg daily.  No personal history of malignancy.  No previous history of DVTs.  No recent travel.  She says up-to-date with age and gender appropriate cancer screening  She underwent venous competency studies no DVT but SVT   Still smokes pk a day  and using patches  Taking medications as prescribed     HISTORIES:  PROBLEM LIST:   Patient Active Problem List   Diagnosis    HYPERLIPIDEMIA LDL GOAL <100    Hypertension goal BP (blood pressure) < 140/90    Polycystic ovarian syndrome    BMI 45.0-49.9, adult (H)    Tobacco abuse    Mild major depression (H)    Insomnia    Chronic abdominal pain    Myofascial pain    Migraine with aura and without status migrainosus, not intractable    Non-allergic rhinitis    Bipolar affective disorder, remission status unspecified (H)    Type 2 diabetes mellitus without complication, without long-term current use of insulin (H)    Torn cartilage    Primary osteoarthritis of right knee     Arthrosis of knee    Acute pain of left shoulder    Morbid obesity (H)     PAST MEDICAL HISTORY:  Past Medical History:   Diagnosis Date    Allergies     Bipolar disorder, unspecified (H)     DM II (diabetes mellitus, type II)     diet controlled    Endometriosis     Herniated lumbar intervertebral disc     age 22    High cholesterol     HTN     Liver disease     fatty liver    Obesity     Other disorder of menstruation and other abnormal bleeding from female genital tract     Polycystic ovarian syndrome     Sleep disorder     Toxemia      PAST SURGICAL HISTORY:  Past Surgical History:   Procedure Laterality Date    AS KNEE SCOPE, ALLOGRAFT IMPANT Left     C/SECTION, CLASSICAL      X2    HYSTERECTOMY, PAP NO LONGER INDICATED      HYSTERECTOMY, KAMILLA  4/8/08    LAPAROSCOPIC CHOLECYSTECTOMY  7/18/2012    Procedure: LAPAROSCOPIC CHOLECYSTECTOMY;  Laparoscopic cholecystectomy;  Surgeon: Miguel Fuentes MD;  Location: [facility]    SALPINGO OOPHORECTOMY,R/L/YANELIS  4/8/08    L    TONSILLECTOMY  1983    TUBAL LIGATION       CURRENT MEDICATIONS:  Current Outpatient Medications   Medication Sig Dispense Refill    aspirin 81 MG tablet Take 81 mg by mouth daily 1 tablet 0    blood glucose monitoring (CONTOUR NEXT MONITOR W/DEVICE KIT) meter device kit USE TO TEST BLOOD SUGAR ONCE DAILY OR AS DIRECTED 1 kit 0    Butalbital-APAP-Caffeine (FIORICET PO)       citalopram (CELEXA) 40 MG tablet       CONTOUR NEXT TEST test strip USE TO TEST BLOOD SUGAR ONCE DAILY OR AS DIRECTED 100 strip 3    cyclobenzaprine (FLEXERIL) 10 MG tablet Take 1 tablet (10 mg) by mouth 3 times daily as needed for muscle spasms 30 tablet 0    hydrochlorothiazide (HYDRODIURIL) 25 MG tablet TAKE 1 TABLET(25 MG) BY MOUTH DAILY 90 tablet 3    losartan (COZAAR) 100 MG tablet TAKE 1 TABLET(100 MG) BY MOUTH DAILY 90 tablet 1    losartan-hydrochlorothiazide (HYZAAR) 100-25 MG tablet Take 1 tablet by mouth daily In am for blood pressure (this is a combination of  cozaar and hydrochlorothiazide - stop those individual medications) 90 tablet 1    lurasidone (LATUDA) 120 MG TABS tablet Take by mouth daily      metFORMIN (GLUCOPHAGE XR) 500 MG 24 hr tablet TAKE 2 TABLETS BY MOUTH TWICE DAILY FOR DIABETES Strength: 500 mg 360 tablet 1    metoprolol tartrate (LOPRESSOR) 100 MG tablet TAKE 1 AND 1/2 TABLETS(150 MG) BY MOUTH TWICE DAILY 270 tablet 1    MICROLET LANCETS MISC USE TO TEST BLOOD SUGAR ONCE DAILY OR AS DIRECTED 100 each 3    nicotine (NICODERM CQ) 14 MG/24HR 24 hr patch Place 1 patch onto the skin every 24 hours 28 patch 4    OLANZapine (ZYPREXA) 10 MG tablet Take 1 tablet (10 mg) by mouth At Bedtime      simvastatin (ZOCOR) 20 MG tablet Take 1 tablet (20 mg) by mouth At Bedtime for cholesterol 90 tablet 3    TRAZODONE HCL PO Take 100 mg by mouth At Bedtime Patient reports: Takes 100-200 MG at bedtime      XARELTO ANTICOAGULANT 20 MG TABS tablet TAKE 1 TABLET(20 MG) BY MOUTH DAILY WITH DINNER 90 tablet 0    HYDROcodone-acetaminophen (NORCO) 5-325 MG tablet Take 1 tablet by mouth nightly as needed for severe pain Avoid with ALCOHOL or within 6 hours of xanax.Take 1 tablet by mouth nightly as needed for severe pain (7-10) Avoid with ALCOHOL or within 6 hours of xanax. (Patient not taking: Reported on 7/25/2023) 7 tablet 0    semaglutide (OZEMPIC) 2 MG/3ML pen Inject 0.25 mg Subcutaneous every 7 days Double dosage in 1 month. 3 mL 1    Semaglutide, 1 MG/DOSE, (OZEMPIC) 4 MG/3ML pen Inject 1 mg Subcutaneous every 7 days 6 mL 0     ALLERGIES:  Allergies   Allergen Reactions    Sumatriptan Anaphylaxis and Other (See Comments)     Blood pressure high/ she was in hospital  Chest pressure    Calcium Channel Blockers     Amoxicillin-Pot Clavulanate Diarrhea    Food Nausea and Vomiting     Kidney beans- mold.    Latex      Puffy, rash     Morphine And Related      Sick to stomach    Sulfa Antibiotics Nausea    Lisinopril Nausea     SOCIAL HISTORY:  Social History     Socioeconomic  History    Marital status:      Spouse name: Not on file    Number of children: Not on file    Years of education: Not on file    Highest education level: Not on file   Occupational History     Employer: [employer]   Tobacco Use    Smoking status: Every Day     Packs/day: 1.00     Years: 20.00     Pack years: 20.00     Types: Cigarettes    Smokeless tobacco: Never    Tobacco comments:     Arash patches   Vaping Use    Vaping Use: Never used   Substance and Sexual Activity    Alcohol use: Not Currently     Comment: socailly    Drug use: No    Sexual activity: Yes     Partners: Male     Birth control/protection: None   Other Topics Concern    Parent/sibling w/ CABG, MI or angioplasty before 65F 55M? No   Social History Narrative    Not on file     Social Determinants of Health     Financial Resource Strain: Not on file   Food Insecurity: Not on file   Transportation Needs: Not on file   Physical Activity: Not on file   Stress: Not on file   Social Connections: Not on file   Intimate Partner Violence: Not on file   Housing Stability: Not on file     FAMILY HISTORY:  Family History   Problem Relation Age of Onset    Cancer Mother         skin cancer - non-melanoma    Hypertension Mother     Lipids Father      REVIEW OF SYSTEMS:  CONSTITUTIONAL:no malaise, fatigue, or other general symptoms  EYES: no subjective changes in visual acuity, no photophobia  ENT/MOUTH: no complaints of rhinorrhea, nasal congestion, sore throat, hearing changes  RESP:no SOB  CV: no c/o exertional chest pressure or CALDERON  GI: No abdominal pain, constipation, change in bowel movements, nausea, pyrosis, BRBPR  :no polyuria or polydipsia, no dysuria, no gross hematuria  MUSCULOSKELATAL:no arthalgias or myalgias  INTEGUMENTARY/SKIN: no pruritis, rashes, or moles with recent change in size, shape, or pigmentation  NEURO: no gross sensory or motor symptoms, no dizziness, no confusion  ENDOCRINE: no polyuria or polydipsia, no  "heat or cold intolerance  HEME/ALLERGY/IMMUNE: no fevers, chills, night sweats, or unwanted weight loss  PSYCHIATRIC: no depression, anxiety, or internal stimuli  EXAM:  /87 (BP Location: Right arm, Patient Position: Chair, Cuff Size: Adult Large)   Pulse 84   Ht 5' 8\" (1.727 m)   Wt 287 lb 6.4 oz (130.4 kg)   LMP  (LMP Unknown)   SpO2 96%   BMI 43.70 kg/m    BMI: Body mass index is 43.7 kg/m .  GENERAL APPEARANCE:  Pleasant  Healthy appearing male , alert, active, no distress cooperative.  EXAM:  EYES: clear conjunctiva, no cataracts, no obvious fundoscopic abnormalities  HENT: oropharynx, nares, and TMs are WNL  NECK: no JVD, thyromegaly or lymphadenopathy, no cervical bruits  RESP: clear to auscultation without rales, wheezes, or rhonchi  CV: RRR, no murmurs, gallops, or rubs  LYMPH: no cervical , axillary, or inguinal lymphadenopathy appreciated  GI: NABS, ND/NT, no masses or organomegally appreciated  : normal external genitalia and anus, no lumps, masses  MS: no obvoius clinicallly relevant arthropathy, no evidence vasculitis  SKIN: no nevi clinically suspicious for malignancy are noted  NEURO: CN II-XII intact, no localizing sensory or motor abnoramlities noted, DTRs symmetrical bilaterally  PSYCH: Mental status exam reveals the pt to have normal mood and affect. There is no disorder of thought form or content. There is no response to internal stimuli. There is no suicidal or homicidal ideation.  Assessment and plan:  1. Thrombophlebitis of superficial veins of right lower extremity GSV from SFJ to Knee area   2. Varicose veins of bilateral lower extremities with other complications     This is a very pleasant morbidly obese female developed first lifetime episode of superficial vein thrombosis of long segment GSV in the right leg because of the symptoms and pain treated with few weeks of Xarelto after stopping she has recurrent of symptoms and now back on Xarelto.  No DVT.  No injury.  No " personal history of malignancy.  Risk factors are obesity, smoker and varicose veins  No HX of cellulitis   Started using any compression stockings  Reviewed venous comp studies as delineated above  Discussed with the patient importance of weight loss and quitting smoking.  Tight control of the diabetes  She has been taking Xarelto 20 mg daily  Legs looks better  At present my recommendations  Stop Xarelto and take 2 baby aspirin's daily with food  Utilize compression stockings thigh-high 20 to 30 mmHg during the daytime and elevate the legs, New Rx given  Lose weight  Tight control of the diabetes  Quit smoking see below  Virtual visit or office visit in 3 to 4 months     3. Type 2 diabetes mellitus without complication, without long-term current use of insulin (H)  Recent A1c greater than 9  Suggested lose weight work with primary care physician or see the endocrinologist for tight control of the diabetes  Follow the diet     4. Cigarette nicotine dependence with other nicotine-induced disorder  She has been smoking more than a pack a day for 20+ years  Previously tried gum did not help  Not a candidate for Zyban or Chantix she has a history of bipolar disorder  Last visit initiated NicoDerm 21 mg patches daily for 30 days and quit smoking and work with primary care provider to get lower strength 14 mg patches a month later if successful then go down to 7 mg patches and eventually stop patches and completely quit smoking  3 minutes of smoking cessation counseling was done  Refilled 7 mg patches today  Educated patient  - SMOKING CESSATION COUNSELING, 3-10 MIN     5. Morbid obesity (H)  She denies any snoring or sleep apnea-like symptoms BMI is 43.70 ( 44.6) suggested losing weight     6. Benign essential hypertension  Suggested patient to take the blood pressure medications as prescribed and monitor at home if it is persistently elevated call our clinic or talk to the primary care physician for adjustment of the  medications    33 minutes spent on the date of the encounter doing chart review, history and exam, documentation, and further activities as noted above.     AVS with written instructions done     This note was dictated by utilizing Dragon software    Copy of this note to primary care physician    Angel Mike MD, FAHA, FSVM, FNLA, FACP  Vascular Medicine  Clinical Hypertension specialist  Clinical Lipidologist

## 2023-07-25 NOTE — PATIENT INSTRUCTIONS
Stop Xarelto and take 2 baby aspirins with food daily     Use compression stockings day time     Lose weight     Quit smoking and use patches as advised     Continue rest of the medications same     Follow up in 6 months office or virtual

## 2023-07-25 NOTE — PROGRESS NOTES
"Patient is here to discuss follow up    /87 (BP Location: Right arm, Patient Position: Chair, Cuff Size: Adult Large)   Pulse 84   Ht 5' 8\" (1.727 m)   Wt 287 lb 6.4 oz (130.4 kg)   LMP  (LMP Unknown)   SpO2 96%   BMI 43.70 kg/m      Questions patient would like addressed today are: N/A.    Refills are needed: Mary Beth    Has homecare services and agency name:  [facility]"

## 2023-07-27 ENCOUNTER — MYC MEDICAL ADVICE (OUTPATIENT)
Dept: FAMILY MEDICINE | Facility: CLINIC | Age: 53
End: 2023-07-27
Payer: COMMERCIAL

## 2023-08-07 ENCOUNTER — TRANSFERRED RECORDS (OUTPATIENT)
Dept: HEALTH INFORMATION MANAGEMENT | Facility: CLINIC | Age: 53
End: 2023-08-07
Payer: COMMERCIAL

## 2023-08-24 ENCOUNTER — OFFICE VISIT (OUTPATIENT)
Dept: FAMILY MEDICINE | Facility: CLINIC | Age: 53
End: 2023-08-24
Payer: COMMERCIAL

## 2023-08-24 VITALS
TEMPERATURE: 97.7 F | OXYGEN SATURATION: 96 % | SYSTOLIC BLOOD PRESSURE: 123 MMHG | HEIGHT: 68 IN | BODY MASS INDEX: 43.04 KG/M2 | RESPIRATION RATE: 24 BRPM | DIASTOLIC BLOOD PRESSURE: 77 MMHG | WEIGHT: 284 LBS | HEART RATE: 91 BPM

## 2023-08-24 DIAGNOSIS — E11.9 TYPE 2 DIABETES MELLITUS WITHOUT COMPLICATION, WITHOUT LONG-TERM CURRENT USE OF INSULIN (H): Primary | ICD-10-CM

## 2023-08-24 DIAGNOSIS — I10 HYPERTENSION GOAL BP (BLOOD PRESSURE) < 140/90: ICD-10-CM

## 2023-08-24 DIAGNOSIS — G43.919 INTRACTABLE MIGRAINE WITHOUT STATUS MIGRAINOSUS, UNSPECIFIED MIGRAINE TYPE: ICD-10-CM

## 2023-08-24 DIAGNOSIS — M79.669 PAIN OF LOWER LEG, UNSPECIFIED LATERALITY: ICD-10-CM

## 2023-08-24 LAB — HBA1C MFR BLD: 8.7 % (ref 0–5.6)

## 2023-08-24 PROCEDURE — 99214 OFFICE O/P EST MOD 30 MIN: CPT | Performed by: FAMILY MEDICINE

## 2023-08-24 PROCEDURE — 36415 COLL VENOUS BLD VENIPUNCTURE: CPT | Performed by: FAMILY MEDICINE

## 2023-08-24 PROCEDURE — 83036 HEMOGLOBIN GLYCOSYLATED A1C: CPT | Performed by: FAMILY MEDICINE

## 2023-08-24 RX ORDER — METOPROLOL TARTRATE 100 MG
TABLET ORAL
Qty: 270 TABLET | Refills: 1 | Status: SHIPPED | OUTPATIENT
Start: 2023-08-24 | End: 2023-11-24

## 2023-08-24 RX ORDER — METFORMIN HCL 500 MG
TABLET, EXTENDED RELEASE 24 HR ORAL
Qty: 360 TABLET | Refills: 1 | Status: SHIPPED | OUTPATIENT
Start: 2023-08-24 | End: 2023-11-24

## 2023-08-24 RX ORDER — LOSARTAN POTASSIUM AND HYDROCHLOROTHIAZIDE 25; 100 MG/1; MG/1
1 TABLET ORAL DAILY
Qty: 90 TABLET | Refills: 1 | Status: SHIPPED | OUTPATIENT
Start: 2023-08-24 | End: 2023-11-24

## 2023-08-24 RX ORDER — LINAGLIPTIN 5 MG/1
5 TABLET, FILM COATED ORAL DAILY
Qty: 30 TABLET | Refills: 3 | Status: SHIPPED | OUTPATIENT
Start: 2023-08-24 | End: 2023-11-24

## 2023-08-24 RX ORDER — HYDROCODONE BITARTRATE AND ACETAMINOPHEN 5; 325 MG/1; MG/1
TABLET ORAL
Qty: 10 TABLET | Refills: 0 | Status: SHIPPED | OUTPATIENT
Start: 2023-08-24 | End: 2023-09-05

## 2023-08-24 ASSESSMENT — PAIN SCALES - GENERAL: PAINLEVEL: NO PAIN (0)

## 2023-08-24 NOTE — PROGRESS NOTES
ASSESSMENT / PLAN:  (E11.9) Type 2 diabetes mellitus without complication, without long-term current use of insulin (H)  (primary encounter diagnosis)  Comment: improving but can't tolerate omezpic.   Plan: Hemoglobin A1c, metFORMIN (GLUCOPHAGE XR) 500         MG 24 hr tablet, linagliptin (TRADJENTA) 5 MG         TABS tablet        Will try tradjenta and continue diet/exercise and 4 metfromin. Recheck in 3 months      (M79.669) Pain of lower leg, unspecified laterality  Comment: improving overall  Plan: HYDROcodone-acetaminophen (NORCO) 5-325 MG         tablet        Continue rare prn norco at bedtime.    (I10) Hypertension goal BP (blood pressure) < 140/90  Comment: stable  Plan: losartan-hydrochlorothiazide (HYZAAR) 100-25 MG        tablet, metoprolol tartrate (LOPRESSOR) 100 MG         tablet        Chest pain or shortness of breath to er. Recheck in 6 months      (G43.919) Intractable migraine without status migrainosus, unspecified migraine type  Comment: stable  Plan: metoprolol tartrate (LOPRESSOR) 100 MG tablet              Monique Ferrer is a 53 year old, presenting for the following health issues:  Follow-up dm, htn, high cholesterol, bipolar, migraines, morbid obesity and lumbar radiculopathy.    Started on omzepic 4 months ago could not tolerate.   Jardiance not helpful.   Patient just started taking metformin 4 pills/day.   Sen dm ed years ago.   Some diet/exercise and weight loss. Emotionally doing ok. No chest pain or shortness of breath.   Vicodin prn at bedtime.   No feet changes. Eye exam last month.   No chief complaint on file.      History of Present Illness       Diabetes:   She presents for follow up of diabetes.    She is not checking blood glucose.         She has no concerns regarding her diabetes at this time.   She is not experiencing numbness or burning in feet, excessive thirst, blurry vision, weight changes or redness, sores or blisters on feet. The patient has had a diabetic eye  "exam in the last 12 months. Eye exam performed on July 2023. Location of last eye exam [facility].        She eats 2-3 servings of fruits and vegetables daily.She consumes 0 sweetened beverage(s) daily.She exercises with enough effort to increase her heart rate 10 to 19 minutes per day.  She exercises with enough effort to increase her heart rate 3 or less days per week.   She is taking medications regularly.         Objective    LMP  (LMP Unknown)   There is no height or weight on file to calculate BMI.  /77   Pulse 91   Temp 97.7  F (36.5  C) (Oral)   Resp 24   Ht 1.727 m (5' 8\")   Wt 128.8 kg (284 lb)   LMP  (LMP Unknown)   SpO2 96%   BMI 43.18 kg/m     Physical Exam   GENERAL: healthy, alert and no distress  EYES: Eyes grossly normal to inspection, PERRL and conjunctivae and sclerae normal  HENT: ear canals and TM's normal, nose and mouth without ulcers or lesions  NECK: no adenopathy, no asymmetry, masses, or scars and thyroid normal to palpation  RESP: lungs clear to auscultation - no rales, rhonchi or wheezes  CV: regular rate and rhythm, normal S1 S2, no S3 or S4, no murmur, click or rub, no peripheral edema and peripheral pulses strong  ABDOMEN: soft, nontender, no hepatosplenomegaly, no masses and bowel sounds normal  MS: no gross musculoskeletal defects noted, no edema  NEURO: Normal strength and tone, mentation intact and speech normal  PSYCH: mentation appears normal, affect normal/bright          "

## 2023-09-05 ENCOUNTER — MYC REFILL (OUTPATIENT)
Dept: FAMILY MEDICINE | Facility: CLINIC | Age: 53
End: 2023-09-05
Payer: COMMERCIAL

## 2023-09-05 DIAGNOSIS — M79.669 PAIN OF LOWER LEG, UNSPECIFIED LATERALITY: ICD-10-CM

## 2023-09-05 RX ORDER — HYDROCODONE BITARTRATE AND ACETAMINOPHEN 5; 325 MG/1; MG/1
TABLET ORAL
Qty: 10 TABLET | Refills: 0 | Status: SHIPPED | OUTPATIENT
Start: 2023-09-06 | End: 2023-10-12

## 2023-09-08 ENCOUNTER — MYC MEDICAL ADVICE (OUTPATIENT)
Dept: FAMILY MEDICINE | Facility: CLINIC | Age: 53
End: 2023-09-08
Payer: COMMERCIAL

## 2023-09-08 DIAGNOSIS — F17.218 CIGARETTE NICOTINE DEPENDENCE WITH OTHER NICOTINE-INDUCED DISORDER: ICD-10-CM

## 2023-09-11 RX ORDER — NICOTINE 21 MG/24HR
1 PATCH, TRANSDERMAL 24 HOURS TRANSDERMAL EVERY 24 HOURS
Qty: 30 PATCH | Refills: 3 | Status: SHIPPED | OUTPATIENT
Start: 2023-09-11

## 2023-09-18 ENCOUNTER — MYC REFILL (OUTPATIENT)
Dept: FAMILY MEDICINE | Facility: CLINIC | Age: 53
End: 2023-09-18
Payer: COMMERCIAL

## 2023-09-18 DIAGNOSIS — M79.669 PAIN OF LOWER LEG, UNSPECIFIED LATERALITY: ICD-10-CM

## 2023-09-18 RX ORDER — HYDROCODONE BITARTRATE AND ACETAMINOPHEN 5; 325 MG/1; MG/1
TABLET ORAL
Qty: 10 TABLET | Refills: 0 | OUTPATIENT
Start: 2023-09-18

## 2023-09-18 ASSESSMENT — SLEEP AND FATIGUE QUESTIONNAIRES
HOW LIKELY ARE YOU TO NOD OFF OR FALL ASLEEP WHILE LYING DOWN TO REST IN THE AFTERNOON WHEN CIRCUMSTANCES PERMIT: SLIGHT CHANCE OF DOZING
HOW LIKELY ARE YOU TO NOD OFF OR FALL ASLEEP WHILE WATCHING TV: SLIGHT CHANCE OF DOZING
HOW LIKELY ARE YOU TO NOD OFF OR FALL ASLEEP WHILE SITTING AND TALKING TO SOMEONE: WOULD NEVER DOZE
HOW LIKELY ARE YOU TO NOD OFF OR FALL ASLEEP WHILE SITTING QUIETLY AFTER LUNCH WITHOUT ALCOHOL: WOULD NEVER DOZE
HOW LIKELY ARE YOU TO NOD OFF OR FALL ASLEEP WHILE SITTING AND READING: WOULD NEVER DOZE
HOW LIKELY ARE YOU TO NOD OFF OR FALL ASLEEP IN A CAR, WHILE STOPPED FOR A FEW MINUTES IN TRAFFIC: WOULD NEVER DOZE
HOW LIKELY ARE YOU TO NOD OFF OR FALL ASLEEP WHEN YOU ARE A PASSENGER IN A CAR FOR AN HOUR WITHOUT A BREAK: WOULD NEVER DOZE
HOW LIKELY ARE YOU TO NOD OFF OR FALL ASLEEP WHILE SITTING INACTIVE IN A PUBLIC PLACE: WOULD NEVER DOZE

## 2023-09-22 ENCOUNTER — THERAPY VISIT (OUTPATIENT)
Dept: SLEEP MEDICINE | Facility: CLINIC | Age: 53
End: 2023-09-22
Attending: INTERNAL MEDICINE
Payer: COMMERCIAL

## 2023-09-22 DIAGNOSIS — G47.00 PERSISTENT INSOMNIA: ICD-10-CM

## 2023-09-22 DIAGNOSIS — R06.83 SNORING: ICD-10-CM

## 2023-09-22 DIAGNOSIS — I10 HYPERTENSION GOAL BP (BLOOD PRESSURE) < 140/90: ICD-10-CM

## 2023-09-22 DIAGNOSIS — R53.82 CHRONIC FATIGUE: ICD-10-CM

## 2023-09-22 PROCEDURE — 95810 POLYSOM 6/> YRS 4/> PARAM: CPT | Performed by: INTERNAL MEDICINE

## 2023-10-12 ENCOUNTER — E-VISIT (OUTPATIENT)
Dept: FAMILY MEDICINE | Facility: CLINIC | Age: 53
End: 2023-10-12

## 2023-10-12 DIAGNOSIS — M79.669 PAIN OF LOWER LEG, UNSPECIFIED LATERALITY: ICD-10-CM

## 2023-10-12 DIAGNOSIS — G43.109 MIGRAINE WITH AURA AND WITHOUT STATUS MIGRAINOSUS, NOT INTRACTABLE: Primary | ICD-10-CM

## 2023-10-12 PROCEDURE — 99421 OL DIG E/M SVC 5-10 MIN: CPT | Performed by: FAMILY MEDICINE

## 2023-10-12 RX ORDER — HYDROCODONE BITARTRATE AND ACETAMINOPHEN 5; 325 MG/1; MG/1
TABLET ORAL
Qty: 10 TABLET | Refills: 0 | Status: SHIPPED | OUTPATIENT
Start: 2023-10-12 | End: 2023-10-13

## 2023-10-13 ENCOUNTER — TELEPHONE (OUTPATIENT)
Dept: FAMILY MEDICINE | Facility: CLINIC | Age: 53
End: 2023-10-13
Payer: COMMERCIAL

## 2023-10-13 ENCOUNTER — MYC MEDICAL ADVICE (OUTPATIENT)
Dept: FAMILY MEDICINE | Facility: CLINIC | Age: 53
End: 2023-10-13
Payer: COMMERCIAL

## 2023-10-13 DIAGNOSIS — G43.109 MIGRAINE WITH AURA AND WITHOUT STATUS MIGRAINOSUS, NOT INTRACTABLE: ICD-10-CM

## 2023-10-13 RX ORDER — HYDROCODONE BITARTRATE AND ACETAMINOPHEN 5; 325 MG/1; MG/1
TABLET ORAL
Qty: 10 TABLET | Refills: 0 | Status: CANCELLED | OUTPATIENT
Start: 2023-10-13

## 2023-10-13 RX ORDER — HYDROCODONE BITARTRATE AND ACETAMINOPHEN 5; 325 MG/1; MG/1
TABLET ORAL
Qty: 10 TABLET | Refills: 0 | Status: SHIPPED | OUTPATIENT
Start: 2023-10-13 | End: 2023-11-13

## 2023-10-13 NOTE — TELEPHONE ENCOUNTER
Medication Question or Refill    Contacts         Type Contact Phone/Fax    10/13/2023 09:15 AM CDT Phone (Incoming) Carissa Spears (Self) [phone] (H)            What medication are you calling about (include dose and sig)?: HYDROcodone-acetaminophen (NORCO) 5-325 MG tablet     Preferred Pharmacy:      [id number] [id number] [facility]  [id number] [place] [id number]  Phone: [phone] Fax: [phone]      Controlled Substance Agreement on file:   CSA -- Patient Level:    CSA: None found at the patient level.       Who prescribed the medication?: Dr Beach    Do you need a refill? No    When did you use the medication last?     Patient offered an appointment? No    Do you have any questions or concerns?  Yes: The [facility] that RX was sent to, she would like sent to a different [facility], pended. The original pharmacy will be closed, due to staffing.      Could we send this information to you in [facility] or would you prefer to receive a phone call?:   Patient would prefer a phone call   Okay to leave a detailed message?: Yes at Cell number on file:    Telephone Information:   Mobile [phone]     Angela Mullen [facility]

## 2023-10-13 NOTE — TELEPHONE ENCOUNTER
Patient request has already been addressed. Covering provider, Estefanía Khan CNP sent in [place] to new pharmacy for patient. See TE in Chart Review from today.      Nickie Bolanos RN  Clinical Triage/Primary Care  [facility]

## 2023-10-13 NOTE — TELEPHONE ENCOUNTER
Called and spoke with patient. Notified of prescription sent by provider below.      Nickie Bolanos RN  Clinical Triage/Primary Care  [facility]

## 2023-10-13 NOTE — TELEPHONE ENCOUNTER
Patient calling to check on status of medication request.  Having migraine headache, was given [place] refill yesterday by PCP for this.  Current pharmacy, [facility] in [place] is currently closed due to staffing shortages and it is not known when they will re-open.    Patient asking for this prescription to please be sent to the [facility] in [place] on [address] instead, they are open and can fill her prescription.  Since controlled substance, needs new Rx sent please. Patient asking to please address today.        Nickie Bolanos RN  Clinical Triage/Primary Care  [facility]

## 2023-10-27 ASSESSMENT — SLEEP AND FATIGUE QUESTIONNAIRES
HOW LIKELY ARE YOU TO NOD OFF OR FALL ASLEEP IN A CAR, WHILE STOPPED FOR A FEW MINUTES IN TRAFFIC: WOULD NEVER DOZE
HOW LIKELY ARE YOU TO NOD OFF OR FALL ASLEEP WHEN YOU ARE A PASSENGER IN A CAR FOR AN HOUR WITHOUT A BREAK: SLIGHT CHANCE OF DOZING
HOW LIKELY ARE YOU TO NOD OFF OR FALL ASLEEP WHILE SITTING AND TALKING TO SOMEONE: WOULD NEVER DOZE
HOW LIKELY ARE YOU TO NOD OFF OR FALL ASLEEP WHILE WATCHING TV: MODERATE CHANCE OF DOZING
HOW LIKELY ARE YOU TO NOD OFF OR FALL ASLEEP WHILE SITTING INACTIVE IN A PUBLIC PLACE: WOULD NEVER DOZE
HOW LIKELY ARE YOU TO NOD OFF OR FALL ASLEEP WHILE SITTING AND READING: WOULD NEVER DOZE
HOW LIKELY ARE YOU TO NOD OFF OR FALL ASLEEP WHILE SITTING QUIETLY AFTER LUNCH WITHOUT ALCOHOL: WOULD NEVER DOZE
HOW LIKELY ARE YOU TO NOD OFF OR FALL ASLEEP WHILE LYING DOWN TO REST IN THE AFTERNOON WHEN CIRCUMSTANCES PERMIT: SLIGHT CHANCE OF DOZING

## 2023-11-03 ENCOUNTER — VIRTUAL VISIT (OUTPATIENT)
Dept: SLEEP MEDICINE | Facility: CLINIC | Age: 53
End: 2023-11-03
Payer: COMMERCIAL

## 2023-11-03 VITALS — BODY MASS INDEX: 42.51 KG/M2 | HEIGHT: 69 IN | WEIGHT: 287 LBS

## 2023-11-03 DIAGNOSIS — G47.33 OBSTRUCTIVE SLEEP APNEA: Primary | ICD-10-CM

## 2023-11-03 DIAGNOSIS — G47.00 PERSISTENT INSOMNIA: ICD-10-CM

## 2023-11-03 PROCEDURE — 99214 OFFICE O/P EST MOD 30 MIN: CPT | Mod: VID | Performed by: INTERNAL MEDICINE

## 2023-11-03 ASSESSMENT — PAIN SCALES - GENERAL: PAINLEVEL: NO PAIN (0)

## 2023-11-03 NOTE — PROGRESS NOTES
Virtual Visit Details    Type of service:  Video Visit     Originating Location (pt. Location): Home  Distant Location (provider location):  Off-site  Platform used for Video Visit: [employer]    Additional 10 minutes on the date of service was spent performing the following:    -Preparing to see the patient  -Ordering medications, tests, or procedures   -Documenting clinical information in the electronic or other health record     Thank you for the opportunity to participate in the care of Carissa Spears.     She is a 53 year old  female patient who comes to the sleep medicine clinic for review of sleep study results. The patient had a sleep study performed on 09/22/2023 (AHI=12.1). The patient was also found to have REM sleep without atonia. Patient denies any episodes of acting out in her sleep.    Assessment and Plan:  In summary Carissa Spears is a 53 year old year old female here for review of sleep study results.  1. Obstructive Sleep Apnea/Persistent Insomnia  We had an extensive conversation to review the results of her sleep study and to  her on the importance of treating sleep apnea. We discussed the options of treatment with oral appliance versus CPAP. Patient decided to proceed with CPAP. She will start using the device as soon as she receives it with the intention to use if for the entire night. We discussed some tips to increase PAP tolerance as well as the normal curve of adaptation. CPAP is going to provide improved respiratory function during the night but it can cause some sleep disruption that tends to improve with continuous usage. She should return to the clinic in 7 weeks to review compliance and efficacy monitoring. Since the patient does not have any preference, we will use [facility] as the [employer] medical equipment company.     2. BMI 45.0-49.9, adult (H)  Healthy weigh management is recommended as part of the long term goal to improve YASSINE. I will give the patient a hand out on  the topic in the AVS.    Lab reviewed: Discussed with patient.    NELLIE:  NELLIE Total Score: 19  Total score - [place]: 4 (10/27/2023  9:16 AM)    Patient Active Problem List   Diagnosis    HYPERLIPIDEMIA LDL GOAL <100    Hypertension goal BP (blood pressure) < 140/90    Polycystic ovarian syndrome    BMI 45.0-49.9, adult (H)    Tobacco abuse    Mild major depression (H)    Insomnia    Chronic abdominal pain    Myofascial pain    Migraine with aura and without status migrainosus, not intractable    Non-allergic rhinitis    Bipolar affective disorder, remission status unspecified (H)    Type 2 diabetes mellitus without complication, without long-term current use of insulin (H)    Torn cartilage    Primary osteoarthritis of right knee    Arthrosis of knee    Acute pain of left shoulder    Morbid obesity (H)       Current Outpatient Medications   Medication Sig Dispense Refill    aspirin 81 MG tablet Take 81 mg by mouth daily 1 tablet 0    blood glucose monitoring (CONTOUR NEXT MONITOR W/DEVICE KIT) meter device kit USE TO TEST BLOOD SUGAR ONCE DAILY OR AS DIRECTED 1 kit 0    Butalbital-APAP-Caffeine (FIORICET PO)       citalopram (CELEXA) 40 MG tablet       CONTOUR NEXT TEST test strip USE TO TEST BLOOD SUGAR ONCE DAILY OR AS DIRECTED 100 strip 3    HYDROcodone-acetaminophen (NORCO) 5-325 MG tablet 1 tablet daily at bedtime as needed for leg pain. Avoid with ALCOHOL or within 6 hours of xanax 10 tablet 0    linagliptin (TRADJENTA) 5 MG TABS tablet Take 1 tablet (5 mg) by mouth daily New for diabetes. Continue metformin too 30 tablet 3    losartan-hydrochlorothiazide (HYZAAR) 100-25 MG tablet Take 1 tablet by mouth daily In am for blood pressure (this is a combination of cozaar and hydrochlorothiazide - stop those individual medications) 90 tablet 1    lurasidone (LATUDA) 120 MG TABS tablet Take by mouth daily      metFORMIN (GLUCOPHAGE XR) 500 MG 24 hr tablet TAKE 2 TABLETS BY MOUTH TWICE DAILY FOR DIABETES Strength: 500  "mg 360 tablet 1    metoprolol tartrate (LOPRESSOR) 100 MG tablet TAKE 1 AND 1/2 TABLETS(150 MG) BY MOUTH TWICE DAILY 270 tablet 1    MICROLET LANCETS MISC USE TO TEST BLOOD SUGAR ONCE DAILY OR AS DIRECTED 100 each 3    nicotine (NICODERM CQ) 14 MG/24HR 24 hr patch Place 1 patch onto the skin every 24 hours 28 patch 4    nicotine (NICODERM CQ) 21 MG/24HR 24 hr patch Place 1 patch onto the skin every 24 hours 30 patch 3    nicotine (NICODERM CQ) 7 MG/24HR 24 hr patch Place 1 patch onto the skin every 24 hours 30 patch 1    OLANZapine (ZYPREXA) 10 MG tablet Take 1 tablet (10 mg) by mouth At Bedtime      simvastatin (ZOCOR) 20 MG tablet Take 1 tablet (20 mg) by mouth At Bedtime for cholesterol 90 tablet 3    TRAZODONE HCL PO Take 100 mg by mouth At Bedtime Patient reports: Takes 100-200 MG at bedtime         Allergies   Allergen Reactions    Ozempic (0.25 Or 0.5 Mg-Dose) [Semaglutide(0.25 Or 0.5mg-Dos)]      Abdominal pain    Sumatriptan Anaphylaxis and Other (See Comments)     Blood pressure high/ she was in hospital  Chest pressure    Calcium Channel Blockers     Amoxicillin-Pot Clavulanate Diarrhea    Food Nausea and Vomiting     Kidney beans- mold.    Latex      Puffy, rash     Morphine And Related      Sick to stomach    Sulfa Antibiotics Nausea    Lisinopril Nausea       Visual Exam:  GEN: NAD  Psych: normal mood, normal affect     Labs/Studies:  - We reviewed the results of the overnight study as described on the HPI.     No results found for: \"PH\", \"PHARTERIAL\", \"PO2\", \"RP5MYUCKMZQ\", \"SAT\", \"PCO2\", \"HCO3\", \"BASEEXCESS\", \"CRISTÓBAL\", \"BEB\"  Lab Results   Component Value Date    TSH 1.49 05/15/2019    TSH 0.77 01/02/2017     Lab Results   Component Value Date     (H) 05/11/2023     (H) 01/02/2023     Lab Results   Component Value Date    HGB 15.4 05/11/2023    HGB 15.8 (H) 05/28/2022     Lab Results   Component Value Date    BUN 13 05/11/2023    BUN 16 01/02/2023    CR 0.60 05/11/2023    CR 0.84 01/02/2023 " "    Lab Results   Component Value Date    AST 25 05/28/2022    AST 15 10/22/2020    ALT 57 (H) 05/28/2022    ALT 38 10/22/2020    ALKPHOS 62 05/28/2022    ALKPHOS 72 10/22/2020    BILITOTAL 0.4 05/28/2022    BILITOTAL 0.4 10/22/2020    BILICONJ 0.0 06/06/2012    BILICONJ 0.0 10/19/2009     No results found for: \"UAMP\", \"UBARB\", \"BENZODIAZEUR\", \"UCANN\", \"UCOC\", \"OPIT\", \"UPCP\"    Recent Labs   Lab Test 05/11/23  1112 01/02/23  0953    137   POTASSIUM 4.0 4.3   CHLORIDE 102 103   CO2 25 27   ANIONGAP 8 7   * 252*   BUN 13 16   CR 0.60 0.84   ANABELLE 9.2 9.1         Patient verbalized understanding of these issues, agrees with the plan and all questions were answered today. Patient was given an opportuntity to voice any other symptoms or concerns not listed above. Patient did not have any other symptoms or concerns.      Jose Mason DO  Board Certified in Internal Medicine and Sleep Medicine      (Note created with Dragon voice recognition and unintended spelling errors and word substitutions may occur)     "

## 2023-11-03 NOTE — NURSING NOTE
Is the patient currently in the state of [place]? YES    Visit mode:VIDEO    If the visit is dropped, the patient can be reconnected by: VIDEO VISIT: Text to cell phone:   Telephone Information:   Mobile [phone]       Will anyone else be joining the visit? NO  (If patient encounters technical issues they should call [phone]    How would you like to obtain your AVS? MyChart    Are changes needed to the allergy or medication list? No    Reason for visit: RECHTANIA KENT      
Normal vision: sees adequately in most situations; can see medication labels, newsprint

## 2023-11-03 NOTE — PATIENT INSTRUCTIONS
Equipment Instructions    We will process your PAP order and send it to a Durable Medical Equipment (DME) provider.    The medical equipment company should call you within 7 days.  If you have not heard from the company, please contact them to see if they received your order and are planning to call you.    Please call us at [phone] if you are unable to contact the medical equipment company or if they do not have the order.    If you are starting a new PAP machine, please call us after you use it the first night to let us know how it went. This call also helps us know that you received your equipment and that everything is ready. Please use our central phone number [phone]    Contact information for [employer] company:    [employer] [facility] Tel: [phone]

## 2023-11-13 ENCOUNTER — DOCUMENTATION ONLY (OUTPATIENT)
Dept: SLEEP MEDICINE | Facility: CLINIC | Age: 53
End: 2023-11-13

## 2023-11-13 ENCOUNTER — E-VISIT (OUTPATIENT)
Dept: FAMILY MEDICINE | Facility: CLINIC | Age: 53
End: 2023-11-13
Payer: COMMERCIAL

## 2023-11-13 DIAGNOSIS — G43.109 MIGRAINE WITH AURA AND WITHOUT STATUS MIGRAINOSUS, NOT INTRACTABLE: ICD-10-CM

## 2023-11-13 PROCEDURE — 99421 OL DIG E/M SVC 5-10 MIN: CPT | Performed by: FAMILY MEDICINE

## 2023-11-13 RX ORDER — HYDROCODONE BITARTRATE AND ACETAMINOPHEN 5; 325 MG/1; MG/1
TABLET ORAL
Qty: 10 TABLET | Refills: 0 | Status: SHIPPED | OUTPATIENT
Start: 2023-11-13 | End: 2023-12-18

## 2023-11-13 NOTE — PROGRESS NOTES
11/13/2023- JL- PATIENT WAS A NO SHOW NO CALL FOR SCHEDULED CPAP SETUP APPT IN [facility].

## 2023-11-20 ENCOUNTER — LAB (OUTPATIENT)
Dept: LAB | Facility: CLINIC | Age: 53
End: 2023-11-20
Payer: COMMERCIAL

## 2023-11-20 DIAGNOSIS — E11.9 TYPE 2 DIABETES MELLITUS WITHOUT COMPLICATION, WITHOUT LONG-TERM CURRENT USE OF INSULIN (H): ICD-10-CM

## 2023-11-20 LAB
ALBUMIN SERPL BCG-MCNC: 4.2 G/DL (ref 3.5–5.2)
ANION GAP SERPL CALCULATED.3IONS-SCNC: 12 MMOL/L (ref 7–15)
BUN SERPL-MCNC: 8.8 MG/DL (ref 6–20)
CALCIUM SERPL-MCNC: 9.3 MG/DL (ref 8.6–10)
CHLORIDE SERPL-SCNC: 100 MMOL/L (ref 98–107)
CREAT SERPL-MCNC: 0.79 MG/DL (ref 0.51–0.95)
DEPRECATED HCO3 PLAS-SCNC: 24 MMOL/L (ref 22–29)
EGFRCR SERPLBLD CKD-EPI 2021: 88 ML/MIN/1.73M2
GLUCOSE SERPL-MCNC: 166 MG/DL (ref 70–99)
HBA1C MFR BLD: 8.3 % (ref 0–5.6)
PHOSPHATE SERPL-MCNC: 3.8 MG/DL (ref 2.5–4.5)
POTASSIUM SERPL-SCNC: 4.5 MMOL/L (ref 3.4–5.3)
SODIUM SERPL-SCNC: 136 MMOL/L (ref 135–145)

## 2023-11-20 PROCEDURE — 36415 COLL VENOUS BLD VENIPUNCTURE: CPT

## 2023-11-20 PROCEDURE — 80069 RENAL FUNCTION PANEL: CPT

## 2023-11-20 PROCEDURE — 83036 HEMOGLOBIN GLYCOSYLATED A1C: CPT

## 2023-11-20 NOTE — COMMUNITY RESOURCES LIST (ENGLISH)
11/20/2023   [facility] [employer]  N/A  For questions about this resource list or additional care needs, please contact your primary care clinic or care manager.  Phone: [phone]   Email: N/A   Address: [address] [place] [address]   Hours: N/A        Financial Stability       Utility payment assistance  1  [facility] Community [employer] (ACCAP) - Energy Assistance Program Distance: 5.11 miles      In-Person, Phone/Virtual   [address] [place] [address]  Language: English  Hours: Mon - Fri 8:00 AM - 4:30 PM  Fees: Free   Phone: (870) 650-4408 Email: accap@accap.org Website: http://www.accap.org     2  [facility]  Office - [facility] Distance: 5.12 miles      Phone/Virtual   [address] [place] [address]  Language: English  Hours: Mon - Fri 8:30 AM - 12:00 PM , Mon - Fri 1:00 PM - 4:00 PM  Fees: Free   Phone: (178) 491-8719 Email: [email or web address].[email or web address] Website: [email or web address]://[email or web address]/usn/          Important Numbers & Websites       Emergency Services   911  [facility] Services   311  Poison Control   (660) 461-5765  Suicide Prevention Lifeline   (530) 952-4262 (TALK)  Child Abuse Hotline   (532) 295-1546 (4-A-Child)  Sexual Assault Hotline   (658) 502-4076 (HOPE)  National Runaway Safeline   (448) 801-7672 (RUNAWAY)  All-Options Talkline   (316) 483-6606  Substance Abuse Referral   (710) 232-2711 (HELP)

## 2023-11-24 ENCOUNTER — VIRTUAL VISIT (OUTPATIENT)
Dept: FAMILY MEDICINE | Facility: CLINIC | Age: 53
End: 2023-11-24
Payer: COMMERCIAL

## 2023-11-24 DIAGNOSIS — E11.9 TYPE 2 DIABETES MELLITUS WITHOUT COMPLICATION, WITHOUT LONG-TERM CURRENT USE OF INSULIN (H): ICD-10-CM

## 2023-11-24 DIAGNOSIS — I10 HYPERTENSION GOAL BP (BLOOD PRESSURE) < 140/90: ICD-10-CM

## 2023-11-24 DIAGNOSIS — G43.919 INTRACTABLE MIGRAINE WITHOUT STATUS MIGRAINOSUS, UNSPECIFIED MIGRAINE TYPE: ICD-10-CM

## 2023-11-24 PROCEDURE — 99214 OFFICE O/P EST MOD 30 MIN: CPT | Mod: VID | Performed by: FAMILY MEDICINE

## 2023-11-24 RX ORDER — LINAGLIPTIN 5 MG/1
5 TABLET, FILM COATED ORAL DAILY
Qty: 90 TABLET | Refills: 1 | Status: SHIPPED | OUTPATIENT
Start: 2023-11-24 | End: 2024-04-02

## 2023-11-24 RX ORDER — METOPROLOL TARTRATE 100 MG
TABLET ORAL
Qty: 270 TABLET | Refills: 1 | Status: SHIPPED | OUTPATIENT
Start: 2023-11-24 | End: 2024-04-02

## 2023-11-24 RX ORDER — LOSARTAN POTASSIUM AND HYDROCHLOROTHIAZIDE 25; 100 MG/1; MG/1
1 TABLET ORAL DAILY
Qty: 90 TABLET | Refills: 1 | Status: SHIPPED | OUTPATIENT
Start: 2023-11-24 | End: 2024-04-02

## 2023-11-24 RX ORDER — METFORMIN HCL 500 MG
TABLET, EXTENDED RELEASE 24 HR ORAL
Qty: 360 TABLET | Refills: 1 | Status: SHIPPED | OUTPATIENT
Start: 2023-11-24 | End: 2024-04-02

## 2023-11-24 ASSESSMENT — PATIENT HEALTH QUESTIONNAIRE - PHQ9
SUM OF ALL RESPONSES TO PHQ QUESTIONS 1-9: 2
10. IF YOU CHECKED OFF ANY PROBLEMS, HOW DIFFICULT HAVE THESE PROBLEMS MADE IT FOR YOU TO DO YOUR WORK, TAKE CARE OF THINGS AT HOME, OR GET ALONG WITH OTHER PEOPLE: NOT DIFFICULT AT ALL
SUM OF ALL RESPONSES TO PHQ QUESTIONS 1-9: 2

## 2023-11-24 NOTE — COMMUNITY RESOURCES LIST (ENGLISH)
11/24/2023   [facility] Out[facility]  N/A  For additional resource needs, please contact your health insurance member services or your primary care team.  Phone: [phone]   Email: N/A   Address: [facility] [place] [address]   Hours: N/A        Financial Stability       Utility payment assistance  1  [place] [facility] - Energy and Utilities Distance: 18.76 miles      In-Person, Phone/Virtual   [address] [place] [address]  Language: English  Hours: Mon - Fri 8:30 AM - 4:30 PM  Fees: Free   Phone: (431) 241-3907 Website: https://[place].gov/[place]arine/energy/consumer-assistance/energy-assistance-program/     2  [place] [employer] [facility] [place]'s Telephone Assistance Plan (TAP) and [facility] Lifeline and Affordable Connectivity Program (ACP) Distance: 20.33 miles      Phone/Virtual   [address] [place] [address]  Language: English  Fees: Free   Phone: (390) 628-7764 Email: vidhi.wali@[facility].mn. Website: https://[place]/puc/consumers/telephone/          Important Numbers & Websites       [facility]   211 211unitedway.org  Poison Control   (203) 940-3770 [place]poison.[place]  Suicide and Crisis Lifeline   [address] [place], [place] [address]line.org  Childhelp [place] Child Abuse Hotline   421.969.3034 Childhelphotline.org  National Sexual Assault Hotline   (933) 444-9162 (HOPE) Rainn.org  National Runaway Safeline   (554) 213-8916 (RUNAWAY) 1800runaway.org  Pregnancy & Postpartum Support [place]   Call/text 417-090-1621 Ppsupportmn.org  Substance Abuse National Helpline ([facility]   416-699-HELP (2577) Findtreatment.gov  Emergency Services   911

## 2023-11-24 NOTE — PROGRESS NOTES
Carissa is a 53 year old who is being evaluated via a billable video visit.      How would you like to obtain your AVS? MyChart  If the video visit is dropped, the invitation should be resent by: Text to cell phone: [phone]  Will anyone else be joining your video visit? No        ASSESSMENT / PLAN:  (I10) Hypertension goal BP (blood pressure) < 140/90  Comment: stale  Plan: losartan-hydrochlorothiazide (HYZAAR) 100-25 MG        tablet, metoprolol tartrate (LOPRESSOR) 100 MG         tablet        Self-monitor. Chest pain or shortness of breath to er.     (E11.9) Type 2 diabetes mellitus without complication, without long-term current use of insulin (H)  Comment: imroving.   Plan: linagliptin (TRADJENTA) 5 MG TABS tablet,         metFORMIN (GLUCOPHAGE XR) 500 MG 24 hr tablet        More exercise and continue meds/ weight loss. Recheck in 6 months  Sooner if worse/news issues. Continue smoking cessation.    (G43.919) Intractable migraine without status migrainosus, unspecified migraine type  Comment: stable  Plan: metoprolol tartrate (LOPRESSOR) 100 MG tablet        Reveiwed risks and side effects of medication  Exercise.      Subjective   Carissa is a 53 year old, presenting for the following health issues:  Follow-up dm, htn, high cholesterol, bipolar, migraines, morbid obesity and lumbar radiculopathy   Unable to tolerated omzepic and start on tradjenta in summer- no side effects noted.  Started 2months ago.   Needs to work out med. Has exercise bike. Can walk.   No feet changes. Eye exam - recently. Outside blood pressure readings ok.   No chest pain or shortness of breath.  Smoking - quit - patch on.   Emotionally doing ok. No SUICIAL IDEATION OR HOMOCIDAL IDEATION OR ISRA[place]. Seeing psych.  Diabetes      11/24/2023    12:36 PM   Additional Questions   Roomed by Mary       History of Present Illness       Diabetes:   She presents for follow up of diabetes.    She is not checking blood glucose.         She has no  concerns regarding her diabetes at this time.  She is having excessive thirst.            She eats 2-3 servings of fruits and vegetables daily.She consumes 0 sweetened beverage(s) daily.She exercises with enough effort to increase her heart rate 10 to 19 minutes per day.  She exercises with enough effort to increase her heart rate 3 or less days per week.   She is taking medications regularly.           Objective    Vitals - Patient Reported  Systolic (Patient Reported): 123  Blood pressure taken with manual cuff (will exclude from quality measure):  (pt reported)  Weight (Patient Reported): 130.2 kg (287 lb)  Pain Score: No Pain (0)        Physical Exam   GENERAL: Healthy, alert and no distress  EYES: Eyes grossly normal to inspection.  No discharge or erythema, or obvious scleral/conjunctival abnormalities.  RESP: No audible wheeze, cough, or visible cyanosis.  No visible retractions or increased work of breathing.    SKIN: Visible skin clear. No significant rash, abnormal pigmentation or lesions.  NEURO: Cranial nerves grossly intact.  Mentation and speech appropriate for age.  PSYCH: Mentation appears normal, affect normal/bright, judgement and insight intact, normal speech and appearance well-groomed.          Video-Visit Details    Type of service:  Video Visit     Originating Location (pt. Location): Home    Distant Location (provider location):  On-site  Platform used for Video Visit: [employer]

## 2023-11-29 ENCOUNTER — DOCUMENTATION ONLY (OUTPATIENT)
Dept: SLEEP MEDICINE | Facility: CLINIC | Age: 53
End: 2023-11-29
Payer: COMMERCIAL

## 2023-11-29 DIAGNOSIS — G47.33 OBSTRUCTIVE SLEEP APNEA (ADULT) (PEDIATRIC): Primary | ICD-10-CM

## 2023-11-29 NOTE — PROGRESS NOTES
Patient was offered choice of vendor and chose [facility].  Patient Carissa Spears was set up at [facility] on November 29, 2023. Patient received a Resmed Airsense 11 Pressures were set at  5-15 cm H2O.   Patient s ramp is 4 cm H2O for Auto and FLEX/EPR is EPR, 2.  Patient received a Resmed Mask name: AIRFIT P10 FOR HER  Pillow mask size Small, heated tubing and heated humidifier.  Patient has the following compliance requirements: none.  Wolfgang Land

## 2023-12-13 ENCOUNTER — TELEPHONE (OUTPATIENT)
Dept: FAMILY MEDICINE | Facility: CLINIC | Age: 53
End: 2023-12-13

## 2023-12-13 NOTE — TELEPHONE ENCOUNTER
FYI - Status Update    Who is Calling:     Update: Pt is participating in a disease management for diabetes with [facility].     Does caller want a call/response back: No, unless needed ([phone] Jackie)

## 2023-12-15 ENCOUNTER — NURSE TRIAGE (OUTPATIENT)
Dept: NURSING | Facility: CLINIC | Age: 53
End: 2023-12-15
Payer: COMMERCIAL

## 2023-12-15 ENCOUNTER — MYC MEDICAL ADVICE (OUTPATIENT)
Dept: FAMILY MEDICINE | Facility: CLINIC | Age: 53
End: 2023-12-15
Payer: COMMERCIAL

## 2023-12-15 DIAGNOSIS — G43.809 OTHER MIGRAINE WITHOUT STATUS MIGRAINOSUS, NOT INTRACTABLE: Primary | ICD-10-CM

## 2023-12-15 DIAGNOSIS — G43.109 MIGRAINE WITH AURA AND WITHOUT STATUS MIGRAINOSUS, NOT INTRACTABLE: ICD-10-CM

## 2023-12-15 RX ORDER — BUTALBITAL, ACETAMINOPHEN AND CAFFEINE 50; 325; 40 MG/1; MG/1; MG/1
1 TABLET ORAL EVERY 4 HOURS PRN
Qty: 20 TABLET | Refills: 1 | Status: SHIPPED | OUTPATIENT
Start: 2023-12-15

## 2023-12-15 NOTE — TELEPHONE ENCOUNTER
See patient MyChart message below, is asking for a refill on Fioricet and Norco for current migraine headache(s).  Patient last visit was 23, migraines were discussed (less than 30 days ago).    Fioricet has not been prescribed by PCP since :    butalbital-acetaminophen-caffeine (FIORICET/ESGIC) -40 MG tablet   Sig - Route: Take 1 tablet by mouth every 6 hours as needed for headaches Do not take with vicodin or xanax - Oral     Norco last prescribed on 23:    HYDROcodone-acetaminophen (NORCO) 5-325 MG tablet   Si tablet daily at bedtime as needed for leg pain.     **Associated diagnosis was Migraine with aura and without status migrainosus, not intractable [G43.109], however.      Please advise.  Thank you.        Nickie Bolanos RN  Clinical Triage/Primary Care  [facility]

## 2023-12-16 NOTE — TELEPHONE ENCOUNTER
Nurse Triage SBAR    Is this a 2nd Level Triage? YES, LICENSED PRACTITIONER REVIEW IS REQUIRED    Situation: Migraine not responding to medications.     Background: Patient reports migraine started in the middle of the night. She took a Fioricet. She then woke up this morning still with a migraine and took another Fioricet. She states they have not helped. She states she is not completely out of Fioricet. She reached out to her provider via [employer] and an order for Fioricet was sent to her pharmacy, but she states her pharmacy is now closed for the weekend d/t lack of staff. She states she usually is prescribed Vicodin by her PCP when Fioricet dos not work.     Assessment: Rates headache 9/10. States this is not the worst headache of her life. She also reports tunnel vision and states this has happened with previous migraines as well.     Protocol Recommended Disposition:   See HCP Within 4 Hours (Or PCP Triage)    Recommendation: Page sent to on-call provider, Dr. Gomez Sibley, for recommendation.      Paged to provider at 7:31 pm.   Call placed to answering service at 7:47 pm. They tried to call Dr. Sibley, but no answer so they left a voicemail. Received call back at 7:57 pm. Dr. Sibley recommends going to ED.     Provider Recommendation Follow Up:   Reached patient/caregiver. Informed of provider's recommendations. Patient verbalized understanding and agrees with the plan.         Does the patient meet one of the following criteria for ADS visit consideration? 16+ years old, with an [facility] PCP     TIP  Providers, please consider if this condition is appropriate for management at one of our Acute and Diagnostic Services sites.     If patient is a good candidate, please use dotphrase <dot>triageresponse and select Refer to ADS to document.        Reason for Disposition   [1] SEVERE headache (e.g., excruciating) AND [2] not improved after 2 hours of pain medicine    Additional Information   Negative: Difficult  "to awaken or acting confused (e.g., disoriented, slurred speech)   Negative: [1] Weakness of the face, arm or leg on one side of the body AND [2] new-onset   Negative: [1] Numbness of the face, arm or leg on one side of the body AND [2] new-onset   Negative: [1] Loss of speech or garbled speech AND [2] new-onset   Negative: Passed out (i.e., lost consciousness, collapsed and was not responding)   Negative: Sounds like a life-threatening emergency to the triager   Negative: Followed a head injury   Negative: Pregnant   Negative: Postpartum (from 0 to 6 weeks after delivery)   Negative: Traumatic Brain Injury (TBI) is suspected   Negative: Unable to walk, or can only walk with assistance (e.g., requires support)   Negative: Stiff neck (can't touch chin to chest)   Negative: Severe pain in one eye   Negative: [1] Other family members (or people in same household) with headaches AND [2] possibility of carbon monoxide exposure   Negative: [1] SEVERE headache (e.g., excruciating) AND [2] \"worst headache\" of life   Negative: [1] SEVERE headache AND [2] sudden-onset (i.e., reaching maximum intensity within seconds to 1 hour)   Negative: [1] SEVERE headache AND [2] fever   Negative: Loss of vision or double vision  (Exception: Same as prior migraines.)   Negative: [1] Fever > 100.0 F (37.8 C) AND [2] diabetes mellitus or weak immune system (e.g., HIV positive, cancer chemo, splenectomy, organ transplant, chronic steroids)   Negative: Patient sounds very sick or weak to the triager    Protocols used: Headache-A-AH    "

## 2023-12-18 RX ORDER — HYDROCODONE BITARTRATE AND ACETAMINOPHEN 5; 325 MG/1; MG/1
TABLET ORAL
Qty: 10 TABLET | Refills: 0 | Status: SHIPPED | OUTPATIENT
Start: 2023-12-19 | End: 2024-01-30

## 2023-12-18 NOTE — TELEPHONE ENCOUNTER
See additional MyChart message below from today at 8:48 AM. Patient was expecting a refill on the Norco as well.   Provider to please advise on this request.      Nickie Bolanos RN  Clinical Triage/Primary Care  [facility]        HYDROcodone-acetaminophen (NORCO) 5-325 MG tablet 10 tablet 0 2023  No   Si tablet daily at bedtime as needed for leg pain. Avoid with ALCOHOL or within 6 hours of xanax   Patient not taking: Reported on 2023        Sent to pharmacy as: HYDROcodone-Acetaminophen 5-325 MG Oral Tablet (NORCO)   Class: E-Prescribe   Earliest Fill Date: 2023   Order: [id number]   E-Prescribing Status: Receipt confirmed by pharmacy (2023 12:03 PM CST)

## 2024-01-05 ENCOUNTER — NURSE TRIAGE (OUTPATIENT)
Dept: FAMILY MEDICINE | Facility: CLINIC | Age: 54
End: 2024-01-05
Payer: COMMERCIAL

## 2024-01-05 NOTE — TELEPHONE ENCOUNTER
"Patient calling in and reports having difficulty breathing. Patient reports it feels as though she is unable to get a deep breath in. Patient had covid a few weeks ago, but did not feel as though affected her lungs. Patient reports has been under significant stress. Patient states she was taking care of her father and he recently passed away. Patient reports the shortness of breath has been noticeable since yesterday and worsens with activities. Has a history of a blood clot in her leg.     Disposition: GO TO ED NOW: patient agrees to plan of care and will go to nearest ED now.      Reason for Disposition   Any history of prior \"blood clot\" in leg or lungs    Additional Information   Negative: Chest pain   Negative: Wheezing (high pitched whistling sound) and previous asthma attacks or use of asthma medicines   Negative: Difficulty breathing and within 14 days of COVID-19 Exposure   Negative: Difficulty breathing and only present when coughing   Negative: Difficulty breathing and only from stuffy nose   Negative: Difficulty breathing and only from stuffy nose or runny nose from common cold   Negative: SEVERE difficulty breathing (e.g., struggling for each breath, speaks in single words, pulse > 120)   Negative: Breathing stopped and hasn't returned   Negative: Choking on something   Negative: Bluish (or gray) lips or face   Negative: Difficult to awaken or acting confused (e.g., disoriented, slurred speech)   Negative: Passed out (i.e., fainted, collapsed and was not responding)   Negative: Wheezing started suddenly after medicine, an allergic food, or bee sting   Negative: Stridor (harsh sound while breathing in)   Negative: Slow, shallow and weak breathing   Negative: Sounds like a life-threatening emergency to the triager   Negative: MODERATE difficulty breathing (e.g., speaks in phrases, SOB even at rest, pulse 100-120) of new-onset or worse than normal   Negative: Oxygen level (e.g., pulse oximetry) 90% or " lower   Negative: Wheezing can be heard across the room   Negative: Drooling or spitting out saliva (because can't swallow)    Protocols used: Breathing Difficulty-A-JACOB Hernandez RN

## 2024-01-18 NOTE — TELEPHONE ENCOUNTER
Reason for Call:  Medication or medication refill:    Do you use a [facility]?  Name of the pharmacy and phone number for the current request:  [facility] [place] /[address] [place] [address] [phone]    Name of the medication requested: gabapentin (NEURONTIN) 300 MG capsule    Other request:     Can we leave a detailed message on this number? YES    Phone number patient can be reached at: Home number on file [phone] (home)    Best Time: anytime    Call taken on 7/3/2017 at 3:39 PM by Carmen Villanueva       no

## 2024-01-30 ENCOUNTER — MYC MEDICAL ADVICE (OUTPATIENT)
Dept: FAMILY MEDICINE | Facility: CLINIC | Age: 54
End: 2024-01-30
Payer: COMMERCIAL

## 2024-01-30 ENCOUNTER — TELEPHONE (OUTPATIENT)
Dept: FAMILY MEDICINE | Facility: CLINIC | Age: 54
End: 2024-01-30
Payer: COMMERCIAL

## 2024-01-30 DIAGNOSIS — G43.109 MIGRAINE WITH AURA AND WITHOUT STATUS MIGRAINOSUS, NOT INTRACTABLE: ICD-10-CM

## 2024-01-30 DIAGNOSIS — E11.9 TYPE 2 DIABETES MELLITUS WITHOUT COMPLICATION, WITHOUT LONG-TERM CURRENT USE OF INSULIN (H): Primary | ICD-10-CM

## 2024-01-30 RX ORDER — HYDROCODONE BITARTRATE AND ACETAMINOPHEN 5; 325 MG/1; MG/1
TABLET ORAL
Qty: 10 TABLET | Refills: 0 | Status: SHIPPED | OUTPATIENT
Start: 2024-01-30 | End: 2024-02-06

## 2024-01-30 NOTE — TELEPHONE ENCOUNTER
See telephone encounter from today 1/30/274. Patient will go to the ER to be evaluated.    Estefanía Hernandez RN

## 2024-01-30 NOTE — TELEPHONE ENCOUNTER
See General Specifict message. Called patient due to red flag symptoms. Patient reports symptoms started yesterday. Patient is unable to walk and requires assistance. Encouraged patient to go to the ER to be evaluated. Patient reports having fluid around the heart in the beginning of January and not feeling well.     Patient will go to the ER now, and will have  drive.    Estefanía Hernandez RN

## 2024-02-01 ENCOUNTER — TRANSFERRED RECORDS (OUTPATIENT)
Dept: HEALTH INFORMATION MANAGEMENT | Facility: CLINIC | Age: 54
End: 2024-02-01
Payer: COMMERCIAL

## 2024-02-04 ENCOUNTER — NURSE TRIAGE (OUTPATIENT)
Dept: NURSING | Facility: CLINIC | Age: 54
End: 2024-02-04
Payer: COMMERCIAL

## 2024-02-04 NOTE — TELEPHONE ENCOUNTER
Triage Call:     Pt calling to report that she has an area on her arm that is painful. She states that it is the same arm that she had an IV in last week. She explains that the vein moved while they attempted to place the IV so there is some bruising.     Vein is hard and painful hard vein  Reddish-Bruise the size 3 inches with the poke edil in the middle  Pain rated: 7/10    No swelling, hand is warm, not cold.     Disposition: Home care. Care advice given.       Reason for Disposition   Minor bruise    Additional Information   Negative: Bruises with fever   Negative: Tiny bruises (spots or dots) of unknown cause   Negative: Bruise(s) of forehead or head   Negative: Bruise(s) of face or jaw   Negative: Followed an injury, and triager doesn't know which injury guideline to use first   Negative: Post-operative bruising   Negative: Dizziness or lightheadedness   Negative: [1] Bruise on head, face, chest, or abdomen AND [2] taking Coumadin (warfarin) or other strong blood thinner, or known bleeding disorder (e.g., thrombocytopenia)   Negative: Unexplained bleeding from another site (e.g., gums, nose, urine) as well   Negative: Patient sounds very sick or weak to the triager   Negative: [1] Not caused by an injury AND [2] 5 or more bruises now   Negative: [1] Raised bruise AND [2] size > 2 inches (5 cm) AND [3] getting bigger   Negative: [1] SEVERE pain AND [2] not improved 2 hours after pain medicine/ice packs   Negative: Suspicious history for the injury   Negative: Taking Coumadin (warfarin) or other strong blood thinner, or known bleeding disorder (e.g., thrombocytopenia)  (Exception: Very small, painless bruise at heparin injection site.)   Negative: Bruising easily is a chronic symptom (recurrent or ongoing AND present > 4 weeks)   Negative: [1] Not caused by an injury AND [2] < 5 unexplained bruises   Negative: [1] After 10 days AND [2] bruise not fading   Negative: [1] After 3 weeks AND [2] bruise still  present   Negative: Minor bruising at site of heparin injection (e.g., heparin, Fragmin, Innohep, Lovenox)    Protocols used: Bruises-MOHAN-ARIN Hutchison RN  [facility] Nurse Advisor 12:24 PM 2/4/2024

## 2024-02-05 ENCOUNTER — TELEPHONE (OUTPATIENT)
Dept: FAMILY MEDICINE | Facility: CLINIC | Age: 54
End: 2024-02-05
Payer: COMMERCIAL

## 2024-02-05 ENCOUNTER — MYC MEDICAL ADVICE (OUTPATIENT)
Dept: FAMILY MEDICINE | Facility: CLINIC | Age: 54
End: 2024-02-05
Payer: COMMERCIAL

## 2024-02-05 NOTE — TELEPHONE ENCOUNTER
Patient calling to follow up on the issue she spoke with a triage nurse on yesterday.  The vein the [facility] blood from is hard, painful and red around it.  Rating her pain today 8/10. She tried warm packs like the nurse advised but not noticing any improvement in pain.  This RN advised patient to be seen in urgent care today.  Patient agreed and will come to urgent care now.    Jackie PEREYRAN, RN

## 2024-02-06 ENCOUNTER — E-VISIT (OUTPATIENT)
Dept: FAMILY MEDICINE | Facility: CLINIC | Age: 54
End: 2024-02-06
Payer: COMMERCIAL

## 2024-02-06 ENCOUNTER — OFFICE VISIT (OUTPATIENT)
Dept: FAMILY MEDICINE | Facility: CLINIC | Age: 54
End: 2024-02-06
Payer: COMMERCIAL

## 2024-02-06 VITALS
TEMPERATURE: 97.1 F | OXYGEN SATURATION: 96 % | WEIGHT: 293 LBS | HEART RATE: 94 BPM | HEIGHT: 68 IN | DIASTOLIC BLOOD PRESSURE: 92 MMHG | SYSTOLIC BLOOD PRESSURE: 160 MMHG | BODY MASS INDEX: 44.41 KG/M2 | RESPIRATION RATE: 20 BRPM

## 2024-02-06 DIAGNOSIS — I80.8 SUPERFICIAL PHLEBITIS OF ARM: Primary | ICD-10-CM

## 2024-02-06 DIAGNOSIS — I10 HYPERTENSION GOAL BP (BLOOD PRESSURE) < 140/90: ICD-10-CM

## 2024-02-06 DIAGNOSIS — E11.9 TYPE 2 DIABETES MELLITUS WITHOUT COMPLICATION, WITHOUT LONG-TERM CURRENT USE OF INSULIN (H): ICD-10-CM

## 2024-02-06 DIAGNOSIS — G43.109 MIGRAINE WITH AURA AND WITHOUT STATUS MIGRAINOSUS, NOT INTRACTABLE: ICD-10-CM

## 2024-02-06 PROCEDURE — 99421 OL DIG E/M SVC 5-10 MIN: CPT | Performed by: FAMILY MEDICINE

## 2024-02-06 PROCEDURE — 99213 OFFICE O/P EST LOW 20 MIN: CPT | Performed by: PHYSICIAN ASSISTANT

## 2024-02-06 RX ORDER — HYDROCODONE BITARTRATE AND ACETAMINOPHEN 5; 325 MG/1; MG/1
TABLET ORAL
Qty: 10 TABLET | Refills: 0 | Status: SHIPPED | OUTPATIENT
Start: 2024-02-06 | End: 2024-05-13

## 2024-02-06 ASSESSMENT — PAIN SCALES - GENERAL: PAINLEVEL: EXTREME PAIN (8)

## 2024-02-06 NOTE — PROGRESS NOTES
Assessment & Plan       ICD-10-CM    1. Superficial phlebitis of arm  I80.8       2. Hypertension goal BP (blood pressure) < 140/90  I10       3. Type 2 diabetes mellitus without complication, without long-term current use of insulin (H)  E11.9       Talk to patient about her concerns were to treat this conservatively with continue ice and heat and NSAIDs.  Warning signs for cellulitis were discussed at this time I do not believe that is the case.  Recheck in 2 weeks as needed.  She is due for a follow-up with her primary provider for diabetes in the next month.  She can have her blood pressure rechecked at that time.        Monique Ferrer is a 53 year old who has hypertension and diabetes, presenting for the following health issues:  Infection        2/6/2024     1:34 PM   Additional Questions   Roomed by clark   Accompanied by self         2/6/2024     1:34 PM   Patient Reported Additional Medications   Patient reports taking the following new medications no     History of Present Illness       Reason for visit:  Infection in my arm  Symptom onset:  1-2 weeks ago  Symptoms include:  Pain in vein  Symptom intensity:  Moderate  Symptom progression:  Staying the same  Had these symptoms before:  No  What makes it worse:  Heat    She eats 2-3 servings of fruits and vegetables daily.She consumes 0 sweetened beverage(s) daily.She exercises with enough effort to increase her heart rate 10 to 19 minutes per day.  She exercises with enough effort to increase her heart rate 3 or less days per week.   She is taking medications regularly.  Patient was seen in the emergency room on 1/30/2024 and had an IV in her right arm placed.  She states it was sore when it was taken out she had bruising around it the day after.  She is gradually noticed increased pain around the vein in that area.  Surrounding swelling and erythema.  She denies any fevers chills or bodyaches.  She has been using ice and heat.  She is here to make  "sure there is no signs of infection.    Review of Systems  Constitutional, HEENT, cardiovascular, pulmonary, gi and gu systems are negative, except as otherwise noted.      Objective    BP (!) 160/92   Pulse 94   Temp 97.1  F (36.2  C) (Tympanic)   Resp 20   Ht 1.727 m (5' 8\")   Wt 132.9 kg (293 lb)   LMP  (LMP Unknown)   SpO2 96%   BMI 44.55 kg/m    Body mass index is 44.55 kg/m .  Physical Exam   GENERAL: alert and no distress  Examination of her right arm she has a tender firm vein near the antecubital region with surrounding swelling and mild erythema.  She has full range of motion of her elbow she neurovascular tact distally.            Signed Electronically by: Lucho Alvarez PA-C    "

## 2024-02-06 NOTE — TELEPHONE ENCOUNTER
Routing to PCP to advise on refilling pain medication patient is requesting.   Jackie Mckeon BSN, RN

## 2024-02-06 NOTE — TELEPHONE ENCOUNTER
[facility] with Dr Beach's message.Angela GUARDADO [facility]

## 2024-02-06 NOTE — TELEPHONE ENCOUNTER
Can do evisit for med refill but if more detailed assessment needed then would need video visit. Silvino Beach MD

## 2024-02-17 ENCOUNTER — MYC MEDICAL ADVICE (OUTPATIENT)
Dept: FAMILY MEDICINE | Facility: CLINIC | Age: 54
End: 2024-02-17
Payer: COMMERCIAL

## 2024-02-17 DIAGNOSIS — R42 VERTIGO: Primary | ICD-10-CM

## 2024-02-19 NOTE — TELEPHONE ENCOUNTER
See [employer] messages below.  Patient asking for PT order due to vertigo.  Was diagnosed in ER at [facility] on 1/30/24.  Do you prefer patient schedule a follow up visit first?

## 2024-03-04 ENCOUNTER — TELEPHONE (OUTPATIENT)
Dept: FAMILY MEDICINE | Facility: CLINIC | Age: 54
End: 2024-03-04
Payer: COMMERCIAL

## 2024-03-04 NOTE — TELEPHONE ENCOUNTER
Patient Quality Outreach    Patient is due for the following:   Colon Cancer Screening      Topic Date Due    Hepatitis B Vaccine (2 of 3 - 19+ 3-dose series) 10/27/1994    Pneumococcal Vaccine (2 of 2 - PCV) 07/21/2013    COVID-19 Vaccine (5 - 2023-24 season) 09/01/2023       Next Steps:   Complete cologuard    Type of outreach:    Sent [employer] message.      Questions for provider review:    None           Sanjuanita Adair         6

## 2024-03-18 ENCOUNTER — PATIENT OUTREACH (OUTPATIENT)
Dept: CARE COORDINATION | Facility: CLINIC | Age: 54
End: 2024-03-18
Payer: COMMERCIAL

## 2024-03-25 ENCOUNTER — NURSE TRIAGE (OUTPATIENT)
Dept: NURSING | Facility: CLINIC | Age: 54
End: 2024-03-25
Payer: COMMERCIAL

## 2024-03-25 NOTE — TELEPHONE ENCOUNTER
Caller:   Patient    Situation:   Stomach flu?    Diarrhea and stomach cramping x 1 week  Last week, had vomiting but non this week    Cramps on and off through the day; pain level is 8-9/10; lasts about an hour  Took Beano today; she isn't unsure if it was helpful    No fever, no bleeding, stool is darker brown  Diarrhea 3-4 times/day  Is staying hydrated; drinking water; able to eat some food    Background:  Denies weak immune system      Assessment:  Needs to be evaluated      Recommendation:  Disposition: be seen w/I 24 hrs    Reviewed care advise with caller.   Informed to call back w/ any questions or new concerns.    Patient verbalized understanding of care advice.        Bryant Cornejo RN, BSN  Triage Nurse Advisor      Reason for Disposition   Abdominal pain  (Exception: Pain clears with each passage of diarrhea stool.)    Additional Information   Negative: Shock suspected (e.g., cold/pale/clammy skin, too weak to stand, low BP, rapid pulse)   Negative: Difficult to awaken or acting confused (e.g., disoriented, slurred speech)   Negative: Sounds like a life-threatening emergency to the triager   Negative: [1] SEVERE abdominal pain (e.g., excruciating) AND [2] present > 1 hour   Negative: [1] SEVERE abdominal pain AND [2] age > 60 years   Negative: [1] Blood in the stool AND [2] moderate or large amount of blood   Negative: Black or tarry bowel movements  (Exception: Chronic-unchanged black-grey BMs AND is taking iron pills or Pepto-Bismol.)   Negative: [1] Drinking very little AND [2] dehydration suspected (e.g., no urine > 12 hours, very dry mouth, very lightheaded)   Negative: Patient sounds very sick or weak to the triager   Negative: [1] SEVERE diarrhea (e.g., 7 or more times / day more than normal) AND [2] age > 60 years   Negative: [1] Constant abdominal pain AND [2] present > 2 hours   Negative: [1] Fever > 103 F (39.4 C) AND [2] not able to get the fever down using Fever Care Advice   Negative: [1]  SEVERE diarrhea (e.g., 7 or more times / day more than normal) AND [2] present > 24 hours (1 day)   Negative: [1] MODERATE diarrhea (e.g., 4-6 times / day more than normal) AND [2] present > 48 hours (2 days)   Negative: [1] MODERATE diarrhea (e.g., 4-6 times / day more than normal) AND [2] age > 70 years   Negative: Fever > 101 F (38.3 C)   Negative: Fever present > 3 days (72 hours)    Protocols used: Diarrhea-A-AH

## 2024-03-28 ENCOUNTER — OFFICE VISIT (OUTPATIENT)
Dept: FAMILY MEDICINE | Facility: CLINIC | Age: 54
End: 2024-03-28
Payer: COMMERCIAL

## 2024-03-28 VITALS
RESPIRATION RATE: 16 BRPM | SYSTOLIC BLOOD PRESSURE: 130 MMHG | HEART RATE: 68 BPM | BODY MASS INDEX: 44.1 KG/M2 | HEIGHT: 68 IN | TEMPERATURE: 97.8 F | WEIGHT: 291 LBS | DIASTOLIC BLOOD PRESSURE: 78 MMHG

## 2024-03-28 DIAGNOSIS — K52.9 GASTROENTERITIS: Primary | ICD-10-CM

## 2024-03-28 DIAGNOSIS — R10.84 ABDOMINAL PAIN, GENERALIZED: ICD-10-CM

## 2024-03-28 DIAGNOSIS — E11.9 TYPE 2 DIABETES MELLITUS WITHOUT COMPLICATION, WITHOUT LONG-TERM CURRENT USE OF INSULIN (H): ICD-10-CM

## 2024-03-28 LAB
ALBUMIN SERPL BCG-MCNC: 4.2 G/DL (ref 3.5–5.2)
ALP SERPL-CCNC: 65 U/L (ref 40–150)
ALT SERPL W P-5'-P-CCNC: 49 U/L (ref 0–50)
ANION GAP SERPL CALCULATED.3IONS-SCNC: 10 MMOL/L (ref 7–15)
AST SERPL W P-5'-P-CCNC: 54 U/L (ref 0–45)
BASOPHILS # BLD AUTO: 0 10E3/UL (ref 0–0.2)
BASOPHILS NFR BLD AUTO: 0 %
BILIRUB SERPL-MCNC: 0.3 MG/DL
BUN SERPL-MCNC: 8.7 MG/DL (ref 6–20)
CALCIUM SERPL-MCNC: 9.8 MG/DL (ref 8.6–10)
CHLORIDE SERPL-SCNC: 100 MMOL/L (ref 98–107)
CREAT SERPL-MCNC: 0.83 MG/DL (ref 0.51–0.95)
DEPRECATED HCO3 PLAS-SCNC: 28 MMOL/L (ref 22–29)
EGFRCR SERPLBLD CKD-EPI 2021: 83 ML/MIN/1.73M2
EOSINOPHIL # BLD AUTO: 0.1 10E3/UL (ref 0–0.7)
EOSINOPHIL NFR BLD AUTO: 1 %
ERYTHROCYTE [DISTWIDTH] IN BLOOD BY AUTOMATED COUNT: 12.6 % (ref 10–15)
GLUCOSE SERPL-MCNC: 200 MG/DL (ref 70–99)
HBA1C MFR BLD: 9.1 % (ref 0–5.6)
HCT VFR BLD AUTO: 46.5 % (ref 35–47)
HGB BLD-MCNC: 14.9 G/DL (ref 11.7–15.7)
IMM GRANULOCYTES # BLD: 0 10E3/UL
IMM GRANULOCYTES NFR BLD: 0 %
LYMPHOCYTES # BLD AUTO: 2.3 10E3/UL (ref 0.8–5.3)
LYMPHOCYTES NFR BLD AUTO: 24 %
MCH RBC QN AUTO: 30.3 PG (ref 26.5–33)
MCHC RBC AUTO-ENTMCNC: 32 G/DL (ref 31.5–36.5)
MCV RBC AUTO: 95 FL (ref 78–100)
MONOCYTES # BLD AUTO: 0.4 10E3/UL (ref 0–1.3)
MONOCYTES NFR BLD AUTO: 4 %
NEUTROPHILS # BLD AUTO: 6.8 10E3/UL (ref 1.6–8.3)
NEUTROPHILS NFR BLD AUTO: 71 %
PLATELET # BLD AUTO: 267 10E3/UL (ref 150–450)
POTASSIUM SERPL-SCNC: 4.6 MMOL/L (ref 3.4–5.3)
PROT SERPL-MCNC: 7 G/DL (ref 6.4–8.3)
RBC # BLD AUTO: 4.92 10E6/UL (ref 3.8–5.2)
SODIUM SERPL-SCNC: 138 MMOL/L (ref 135–145)
WBC # BLD AUTO: 9.6 10E3/UL (ref 4–11)

## 2024-03-28 PROCEDURE — 99214 OFFICE O/P EST MOD 30 MIN: CPT | Performed by: FAMILY MEDICINE

## 2024-03-28 PROCEDURE — 85025 COMPLETE CBC W/AUTO DIFF WBC: CPT | Performed by: FAMILY MEDICINE

## 2024-03-28 PROCEDURE — 83036 HEMOGLOBIN GLYCOSYLATED A1C: CPT | Performed by: FAMILY MEDICINE

## 2024-03-28 PROCEDURE — 80053 COMPREHEN METABOLIC PANEL: CPT | Performed by: FAMILY MEDICINE

## 2024-03-28 PROCEDURE — 36415 COLL VENOUS BLD VENIPUNCTURE: CPT | Performed by: FAMILY MEDICINE

## 2024-03-28 RX ORDER — SACCHAROMYCES BOULARDII 250 MG
250 CAPSULE ORAL 2 TIMES DAILY
Qty: 30 CAPSULE | Refills: 0 | Status: SHIPPED | OUTPATIENT
Start: 2024-03-28

## 2024-03-28 ASSESSMENT — ENCOUNTER SYMPTOMS: DIARRHEA: 1

## 2024-03-28 NOTE — PROGRESS NOTES
"  Assessment & Plan     Gastroenteritis  Abdominal pain, generalized  Cause not entirely clear.  She seems relatively comfortable and healthy at this time.  Does report that abdominal pain increases after eating.  At 2 weeks, seems a bit longer than infectious process but also seemingly relatively mild right now.  Discussed probiotic to help encourage return to normal bowel function.  Patient does have vascular risk factors so something such as intermittent mesenteric ischemia could be considered.  During visit with me,  pain not out of proportion to exam.  I would attempt to continue slowly advancing diet as she has.  Would avoid antidiarrheal medicine at this time.  Checking electrolytes.  I am happy to see that she has a follow-up with PMD next week.  I would consider imaging if she has any acute worsening or if she does not have resolved pain by the time she follows up next week.  - saccharomyces boulardii (FLORASTOR) 250 MG capsule  Dispense: 30 capsule; Refill: 0  - Comprehensive metabolic panel (BMP + Alb, Alk Phos, ALT, AST, Total. Bili, TP)  - CBC with platelets and differential    Type 2 diabetes mellitus without complication, without long-term current use of insulin (H)  Will update A1c in anticipation of PMD visit as below.    Future Appointments   Date Time Provider Department Center   4/2/2024  7:00 AM Silvino Beach MD [facility]      BMI  Estimated body mass index is 44.1 kg/m  as calculated from the following:    Height as of this encounter: 1.73 m (5' 8.11\").    Weight as of this encounter: 132 kg (291 lb).       Monique Ferrer is a 53 year old, presenting for the following health issues:  Diarrhea and Abdominal Pain        3/28/2024    10:21 AM   Additional Questions   Roomed by M   Accompanied by self     Via the Health Maintenance questionnaire, the patient has reported the following services have been completed -Mammogram, this information has been sent to the abstraction " team.  History of Present Illness       Reason for visit:  Diahrrhea stomach cramps  Symptom onset:  1-2 weeks ago  Symptoms include:  Diarrhra 4to6 times day  Symptom intensity:  Moderate  Symptom progression:  Staying the same  Had these symptoms before:  No  What makes it worse:  Food  What makes it better:  No    She eats 2-3 servings of fruits and vegetables daily.She consumes 0 sweetened beverage(s) daily.She exercises with enough effort to increase her heart rate 10 to 19 minutes per day.  She exercises with enough effort to increase her heart rate 5 days per week.   She is taking medications regularly.     53-year-old female here with about 2 weeks of stomach cramps.  Unfortunately was dealing with her father in the last stages of his life on hospice care.  He recently passed away and they had  proceedings today.  She could not go to everything because there was no bathroom close.  She has had increase in stool and diarrhea.  She gets abdominal pain whenever she eats anything.  She has been trying to take fluids to keep up hydration.  She has switched over to a brat diet.  Last week had a large formed stool that she described as gray.  Diarrhea since then has been brown.  Denies any blackness or blood in her stool.  Abdominal pain increases after she eats.  No history of recent antibiotics.  Vomited once last week but not more than that.  Again no blood with that.  Denies fever.  Denies sick contacts.  She has no travel history.    Status postcholecystectomy.  Multiple other abdominal surgeries.      Patient Active Problem List   Diagnosis    HYPERLIPIDEMIA LDL GOAL <100    Hypertension goal BP (blood pressure) < 140/90    Polycystic ovarian syndrome    BMI 45.0-49.9, adult (H)    Tobacco abuse    Mild major depression (H)    Insomnia    Chronic abdominal pain    Myofascial pain    Migraine with aura and without status migrainosus, not intractable    Non-allergic rhinitis    Bipolar affective  "disorder, remission status unspecified (H)    Type 2 diabetes mellitus without complication, without long-term current use of insulin (H)    Torn cartilage    Primary osteoarthritis of right knee    Arthrosis of knee    Acute pain of left shoulder    Morbid obesity (H)         Objective    /78 (BP Location: Right arm, Patient Position: Sitting, Cuff Size: Adult Large)   Pulse 68   Temp 97.8  F (36.6  C) (Temporal)   Resp 16   Ht 1.73 m (5' 8.11\")   Wt 132 kg (291 lb)   LMP  (LMP Unknown)   BMI 44.10 kg/m    Body mass index is 44.1 kg/m .  Physical Exam   GENERAL: alert, not distressed  EARS: normal tympanic membranes and external auditory canals bilaterally  PHARYNX: no erythema or exudates  MOUTH: well hydrated mucosa, no lesions  NECK: no lymphadenopathy or thyroid nodules  EARS: normal tympanic membranes and external auditory canals bilaterally  PHARYNX: no erythema or exudates  MOUTH: well hydrated mucosa, no lesions  NECK: no lymphadenopathy or thyroid nodules   CHEST: clear, no rales, rhonchi, or wheezes  CARDIAC: regular without murmur, gallop, or rub  ABD: soft, mild tender RUQ and epigastrium, no masses, obese    Results for orders placed or performed in visit on 03/28/24 (from the past 24 hour(s))   CBC with platelets and differential    Narrative    The following orders were created for panel order CBC with platelets and differential.  Procedure                               Abnormality         Status                     ---------                               -----------         ------                     CBC with platelets and d...[[id number]]                      Final result                 Please view results for these tests on the individual orders.   CBC with platelets and differential   Result Value Ref Range    WBC Count 9.6 4.0 - 11.0 10e3/uL    RBC Count 4.92 3.80 - 5.20 10e6/uL    Hemoglobin 14.9 11.7 - 15.7 g/dL    Hematocrit 46.5 35.0 - 47.0 %    MCV 95 78 - 100 fL    MCH 30.3 " 26.5 - 33.0 pg    MCHC 32.0 31.5 - 36.5 g/dL    RDW 12.6 10.0 - 15.0 %    Platelet Count 267 150 - 450 10e3/uL    % Neutrophils 71 %    % Lymphocytes 24 %    % Monocytes 4 %    % Eosinophils 1 %    % Basophils 0 %    % Immature Granulocytes 0 %    Absolute Neutrophils 6.8 1.6 - 8.3 10e3/uL    Absolute Lymphocytes 2.3 0.8 - 5.3 10e3/uL    Absolute Monocytes 0.4 0.0 - 1.3 10e3/uL    Absolute Eosinophils 0.1 0.0 - 0.7 10e3/uL    Absolute Basophils 0.0 0.0 - 0.2 10e3/uL    Absolute Immature Granulocytes 0.0 <=0.4 10e3/uL           Signed Electronically by: Walter García MD      Prior to immunization administration, verified patients identity using patient s name and date of birth. Please see Immunization Activity for additional information.     Screening Questionnaire for Adult Immunization    Are you sick today?   Yes   Do you have allergies to medications, food, a vaccine component or latex?   Yes   Have you ever had a serious reaction after receiving a vaccination?   No   Do you have a long-term health problem with heart, lung, kidney, or metabolic disease (e.g., diabetes), asthma, a blood disorder, no spleen, complement component deficiency, a cochlear implant, or a spinal fluid leak?  Are you on long-term aspirin therapy?   Yes   Do you have cancer, leukemia, HIV/AIDS, or any other immune system problem?   No   Do you have a parent, brother, or sister with an immune system problem?   No   In the past 3 months, have you taken medications that affect  your immune system, such as prednisone, other steroids, or anticancer drugs; drugs for the treatment of rheumatoid arthritis, Crohn s disease, or psoriasis; or have you had radiation treatments?   No   Have you had a seizure, or a brain or other nervous system problem?   No   During the past year, have you received a transfusion of blood or blood    products, or been given immune (gamma) globulin or antiviral drug?   No   For women: Are you pregnant or is there a  chance you could become       pregnant during the next month?   No   Have you received any vaccinations in the past 4 weeks?   No     Immunization questionnaire was positive for at least one answer.  Notified .      Patient instructed to remain in clinic for 15 minutes afterwards, and to report any adverse reactions.     Screening performed by IVÁN Wilcox MA on 3/28/2024 at 10:25 AM.

## 2024-04-02 ENCOUNTER — OFFICE VISIT (OUTPATIENT)
Dept: FAMILY MEDICINE | Facility: CLINIC | Age: 54
End: 2024-04-02
Payer: COMMERCIAL

## 2024-04-02 VITALS
OXYGEN SATURATION: 96 % | TEMPERATURE: 98.1 F | RESPIRATION RATE: 26 BRPM | SYSTOLIC BLOOD PRESSURE: 130 MMHG | WEIGHT: 289.2 LBS | BODY MASS INDEX: 42.83 KG/M2 | HEART RATE: 77 BPM | HEIGHT: 69 IN | DIASTOLIC BLOOD PRESSURE: 82 MMHG

## 2024-04-02 DIAGNOSIS — I10 HYPERTENSION GOAL BP (BLOOD PRESSURE) < 140/90: ICD-10-CM

## 2024-04-02 DIAGNOSIS — G43.919 INTRACTABLE MIGRAINE WITHOUT STATUS MIGRAINOSUS, UNSPECIFIED MIGRAINE TYPE: ICD-10-CM

## 2024-04-02 DIAGNOSIS — R19.7 DIARRHEA, UNSPECIFIED TYPE: Primary | ICD-10-CM

## 2024-04-02 DIAGNOSIS — E11.9 TYPE 2 DIABETES MELLITUS WITHOUT COMPLICATION, WITHOUT LONG-TERM CURRENT USE OF INSULIN (H): ICD-10-CM

## 2024-04-02 PROCEDURE — 99214 OFFICE O/P EST MOD 30 MIN: CPT | Performed by: FAMILY MEDICINE

## 2024-04-02 RX ORDER — L. ACIDOPHILUS/L.BULGARICUS 1MM CELL
1 TABLET ORAL 2 TIMES DAILY
Qty: 20 TABLET | Refills: 1 | Status: SHIPPED | OUTPATIENT
Start: 2024-04-02 | End: 2024-04-12

## 2024-04-02 RX ORDER — METFORMIN HCL 500 MG
TABLET, EXTENDED RELEASE 24 HR ORAL
Qty: 360 TABLET | Refills: 1 | Status: SHIPPED | OUTPATIENT
Start: 2024-04-02

## 2024-04-02 RX ORDER — LINAGLIPTIN 5 MG/1
5 TABLET, FILM COATED ORAL DAILY
Qty: 90 TABLET | Refills: 1 | Status: SHIPPED | OUTPATIENT
Start: 2024-04-02

## 2024-04-02 RX ORDER — LOSARTAN POTASSIUM AND HYDROCHLOROTHIAZIDE 25; 100 MG/1; MG/1
1 TABLET ORAL DAILY
Qty: 90 TABLET | Refills: 1 | Status: SHIPPED | OUTPATIENT
Start: 2024-04-02

## 2024-04-02 RX ORDER — METOPROLOL TARTRATE 100 MG
TABLET ORAL
Qty: 270 TABLET | Refills: 1 | Status: SHIPPED | OUTPATIENT
Start: 2024-04-02 | End: 2024-09-16

## 2024-04-02 RX ORDER — GLIPIZIDE 5 MG/1
5 TABLET, FILM COATED, EXTENDED RELEASE ORAL DAILY
Qty: 30 TABLET | Refills: 3 | Status: SHIPPED | OUTPATIENT
Start: 2024-04-02

## 2024-04-02 ASSESSMENT — PAIN SCALES - GENERAL: PAINLEVEL: MODERATE PAIN (5)

## 2024-04-02 NOTE — PROGRESS NOTES
ASSESSMENT / PLAN:  (R19.7) Diarrhea, unspecified type  (primary encounter diagnosis)  Comment: likely viral - slowly improving  Plan: psyllium (METAMUCIL/KONSYL) 58.6 % powder,         Lactobacillus 0.05-0.05 MG TABS        Reveiwed risks and side effects of medication  Will need stool studies if persists. Expected course and warning signs reviewed. Call/email with questions/concerns      (I10) Hypertension goal BP (blood pressure) < 140/90  Comment: stable  Plan: metoprolol tartrate (LOPRESSOR) 100 MG tablet,         losartan-hydrochlorothiazide (HYZAAR) 100-25 MG        tablet        Chest pain or shortness of breath to er.     (G43.919) Intractable migraine without status migrainosus, unspecified migraine type  Comment: stable  Plan: metoprolol tartrate (LOPRESSOR) 100 MG tablet            (E11.9) Type 2 diabetes mellitus without complication, without long-term current use of insulin (H)  Comment: needs help  Plan: metFORMIN (GLUCOPHAGE XR) 500 MG 24 hr tablet,         linagliptin (TRADJENTA) 5 MG TABS tablet,         glipiZIDE (GLUCOTROL XL) 5 MG 24 hr tablet        Add glucotrol. Reveiwed risks and side effects of medication  Patient not interested in insulin at this point. Diet/exercise. Recheck in 3 months        Monique Ferrer is a 53 year old, presenting for the following health issues:  Follow-up dm, htn, high cholesterol, bipolar, migraines, morbid obesity and lumbar radiculopathy   Unable to tolerated omzepic and start on tradjenta in summer- no side effects noted.  Loose stools past 3 weeks. No black/bloody stools. No travel. Son was sick but resolved.. No antibiotics recently.   Doing BRAT diet. Now just in am. No probiotics or fiber supplement.  Last eye exam ok. No feet changes.   Exercise - needs more. Had some nausea/emesis- but initially.  Diabetes      4/2/2024     6:54 AM   Additional Questions   Roomed by DEEPA Adair CMA     Via the Health Maintenance questionnaire, the patient has reported  "the following services have been completed -Mammogram, this information has been sent to the abstraction team.  History of Present Illness       Reason for visit:  Diahrrhea stomach cramps  Symptom onset:  1-2 weeks ago  Symptoms include:  Diarrhra 4to6 times day  Symptom intensity:  Moderate  Symptom progression:  Staying the same  Had these symptoms before:  No  What makes it worse:  Food  What makes it better:  No    She eats 2-3 servings of fruits and vegetables daily.She consumes 0 sweetened beverage(s) daily.She exercises with enough effort to increase her heart rate 10 to 19 minutes per day.  She exercises with enough effort to increase her heart rate 5 days per week.   She is taking medications regularly.                   Objective    /82   Pulse 77   Temp 98.1  F (36.7  C) (Oral)   Resp 26   Ht 1.74 m (5' 8.5\")   Wt 131.2 kg (289 lb 3.2 oz)   LMP  (LMP Unknown)   SpO2 96%   BMI 43.33 kg/m    Body mass index is 43.33 kg/m .  Physical Exam   GENERAL: alert and no distress  EYES: Eyes grossly normal to inspection, PERRL and conjunctivae and sclerae normal  NECK: no adenopathy, no asymmetry, masses, or scars  RESP: lungs clear to auscultation - no rales, rhonchi or wheezes  CV: regular rate and rhythm, normal S1 S2, no S3 or S4, no murmur, click or rub, no peripheral edema   ABDOMEN: soft, nontender, no hepatosplenomegaly, no masses and bowel sounds normal  MS: no gross musculoskeletal defects noted, no edema  NEURO: Normal strength and tone, mentation intact and speech normal  PSYCH: mentation appears normal, affect normal/bright            Signed Electronically by: Silvino Beach MD    "

## 2024-04-04 ENCOUNTER — MYC MEDICAL ADVICE (OUTPATIENT)
Dept: FAMILY MEDICINE | Facility: CLINIC | Age: 54
End: 2024-04-04
Payer: COMMERCIAL

## 2024-04-04 DIAGNOSIS — R19.7 DIARRHEA, UNSPECIFIED TYPE: Primary | ICD-10-CM

## 2024-04-04 NOTE — TELEPHONE ENCOUNTER
Routed to PCP. Please review and advise if stool testing is indicated for pt-reported continuing sx.    Angelica Mcqueen, BSN, RN

## 2024-04-11 ENCOUNTER — PATIENT OUTREACH (OUTPATIENT)
Dept: CARE COORDINATION | Facility: CLINIC | Age: 54
End: 2024-04-11
Payer: COMMERCIAL

## 2024-04-15 ENCOUNTER — PATIENT OUTREACH (OUTPATIENT)
Dept: CARE COORDINATION | Facility: CLINIC | Age: 54
End: 2024-04-15
Payer: COMMERCIAL

## 2024-04-25 ENCOUNTER — PATIENT OUTREACH (OUTPATIENT)
Dept: CARE COORDINATION | Facility: CLINIC | Age: 54
End: 2024-04-25
Payer: COMMERCIAL

## 2024-05-13 ENCOUNTER — E-VISIT (OUTPATIENT)
Dept: FAMILY MEDICINE | Facility: CLINIC | Age: 54
End: 2024-05-13
Payer: COMMERCIAL

## 2024-05-13 DIAGNOSIS — G43.109 MIGRAINE WITH AURA AND WITHOUT STATUS MIGRAINOSUS, NOT INTRACTABLE: ICD-10-CM

## 2024-05-13 PROCEDURE — 99421 OL DIG E/M SVC 5-10 MIN: CPT | Performed by: FAMILY MEDICINE

## 2024-05-13 RX ORDER — HYDROCODONE BITARTRATE AND ACETAMINOPHEN 5; 325 MG/1; MG/1
TABLET ORAL
Qty: 10 TABLET | Refills: 0 | Status: SHIPPED | OUTPATIENT
Start: 2024-05-13 | End: 2024-06-13

## 2024-05-16 ENCOUNTER — E-VISIT (OUTPATIENT)
Dept: FAMILY MEDICINE | Facility: CLINIC | Age: 54
End: 2024-05-16
Payer: COMMERCIAL

## 2024-05-16 DIAGNOSIS — R05.9 COUGH, UNSPECIFIED TYPE: ICD-10-CM

## 2024-05-16 DIAGNOSIS — R09.81 CONGESTION OF PARANASAL SINUS: Primary | ICD-10-CM

## 2024-05-16 PROCEDURE — 99421 OL DIG E/M SVC 5-10 MIN: CPT | Performed by: FAMILY MEDICINE

## 2024-05-16 RX ORDER — ALBUTEROL SULFATE 90 UG/1
1-2 AEROSOL, METERED RESPIRATORY (INHALATION) EVERY 4 HOURS PRN
Qty: 18 G | Refills: 0 | Status: SHIPPED | OUTPATIENT
Start: 2024-05-16

## 2024-05-16 RX ORDER — AZITHROMYCIN 250 MG/1
TABLET, FILM COATED ORAL
Qty: 6 TABLET | Refills: 0 | Status: SHIPPED | OUTPATIENT
Start: 2024-05-16 | End: 2024-05-21

## 2024-06-13 ENCOUNTER — MYC MEDICAL ADVICE (OUTPATIENT)
Dept: FAMILY MEDICINE | Facility: CLINIC | Age: 54
End: 2024-06-13
Payer: COMMERCIAL

## 2024-06-13 DIAGNOSIS — G43.109 MIGRAINE WITH AURA AND WITHOUT STATUS MIGRAINOSUS, NOT INTRACTABLE: ICD-10-CM

## 2024-06-13 RX ORDER — HYDROCODONE BITARTRATE AND ACETAMINOPHEN 5; 325 MG/1; MG/1
TABLET ORAL
Qty: 10 TABLET | Refills: 0 | Status: SHIPPED | OUTPATIENT
Start: 2024-06-13 | End: 2024-07-22

## 2024-06-15 ENCOUNTER — TRANSFERRED RECORDS (OUTPATIENT)
Dept: MULTI SPECIALTY CLINIC | Facility: CLINIC | Age: 54
End: 2024-06-15

## 2024-06-25 ENCOUNTER — DOCUMENTATION ONLY (OUTPATIENT)
Dept: FAMILY MEDICINE | Facility: CLINIC | Age: 54
End: 2024-06-25
Payer: COMMERCIAL

## 2024-06-25 DIAGNOSIS — E78.5 HYPERLIPIDEMIA LDL GOAL <100: Primary | ICD-10-CM

## 2024-06-25 DIAGNOSIS — E11.9 TYPE 2 DIABETES MELLITUS WITHOUT COMPLICATION, WITHOUT LONG-TERM CURRENT USE OF INSULIN (H): ICD-10-CM

## 2024-06-25 NOTE — PROGRESS NOTES
Carissa Spears has an upcoming lab appointment:    Future Appointments   Date Time Provider Department Center   7/9/2024  7:15 AM [facility]   7/16/2024  2:00 PM Silvino Beach MD [facility]       There is no order available. Please review and place either future orders or HMPO (Review of Health Maintenance Protocol Orders), as appropriate.    Health Maintenance Due   Topic    ANNUAL REVIEW OF HM ORDERS     HIV SCREENING     HEPATITIS C SCREENING     LIPID     MICROALBUMIN      Wally PATELT

## 2024-06-29 ENCOUNTER — HEALTH MAINTENANCE LETTER (OUTPATIENT)
Age: 54
End: 2024-06-29

## 2024-07-01 ENCOUNTER — TRANSFERRED RECORDS (OUTPATIENT)
Dept: HEALTH INFORMATION MANAGEMENT | Facility: CLINIC | Age: 54
End: 2024-07-01
Payer: COMMERCIAL

## 2024-07-09 ENCOUNTER — LAB (OUTPATIENT)
Dept: LAB | Facility: CLINIC | Age: 54
End: 2024-07-09
Payer: COMMERCIAL

## 2024-07-09 DIAGNOSIS — E78.5 HYPERLIPIDEMIA LDL GOAL <100: ICD-10-CM

## 2024-07-09 DIAGNOSIS — E11.9 TYPE 2 DIABETES MELLITUS WITHOUT COMPLICATION, WITHOUT LONG-TERM CURRENT USE OF INSULIN (H): ICD-10-CM

## 2024-07-09 LAB
CHOLEST SERPL-MCNC: 147 MG/DL
CREAT UR-MCNC: 83.8 MG/DL
FASTING STATUS PATIENT QL REPORTED: YES
HBA1C MFR BLD: 9.2 % (ref 0–5.6)
HDLC SERPL-MCNC: 30 MG/DL
LDLC SERPL CALC-MCNC: 62 MG/DL
MICROALBUMIN UR-MCNC: 21.9 MG/L
MICROALBUMIN/CREAT UR: 26.13 MG/G CR (ref 0–25)
NONHDLC SERPL-MCNC: 117 MG/DL
TRIGL SERPL-MCNC: 276 MG/DL

## 2024-07-09 PROCEDURE — 82570 ASSAY OF URINE CREATININE: CPT

## 2024-07-09 PROCEDURE — 82043 UR ALBUMIN QUANTITATIVE: CPT

## 2024-07-09 PROCEDURE — 83036 HEMOGLOBIN GLYCOSYLATED A1C: CPT

## 2024-07-09 PROCEDURE — 36415 COLL VENOUS BLD VENIPUNCTURE: CPT

## 2024-07-09 PROCEDURE — 80061 LIPID PANEL: CPT

## 2024-07-16 ENCOUNTER — VIRTUAL VISIT (OUTPATIENT)
Dept: FAMILY MEDICINE | Facility: CLINIC | Age: 54
End: 2024-07-16
Payer: COMMERCIAL

## 2024-07-16 DIAGNOSIS — E78.5 HYPERLIPIDEMIA LDL GOAL <100: ICD-10-CM

## 2024-07-16 DIAGNOSIS — E11.65 TYPE 2 DIABETES MELLITUS WITH HYPERGLYCEMIA, UNSPECIFIED WHETHER LONG TERM INSULIN USE (H): Primary | ICD-10-CM

## 2024-07-16 PROCEDURE — 99214 OFFICE O/P EST MOD 30 MIN: CPT | Mod: 95 | Performed by: FAMILY MEDICINE

## 2024-07-16 PROCEDURE — G2211 COMPLEX E/M VISIT ADD ON: HCPCS | Mod: 95 | Performed by: FAMILY MEDICINE

## 2024-07-16 RX ORDER — SIMVASTATIN 20 MG
20 TABLET ORAL AT BEDTIME
Qty: 90 TABLET | Refills: 3 | Status: SHIPPED | OUTPATIENT
Start: 2024-07-16

## 2024-07-16 NOTE — PROGRESS NOTES
Carissa is a 54 year old who is being evaluated via a billable video visit.    How would you like to obtain your AVS? MyChart  If the video visit is dropped, the invitation should be resent by: Text to cell phone: [phone]  Will anyone else be joining your video visit? No      ASSESSMENT / PLAN:  (E11.65) Type 2 diabetes mellitus with hyperglycemia, unspecified whether long term insulin use (H)  (primary encounter diagnosis)  Comment: needs help  Plan: Adult Diabetes Education  Referral        Will ask dm ed to see. Likely needs insulin. Continue diet/exercise and weight loss. Recheck in 4 months  Continue meds for now.     (E78.5) Hyperlipidemia LDL goal <100  Comment: stable  Plan: simvastatin (ZOCOR) 20 MG tablet        Work on diabetes. Chest pain or shortness of breath to er.    The longitudinal plan of care for the diagnosis(es)/condition(s) as documented were addressed during this visit. Due to the added complexity in care, I will continue to support Carissa in the subsequent management and with ongoing continuity of care.      Subjective   Carissa is a 54 year old, presenting for the following health issues:  Follow-up dm, htn, high cholesterol, bipolar, migraines, morbid obesity and lumbar radiculopathy   Unable to tolerated omzepic and start on tradjenta in summer- no side effects noted.  Started keto diet 2 weeks ago and walking more. No feet changes.  No insulin in past. No dm ed.   Diabetes      7/16/2024    10:05 AM   Additional Questions   Roomed by DEEPA Adair CMA     [facility]               Objective           Vitals:  No vitals were obtained today due to virtual visit.    Physical Exam   GENERAL: alert and no distress  EYES: Eyes grossly normal to inspection.  No discharge or erythema, or obvious scleral/conjunctival abnormalities.  RESP: No audible wheeze, cough, or visible cyanosis.    SKIN: Visible skin clear. No significant rash, abnormal pigmentation or lesions.  NEURO: Cranial nerves grossly  intact.  Mentation and speech appropriate for age.  PSYCH: Appropriate affect, tone, and pace of words          Video-Visit Details    Type of service:  Video Visit   Originating Location (pt. Location): Home    Distant Location (provider location):  On-site  Platform used for Video Visit: Richie  Signed Electronically by: Silvino Beach MD

## 2024-07-17 DIAGNOSIS — E78.5 HYPERLIPIDEMIA LDL GOAL <100: ICD-10-CM

## 2024-07-17 RX ORDER — SIMVASTATIN 20 MG
TABLET ORAL
Qty: 90 TABLET | Refills: 3 | OUTPATIENT
Start: 2024-07-17

## 2024-07-21 ENCOUNTER — MYC MEDICAL ADVICE (OUTPATIENT)
Dept: FAMILY MEDICINE | Facility: CLINIC | Age: 54
End: 2024-07-21
Payer: COMMERCIAL

## 2024-07-21 DIAGNOSIS — G43.109 MIGRAINE WITH AURA AND WITHOUT STATUS MIGRAINOSUS, NOT INTRACTABLE: ICD-10-CM

## 2024-07-22 RX ORDER — HYDROCODONE BITARTRATE AND ACETAMINOPHEN 5; 325 MG/1; MG/1
1 TABLET ORAL DAILY PRN
Qty: 10 TABLET | Refills: 0 | Status: SHIPPED | OUTPATIENT
Start: 2024-07-22 | End: 2024-07-23

## 2024-07-23 ENCOUNTER — MYC MEDICAL ADVICE (OUTPATIENT)
Dept: FAMILY MEDICINE | Facility: CLINIC | Age: 54
End: 2024-07-23
Payer: COMMERCIAL

## 2024-07-23 DIAGNOSIS — G43.109 MIGRAINE WITH AURA AND WITHOUT STATUS MIGRAINOSUS, NOT INTRACTABLE: ICD-10-CM

## 2024-07-23 RX ORDER — HYDROCODONE BITARTRATE AND ACETAMINOPHEN 5; 325 MG/1; MG/1
1 TABLET ORAL DAILY PRN
Qty: 10 TABLET | Refills: 0 | Status: SHIPPED | OUTPATIENT
Start: 2024-07-23 | End: 2024-07-24

## 2024-07-24 RX ORDER — HYDROCODONE BITARTRATE AND ACETAMINOPHEN 5; 325 MG/1; MG/1
1 TABLET ORAL DAILY PRN
Qty: 10 TABLET | Refills: 0 | Status: SHIPPED | OUTPATIENT
Start: 2024-07-24 | End: 2024-09-09

## 2024-08-15 ENCOUNTER — VIRTUAL VISIT (OUTPATIENT)
Dept: EDUCATION SERVICES | Facility: CLINIC | Age: 54
End: 2024-08-15
Payer: COMMERCIAL

## 2024-08-15 VITALS — WEIGHT: 293 LBS | BODY MASS INDEX: 44.35 KG/M2

## 2024-08-15 DIAGNOSIS — E11.9 TYPE 2 DIABETES MELLITUS WITHOUT COMPLICATION, WITHOUT LONG-TERM CURRENT USE OF INSULIN (H): Primary | ICD-10-CM

## 2024-08-15 PROCEDURE — G0108 DIAB MANAGE TRN  PER INDIV: HCPCS | Mod: 95 | Performed by: NUTRITIONIST

## 2024-08-15 NOTE — PROGRESS NOTES
Virtual Visit Details    Type of service:  Video Visit   Joined the call at 8/15/2024, 9:35:43 am.  Left the call at 8/15/2024, 10:14:08 am.  You were on the call for 38 minutes 24 second  Originating Location (pt. Location): Home    Distant Location (provider location):  Off-site  Platform used for Video Visit: "[employer]"    Diabetes Self-Management Education & Support    Carissa Spears presents today for education related to Type 2 diabetes    She is accompanied by self    Year of diagnosis: 37 years  Referring provider:  PCP    PATIENT CONCERNS/REASON FOR REFERRAL high glucose       ASSESSMENT:    Taking Medication:     Current Diabetes Management per Patient:  Taking diabetes medications? yes:     Diabetes Medication(s)       Biguanides       metFORMIN (GLUCOPHAGE XR) 500 MG 24 hr tablet TAKE 2 TABLETS BY MOUTH TWICE DAILY FOR DIABETES Strength: 500 mg       Dipeptidyl Peptidase-4 (DPP-4) Inhibitors       linagliptin (TRADJENTA) 5 MG TABS tablet Take 1 tablet (5 mg) by mouth daily or diabetes       Sulfonylureas       glipiZIDE (GLUCOTROL XL) 5 MG 24 hr tablet Take 1 tablet (5 mg) by mouth daily New for diabetes     Patient not taking: Reported on 7/16/2024          Tradjenta for 4-5 months   Linagliptin and glipizide were stopped two weeks ago by PCP per patient    Monitoring    Testing glucose before lunch and before dinner      Patient's most recent   Lab Results   Component Value Date    A1C 9.2 07/09/2024    A1C 5.9 09/08/2020      Patient's A1C goal: <7.0    Activity: walking an hour every day pre covid.    Healthy Eating:   three meals per day  Breakfast 2 eggs, sausage and meat  Stuffed peppers for lunch with hamburger and rice and tomato sauce or Salad with chicken    Beef stick and babybel cheese  Meat and vegetable   Beverages coffee, water, diet pepsi     Problem Solving:      Patient is at risk of hypoglycemia?: YES  Hospitalizations for hyper or hypoglycemia: No    EDUCATION and INSTRUCTION PROVIDED AT  "THIS VISIT:       Patient with type 2 diabetes diagnosed around age 37. She did not tolerate Jardiance or Ozempic. She does not tolerate more than 1000mg metformin daily.  Glucose has not been controlled the past couple years. Referred by PCP. Discussed starting insulin today. Patient remembers how to do injections.   Explained some of the differences between insulins.  Explained the action and timing of glargine insulin.  Demonstrated applying pen needle, dialing up the 2 unit \"air-shot\", dialing up dose to be given, injecting insulin, and instructed to hold in the skin for 10 seconds after pushing in dose.   Discussed safe and legal disposal of needles.   Discussed the storage and disposal of insulin pens, including \"good to\" date.   Discussed the recognition and treatment of hypoglycemia, including carrying meter and fast-acting form of carbohydrate at all times.   Discussed plan for follow up.   Discussed goals for BG's and probable need for adjustment of insulin.      Discussed need for more glucose testing as well.   I have no glucose readings to review today. So patient will plan to test this week and we will follow up next week.   Will plan to start insulin next week once we have more readings.     Patient will test glucose before each meal.     Patient-stated goal written and given to Carissa Spears.  Verbalized and demonstrated understanding of instructions.       FOLLOW-UP:    One week    Time spent with patient at today's visit was 38 minutes.      Any diabetes medication dose changes were made via the CDE Protocol and Collaborative Practice Agreement with [place] and  [facility].  A copy of this encounter was provided to patient's referring provider.      "

## 2024-08-15 NOTE — NURSING NOTE
Current patient location:  [place]    Is the patient currently in the state of [place]? YES    Visit mode:VIDEO    If the visit is dropped, the patient can be reconnected by: VIDEO VISIT: Text to cell phone:   Telephone Information:   Mobile [phone]       Will anyone else be joining the visit? NO  (If patient encounters technical issues they should call [phone]    How would you like to obtain your AVS? MyChart    Are changes needed to the allergy or medication list? No    Are refills needed on medications prescribed by this physician? NO    Rooming Documentation:  Assigned questionnaire(s) completed      Reason for visit: RECHECK and Video Visit    Estefanía KENT

## 2024-08-27 ENCOUNTER — VIRTUAL VISIT (OUTPATIENT)
Dept: EDUCATION SERVICES | Facility: CLINIC | Age: 54
End: 2024-08-27
Payer: COMMERCIAL

## 2024-08-27 ENCOUNTER — TELEPHONE (OUTPATIENT)
Dept: EDUCATION SERVICES | Facility: CLINIC | Age: 54
End: 2024-08-27

## 2024-08-27 VITALS — BODY MASS INDEX: 44.05 KG/M2 | WEIGHT: 293 LBS

## 2024-08-27 DIAGNOSIS — E11.9 TYPE 2 DIABETES MELLITUS WITHOUT COMPLICATION, WITHOUT LONG-TERM CURRENT USE OF INSULIN (H): Primary | ICD-10-CM

## 2024-08-27 PROCEDURE — 99207 PR NO BILLABLE SERVICE THIS VISIT: CPT | Mod: 95 | Performed by: NUTRITIONIST

## 2024-08-27 RX ORDER — BLOOD-GLUCOSE SENSOR
1 EACH MISCELLANEOUS
Qty: 2 EACH | Refills: 5 | Status: SHIPPED | OUTPATIENT
Start: 2024-08-27

## 2024-08-27 ASSESSMENT — PAIN SCALES - GENERAL: PAINLEVEL: NO PAIN (0)

## 2024-08-27 NOTE — PROGRESS NOTES
Virtual Visit Details    Type of service:  Video Visit     Joined the call at 8/27/2024, 11:40:03 am.  Left the call at 8/27/2024, 12:07:23 pm.  You were on the call for 27 minutes 19 seconds    Originating Location (pt. Location): Home    Distant Location (provider location):  On-site  Platform used for Video Visit: [employer]    Diabetes Self-Management Education & Support    Carissa Spears presents today for education related to Type 2 diabetes    She is accompanied by self    Year of diagnosis: 37 years  Referring provider:  PCP    PATIENT CONCERNS/REASON FOR REFERRAL high glucose       ASSESSMENT:    Taking Medication:     Current Diabetes Management per Patient:  Taking diabetes medications? yes:     Diabetes Medication(s)       Biguanides       metFORMIN (GLUCOPHAGE XR) 500 MG 24 hr tablet TAKE 2 TABLETS BY MOUTH TWICE DAILY FOR DIABETES Strength: 500 mg       Dipeptidyl Peptidase-4 (DPP-4) Inhibitors       linagliptin (TRADJENTA) 5 MG TABS tablet Take 1 tablet (5 mg) by mouth daily or diabetes       Sulfonylureas       glipiZIDE (GLUCOTROL XL) 5 MG 24 hr tablet Take 1 tablet (5 mg) by mouth daily New for diabetes     Patient not taking: Reported on 7/16/2024          Tradjenta for 4-5 months   Linagliptin and glipizide were stopped by PCP per patient    Monitoring    Testing glucose before lunch and before dinner      Patient's most recent   Lab Results   Component Value Date    A1C 9.2 07/09/2024    A1C 5.9 09/08/2020      Patient's A1C goal: <7.0    Activity: walking an hour every day pre covid.    Healthy Eating:   three meals per day  Breakfast 2 eggs, sausage and meat  Stuffed peppers for lunch with hamburger and rice and tomato sauce or Salad with chicken    Beef stick and babybel cheese  Meat and vegetable   Beverages coffee, water, diet pepsi     Problem Solving:      Patient is at risk of hypoglycemia?: YES  Hospitalizations for hyper or hypoglycemia: No    EDUCATION and INSTRUCTION PROVIDED AT THIS  VISIT:       Patient with type 2 diabetes diagnosed around age 37. She did not tolerate Jardiance or Ozempic. She does not tolerate more than 1000mg metformin daily.  Glucose has not been controlled the past couple years. Referred by PCP. Insulin injection technique was reviewed at previous appointment.     Discussed need for more glucose testing as well. Patient was not feeling well this week and did not get glucose readings as planned. She would like to try CGM.  Discussed how a G7 and analy 3 sensor work. Showed her both sensors and discussed similarities and differences. Patient would like to try a analy 3 sensor. Order placed.    Patient-stated goal written and given to Carissa Spears.  Verbalized and demonstrated understanding of instructions.       FOLLOW-UP:    Two weeks    Any diabetes medication dose changes were made via the CDE Protocol and Collaborative Practice Agreement with [place] and  [facility].  A copy of this encounter was provided to patient's referring provider.

## 2024-08-27 NOTE — NURSING NOTE
[place]:  [place]    Is the patient currently in the state of [place]? YES    Visit mode:VIDEO    If the visit is dropped, the patient can be reconnected by: VIDEO VISIT: Text to cell phone:   Telephone Information:   Mobile [phone]       Will anyone else be joining the visit? NO  (If patient encounters technical issues they should call [phone])    How would you like to obtain your AVS? MyChart    Are changes needed to the allergy or medication list? No    Are refills needed on medications prescribed by this physician? NO    Reason for visit: RECHECK    Maribel KENT

## 2024-08-27 NOTE — TELEPHONE ENCOUNTER
Isaac sensors are only covered if patient is on insulin, let me know if you would like me to release the script to the pharmacy anyway, they usually have coupons  Test claim TRANS FEE = 0.00  MUST BE USING INSULIN

## 2024-08-27 NOTE — PATIENT INSTRUCTIONS
Parts of your Isaac Sensor                                         Cell phone justin and sensor.    The sensor is a white disc that you typically wear on the back of your upper arm.  The reader is a device to scan the meter with.  An justin for your cell phone may take the place of the reader.  ([employer] and [employer])      1. Plan to wear your Isaac sensor for 14-days.  The reader/justin will tell you when your sensor session is ending.  A one month supply is two sensors.    2. There is a one-hour warm up period each time you insert a new sensor.  You will not get any glucose data during this time.    4. When your glucose result is on the screen of the reader you will see a pencil in the upper right corner of the screen.  If you press the pencil it will take you to a screen where you can check boxes for insulin and or food.  This will allow your healthcare team to see when the food and/or insulin go in.     5. Justin users can hook to our clinic portal using the code: [id number].  You cannot hook to our portal if you use a reader but you can upload your reader to [employer].    6. If you have any issues with keeping the Isaac sensor on, please let us know.  There are several products that can be used to help it stay in place. This tends to be more of an issue during the summer months.     7.  If you have a skin reaction related to the Isaac tape, let us know.  There are some products that can be used under that can reduce the reaction.    8.  Important:  The Isaac sensor is reading the glucose in the interstitial fluid.  Your blood glucose meter is reading the glucose in the bloodstream.  These two values often mirror each other.  However, after you eat glucose goes to your bloodstream first and then takes an additional 20-30 minutes to get out to the interstitial fluid where the sensor can read it.  This means after treating a low (with food) it will take the sensor longer to recognize that you have recovered.  Do not eat until the  "sensor value comes back up, this will cause you to rebound high.  Do a fingerstick 15 minutes after treating a low to check recovery.    9. Compression lows: If you lay on the sensor you may prevent the interstitial fluid from getting to the sensor.  The sensor may read this as a low blood sugar.  The glucose will typically come right back up if you reposition yourself.  If you are unsure if you are low, do a fingerstick.    10. You will get some notice when your sensor session is ending.  There will be messages that give you a countdown until your sensor ends.  When you see the \"change sensor now\" message remove the old sensor.  It peels off like a Band-Aid and insert a new sensor.      11. Isaac sensors are typically covered by your pharmacy benefit.  There are often coupons available on-line for people who do not have coverage.  Medicare covers them as durable medical equipment and they must be ordered through a company who can bill that way.  [facility] or [employer] are examples of companies who can bill this way.      Tips for Wearing Your Isaac Sensor:    Clothing: Be careful when dressing to avoid disturbing the sensor.    Showering, bathing, and swimming: Sensor is water-resistant. Do not submerge more than 3 feet (1 meter) or keep under water longer than 30 minutes at a time. Gently pat dry after getting it wet.    Exercising: Use skin adhesive if sweating loosens sensor. Try an over-bandage if playing contact sports.    Traveling: System is safe to use while on an aircraft. Do not expose the sensor to airport full-body scanners. Request another type of screening to avoid removing your sensor.    Medical procedures: Notify your healthcare provider and remove your sensor when necessary. Exposing the sensor to MRI, CT scan, diathermy, or X-ray may cause damage and incorrect readings.    Tips to Help Keep Your Sensor in Place:  Some people use the following products for extra " stickiness and skin protection:    1. Torbot Skin Tac   Hypoallergenic and latex-free  tacky  skin barrier  2. Skin-Prep  Protective Barrier Wipe  Protective liquid dressing that allows skin to breathe so tapes and films adhere better  3. Mastisol  Liquid Adhesive:  Clear, non-irritating, nonwater-soluble liquid adhesive  4. Tegaderm I.V.   A transparent film that provides adhesive strength  5.Over-bandage  Be sure to use only medical-grade adhesive, bandage, or tape.  Apply and remove at the same time you apply or remove your sensor. Leave the opening/hole over the center of the sensor uncovered so it can breathe.    Products that can be helpful for removal but are optional:   Baby Oil: Soft moisturizer   Remove  Adhesive: Removes adhesive residue on the skin   UNI-SOLVE  Adhesive Remover: Formulated to reduce adhesive trauma to the skin by thoroughly dissolving dressing

## 2024-09-09 ENCOUNTER — MYC MEDICAL ADVICE (OUTPATIENT)
Dept: FAMILY MEDICINE | Facility: CLINIC | Age: 54
End: 2024-09-09
Payer: COMMERCIAL

## 2024-09-09 DIAGNOSIS — G43.109 MIGRAINE WITH AURA AND WITHOUT STATUS MIGRAINOSUS, NOT INTRACTABLE: ICD-10-CM

## 2024-09-09 RX ORDER — HYDROCODONE BITARTRATE AND ACETAMINOPHEN 5; 325 MG/1; MG/1
1 TABLET ORAL DAILY PRN
Qty: 10 TABLET | Refills: 0 | Status: SHIPPED | OUTPATIENT
Start: 2024-09-09 | End: 2024-09-25

## 2024-09-10 ENCOUNTER — VIRTUAL VISIT (OUTPATIENT)
Dept: EDUCATION SERVICES | Facility: CLINIC | Age: 54
End: 2024-09-10
Payer: COMMERCIAL

## 2024-09-10 DIAGNOSIS — E11.9 TYPE 2 DIABETES MELLITUS WITHOUT COMPLICATION, WITHOUT LONG-TERM CURRENT USE OF INSULIN (H): Primary | ICD-10-CM

## 2024-09-10 PROCEDURE — G0108 DIAB MANAGE TRN  PER INDIV: HCPCS | Mod: 95 | Performed by: NUTRITIONIST

## 2024-09-10 PROCEDURE — 99207 PR NO BILLABLE SERVICE THIS VISIT: CPT | Performed by: NUTRITIONIST

## 2024-09-10 ASSESSMENT — PAIN SCALES - GENERAL: PAINLEVEL: MILD PAIN (3)

## 2024-09-10 NOTE — NURSING NOTE
Current patient location: [id number] [place] [address]    Is the patient currently in the state of [place]? YES    Visit mode:VIDEO    If the visit is dropped, the patient can be reconnected by: VIDEO VISIT: Text to cell phone:   Telephone Information:   Mobile [phone]       Will anyone else be joining the visit? NO  (If patient encounters technical issues they should call [phone]    How would you like to obtain your AVS? MyChart    Are changes needed to the allergy or medication list? No    Are refills needed on medications prescribed by this physician? NO    Rooming Documentation:  Not applicable      Reason for visit: RECHECK (DIABETES ED)    Carol KENT

## 2024-09-10 NOTE — PATIENT INSTRUCTIONS
"Taking Insulin:    1. Take Lantus - 13 units at morning.     - Rotate injection sites, keeping at least 1 inch apart from last injection site and 2 inches away from belly button or surgical scars.      2. Pen - Use a new pen needle for each injection. Always remove pen needle from the insulin pen after use and do not store insulin pens with the needle on the pen. Do a 2 unit \"prime\" before each injection, be sure a drop or stream of insulin comes out of the needle before you give your injection. After you inject, hold the needle under the skin to the count of 10 to be sure all of the insulin goes in.      3. Store insulin you are not using in the refrigerator (do not freeze). Take new insulin out of the refrigerator a few hours prior to use to bring to room temperature.     4. Once opened Lantus can be kept at room temperature for 28 days.. Do not use the opened insulin after this time has passed, even if there is still medicine inside.     5. Always carry your blood sugar meter and a sugar source like glucose tablets with you in case of a low blood sugar. Treat a low blood sugar (less than 70) with 15 grams of carbohydrate (1 carb choice). Wait 15 minutes, recheck blood sugar. If blood sugar is still below 70, repeat the treatment.    6. Wear Medical ID or carry a wallet card stating you have Diabetes.    7. Call your doctor or diabetes educator if you begin having low blood sugars or if you have questions or concerns.     8. Complete and mail in the form to inform the [place] Department of Public Safety that you have started taking insulin.    9. Follow-up: in one week as scheduled.    If you need a medication refill please contact your pharmacy. Please allow 3 business days for your refills to be completed.        The 15-15 Rule:  For low blood sugars between 55 - 69 mg/dL, raise it by following the 15-15 rule: have 15 grams of carbs and check your blood sugar after 15 minutes. If it s still below your target " range, have another serving. Repeat these steps until it s in your target range. Once it s in range, eat a nutritious meal or snack to ensure it doesn t get too low again.  These items have about 15 grams of carbs:  4 ounces (  cup) of juice or regular soda.  1 tablespoon of sugar, honey, or syrup.  Hard candies, jellybeans, or gumdrops (see food label for how much to eat).  3-4 glucose tablets (follow instructions).  1 dose of glucose gel (usually 1 tube; follow instructions).  Tips to keep in mind:  It takes time for blood sugar to rise after eating. Give some time for treatment to work. Following the 15-15 rule helps.  You should avoid eating a carb with lots of fiber, such as beans or lentils, or a carb that also has fat, such as chocolate. Fiber and fat slow down how fast you absorb sugar.  Check your blood sugar often when lows are more likely, such as when the weather is hot or when you travel.    Sharps Disposal    What are  sharps ?  Sharps are needles, lancets and syringes. To prevent injury and the spread of disease, you must store and dispose of sharps properly. Never leave them out where someone could get hurt.  Store your used sharps in either:  A sharps container. You can buy this at your drug store or pharmacy.  An empty bottle of laundry detergent. The bottle should have a screw-on lid.  Label the bottle. The label should read    Do Not Recycle: Household Sharps.   Put the cap back on after placing sharps in the bottle.  A needle cutter. You may buy this at your drug store or pharmacy. The device will cut and store your needles. You may wish to carry a needle cutter while away from home.     Place sharps point-first into the container. When it is about half-full, it is ready for disposal.  Don't store sharps in glass bottles, coffee cans or aluminum cans. Keep your sharps out of the reach of children and pets.  Disposing of sharps  Never throw loose sharps in the trash. Never place a container with  sharps in a recycling bin.  Instead, choose one of these options:  Mail-back programs. You may mail your used sharps to licensed disposal company. There is a fee for this service. Try one of the following:    [employer].: 975.931.3931, www.MyBeautyCompare.Kreditech   [employer] 127.774.4668, www.sharpOne-Songc.[employer].: 259.681.5707, wwwSkillz  In [place]: It is legal to put a laundry bottle full of used sharps into the garbage, as long as the bottle has a tight lid. Duct-tape the lid to the bottle, and then place the bottle in a brown bag. Throw the bag in the garbage.  In [place]: You may bring your sharps container to an approved collection station. To find a station near you, call [phone] or [phone]. Some stations will charge a fee for this service.  Page 2 of 2  If you will destroy your sharps  You may buy a  needle destruction device.  Use this to destroy needles and lancets after each use.  The device will melt your sharps into small metal balls. You may throw the melted metal in the garbage. (Do not recycle.)  For more information  To learn more about household hazardous wastes and collection programs, contact your county's solid waste office. Or check the Internet to find a program near you.  In [place]: www.[facility].[place]/index.php/ living-green/citizens.html  In [place]: [place]topic[place]waste/ householdhw.html

## 2024-09-10 NOTE — PROGRESS NOTES
Virtual Visit Details    Type of service:  Video Visit     Originating Location (pt. Location): Home    Distant Location (provider location):  On-site  Platform used for Video Visit: MarkBumpTop    Diabetes Self-Management Education & Support    Carissa Spears presents today for education related to Type 2 diabetes    She is accompanied by self    Year of diagnosis: 17 years ago  Referring provider:  PCP - Silvino Beach    PATIENT CONCERNS/REASON FOR REFERRAL       ASSESSMENT:    Taking Medication:     Current Diabetes Management per Patient:  Taking diabetes medications? yes:     Diabetes Medication(s)       Biguanides       metFORMIN (GLUCOPHAGE XR) 500 MG 24 hr tablet TAKE 2 TABLETS BY MOUTH TWICE DAILY FOR DIABETES Strength: 500 mg       Dipeptidyl Peptidase-4 (DPP-4) Inhibitors       linagliptin (TRADJENTA) 5 MG TABS tablet Take 1 tablet (5 mg) by mouth daily or diabetes       Sulfonylureas       glipiZIDE (GLUCOTROL XL) 5 MG 24 hr tablet Take 1 tablet (5 mg) by mouth daily New for diabetes     Patient not taking: Reported on 7/16/2024          Was on Tradjenta for 4-5 months   Tradjenta and glipizide were stopped by PCP per patient    Monitoring    She got the analy 3 sensor and has been wearing that for one week.                       Patient's most recent   Lab Results   Component Value Date    A1C 9.2 07/09/2024    A1C 5.9 09/08/2020      Patient's A1C goal: <7.0    Activity: walking an hour every day pre covid.    Healthy Eating:   three meals per day  Breakfast 2 eggs, sausage and meat  Stuffed peppers for lunch with hamburger and rice and tomato sauce or Salad with chicken    Beef stick and babybel cheese  Meat and vegetable   Beverages coffee, water, diet pepsi     Problem Solving:      Patient is at risk of hypoglycemia?: YES  Hospitalizations for hyper or hypoglycemia: No    EDUCATION and INSTRUCTION PROVIDED AT THIS VISIT:       Patient with type 2 diabetes diagnosed around age 37. She did not tolerate  "Jardiance or Ozempic. She does not tolerate more than 1000mg metformin daily.  Glucose has not been controlled the past couple years. Referred by PCP. Insulin injection technique was reviewed at previous appointment.     At last visit we had no readings to review and discussed need for glucose testing. She decided she would like to try CGM. We discussed how a G7 and isaac 3 sensor work. Showed her both sensors and discussed similarities and differences. Patient decided she would like to try a isaac 3 sensor.    Isaac data reviewed with patient today. Not meeting time in range.  Recommend starting 13 units glargine daily.  Reviewed insulin injection technique with patient.  Explained the action and timing of glargine insulin.  Demonstrated applying pen needle, dialing up the 2 unit \"air-shot\", dialing up dose to be given, injecting insulin, and instructed to hold in the skin for 10 seconds after pushing in dose.   Discussed safe and legal disposal of needles.   Discussed the storage and disposal of insulin pens, including \"good to\" date.   Discussed the recognition and treatment of hypoglycemia, including carrying meter and fast-acting form of carbohydrate at all times.   Discussed plan for follow up.   Discussed goals for BG's and probable need for adjustment of insulin.    Patient-stated goal written and given to Carissa Spears.  Verbalized and demonstrated understanding of instructions.       FOLLOW-UP:    One week    Time spent with patient: 60 minutes    Any diabetes medication dose changes were made via the CDE Protocol and Collaborative Practice Agreement with [place] and  [facility].  A copy of this encounter was provided to patient's referring provider.            "

## 2024-09-16 ENCOUNTER — MYC MEDICAL ADVICE (OUTPATIENT)
Dept: FAMILY MEDICINE | Facility: CLINIC | Age: 54
End: 2024-09-16
Payer: COMMERCIAL

## 2024-09-16 DIAGNOSIS — E11.9 TYPE 2 DIABETES MELLITUS WITHOUT COMPLICATION, WITHOUT LONG-TERM CURRENT USE OF INSULIN (H): ICD-10-CM

## 2024-09-16 DIAGNOSIS — I10 HYPERTENSION GOAL BP (BLOOD PRESSURE) < 140/90: ICD-10-CM

## 2024-09-16 DIAGNOSIS — G43.919 INTRACTABLE MIGRAINE WITHOUT STATUS MIGRAINOSUS, UNSPECIFIED MIGRAINE TYPE: ICD-10-CM

## 2024-09-16 RX ORDER — METOPROLOL TARTRATE 100 MG
TABLET ORAL
Qty: 270 TABLET | Refills: 1 | Status: SHIPPED | OUTPATIENT
Start: 2024-09-16

## 2024-09-19 ENCOUNTER — VIRTUAL VISIT (OUTPATIENT)
Dept: EDUCATION SERVICES | Facility: CLINIC | Age: 54
End: 2024-09-19
Payer: COMMERCIAL

## 2024-09-19 DIAGNOSIS — E11.9 TYPE 2 DIABETES MELLITUS WITHOUT COMPLICATION, WITHOUT LONG-TERM CURRENT USE OF INSULIN (H): Primary | ICD-10-CM

## 2024-09-19 PROCEDURE — 99207 PR NO BILLABLE SERVICE THIS VISIT: CPT | Mod: 95 | Performed by: NUTRITIONIST

## 2024-09-19 NOTE — NURSING NOTE
Current patient location: [address] [place] [address]    Is the patient currently in the state of [place]? YES    Visit mode:VIDEO    If the visit is dropped, the patient can be reconnected by: VIDEO VISIT: Text to cell phone:   Telephone Information:   Mobile [phone]       Will anyone else be joining the visit? NO  (If patient encounters technical issues they should call [phone]    How would you like to obtain your AVS? MyChart    Are changes needed to the allergy or medication list? No, Pt stated no changes to allergies, and Pt stated no med changes    Are refills needed on medications prescribed by this physician? NO    Rooming Documentation:  Not applicable      Reason for visit: RECHECK and Diabetes Education    Abby KENT

## 2024-09-19 NOTE — PROGRESS NOTES
Virtual Visit Details    Type of service:  Video Visit     Originating Location (pt. Location): Home    Distant Location (provider location):  On-site  Platform used for Video Visit: Richie    Diabetes Self-Management Education & Support    Carissa Spears presents today for education related to Type 2 diabetes    She is accompanied by self    Year of diagnosis: 17 years ago  Referring provider:  PCP - Silvino Beach    PATIENT CONCERNS/REASON FOR REFERRAL       ASSESSMENT:    Taking Medication:     Current Diabetes Management per Patient:  Taking diabetes medications? yes:     Diabetes Medication(s)       Biguanides       metFORMIN (GLUCOPHAGE XR) 500 MG 24 hr tablet TAKE 2 TABLETS BY MOUTH TWICE DAILY FOR DIABETES Strength: 500 mg       Dipeptidyl Peptidase-4 (DPP-4) Inhibitors       linagliptin (TRADJENTA) 5 MG TABS tablet Take 1 tablet (5 mg) by mouth daily or diabetes       Insulin       insulin glargine (LANTUS PEN) 100 UNIT/ML pen Inject 13 Units subcutaneously every morning.       Sulfonylureas       glipiZIDE (GLUCOTROL XL) 5 MG 24 hr tablet Take 1 tablet (5 mg) by mouth daily New for diabetes     Patient not taking: Reported on 7/16/2024          Was on Tradjenta for 4-5 months   Tradjenta and glipizide were stopped by PCP per patient    Monitoring    She got the analy 3 sensor and has been wearing that for one week.                 Patient's most recent   Lab Results   Component Value Date    A1C 9.2 07/09/2024    A1C 5.9 09/08/2020      Patient's A1C goal: <7.0    Activity: walking an hour every day pre covid.    Healthy Eating:   three meals per day  Breakfast 2 eggs, sausage and meat  Stuffed peppers for lunch with hamburger and rice and tomato sauce or Salad with chicken    Beef stick and babybel cheese  Meat and vegetable   Beverages coffee, water, diet pepsi     Problem Solving:      Patient is at risk of hypoglycemia?: YES  Hospitalizations for hyper or hypoglycemia: No    EDUCATION and INSTRUCTION  PROVIDED AT THIS VISIT:       Patient with type 2 diabetes diagnosed around age 37. She did not tolerate Jardiance or Ozempic. She does not tolerate more than 1000mg metformin daily.  Glucose has not been controlled the past couple years. Referred by PCP. Insulin injection technique was reviewed at previous appointment.     Isaac data reviewed with patient today.   Patient started 13 units Lantus on Monday    Glucose is improving but not at goal.     See AVS for plan     Patient-stated goal written and given to Carissa Spears.  Verbalized and demonstrated understanding of instructions.       FOLLOW-UP:    One week    Time spent with patient: 25 minutes    Any diabetes medication dose changes were made via the CDE Protocol and Collaborative Practice Agreement with [place] and [facility].  A copy of this encounter was provided to patient's referring provider.

## 2024-09-19 NOTE — PATIENT INSTRUCTIONS
Increase Lantus to 14 units tomorrow.    Increase to 15 units in two days if fasting glucose is >150.    Increase to 17 units in 2-3 days after that if fasting glucose is >150    Follow up in one week.    Be in touch if any questions arise.

## 2024-09-25 ENCOUNTER — MYC MEDICAL ADVICE (OUTPATIENT)
Dept: FAMILY MEDICINE | Facility: CLINIC | Age: 54
End: 2024-09-25
Payer: COMMERCIAL

## 2024-09-25 DIAGNOSIS — G43.109 MIGRAINE WITH AURA AND WITHOUT STATUS MIGRAINOSUS, NOT INTRACTABLE: ICD-10-CM

## 2024-09-25 RX ORDER — HYDROCODONE BITARTRATE AND ACETAMINOPHEN 5; 325 MG/1; MG/1
1 TABLET ORAL DAILY PRN
Qty: 10 TABLET | Refills: 0 | Status: SHIPPED | OUTPATIENT
Start: 2024-09-25

## 2024-09-27 ENCOUNTER — MYC MEDICAL ADVICE (OUTPATIENT)
Dept: FAMILY MEDICINE | Facility: CLINIC | Age: 54
End: 2024-09-27

## 2024-09-27 ENCOUNTER — VIRTUAL VISIT (OUTPATIENT)
Dept: EDUCATION SERVICES | Facility: CLINIC | Age: 54
End: 2024-09-27
Payer: COMMERCIAL

## 2024-09-27 DIAGNOSIS — E11.9 TYPE 2 DIABETES MELLITUS WITHOUT COMPLICATION, WITHOUT LONG-TERM CURRENT USE OF INSULIN (H): Primary | ICD-10-CM

## 2024-09-27 PROCEDURE — 99207 PR NO BILLABLE SERVICE THIS VISIT: CPT | Mod: 95 | Performed by: NUTRITIONIST

## 2024-09-27 NOTE — PROGRESS NOTES
Virtual Visit Details    Type of service:  Video Visit     Joined the call at 9/27/2024, 7:32:32 am.  Left the call at 9/27/2024, 7:48:40 am.  You were on the call for 16 minutes 7 seconds    Originating Location (pt. Location): Home    Distant Location (provider location):  Off-site  Platform used for Video Visit: [employer] acquired [employer]    Diabetes Self-Management Education & Support    Carissa Spears presents today for education related to Type 2 diabetes    She is accompanied by self    Year of diagnosis: 17 years ago  Referring provider:  PCP - Silvino Beach    PATIENT CONCERNS/REASON FOR REFERRAL       ASSESSMENT:    Taking Medication:     Current Diabetes Management per Patient:  Taking diabetes medications? yes:     Diabetes Medication(s)       Biguanides       metFORMIN (GLUCOPHAGE XR) 500 MG 24 hr tablet TAKE 2 TABLETS BY MOUTH TWICE DAILY FOR DIABETES Strength: 500 mg       Dipeptidyl Peptidase-4 (DPP-4) Inhibitors       linagliptin (TRADJENTA) 5 MG TABS tablet Take 1 tablet (5 mg) by mouth daily or diabetes       Insulin       insulin glargine (LANTUS PEN) 100 UNIT/ML pen Inject 13 Units subcutaneously every morning.       Sulfonylureas       glipiZIDE (GLUCOTROL XL) 5 MG 24 hr tablet Take 1 tablet (5 mg) by mouth daily New for diabetes     Patient not taking: Reported on 7/16/2024          Was on Tradjenta for 4-5 months   Tradjenta and glipizide were stopped by PCP per patient    Monitoring            Patient's most recent   Lab Results   Component Value Date    A1C 9.2 07/09/2024    A1C 5.9 09/08/2020      Patient's A1C goal: <7.0    Activity: walking an hour every day pre covid.    Healthy Eating:   three meals per day  Breakfast 2 eggs, sausage and meat  Stuffed peppers for lunch with hamburger and rice and tomato sauce or Salad with chicken    Beef stick and babybel cheese  Meat and vegetable   Beverages coffee, water, diet pepsi     Problem Solving:      Patient is at risk of hypoglycemia?: YES  Hospitalizations for  hyper or hypoglycemia: No    EDUCATION and INSTRUCTION PROVIDED AT THIS VISIT:       Patient with type 2 diabetes diagnosed around age 37. She did not tolerate Jardiance or Ozempic. She does not tolerate more than 1000mg metformin daily.  Glucose has not been controlled the past couple years. Referred by PCP to start insulin.    Patient is now taking 16 units Lantus. Glucose improving now at 32% in target range. Not yet meeting goal of >70%.      PLAN:  Increase lantus to 19 units today  Increase to 21 units in three days if glucose remains over 150 fasting    Patient-stated goal written and given to Carissa Spears.  Verbalized and demonstrated understanding of instructions.       FOLLOW-UP:    One week    Time spent with patient: 16 minutes    Any diabetes medication dose changes were made via the CDE Protocol and Collaborative Practice Agreement with [place] and  [facility].  A copy of this encounter was provided to patient's referring provider.

## 2024-09-27 NOTE — NURSING NOTE
Current patient location: [address] [place] [address]    Is the patient currently in the state of [place]? YES    Visit mode:VIDEO    If the visit is dropped, the patient can be reconnected by: VIDEO VISIT: Text to cell phone:   Telephone Information:   Mobile [phone]       Will anyone else be joining the visit? NO  (If patient encounters technical issues they should call [phone]    How would you like to obtain your AVS? MyChart    Are changes needed to the allergy or medication list? No    Are refills needed on medications prescribed by this physician? NO    Rooming Documentation:  Not applicable    Reason for visit: RECHECK and Diabetes Education    Abby KENT

## 2024-10-01 ENCOUNTER — MYC MEDICAL ADVICE (OUTPATIENT)
Dept: FAMILY MEDICINE | Facility: CLINIC | Age: 54
End: 2024-10-01
Payer: COMMERCIAL

## 2024-10-01 DIAGNOSIS — E11.9 TYPE 2 DIABETES MELLITUS WITHOUT COMPLICATION, WITHOUT LONG-TERM CURRENT USE OF INSULIN (H): Primary | ICD-10-CM

## 2024-10-01 RX ORDER — BLOOD-GLUCOSE SENSOR
EACH MISCELLANEOUS
Qty: 6 EACH | Refills: 1 | Status: SHIPPED | OUTPATIENT
Start: 2024-10-01

## 2024-10-01 NOTE — TELEPHONE ENCOUNTER
See Narr8t message below.  Isaac 3 sensors are no longer being made, need to send in new Rx for Isaac 3 PLUS sensors (compatible with Isaac 3 device).  Rx T'd up for review.      Nickie Bolanos RN  Clinical Triage/Primary Care  [facility]

## 2024-10-04 ENCOUNTER — MYC MEDICAL ADVICE (OUTPATIENT)
Dept: FAMILY MEDICINE | Facility: CLINIC | Age: 54
End: 2024-10-04

## 2024-10-04 ENCOUNTER — VIRTUAL VISIT (OUTPATIENT)
Dept: EDUCATION SERVICES | Facility: CLINIC | Age: 54
End: 2024-10-04
Payer: COMMERCIAL

## 2024-10-04 DIAGNOSIS — E11.9 TYPE 2 DIABETES MELLITUS WITHOUT COMPLICATION, WITHOUT LONG-TERM CURRENT USE OF INSULIN (H): Primary | ICD-10-CM

## 2024-10-04 PROCEDURE — 99207 PR NO BILLABLE SERVICE THIS VISIT: CPT | Mod: 95 | Performed by: NUTRITIONIST

## 2024-10-04 NOTE — PROGRESS NOTES
Virtual Visit Details    Type of service:  Video Visit     Originating Location (pt. Location): Home    Distant Location (provider location):  Off-site  Platform used for Video Visit: Richie    Diabetes Self-Management Education & Support    Carissa Spears presents today for education related to Type 2 diabetes    She is accompanied by self    Year of diagnosis: 17 years ago  Referring provider:  PCP - Silvino Beach    PATIENT CONCERNS/REASON FOR REFERRAL       ASSESSMENT:    Taking Medication:     Current Diabetes Management per Patient:  Taking diabetes medications? yes:     Diabetes Medication(s)       Biguanides       metFORMIN (GLUCOPHAGE XR) 500 MG 24 hr tablet TAKE 2 TABLETS BY MOUTH TWICE DAILY FOR DIABETES Strength: 500 mg       Dipeptidyl Peptidase-4 (DPP-4) Inhibitors       linagliptin (TRADJENTA) 5 MG TABS tablet Take 1 tablet (5 mg) by mouth daily or diabetes       Insulin       insulin glargine (LANTUS PEN) 100 UNIT/ML pen Inject 13 Units subcutaneously every morning.       Sulfonylureas       glipiZIDE (GLUCOTROL XL) 5 MG 24 hr tablet Take 1 tablet (5 mg) by mouth daily New for diabetes     Patient not taking: Reported on 7/16/2024          Was on Tradjenta for 4-5 months   Tradjenta and glipizide were stopped by PCP per patient    Monitoring      Reports above      Patient's most recent   Lab Results   Component Value Date    A1C 9.2 07/09/2024    A1C 5.9 09/08/2020      Patient's A1C goal: <7.0    Activity: walking an hour every day pre covid.    Healthy Eating:   three meals per day  Breakfast 2 eggs, sausage and meat  Stuffed peppers for lunch with hamburger and rice and tomato sauce or Salad with chicken    Beef stick and babybel cheese  Meat and vegetable   Beverages coffee, water, diet pepsi     Problem Solving:      Patient is at risk of hypoglycemia?: YES  Hospitalizations for hyper or hypoglycemia: No    EDUCATION and INSTRUCTION PROVIDED AT THIS VISIT:       Patient with type 2  diabetes diagnosed around age 37. She did not tolerate Jardiance or Ozempic. She does not tolerate more than 1000mg metformin daily.  Glucose has not been controlled the past couple years. Referred by PCP to start insulin.    Patient is now taking 27 units Lantus.  Glucose is not at goal.       PLAN:  Increase insulin to 30 units    Patient-stated goal written and given to Carissa Spears.  Verbalized and demonstrated understanding of instructions.       FOLLOW-UP:    One week      Any diabetes medication dose changes were made via the CDE Protocol and Collaborative Practice Agreement [facility] [place] [facility].  A copy of this encounter was provided to patient's referring provider.

## 2024-10-04 NOTE — TELEPHONE ENCOUNTER
Virtual Visit  Open  10/4/2024  [facility] Diabetes Education [place]     [place], [facility]  Nutrition

## 2024-10-04 NOTE — NURSING NOTE
Current patient location:  [place]    Is the patient currently in the state of [place]? YES    Visit mode:VIDEO    If the visit is dropped, the patient can be reconnected by: VIDEO VISIT: Text to cell phone:   Telephone Information:   Mobile [phone]       Will anyone else be joining the visit? NO  (If patient encounters technical issues they should call [phone])    Are changes needed to the allergy or medication list? No    Are refills needed on medications prescribed by this physician? NO    Rooming Documentation:  Questionnaire(s) completed    Reason for visit: Video Visit and RECHECK    Estefanía KENT

## 2024-10-11 ENCOUNTER — VIRTUAL VISIT (OUTPATIENT)
Dept: EDUCATION SERVICES | Facility: CLINIC | Age: 54
End: 2024-10-11
Payer: COMMERCIAL

## 2024-10-11 DIAGNOSIS — E11.9 TYPE 2 DIABETES MELLITUS WITHOUT COMPLICATION, WITHOUT LONG-TERM CURRENT USE OF INSULIN (H): ICD-10-CM

## 2024-10-11 PROCEDURE — 99207 PR NO BILLABLE SERVICE THIS VISIT: CPT | Mod: 95 | Performed by: NUTRITIONIST

## 2024-10-11 ASSESSMENT — PAIN SCALES - GENERAL: PAINLEVEL: NO PAIN (0)

## 2024-10-11 NOTE — PROGRESS NOTES
Virtual Visit Details    Type of service:  Video Visit     Originating Location (pt. Location): Home    Distant Location (provider location):  Off-site  Platform used for Video Visit: MarkWell    Joined the call at 10/11/2024, 8:19:03 am.  Left the call at 10/11/2024, 8:37:46 am.  You were on the call for 18 minutes 42 seconds     Virtual Visit Details    Type of service:  Video Visit     Originating Location (pt. Location): Home    Distant Location (provider location):  Off-site  Platform used for Video Visit: Richie    Diabetes Self-Management Education & Support    Carissa Spears presents today for education related to Type 2 diabetes    She is accompanied by self    Year of diagnosis: 17 years ago  Referring provider:  PCP - Silvino Beach    PATIENT CONCERNS/REASON FOR REFERRAL       ASSESSMENT:    Taking Medication:     Current Diabetes Management per Patient:  Taking diabetes medications? yes:     Diabetes Medication(s)       Biguanides       metFORMIN (GLUCOPHAGE XR) 500 MG 24 hr tablet TAKE 2 TABLETS BY MOUTH TWICE DAILY FOR DIABETES Strength: 500 mg       Dipeptidyl Peptidase-4 (DPP-4) Inhibitors       linagliptin (TRADJENTA) 5 MG TABS tablet Take 1 tablet (5 mg) by mouth daily or diabetes       Insulin       insulin glargine (LANTUS PEN) 100 UNIT/ML pen Inject 13 Units subcutaneously every morning.       Sulfonylureas       glipiZIDE (GLUCOTROL XL) 5 MG 24 hr tablet Take 1 tablet (5 mg) by mouth daily New for diabetes     Patient not taking: Reported on 7/16/2024          Was on Tradjenta for 4-5 months   Tradjenta and glipizide were stopped by PCP per patient    Monitoring                  Patient's most recent   Lab Results   Component Value Date    A1C 9.2 07/09/2024    A1C 5.9 09/08/2020      Patient's A1C goal: <7.0    Activity: walking an hour every day pre covid.    Healthy Eating:   three meals per day  Breakfast 2 eggs, sausage and meat  Stuffed peppers for lunch with hamburger and rice and tomato  sauce or Salad with chicken    Beef stick and babybel cheese  Meat and vegetable   Beverages coffee, water, diet pepsi     Problem Solving:      Patient is at risk of hypoglycemia?: YES  Hospitalizations for hyper or hypoglycemia: No    EDUCATION and INSTRUCTION PROVIDED AT THIS VISIT:       Patient with type 2 diabetes diagnosed around age 37. She did not tolerate Jardiance or Ozempic. She does not tolerate more than 1000mg metformin daily.  Glucose has not been controlled the past couple years. Referred by PCP to start insulin.    Patient is now taking 33 units Lantus.  Glucose is not at goal but improving     Patient is watching her carb intake and has found some good alternatives for low carb treats       PLAN:  Increase insulin to 36 units tomorrow morning     Patient-stated goal written and given to Carissa Spears.  Verbalized and demonstrated understanding of instructions.       FOLLOW-UP:    One week      Any diabetes medication dose changes were made via the CDE Protocol and Collaborative Practice Agreement with [place] and  [facility].  A copy of this encounter was provided to patient's referring provider.

## 2024-10-11 NOTE — NURSING NOTE
Current patient location: [address]  [place] [address]    Is the patient currently in the state of [place]? YES    Visit mode:VIDEO    If the visit is dropped, the patient can be reconnected by: VIDEO VISIT: Text to cell phone:   Telephone Information:   Mobile [phone]       Will anyone else be joining the visit? NO  (If patient encounters technical issues they should call [phone]    Are changes needed to the allergy or medication list? No    Are refills needed on medications prescribed by this physician? NO    Rooming Documentation:  Questionnaire(s) not done per department protocol    Reason for visit: Diabetes Education    Shelby Kocher VVF

## 2024-10-14 ENCOUNTER — PATIENT OUTREACH (OUTPATIENT)
Dept: CARE COORDINATION | Facility: CLINIC | Age: 54
End: 2024-10-14
Payer: COMMERCIAL

## 2024-10-16 DIAGNOSIS — E11.9 TYPE 2 DIABETES MELLITUS WITHOUT COMPLICATION, WITHOUT LONG-TERM CURRENT USE OF INSULIN (H): ICD-10-CM

## 2024-10-16 RX ORDER — METFORMIN HYDROCHLORIDE 500 MG/1
TABLET, EXTENDED RELEASE ORAL
Qty: 360 TABLET | Refills: 1 | Status: SHIPPED | OUTPATIENT
Start: 2024-10-16

## 2024-10-18 ENCOUNTER — VIRTUAL VISIT (OUTPATIENT)
Dept: EDUCATION SERVICES | Facility: CLINIC | Age: 54
End: 2024-10-18
Payer: COMMERCIAL

## 2024-10-18 DIAGNOSIS — E11.9 TYPE 2 DIABETES MELLITUS WITHOUT COMPLICATION, WITHOUT LONG-TERM CURRENT USE OF INSULIN (H): Primary | ICD-10-CM

## 2024-10-18 PROCEDURE — 99207 PR NO BILLABLE SERVICE THIS VISIT: CPT | Mod: 95 | Performed by: NUTRITIONIST

## 2024-10-18 ASSESSMENT — PAIN SCALES - GENERAL: PAINLEVEL: NO PAIN (0)

## 2024-10-18 NOTE — PROGRESS NOTES
Virtual Visit Details    Type of service:  Video Visit   Joined the call at 10/18/2024, 11:37:41 am.  Left the call at 10/18/2024, 11:47:40 am.  You were on the call for 9 minutes 59 seconds   Originating Location (pt. Location): Home    Distant Location (provider location):  Off-site  Platform used for Video Visit: ZQGame    Diabetes Self-Management Education & Support    Carissa Spears presents today for education related to Type 2 diabetes    She is accompanied by self    Year of diagnosis: 17 years ago  Referring provider:  PCP - Silvino Beach    PATIENT CONCERNS/REASON FOR REFERRAL       ASSESSMENT:    Taking Medication:     Current Diabetes Management per Patient:  Taking diabetes medications? yes:     Diabetes Medication(s)       Biguanides       metFORMIN (GLUCOPHAGE XR) 500 MG 24 hr tablet TAKE 2 TABLET BY MOUTH TWICE DAILY FOR DIABETES       Dipeptidyl Peptidase-4 (DPP-4) Inhibitors       linagliptin (TRADJENTA) 5 MG TABS tablet Take 1 tablet (5 mg) by mouth daily or diabetes       Insulin       insulin glargine (LANTUS PEN) 100 UNIT/ML pen Inject 36 Units subcutaneously every morning.       Sulfonylureas       glipiZIDE (GLUCOTROL XL) 5 MG 24 hr tablet Take 1 tablet (5 mg) by mouth daily New for diabetes     Patient not taking: Reported on 7/16/2024          Was on Tradjenta for 4-5 months   Tradjenta and glipizide were stopped by PCP per patient    Monitoring                    Patient's most recent   Lab Results   Component Value Date    A1C 9.2 07/09/2024    A1C 5.9 09/08/2020      Patient's A1C goal: <7.0    Activity: walking an hour every day pre covid.    Healthy Eating:   three meals per day  Breakfast 2 eggs, sausage and meat  Stuffed peppers for lunch with hamburger and rice and tomato sauce or Salad with chicken    Beef stick and babybel cheese  Meat and vegetable   Beverages coffee, water, diet pepsi     Problem Solving:      Patient is at risk of hypoglycemia?: YES  Hospitalizations for hyper or  hypoglycemia: No    EDUCATION and INSTRUCTION PROVIDED AT THIS VISIT:       Patient with type 2 diabetes diagnosed around age 37. She did not tolerate Jardiance or Ozempic. She does not tolerate more than 1000mg metformin daily.  Glucose has not been controlled the past couple years. Referred by PCP to start insulin.    Patient is watching her carb intake and has found some good alternatives for low carb treats. She is focusing on protein. Lantus is at 36 units in the morning. Glucose improving. TIR up to 59% from 43%. Fasting glucose still not in goal.    PLAN:  Increase insulin to 40 units tomorrow morning   If Fasting glucose is >130 in three days, increase to 44 units.    Follow up in one week.     Patient-stated goal written and given to Carissa Spears.  Verbalized and demonstrated understanding of instructions.       FOLLOW-UP:    One week      Any diabetes medication dose changes were made via the CDE Protocol and Collaborative Practice Agreement with [place] and  [facility].  A copy of this encounter was provided to patient's referring provider.

## 2024-10-18 NOTE — NURSING NOTE
Current patient location: [address]  [place] [address]    Is the patient currently in the state of [place]? YES    Visit mode:VIDEO    If the visit is dropped, the patient can be reconnected by: VIDEO VISIT: Text to cell phone:   Telephone Information:   Mobile [phone]       Will anyone else be joining the visit? NO  (If patient encounters technical issues they should call [phone]    Are changes needed to the allergy or medication list? No    Are refills needed on medications prescribed by this physician? NO    Rooming Documentation:  Questionnaire(s) not done per department protocol    Reason for visit: Diabetes Education    Shelby Kocher VVF

## 2024-10-21 ENCOUNTER — MYC MEDICAL ADVICE (OUTPATIENT)
Dept: FAMILY MEDICINE | Facility: CLINIC | Age: 54
End: 2024-10-21
Payer: COMMERCIAL

## 2024-10-21 DIAGNOSIS — E11.9 TYPE 2 DIABETES MELLITUS WITHOUT COMPLICATION, WITHOUT LONG-TERM CURRENT USE OF INSULIN (H): Primary | ICD-10-CM

## 2024-10-22 ENCOUNTER — MYC MEDICAL ADVICE (OUTPATIENT)
Dept: FAMILY MEDICINE | Facility: CLINIC | Age: 54
End: 2024-10-22
Payer: COMMERCIAL

## 2024-10-22 DIAGNOSIS — E11.9 TYPE 2 DIABETES MELLITUS WITHOUT COMPLICATION, WITHOUT LONG-TERM CURRENT USE OF INSULIN (H): ICD-10-CM

## 2024-10-22 RX ORDER — ACYCLOVIR 400 MG/1
TABLET ORAL
Qty: 3 EACH | Refills: 5 | Status: SHIPPED | OUTPATIENT
Start: 2024-10-22

## 2024-10-23 ENCOUNTER — MYC MEDICAL ADVICE (OUTPATIENT)
Dept: FAMILY MEDICINE | Facility: CLINIC | Age: 54
End: 2024-10-23
Payer: COMMERCIAL

## 2024-10-23 DIAGNOSIS — E11.9 TYPE 2 DIABETES MELLITUS WITHOUT COMPLICATION, WITHOUT LONG-TERM CURRENT USE OF INSULIN (H): ICD-10-CM

## 2024-10-23 NOTE — TELEPHONE ENCOUNTER
See MyChart message below.  Patient was seen by diabetes education on 10/18/24 and advised to increase insulin (Lantus) to 40 U each morning.  PLAN:  Increase insulin to 40 units tomorrow morning   If Fasting glucose is >130 in three days, increase to 44 units.     Patient asking for new Rx to reflect this dose change, sent to pharmacy ASAP as will be out of insulin by tomorrow.    Routing to provider to review and advise.        Nickie Bolanos RN  Clinical Triage/Primary Care  [facility]

## 2024-10-23 NOTE — TELEPHONE ENCOUNTER
Clinic RN: Please investigate patient's chart or contact patient if the information cannot be found because  insulin was refilled 10/11/24 with 5 additional refills.  Is the amount ordered incorrect because she is now taking 40 units daily?    Jackie PEREYRAN, RN

## 2024-10-23 NOTE — TELEPHONE ENCOUNTER
Provider:    Please see [email or web address] message from the patient. Please send new insulin glargine (LANTUS PEN) 100 UNIT/ML pen Prescription(s) for the 40 units a day if appropriate.  Please remove the nsulin glargine (LANTUS PEN) 100 UNIT/ML pen 36 units a day from her medication list. Thank you. Denisa Mejia R.N.        Per OV note dated 10/18/2024 from diabetic education is as follows:   PLAN:  Increase insulin to 40 units tomorrow morning   If Fasting glucose is >130 in three days, increase to 44 units.

## 2024-10-25 ENCOUNTER — VIRTUAL VISIT (OUTPATIENT)
Dept: EDUCATION SERVICES | Facility: CLINIC | Age: 54
End: 2024-10-25
Payer: COMMERCIAL

## 2024-10-25 DIAGNOSIS — E11.9 TYPE 2 DIABETES MELLITUS WITHOUT COMPLICATION, WITHOUT LONG-TERM CURRENT USE OF INSULIN (H): Primary | ICD-10-CM

## 2024-10-25 PROCEDURE — 99207 PR NO BILLABLE SERVICE THIS VISIT: CPT | Mod: 95 | Performed by: NUTRITIONIST

## 2024-10-25 NOTE — PROGRESS NOTES
Virtual Visit Details    Type of service:  Video Visit     Joined the call at 10/25/2024, 7:31:44 am.  Left the call at 10/25/2024, 7:56:10 am.  You were on the call for 24 minutes 26 seconds .    Originating Location (pt. Location): Home    Distant Location (provider location):  Off-site  Platform used for Video Visit: [employer]    Diabetes Self-Management Education & Support    Carissa Spears presents today for education related to Type 2 diabetes    She is accompanied by self    Year of diagnosis: 17 years ago  Referring provider:  PCP - Silvino Beach    PATIENT CONCERNS/REASON FOR REFERRAL       ASSESSMENT:    Taking Medication:     Current Diabetes Management per Patient:  Taking diabetes medications? yes:     Diabetes Medication(s)       Biguanides       metFORMIN (GLUCOPHAGE XR) 500 MG 24 hr tablet TAKE 2 TABLET BY MOUTH TWICE DAILY FOR DIABETES       Dipeptidyl Peptidase-4 (DPP-4) Inhibitors       linagliptin (TRADJENTA) 5 MG TABS tablet Take 1 tablet (5 mg) by mouth daily or diabetes       Insulin       insulin glargine (LANTUS PEN) 100 UNIT/ML pen Inject 44 Units subcutaneously every morning.       Sulfonylureas       glipiZIDE (GLUCOTROL XL) 5 MG 24 hr tablet Take 1 tablet (5 mg) by mouth daily New for diabetes     Patient not taking: Reported on 7/16/2024          Was on Tradjenta for 4-5 months   Tradjenta and glipizide were stopped by PCP per patient    Monitoring                  Patient's most recent   Lab Results   Component Value Date    A1C 9.2 07/09/2024    A1C 5.9 09/08/2020      Patient's A1C goal: <7.0    Activity: walking an hour every day pre covid.    Healthy Eating:   three meals per day  Breakfast 2 eggs, sausage and meat  Stuffed peppers for lunch with hamburger and rice and tomato sauce or Salad with chicken    Beef stick and babybel cheese  Meat and vegetable   Beverages coffee, water, diet pepsi     Problem Solving:      Patient is at risk of hypoglycemia?: YES  Hospitalizations for hyper  or hypoglycemia: No    EDUCATION and INSTRUCTION PROVIDED AT THIS VISIT:       Patient with type 2 diabetes diagnosed around age 37. She did not tolerate Jardiance or Ozempic. She does not tolerate more than 1000mg metformin daily.  Glucose has not been controlled the past couple years. Referred by PCP to start insulin.    Patient is watching her carb intake and has found some good alternatives for low carb treats. She is focusing on protein. Lantus is at 44 units in the morning. She just increased that yesterday. Glucose improving. Patient's time in range on first report on 9/10 was 5% and is now 66%.    We had a discussion today around starting to walk more and relationship between activity and glucose. Patient is feeling ready. She would like to not need meal time insulin so that is motivating for her.     PLAN:  Increase insulin to 48 units in two days  Start walking a little every single day - 10 minutes.     Follow up in one week.     Patient-stated goal written and given to Carissa Spears.  Verbalized and demonstrated understanding of instructions.       FOLLOW-UP:    One week      Any diabetes medication dose changes were made via the CDE Protocol and Collaborative Practice Agreement with [place] and  [facility].  A copy of this encounter was provided to patient's referring provider.

## 2024-10-25 NOTE — NURSING NOTE
Current patient location:  [place]    Is the patient currently in the state of [place]? YES    Visit mode:VIDEO    If the visit is dropped, the patient can be reconnected by: VIDEO VISIT: Text to cell phone:   Telephone Information:   Mobile [phone]       Will anyone else be joining the visit? NO  (If patient encounters technical issues they should call [phone]    Are changes needed to the allergy or medication list? Pt stated no med changes    Are refills needed on medications prescribed by this physician? NO    Rooming Documentation:  Not applicable    Reason for visit: VERONICA KENT

## 2024-11-01 ENCOUNTER — VIRTUAL VISIT (OUTPATIENT)
Dept: EDUCATION SERVICES | Facility: CLINIC | Age: 54
End: 2024-11-01
Payer: COMMERCIAL

## 2024-11-01 DIAGNOSIS — E11.9 TYPE 2 DIABETES MELLITUS WITHOUT COMPLICATION, WITHOUT LONG-TERM CURRENT USE OF INSULIN (H): Primary | ICD-10-CM

## 2024-11-01 PROCEDURE — 99207 PR NO BILLABLE SERVICE THIS VISIT: CPT | Mod: 95 | Performed by: NUTRITIONIST

## 2024-11-01 NOTE — NURSING NOTE
Current patient location: [address] [place] [address]    Is the patient currently in the state of [place]? YES    Visit mode:VIDEO    If the visit is dropped, the patient can be reconnected by: VIDEO VISIT: Text to cell phone:   Telephone Information:   Mobile [phone]       Will anyone else be joining the visit? NO  (If patient encounters technical issues they should call [phone]    Are changes needed to the allergy or medication list? No, Pt stated no changes to allergies, and Pt stated no med changes    Are refills needed on medications prescribed by this physician? NO    Rooming Documentation:  Not applicable    Reason for visit: Diabetes Education    Abby KENT

## 2024-11-01 NOTE — PROGRESS NOTES
Virtual Visit Details    Type of service:  Video Visit     Originating Location (pt. Location): Home    Distant Location (provider location):  Off-site  Platform used for Video Visit: [employer]    Diabetes Self-Management Education & Support    Carissa Spears presents today for education related to Type 2 diabetes    She is accompanied by self    Year of diagnosis: 17 years ago  Referring provider:  PCP - Silvino Beach    PATIENT CONCERNS/REASON FOR REFERRAL       ASSESSMENT:    Taking Medication:     Current Diabetes Management per Patient:  Taking diabetes medications? yes:     Diabetes Medication(s)       Biguanides       metFORMIN (GLUCOPHAGE XR) 500 MG 24 hr tablet TAKE 2 TABLET BY MOUTH TWICE DAILY FOR DIABETES       Dipeptidyl Peptidase-4 (DPP-4) Inhibitors       linagliptin (TRADJENTA) 5 MG TABS tablet Take 1 tablet (5 mg) by mouth daily or diabetes       Insulin       insulin glargine (LANTUS PEN) 100 UNIT/ML pen Inject 44 Units subcutaneously every morning.       Sulfonylureas       glipiZIDE (GLUCOTROL XL) 5 MG 24 hr tablet Take 1 tablet (5 mg) by mouth daily New for diabetes     Patient not taking: Reported on 7/16/2024          Was on Tradjenta for 4-5 months   Tradjenta and glipizide were stopped by PCP per patient    Monitoring                    Patient's most recent   Lab Results   Component Value Date    A1C 9.2 07/09/2024    A1C 5.9 09/08/2020      Patient's A1C goal: <7.0    Activity: walking an hour every day pre covid.    Healthy Eating:   three meals per day  Breakfast 2 eggs, sausage and meat  Stuffed peppers for lunch with hamburger and rice and tomato sauce or Salad with chicken    Beef stick and babybel cheese  Meat and vegetable   Beverages coffee, water, diet pepsi     Problem Solving:      Patient is at risk of hypoglycemia?: YES  Hospitalizations for hyper or hypoglycemia: No    EDUCATION and INSTRUCTION PROVIDED AT THIS VISIT:       Patient with type 2 diabetes diagnosed around age 37. She  did not tolerate Jardiance or Ozempic. She does not tolerate more than 1000mg metformin daily.  Glucose has not been controlled the past couple years. Referred by PCP to start insulin.    Patient is watching her carb intake and has found some good alternatives for low carb treats. She is focusing on protein. Lantus is at 52 units in the morning. She just increased that today. Glucose improving. Patient's time in range on first report on 9/10 was 5% and is now 73%.    She has not started walking. Developed a cold a couple days ago and hasn't felt up to it.     She is due for A1C recheck but doesn't have an apt with Dr. Beach on the calendar. Will place order for A1C and patient will schedule follow up with Dr. Beach.     PLAN:  Continue 52 units Lantus  Start walking a little every single day - 10 minutes.     Follow up in one week.     Patient-stated goal written and given to Carissa Spears.  Verbalized and demonstrated understanding of instructions.       FOLLOW-UP:    One week      Time spent 25 minutes  Any diabetes medication dose changes were made via the CDE Protocol and Collaborative Practice Agreement with [place] and  [facility].  A copy of this encounter was provided to patient's referring provider.

## 2024-11-07 ENCOUNTER — PATIENT OUTREACH (OUTPATIENT)
Dept: CARE COORDINATION | Facility: CLINIC | Age: 54
End: 2024-11-07
Payer: COMMERCIAL

## 2024-11-12 ENCOUNTER — MYC MEDICAL ADVICE (OUTPATIENT)
Dept: FAMILY MEDICINE | Facility: CLINIC | Age: 54
End: 2024-11-12
Payer: COMMERCIAL

## 2024-11-12 DIAGNOSIS — G43.109 MIGRAINE WITH AURA AND WITHOUT STATUS MIGRAINOSUS, NOT INTRACTABLE: ICD-10-CM

## 2024-11-12 RX ORDER — HYDROCODONE BITARTRATE AND ACETAMINOPHEN 5; 325 MG/1; MG/1
1 TABLET ORAL DAILY PRN
Qty: 10 TABLET | Refills: 0 | Status: SHIPPED | OUTPATIENT
Start: 2024-11-12

## 2024-11-15 ENCOUNTER — TELEPHONE (OUTPATIENT)
Dept: EDUCATION SERVICES | Facility: CLINIC | Age: 54
End: 2024-11-15

## 2024-11-15 ENCOUNTER — VIRTUAL VISIT (OUTPATIENT)
Dept: EDUCATION SERVICES | Facility: CLINIC | Age: 54
End: 2024-11-15
Payer: COMMERCIAL

## 2024-11-15 DIAGNOSIS — I10 HYPERTENSION GOAL BP (BLOOD PRESSURE) < 140/90: ICD-10-CM

## 2024-11-15 DIAGNOSIS — E11.9 TYPE 2 DIABETES MELLITUS WITHOUT COMPLICATION, WITHOUT LONG-TERM CURRENT USE OF INSULIN (H): Primary | ICD-10-CM

## 2024-11-15 RX ORDER — LOSARTAN POTASSIUM AND HYDROCHLOROTHIAZIDE 25; 100 MG/1; MG/1
TABLET ORAL
Qty: 90 TABLET | Refills: 0 | Status: SHIPPED | OUTPATIENT
Start: 2024-11-15

## 2024-11-15 NOTE — PROGRESS NOTES
Telephone encounter                          Spoke with Carissa. She is taking Lantus 52 units daily. Will increase to 57 units daily.  She is unable to take Sglt-2 or GLP-1  She is hoping walking more often will help improve glucose control. Encouraged walking daily.   She has follow up with Dr. Beach on 12/10 and with me in one week.

## 2024-11-15 NOTE — TELEPHONE ENCOUNTER
Patient needs to be rescheduled for their virtual visit due to Reason for Reschedule: Out-of-State    Scheduling team, please refer to service line late cancellation/no-show policies and reach out to patient at a later date for rescheduling.    Appointment mode: Video  Provider: Lyubov North

## 2024-11-25 ENCOUNTER — MYC MEDICAL ADVICE (OUTPATIENT)
Dept: FAMILY MEDICINE | Facility: CLINIC | Age: 54
End: 2024-11-25
Payer: COMMERCIAL

## 2024-11-25 DIAGNOSIS — E11.9 TYPE 2 DIABETES MELLITUS WITHOUT COMPLICATION, WITHOUT LONG-TERM CURRENT USE OF INSULIN (H): ICD-10-CM

## 2024-11-25 NOTE — TELEPHONE ENCOUNTER
Insulin rx updated and sent to pharmacy per last note on 11/22/24.    Chantal Degroot, PharmD, [facility] [place], [facility] and [facility]

## 2024-12-07 ASSESSMENT — ANXIETY QUESTIONNAIRES
3. WORRYING TOO MUCH ABOUT DIFFERENT THINGS: MORE THAN HALF THE DAYS
1. FEELING NERVOUS, ANXIOUS, OR ON EDGE: MORE THAN HALF THE DAYS
2. NOT BEING ABLE TO STOP OR CONTROL WORRYING: MORE THAN HALF THE DAYS
4. TROUBLE RELAXING: NEARLY EVERY DAY
6. BECOMING EASILY ANNOYED OR IRRITABLE: SEVERAL DAYS
IF YOU CHECKED OFF ANY PROBLEMS ON THIS QUESTIONNAIRE, HOW DIFFICULT HAVE THESE PROBLEMS MADE IT FOR YOU TO DO YOUR WORK, TAKE CARE OF THINGS AT HOME, OR GET ALONG WITH OTHER PEOPLE: VERY DIFFICULT
7. FEELING AFRAID AS IF SOMETHING AWFUL MIGHT HAPPEN: MORE THAN HALF THE DAYS
GAD7 TOTAL SCORE: 13
8. IF YOU CHECKED OFF ANY PROBLEMS, HOW DIFFICULT HAVE THESE MADE IT FOR YOU TO DO YOUR WORK, TAKE CARE OF THINGS AT HOME, OR GET ALONG WITH OTHER PEOPLE?: VERY DIFFICULT
GAD7 TOTAL SCORE: 13
GAD7 TOTAL SCORE: 13
5. BEING SO RESTLESS THAT IT IS HARD TO SIT STILL: SEVERAL DAYS
7. FEELING AFRAID AS IF SOMETHING AWFUL MIGHT HAPPEN: MORE THAN HALF THE DAYS

## 2024-12-12 ENCOUNTER — OFFICE VISIT (OUTPATIENT)
Dept: FAMILY MEDICINE | Facility: CLINIC | Age: 54
End: 2024-12-12
Payer: COMMERCIAL

## 2024-12-12 VITALS
DIASTOLIC BLOOD PRESSURE: 71 MMHG | SYSTOLIC BLOOD PRESSURE: 126 MMHG | RESPIRATION RATE: 24 BRPM | WEIGHT: 291 LBS | OXYGEN SATURATION: 96 % | BODY MASS INDEX: 43.1 KG/M2 | HEART RATE: 85 BPM | TEMPERATURE: 97.2 F | HEIGHT: 69 IN

## 2024-12-12 DIAGNOSIS — G43.809 OTHER MIGRAINE WITHOUT STATUS MIGRAINOSUS, NOT INTRACTABLE: ICD-10-CM

## 2024-12-12 DIAGNOSIS — E11.9 TYPE 2 DIABETES MELLITUS WITHOUT COMPLICATION, WITHOUT LONG-TERM CURRENT USE OF INSULIN (H): Primary | ICD-10-CM

## 2024-12-12 DIAGNOSIS — G43.109 MIGRAINE WITH AURA AND WITHOUT STATUS MIGRAINOSUS, NOT INTRACTABLE: ICD-10-CM

## 2024-12-12 LAB
ALBUMIN SERPL BCG-MCNC: 4.2 G/DL (ref 3.5–5.2)
ANION GAP SERPL CALCULATED.3IONS-SCNC: 12 MMOL/L (ref 7–15)
BUN SERPL-MCNC: 13.2 MG/DL (ref 6–20)
CALCIUM SERPL-MCNC: 9.8 MG/DL (ref 8.8–10.4)
CHLORIDE SERPL-SCNC: 99 MMOL/L (ref 98–107)
CREAT SERPL-MCNC: 0.71 MG/DL (ref 0.51–0.95)
EGFRCR SERPLBLD CKD-EPI 2021: >90 ML/MIN/1.73M2
EST. AVERAGE GLUCOSE BLD GHB EST-MCNC: 192 MG/DL
GLUCOSE SERPL-MCNC: 213 MG/DL (ref 70–99)
HBA1C MFR BLD: 8.3 % (ref 0–5.6)
HCO3 SERPL-SCNC: 24 MMOL/L (ref 22–29)
PHOSPHATE SERPL-MCNC: 3.1 MG/DL (ref 2.5–4.5)
POTASSIUM SERPL-SCNC: 4.3 MMOL/L (ref 3.4–5.3)
SODIUM SERPL-SCNC: 135 MMOL/L (ref 135–145)

## 2024-12-12 RX ORDER — HYDROCODONE BITARTRATE AND ACETAMINOPHEN 5; 325 MG/1; MG/1
1 TABLET ORAL DAILY PRN
Qty: 10 TABLET | Refills: 0 | Status: SHIPPED | OUTPATIENT
Start: 2024-12-12

## 2024-12-12 RX ORDER — BUTALBITAL, ACETAMINOPHEN AND CAFFEINE 50; 325; 40 MG/1; MG/1; MG/1
1 TABLET ORAL EVERY 4 HOURS PRN
Qty: 20 TABLET | Refills: 1 | Status: SHIPPED | OUTPATIENT
Start: 2024-12-12

## 2024-12-12 ASSESSMENT — PAIN SCALES - GENERAL: PAINLEVEL_OUTOF10: NO PAIN (1)

## 2024-12-12 ASSESSMENT — PATIENT HEALTH QUESTIONNAIRE - PHQ9
SUM OF ALL RESPONSES TO PHQ QUESTIONS 1-9: 11
SUM OF ALL RESPONSES TO PHQ QUESTIONS 1-9: 11
10. IF YOU CHECKED OFF ANY PROBLEMS, HOW DIFFICULT HAVE THESE PROBLEMS MADE IT FOR YOU TO DO YOUR WORK, TAKE CARE OF THINGS AT HOME, OR GET ALONG WITH OTHER PEOPLE: SOMEWHAT DIFFICULT

## 2024-12-12 NOTE — PROGRESS NOTES
ASSESSMENT / PLAN:  (E11.9) Type 2 diabetes mellitus without complication, without long-term current use of insulin (H)  (primary encounter diagnosis)  Comment: improving  Plan: Renal panel, Hemoglobin A1c        Quit all smoking. Continue diet/exercise and lantus. Recheck in 6 months    The longitudinal plan of care for the diagnosis(es)/condition(s) as documented were addressed during this visit. Due to the added complexity in care, I will continue to support Carissa in the subsequent management and with ongoing continuity of care.    (G43.809) Other migraine without status migrainosus, not intractable  Comment: needs help  Plan: butalbital-acetaminophen-caffeine (ESGIC)         -40 MG tablet        Rare prn    (G43.109) Migraine with aura and without status migrainosus, not intractable  Comment: nees help  Plan: HYDROcodone-acetaminophen (NORCO) 5-325 MG         tablet        Max #10/month. Neurology if worse.   Exerciise and limit caffeine/ avoid ALCOHOL. Reveiwed risks and side effects of medication        Subjective   Carissa is a 54 year old, presenting for the following health issues:    Follow-up dm, htn, high cholesterol, bipolar, migraines, morbid obesity and lumbar radiculopathy   Seen dm ed and started on lantus.   Unable to tolerated omzepic and start on tradjenta in summer- no side effects noted.  No blood sugars <100. Not >300.    No shortness of breath or chest pain. Emotionally doing ok. Sleep - seeing psych.   No feet changes. Eye exam 2 months ago - ok.  Smoking - has patches. Outside blood pressure readings ok. No cough.  [place] in March.       12/12/2024    10:42 AM   Additional Questions   Roomed by Mary   Accompanied by self         12/12/2024    10:42 AM   Patient Reported Additional Medications   Patient reports taking the following new medications n/a     Via the Health Maintenance questionnaire, the patient has reported the following services have been completed -Mammogram:  "[facility] 2024-06-15, this information has been sent to the abstraction team.  History of Present Illness       Mental Health Follow-up:  Patient presents to follow-up on Depression & Anxiety.Patient's depression since last visit has been:  Medium  The patient is having other symptoms associated with depression.  Patient's anxiety since last visit has been:  Worse  The patient is having other symptoms associated with anxiety.  Any significant life events: No  Patient is feeling anxious or having panic attacks.  Patient has no concerns about alcohol or drug use.    She eats 2-3 servings of fruits and vegetables daily.She consumes 0 sweetened beverage(s) daily.She exercises with enough effort to increase her heart rate 9 or less minutes per day.  She exercises with enough effort to increase her heart rate 5 days per week.   She is taking medications regularly.                     Objective    /71   Pulse 85   Temp 97.2  F (36.2  C) (Tympanic)   Resp 24   Ht 1.74 m (5' 8.5\")   Wt 132 kg (291 lb)   LMP  (LMP Unknown)   SpO2 96%   BMI 43.60 kg/m      Physical Exam   GENERAL: alert and no distress  EYES: Eyes grossly normal to inspection, PERRL and conjunctivae and sclerae normal  HENT: ear canals and TM's normal, nose and mouth without ulcers or lesions  NECK: no adenopathy, no asymmetry, masses, or scars  RESP: lungs clear to auscultation - no rales, rhonchi or wheezes  CV: regular rate and rhythm, normal S1 S2, no S3 or S4, no murmur, click or rub, no peripheral edema   ABDOMEN: soft, nontender, no hepatosplenomegaly, no masses and bowel sounds normal  MS: no gross musculoskeletal defects noted, no edema  NEURO: Normal strength and tone, mentation intact and speech normal  PSYCH: mentation appears normal, affect normal/bright            Signed Electronically by: Silvino Beach MD    "

## 2024-12-15 DIAGNOSIS — E11.9 TYPE 2 DIABETES MELLITUS WITHOUT COMPLICATION, WITHOUT LONG-TERM CURRENT USE OF INSULIN (H): ICD-10-CM

## 2024-12-16 RX ORDER — INSULIN GLARGINE 100 [IU]/ML
INJECTION, SOLUTION SUBCUTANEOUS
Qty: 15 ML | Refills: 1 | Status: SHIPPED | OUTPATIENT
Start: 2024-12-16

## 2025-01-06 ENCOUNTER — MYC MEDICAL ADVICE (OUTPATIENT)
Dept: FAMILY MEDICINE | Facility: CLINIC | Age: 55
End: 2025-01-06
Payer: COMMERCIAL

## 2025-01-06 DIAGNOSIS — E11.9 TYPE 2 DIABETES MELLITUS WITHOUT COMPLICATION, WITHOUT LONG-TERM CURRENT USE OF INSULIN (H): ICD-10-CM

## 2025-01-06 DIAGNOSIS — G43.109 MIGRAINE WITH AURA AND WITHOUT STATUS MIGRAINOSUS, NOT INTRACTABLE: ICD-10-CM

## 2025-01-06 RX ORDER — HYDROCODONE BITARTRATE AND ACETAMINOPHEN 5; 325 MG/1; MG/1
1 TABLET ORAL DAILY PRN
Qty: 10 TABLET | Refills: 0 | Status: SHIPPED | OUTPATIENT
Start: 2025-01-06

## 2025-01-06 RX ORDER — BLOOD-GLUCOSE METER
EACH MISCELLANEOUS
Qty: 1 KIT | Refills: 0 | Status: SHIPPED | OUTPATIENT
Start: 2025-01-06

## 2025-01-07 ENCOUNTER — TRANSFERRED RECORDS (OUTPATIENT)
Dept: HEALTH INFORMATION MANAGEMENT | Facility: CLINIC | Age: 55
End: 2025-01-07
Payer: COMMERCIAL

## 2025-01-07 NOTE — TELEPHONE ENCOUNTER
Ok early refill viciodin this time. Need to be seen or video visit if not better. Er if worse. Silvino Beach MD

## 2025-01-07 NOTE — TELEPHONE ENCOUNTER
RN spoke with Sheila at [facility] in [place]. She reports that pharmacy has a policy of no sooner than 3 days for early fill. She states it does not matter what start date the provider placed on the prescription.Sheila states they can fill it tomorrow on 1/8.       Racheal Fofana RN    [facility]

## 2025-01-07 NOTE — TELEPHONE ENCOUNTER
RN called and spoke with patient. Relayed below information from pharmacy.   Pt verbalized understanding and agrees with told. She states that she wishes the pharmacy would have just told her that information. She does not have any more questions at this time.       Racheal Fofana RN    [facility]

## 2025-01-07 NOTE — TELEPHONE ENCOUNTER
Patient calling to report that the pharmacy will not be able to refill until 1/9 due to the provider stating that is the earliest. RN does not see any note from Dr. Beach stating above. The prescription has a start date of 1/6.     RN will call the pharmacy and see what the issue is. Pt will like a call back at number on file.       Racheal Fofana RN    [facility]

## 2025-01-24 ENCOUNTER — VIRTUAL VISIT (OUTPATIENT)
Dept: EDUCATION SERVICES | Facility: CLINIC | Age: 55
End: 2025-01-24
Payer: COMMERCIAL

## 2025-01-24 DIAGNOSIS — E11.9 TYPE 2 DIABETES MELLITUS WITHOUT COMPLICATION, WITHOUT LONG-TERM CURRENT USE OF INSULIN (H): Primary | ICD-10-CM

## 2025-01-24 PROCEDURE — 99207 PR NO BILLABLE SERVICE THIS VISIT: CPT | Mod: 95 | Performed by: NUTRITIONIST

## 2025-01-24 NOTE — PROGRESS NOTES
Virtual Visit Details    Type of service:  Video Visit     Joined the call at 1/24/2025, 8:37:30 am.  Left the call at 1/24/2025, 8:57:51 am.  You were on the call for 20 minutes 20 seconds     Originating Location (pt. Location): Home    Distant Location (provider location):  Off-site  Platform used for Video Visit: [employer]      Current patient location: [address] [place] [address] [address] [place] [address]    Is the patient currently in the state of [place]? YES    Visit mode: VIDEO    If the visit is dropped, the patient can be reconnected by:VIDEO VISIT: Text to cell phone:   Telephone Information:   Mobile [phone]       Will anyone else be joining the visit? NO  (If patient encounters technical issues they should call [phone]    Are changes needed to the allergy or medication list? No    Are refills needed on medications prescribed by this physician? NO    Rooming Documentation:  Questionnaire(s) completed    Reason for visit: Diabetes Education    [facility]    Diabetes Self-Management Education & Support    Carissa Spears presents today for education related to Type 2 diabetes    She is accompanied by self    Year of diagnosis: 17 years ago  Referring provider:  PCP - Silvino Beach    PATIENT CONCERNS/REASON FOR REFERRAL       ASSESSMENT:    Taking Medication:     Current Diabetes Management per Patient:  Taking diabetes medications? yes:     Diabetes Medication(s)       Biguanides       metFORMIN (GLUCOPHAGE XR) 500 MG 24 hr tablet TAKE 2 TABLET BY MOUTH TWICE DAILY FOR DIABETES       Insulin       insulin glargine (LANTUS SOLOSTAR) 100 UNIT/ML pen INJECT 58UNITS UNDER THE SKIN EVERY MORNING. MAX DAILY DOSE OF 65 UNITS       Incretin Mimetic Agents       MOUNJARO 2.5 MG/0.5ML SOAJ Inject 0.5 mLs (2.5 mg) subcutaneously every 7 days.          Was on Tradjenta for 4-5 months   Tradjenta and glipizide were stopped by PCP per patient    Monitoring                  Patient's most recent   Lab Results    Component Value Date    A1C 9.2 07/09/2024    A1C 5.9 09/08/2020      Patient's A1C goal: <7.0    Activity: walking an hour every day pre covid.    Patient has been trying to get motivated to walk more.     Healthy Eating:   three meals per day  Breakfast 2 eggs, sausage and meat  Stuffed peppers for lunch with hamburger and rice and tomato sauce or Salad with chicken    Beef stick and babybel cheese  Meat and vegetable   Beverages coffee, water, diet pepsi     Problem Solving:      Patient is at risk of hypoglycemia?: YES  Hospitalizations for hyper or hypoglycemia: No    EDUCATION and INSTRUCTION PROVIDED AT THIS VISIT:       Patient with type 2 diabetes diagnosed around age 37. Follow up today.    She started a new fitness challenge last week that involves daily walks and some other movements for about 20 minutes total. Patient continues this - is going well. Struggling with smoking cessation.     Takes mounjaro on Thursday   Went down to 40 on the insulin on Mon or Tues of this week    She has now started her second month on mounjaro 2.5 mg and is tolerating well. She would like to increase to 5 mg after finishing this month at the 2.5mg. We will meet in a couple weeks to review her glucose readings and adjust insulin prior.     PLAN:  Continue current plan.   If any lows or fasting under 100 2-3 days in a row reduce further to 36 units.   Follow up with me in two weeks.     Patient-stated goal written and given to Carissa Spears.  Verbalized and demonstrated understanding of instructions.     Any diabetes medication dose changes were made via the CDE Protocol and Collaborative Practice Agreement with [place] and  [facility].  A copy of this encounter was provided to patient's referring provider.

## 2025-01-27 ENCOUNTER — MYC MEDICAL ADVICE (OUTPATIENT)
Dept: FAMILY MEDICINE | Facility: CLINIC | Age: 55
End: 2025-01-27

## 2025-01-27 RX ORDER — TIRZEPATIDE 5 MG/.5ML
5 INJECTION, SOLUTION SUBCUTANEOUS
Qty: 2 ML | Refills: 1 | Status: SHIPPED | OUTPATIENT
Start: 2025-01-27

## 2025-01-27 NOTE — TELEPHONE ENCOUNTER
This massage should go to DM ED and if not already seeing - will place a referral. Decrease lantus by anther 4 units in meantime and continue monitor. Silvino Beach MD

## 2025-01-29 ENCOUNTER — MYC MEDICAL ADVICE (OUTPATIENT)
Dept: FAMILY MEDICINE | Facility: CLINIC | Age: 55
End: 2025-01-29
Payer: COMMERCIAL

## 2025-01-29 NOTE — TELEPHONE ENCOUNTER
See MyChart messages below. Patient has been managing BG and meds most recently with Diabetes Ed. Most recent visit was 1/24/25.  Previous MyChart messages from patient about same issues-see 1/27/25 MyChart Medical Advice encounter in Chart Review. Provider had advised for message to be sent to DE team.        Nickie Bolanos RN  Clinical Triage/Primary Care  [facility]

## 2025-01-29 NOTE — TELEPHONE ENCOUNTER
"Diabetes Education Note:     Called Carissa back about her concern with hyperglycemia.    She states that she felt that she was doing quite well on the 40 unit dose of Lantus after her visit with Lyubov North last week.    She states that Monday evening, she was woken by her Dexcom alarm that was reading 40 mg/dL.  She states that she had no symptoms of hypoglycemia, but treated anyway and Dr. Beach advised her to drop her insulin dose to 36 units.  She states that now her glucoses are higher and she is wondering what to do.     Given that she had no symptoms of hypoglycemia at the time her Dexcom was reading 40 mg/dL, and she was in bed sleeping, I suspect a compression low.    Advised that next time she is getting an alert from her Dexcom that doesn't jibe with her symptoms, to check a fingerstick glucose.  She states that every time she has a low, it seems to take \"forever\" for it to resolve.  Explained that it will generally take the Dexcom 45 minutes to an hour to \"catch up\" with her blood glucose, so the most reliable method of determining whether she has recovered from a low is, again, to check a fingerstick glucose.      She wanted some advice on how to adjust her insulin.  Suggest that tomorrow morning she increase the dose to 38 units, and if glucoses are still higher than target, increase back to 40 units.  Will follow up with Lyubov on Feb 7.      Dexcom report below:           "

## 2025-01-29 NOTE — TELEPHONE ENCOUNTER
Please see diabetic education's note dated 1/24/2025:    Takes mounjaro on Thursday   Went down to 40 on the insulin on Mon or Tues of this week     She has now started her second month on mounjaro 2.5 mg and is tolerating well. She would like to increase to 5 mg after finishing this month at the 2.5mg. We will meet in a couple weeks to review her glucose readings and adjust insulin prior.      PLAN:  Continue current plan.   If any lows or fasting under 100 2-3 days in a row reduce further to 36 units.   Follow up with me in two weeks.

## 2025-02-01 DIAGNOSIS — E11.9 TYPE 2 DIABETES MELLITUS WITHOUT COMPLICATION, WITHOUT LONG-TERM CURRENT USE OF INSULIN (H): ICD-10-CM

## 2025-02-03 RX ORDER — INSULIN GLARGINE 100 [IU]/ML
INJECTION, SOLUTION SUBCUTANEOUS
Qty: 15 ML | Refills: 0 | Status: SHIPPED | OUTPATIENT
Start: 2025-02-03

## 2025-02-05 DIAGNOSIS — I10 HYPERTENSION GOAL BP (BLOOD PRESSURE) < 140/90: ICD-10-CM

## 2025-02-05 RX ORDER — LOSARTAN POTASSIUM AND HYDROCHLOROTHIAZIDE 25; 100 MG/1; MG/1
TABLET ORAL
Qty: 90 TABLET | Refills: 0 | Status: SHIPPED | OUTPATIENT
Start: 2025-02-05

## 2025-02-10 ENCOUNTER — MYC MEDICAL ADVICE (OUTPATIENT)
Dept: FAMILY MEDICINE | Facility: CLINIC | Age: 55
End: 2025-02-10
Payer: COMMERCIAL

## 2025-02-11 ENCOUNTER — MYC MEDICAL ADVICE (OUTPATIENT)
Dept: FAMILY MEDICINE | Facility: CLINIC | Age: 55
End: 2025-02-11
Payer: COMMERCIAL

## 2025-02-11 DIAGNOSIS — I10 HYPERTENSION GOAL BP (BLOOD PRESSURE) < 140/90: ICD-10-CM

## 2025-02-11 DIAGNOSIS — E11.9 TYPE 2 DIABETES MELLITUS WITHOUT COMPLICATION, WITHOUT LONG-TERM CURRENT USE OF INSULIN (H): ICD-10-CM

## 2025-02-11 DIAGNOSIS — G43.919 INTRACTABLE MIGRAINE WITHOUT STATUS MIGRAINOSUS, UNSPECIFIED MIGRAINE TYPE: ICD-10-CM

## 2025-02-11 DIAGNOSIS — E78.5 HYPERLIPIDEMIA LDL GOAL <100: ICD-10-CM

## 2025-02-11 RX ORDER — INSULIN GLARGINE 100 [IU]/ML
40 INJECTION, SOLUTION SUBCUTANEOUS EVERY MORNING
Qty: 15 ML | Refills: 5 | Status: SHIPPED | OUTPATIENT
Start: 2025-02-11

## 2025-02-11 RX ORDER — TIRZEPATIDE 5 MG/.5ML
5 INJECTION, SOLUTION SUBCUTANEOUS
Qty: 2 ML | Refills: 5 | Status: SHIPPED | OUTPATIENT
Start: 2025-02-11

## 2025-02-11 RX ORDER — SIMVASTATIN 20 MG
20 TABLET ORAL AT BEDTIME
Qty: 90 TABLET | Refills: 1 | Status: SHIPPED | OUTPATIENT
Start: 2025-02-11

## 2025-02-11 RX ORDER — METOPROLOL TARTRATE 100 MG/1
TABLET ORAL
Qty: 270 TABLET | Refills: 1 | Status: SHIPPED | OUTPATIENT
Start: 2025-02-11

## 2025-02-11 RX ORDER — METFORMIN HYDROCHLORIDE 500 MG/1
TABLET, EXTENDED RELEASE ORAL
Qty: 360 TABLET | Refills: 0 | Status: SHIPPED | OUTPATIENT
Start: 2025-02-11

## 2025-02-11 RX ORDER — LOSARTAN POTASSIUM AND HYDROCHLOROTHIAZIDE 25; 100 MG/1; MG/1
1 TABLET ORAL DAILY
Qty: 90 TABLET | Refills: 0 | Status: SHIPPED | OUTPATIENT
Start: 2025-02-11

## 2025-02-19 ENCOUNTER — MYC MEDICAL ADVICE (OUTPATIENT)
Dept: FAMILY MEDICINE | Facility: CLINIC | Age: 55
End: 2025-02-19
Payer: COMMERCIAL

## 2025-02-19 ENCOUNTER — NURSE TRIAGE (OUTPATIENT)
Dept: FAMILY MEDICINE | Facility: CLINIC | Age: 55
End: 2025-02-19
Payer: COMMERCIAL

## 2025-02-19 DIAGNOSIS — G43.109 MIGRAINE WITH AURA AND WITHOUT STATUS MIGRAINOSUS, NOT INTRACTABLE: ICD-10-CM

## 2025-02-19 RX ORDER — HYDROCODONE BITARTRATE AND ACETAMINOPHEN 5; 325 MG/1; MG/1
1 TABLET ORAL DAILY PRN
Qty: 10 TABLET | Refills: 0 | Status: SHIPPED | OUTPATIENT
Start: 2025-02-19

## 2025-02-19 NOTE — TELEPHONE ENCOUNTER
"Reason for Disposition    SEVERE pain (e.g., excruciating, scale 8-10) and present > 1 hour    Additional Information    Negative: Passed out (i.e., lost consciousness, collapsed and was not responding)    Negative: Shock suspected (e.g., cold/pale/clammy skin, too weak to stand, low BP, rapid pulse)    Negative: Sounds like a life-threatening emergency to the triager    Negative: Followed a major injury to the back (e.g., MVA, fall > 10 feet or 3 meters, penetrating injury, etc.)    Negative: Upper, mid or lower back pain that occurs mainly in the midline    Negative: Unable to urinate (or only a few drops) > 4 hours and bladder feels very full (e.g., palpable bladder or strong urge to urinate)    Negative: Pain radiates into groin, scrotum    Negative: Weakness of a leg or foot (e.g., unable to bear weight, dragging foot)    Negative: Vomiting    Negative: Patient sounds very sick or weak to the triager    Negative: Fever > 100.4 F (38.0 C)    Negative: Pain or burning with passing urine (urination)    Negative: History of kidney stones    Negative: MILD pain (i.e., scale 1-3; does not interfere with normal activities) and present > 3 days    Answer Assessment - Initial Assessment Questions  1. LOCATION: \"Where does it hurt?\" (e.g., left, right)      Right side/flank area  2. ONSET: \"When did the pain start?\"      Started today, getting worse throughout the day  3. SEVERITY: \"How bad is the pain?\" (e.g., Scale 1-10; mild, moderate, or severe)    - MILD (1-3): doesn't interfere with normal activities     - MODERATE (4-7): interferes with normal activities or awakens from sleep     - SEVERE (8-10): excruciating pain and patient unable to do normal activities (stays in bed)        Severe, 8/10 right now. 10/10 if she coughs.   4. PATTERN: \"Does the pain come and go, or is it constant?\"       Constant pain, not going away. Seems to be getting worse throughout the day.  5. CAUSE: \"What do you think is causing the " "pain?\"      Not sure, feels pain where kidney is. Hx of kidney damage from pregnancies. Feels pain deep inside, doesn't feel muscular.  6. OTHER SYMPTOMS:  \"Do you have any other symptoms?\" (e.g., fever, abdomen pain, vomiting, leg weakness, burning with urination, blood in urine)      No fever, no chills, no abdominal pain, urine seems normal, no blood. No nausea, no vomiting. Really no other symptoms. Pain came on suddenly and is worsening throughout the day.  7. PREGNANCY:  \"Is there any chance you are pregnant?\" \"When was your last menstrual period?\"      N/A    Protocols used: Flank Pain-A-[place]    "

## 2025-02-25 ENCOUNTER — OFFICE VISIT (OUTPATIENT)
Dept: URGENT CARE | Facility: URGENT CARE | Age: 55
End: 2025-02-25
Payer: COMMERCIAL

## 2025-02-25 VITALS
HEART RATE: 93 BPM | TEMPERATURE: 98 F | RESPIRATION RATE: 20 BRPM | DIASTOLIC BLOOD PRESSURE: 96 MMHG | SYSTOLIC BLOOD PRESSURE: 184 MMHG | OXYGEN SATURATION: 98 %

## 2025-02-25 DIAGNOSIS — T14.8XXA BRUISING: ICD-10-CM

## 2025-02-25 DIAGNOSIS — M54.50 ACUTE RIGHT-SIDED LOW BACK PAIN WITHOUT SCIATICA: Primary | ICD-10-CM

## 2025-02-25 DIAGNOSIS — R51.9 ACUTE INTRACTABLE HEADACHE, UNSPECIFIED HEADACHE TYPE: ICD-10-CM

## 2025-02-25 DIAGNOSIS — I10 SEVERE HYPERTENSION: ICD-10-CM

## 2025-02-25 LAB
ALBUMIN UR-MCNC: NEGATIVE MG/DL
APPEARANCE UR: CLEAR
BILIRUB UR QL STRIP: NEGATIVE
COLOR UR AUTO: YELLOW
GLUCOSE UR STRIP-MCNC: NEGATIVE MG/DL
HGB UR QL STRIP: NEGATIVE
KETONES UR STRIP-MCNC: NEGATIVE MG/DL
LEUKOCYTE ESTERASE UR QL STRIP: ABNORMAL
NITRATE UR QL: NEGATIVE
PH UR STRIP: 7 [PH] (ref 5–7)
RBC #/AREA URNS AUTO: ABNORMAL /HPF
SP GR UR STRIP: 1.01 (ref 1–1.03)
SQUAMOUS #/AREA URNS AUTO: ABNORMAL /LPF
UROBILINOGEN UR STRIP-ACNC: 0.2 E.U./DL
WBC #/AREA URNS AUTO: ABNORMAL /HPF

## 2025-02-25 PROCEDURE — 81001 URINALYSIS AUTO W/SCOPE: CPT | Performed by: NURSE PRACTITIONER

## 2025-02-25 PROCEDURE — 3080F DIAST BP >= 90 MM HG: CPT | Performed by: NURSE PRACTITIONER

## 2025-02-25 PROCEDURE — 99215 OFFICE O/P EST HI 40 MIN: CPT | Performed by: NURSE PRACTITIONER

## 2025-02-25 PROCEDURE — 1125F AMNT PAIN NOTED PAIN PRSNT: CPT | Performed by: NURSE PRACTITIONER

## 2025-02-25 PROCEDURE — 3077F SYST BP >= 140 MM HG: CPT | Performed by: NURSE PRACTITIONER

## 2025-02-25 ASSESSMENT — PAIN SCALES - GENERAL: PAINLEVEL_OUTOF10: MODERATE PAIN (5)

## 2025-02-25 NOTE — PROGRESS NOTES
Assessment & Plan     Acute right-sided low back pain without sciatica    - UA Macroscopic with reflex to Microscopic and Culture - Clinic Collect  - UA Microscopic with Reflex to Culture    Severe hypertension      Bruising      Acute intractable headache, unspecified headache type    Reviewed UA showing no obvious UTI or hematuria indicating kidney stone.   Go to emergency room for further evaluation of severe pain and bruising right flank with severe HTN and headache as higher level of care indicated. Patient agreeable and discharged in stable condition.         Shaina Torres NP  [facility] [place]    Subjective     Carissa is a 54 year old female who presents to clinic today for the following health issues:  Chief Complaint   Patient presents with    Urgent Care    Back Pain     X's 1 week, lower back pain with bruises kidneys stones           2/25/2025     2:23 PM   Additional Questions   Roomed by ca   Accompanied by self       Patient presents for evaluation of right low back pain and bruising. Pain does not radiate. Pain is severe 8/10. Denies fever, dysuria, hematuria, incontinence of bowel or bladder, weakness or numbness in her legs. Has had a headache since last last week and states her doctor called in Vicodin which are gone as of 2 days ago. Noticed large bruising this morning on right flank. She threw up a week ago Saturday when out of town.     She did not take flomax today due to bruising today. She is out of oxycodone which helped. Lidocaine did not fill since not covered and did not get otc. Last had tylenol and ibuprofen yesterday. She takes 2 baby aspirin for her heart- no other blood thinners. Is not anticoagulated.     She has been taking 800 mg every 6 hours which helps temporarily.     She was evaluated in ED 2/19/25 for flank pain and CT showed possible kidney stone and tylenol, ibuprofen, lidocaine, oxycodone and was advised in addition to flomax and to follow up with  PCP. Has PCP appt in 2 days. She thinks she passed kidney stone because pain moved to abdomen and then improved.     She has a history of HTN, fatty liver, morbid obesity, tobacco abuse. She smokes 1 ppd.     Problem list, Medication list, Allergies, and Medical history reviewed in EPIC.    ROS:  Review of systems negative except for noted above        Objective    BP (!) 184/96   Pulse 93   Temp 98  F (36.7  C) (Tympanic)   Resp 20   LMP  (LMP Unknown)   SpO2 98%   Physical Exam  Constitutional:       General: She is not in acute distress.     Appearance: She is obese. She is not toxic-appearing or diaphoretic.   Cardiovascular:      Rate and Rhythm: Normal rate and regular rhythm.      Heart sounds: Normal heart sounds.   Pulmonary:      Effort: Pulmonary effort is normal. No respiratory distress.      Breath sounds: Normal breath sounds. No wheezing, rhonchi or rales.   Abdominal:      Tenderness: There is no right CVA tenderness or left CVA tenderness.   Skin:     Findings: Bruising present. No erythema or rash.      Comments: Two Large 10+cm bruising right flank   Neurological:      General: No focal deficit present.      Mental Status: She is alert and oriented to person, place, and time.      Gait: Gait normal.          Labs:  Results for orders placed or performed in visit on 02/25/25   UA Macroscopic with reflex to Microscopic and Culture - Clinic Collect     Status: Abnormal    Specimen: Urine, Clean Catch   Result Value Ref Range    Color Urine Yellow Colorless, Straw, Light Yellow, Yellow    Appearance Urine Clear Clear    Glucose Urine Negative Negative mg/dL    Bilirubin Urine Negative Negative    Ketones Urine Negative Negative mg/dL    Specific Gravity Urine 1.010 1.003 - 1.035    Blood Urine Negative Negative    pH Urine 7.0 5.0 - 7.0    Protein Albumin Urine Negative Negative mg/dL    Urobilinogen Urine 0.2 0.2, 1.0 E.U./dL    Nitrite Urine Negative Negative    Leukocyte Esterase Urine Small  (A) Negative   UA Microscopic with Reflex to Culture     Status: Abnormal   Result Value Ref Range    RBC Urine 0-2 0-2 /HPF /HPF    WBC Urine 0-5 0-5 /HPF /HPF    Squamous Epithelials Urine Few (A) None Seen /LPF    Narrative    Urine Culture not indicated

## 2025-02-25 NOTE — PATIENT INSTRUCTIONS
Go to emergency room for further evaluation of severe pain and bruising right flank with severe HTN and headache

## 2025-03-03 ENCOUNTER — MYC MEDICAL ADVICE (OUTPATIENT)
Dept: FAMILY MEDICINE | Facility: CLINIC | Age: 55
End: 2025-03-03
Payer: COMMERCIAL

## 2025-03-03 DIAGNOSIS — G43.109 MIGRAINE WITH AURA AND WITHOUT STATUS MIGRAINOSUS, NOT INTRACTABLE: ICD-10-CM

## 2025-03-03 RX ORDER — HYDROCODONE BITARTRATE AND ACETAMINOPHEN 5; 325 MG/1; MG/1
1 TABLET ORAL DAILY PRN
Qty: 10 TABLET | Refills: 0 | OUTPATIENT
Start: 2025-03-03

## 2025-03-03 NOTE — TELEPHONE ENCOUNTER
Called patient due to [employer] messages. Patient reports had a vomiting and diarrhea for a few days. Patient held insulin (lantus 40 units) over the weekend while vomiting and having diarrhea. Patient denies symptoms of dehydration such as feeling weak, dizzy, lightheaded or faint. Patient is urinating. Patient no longer vomiting or having diarrhea. Patient is not able to eat much, but tolerated some orange juice and a banana today. Patient has also been drinking Gatorade and helps with blood sugars. Patient reports blood sugar was dropping to 50's a few days ago. Patient reports blood sugars are not dropping today below 100-110.      Provider: Patient has not been giving self insulin, and asking when it is safe to re start Lantus? Patient is also on Mounjaro 5 mg weekly. Patient gives self weekly injections of medication on Thursday. Routing to provider to advise.     Patient also requesting refill of norco. Med and pharmacy pended.     Estefanía Hernandez RN

## 2025-03-04 ENCOUNTER — MYC MEDICAL ADVICE (OUTPATIENT)
Dept: FAMILY MEDICINE | Facility: CLINIC | Age: 55
End: 2025-03-04
Payer: COMMERCIAL

## 2025-03-04 ENCOUNTER — MYC MEDICAL ADVICE (OUTPATIENT)
Dept: FAMILY MEDICINE | Facility: CLINIC | Age: 55
End: 2025-03-04

## 2025-03-04 ENCOUNTER — E-VISIT (OUTPATIENT)
Dept: FAMILY MEDICINE | Facility: CLINIC | Age: 55
End: 2025-03-04
Payer: COMMERCIAL

## 2025-03-04 DIAGNOSIS — R11.0 NAUSEA: ICD-10-CM

## 2025-03-04 DIAGNOSIS — N20.0 KIDNEY STONE: Primary | ICD-10-CM

## 2025-03-04 RX ORDER — ONDANSETRON 4 MG/1
4 TABLET, ORALLY DISINTEGRATING ORAL EVERY 8 HOURS PRN
Qty: 15 TABLET | Refills: 1 | Status: SHIPPED | OUTPATIENT
Start: 2025-03-04

## 2025-03-04 RX ORDER — OXYCODONE HYDROCHLORIDE 5 MG/1
5 TABLET ORAL 2 TIMES DAILY PRN
Qty: 10 TABLET | Refills: 0 | Status: SHIPPED | OUTPATIENT
Start: 2025-03-04 | End: 2025-03-07

## 2025-03-04 NOTE — TELEPHONE ENCOUNTER
Provider please review message below regarding re starting lantus and Mounjaro.    Patient had a stomach virus and blood sugars were low. Patient had been holding insulin. Asked patient for an update on blood sugars today.     Estefanía Hernandez RN

## 2025-03-04 NOTE — TELEPHONE ENCOUNTER
Continue hold in AM until taking more food. Can titrate back up by 10 units a day until back to completely normal or sugars average 140's. Hold monjero until better. Can't do norco until refill due.   Silvino Beach MD

## 2025-03-04 NOTE — TELEPHONE ENCOUNTER
Called patient and read providers note. Patient understands message and verbalizes good understanding of plan of care.    Patient also will hold Lantus in the am until tolerating food. Patient reports kept bland foods down yesterday, but ate spam, eggs, and hashbrowns and vomited. Advised to call the clinic back if symptoms are not resolving or improving.    Estefanía Hernandez RN

## 2025-03-17 ENCOUNTER — MYC MEDICAL ADVICE (OUTPATIENT)
Dept: FAMILY MEDICINE | Facility: CLINIC | Age: 55
End: 2025-03-17
Payer: COMMERCIAL

## 2025-03-17 DIAGNOSIS — G43.809 OTHER MIGRAINE WITHOUT STATUS MIGRAINOSUS, NOT INTRACTABLE: ICD-10-CM

## 2025-03-17 RX ORDER — BUTALBITAL, ACETAMINOPHEN AND CAFFEINE 50; 325; 40 MG/1; MG/1; MG/1
1 TABLET ORAL EVERY 4 HOURS PRN
Qty: 20 TABLET | Refills: 1 | Status: SHIPPED | OUTPATIENT
Start: 2025-03-17

## 2025-03-17 NOTE — TELEPHONE ENCOUNTER
Referral placed for neurology and prescription for fiorcet for migraines given. Will refill zofran for nausea if needed. Please fax prescription since my automatic refill isn't working. Please give # for neurology too. Silvino Beach MD

## 2025-03-17 NOTE — TELEPHONE ENCOUNTER
Faxed RX to [facility] @ [phone].Angela GUARDADO [facility]

## 2025-03-18 ENCOUNTER — MYC MEDICAL ADVICE (OUTPATIENT)
Dept: FAMILY MEDICINE | Facility: CLINIC | Age: 55
End: 2025-03-18
Payer: COMMERCIAL

## 2025-03-19 ENCOUNTER — TRANSFERRED RECORDS (OUTPATIENT)
Dept: HEALTH INFORMATION MANAGEMENT | Facility: CLINIC | Age: 55
End: 2025-03-19
Payer: COMMERCIAL

## 2025-04-03 ENCOUNTER — MYC MEDICAL ADVICE (OUTPATIENT)
Dept: FAMILY MEDICINE | Facility: CLINIC | Age: 55
End: 2025-04-03
Payer: COMMERCIAL

## 2025-04-04 ENCOUNTER — MYC MEDICAL ADVICE (OUTPATIENT)
Dept: FAMILY MEDICINE | Facility: CLINIC | Age: 55
End: 2025-04-04

## 2025-04-05 DIAGNOSIS — E11.9 TYPE 2 DIABETES MELLITUS WITHOUT COMPLICATION, WITHOUT LONG-TERM CURRENT USE OF INSULIN (H): ICD-10-CM

## 2025-04-06 DIAGNOSIS — E11.9 TYPE 2 DIABETES MELLITUS WITHOUT COMPLICATION, WITHOUT LONG-TERM CURRENT USE OF INSULIN (H): ICD-10-CM

## 2025-04-07 RX ORDER — BLOOD SUGAR DIAGNOSTIC
STRIP MISCELLANEOUS
Qty: 100 STRIP | Refills: 3 | Status: SHIPPED | OUTPATIENT
Start: 2025-04-07

## 2025-04-07 RX ORDER — ACYCLOVIR 400 MG/1
TABLET ORAL
Qty: 3 EACH | Refills: 3 | Status: SHIPPED | OUTPATIENT
Start: 2025-04-07

## 2025-04-22 NOTE — TELEPHONE ENCOUNTER
Pt notified of provider message as written.  Pt verbalized good understanding.  Jesenia Agrawal BSN, RN       negative

## 2025-04-23 ENCOUNTER — TRANSFERRED RECORDS (OUTPATIENT)
Dept: HEALTH INFORMATION MANAGEMENT | Facility: CLINIC | Age: 55
End: 2025-04-23
Payer: COMMERCIAL

## 2025-04-23 ENCOUNTER — MYC MEDICAL ADVICE (OUTPATIENT)
Dept: FAMILY MEDICINE | Facility: CLINIC | Age: 55
End: 2025-04-23
Payer: COMMERCIAL

## 2025-04-23 DIAGNOSIS — E11.9 TYPE 2 DIABETES MELLITUS WITHOUT COMPLICATION, WITHOUT LONG-TERM CURRENT USE OF INSULIN (H): ICD-10-CM

## 2025-04-23 RX ORDER — TIRZEPATIDE 2.5 MG/.5ML
2.5 INJECTION, SOLUTION SUBCUTANEOUS
Qty: 2 ML | Refills: 1 | Status: SHIPPED | OUTPATIENT
Start: 2025-04-23

## 2025-04-23 NOTE — TELEPHONE ENCOUNTER
See [employer] message.    Routing to provider to advise. Patient did not tolerate 5 mg of mounjaro. Patient blood glucose has been 180-250 for the last few weeks.     Provider: patient wondering if she can start the 2.5 mounjaro? Med pended. Awaiting for patient to reply back to which pharmacy is preferred.     Estefanía Hernandez RN

## 2025-04-24 NOTE — Clinical Note
Md appointment needed in 2 months. No previsit labs.  Orders for Admissions placed for Colonoscopy with Dr. Parks on 07/07/2025.

## 2025-05-02 ENCOUNTER — VIRTUAL VISIT (OUTPATIENT)
Dept: EDUCATION SERVICES | Facility: CLINIC | Age: 55
End: 2025-05-02
Payer: COMMERCIAL

## 2025-05-02 DIAGNOSIS — E11.9 TYPE 2 DIABETES MELLITUS WITHOUT COMPLICATION, WITHOUT LONG-TERM CURRENT USE OF INSULIN (H): Primary | ICD-10-CM

## 2025-05-02 PROCEDURE — 1126F AMNT PAIN NOTED NONE PRSNT: CPT | Mod: 95 | Performed by: NUTRITIONIST

## 2025-05-02 PROCEDURE — 99207 PR NO BILLABLE SERVICE THIS VISIT: CPT | Mod: 95 | Performed by: NUTRITIONIST

## 2025-05-02 NOTE — NURSING NOTE
Current patient location: [id number]  [place] [address]    Is the patient currently in the state of [place]? YES    Visit mode: VIDEO    If the visit is dropped, the patient can be reconnected by:VIDEO VISIT: Text to cell phone:   Telephone Information:   Mobile [phone]       Will anyone else be joining the visit? NO  (If patient encounters technical issues they should call [phone])    Are changes needed to the allergy or medication list? No    Are refills needed on medications prescribed by this physician? NO    Rooming Documentation:  Not applicable    Reason for visit: Diabetes Education    Kaelyn HUSAINF       Yes

## 2025-05-02 NOTE — PROGRESS NOTES
Virtual Visit Details    Type of service:  Video Visit   Joined the call at 5/2/2025, 8:31:59 am.  Left the call at 5/2/2025, 8:53:15 am.  You were on the call for 21 minutes 15 seconds.  Originating Location (pt. Location): Home    Distant Location (provider location):  Off-site  Platform used for Video Visit: [employer]      Diabetes Self-Management Education & Support    Carissa Spears presents today for education related to Type 2 diabetes    She is accompanied by self    Year of diagnosis: 17 years ago  Referring provider:  PCP - Silvino Beach    PATIENT CONCERNS/REASON FOR REFERRAL       ASSESSMENT:    Taking Medication:     Current Diabetes Management per Patient:  Taking diabetes medications? yes:     Diabetes Medication(s)       Biguanides       metFORMIN (GLUCOPHAGE XR) 500 MG 24 hr tablet TAKE 2 TABLET BY MOUTH TWICE DAILY FOR DIABETES     Patient not taking: Reported on 2/25/2025       Insulin       insulin glargine (LANTUS SOLOSTAR) 100 UNIT/ML pen Inject 40 Units subcutaneously every morning.       Incretin Mimetic Agents       MOUNJARO 2.5 MG/0.5ML SOAJ auto-injector pen Inject 0.5 mLs (2.5 mg) subcutaneously every 7 days.     MOUNJARO 5 MG/0.5ML SOAJ Inject 0.5 mLs (5 mg) subcutaneously every 7 days.          Was on Tradjenta for 4-5 months   Tradjenta and glipizide were stopped by PCP per patient    Monitoring  Reports attached below    Patient's most recent   Lab Results   Component Value Date    A1C 9.2 07/09/2024    A1C 5.9 09/08/2020      Patient's A1C goal: <7.0  Last A1C in December was 8.3%    Activity: walking an hour every day pre covid.    Patient has been trying to get motivated to walk more.     Healthy Eating:   three meals per day  Breakfast 2 eggs, sausage and meat  Stuffed peppers for lunch with hamburger and rice and tomato sauce or Salad with chicken    Beef stick and babybel cheese  Meat and vegetable   Beverages coffee, water, diet pepsi     Problem Solving:      Patient is at risk of  hypoglycemia?: YES  Hospitalizations for hyper or hypoglycemia: No    EDUCATION and INSTRUCTION PROVIDED AT THIS VISIT:       Patient with type 2 diabetes diagnosed around age 37. Follow up today.  Patient last seen about a month ago. At that time she had stopped mounjaro 5mg due to side effects.   She restarted mounjaro 2.5 mg due to elevated glucose. She also decreased her insulin dose to 36 units when she restarted. Restarted last week and has her second dose today. She is tolerating well.   We decided to have her go up slightly on the long acting insulin as her fasting glucose is elevated.     PLAN:  Long acting insulin 40 units  Increase movement during the day as able.  Eat healthy balanced meals and use portion control for carb intake.   Follow up in 5/23.     Patient-stated goal written and given to Carissa Spears.  Verbalized and demonstrated understanding of instructions.     Any diabetes medication dose changes were made via the CDE Protocol and Collaborative Practice Agreement with [facility] and  [facility].  A copy of this encounter was provided to patient's referring provider.

## 2025-05-07 ENCOUNTER — TRANSFERRED RECORDS (OUTPATIENT)
Dept: HEALTH INFORMATION MANAGEMENT | Facility: CLINIC | Age: 55
End: 2025-05-07
Payer: COMMERCIAL

## 2025-05-15 ENCOUNTER — MYC MEDICAL ADVICE (OUTPATIENT)
Dept: FAMILY MEDICINE | Facility: CLINIC | Age: 55
End: 2025-05-15
Payer: COMMERCIAL

## 2025-05-15 DIAGNOSIS — G43.109 MIGRAINE WITH AURA AND WITHOUT STATUS MIGRAINOSUS, NOT INTRACTABLE: ICD-10-CM

## 2025-05-15 RX ORDER — HYDROCODONE BITARTRATE AND ACETAMINOPHEN 5; 325 MG/1; MG/1
1 TABLET ORAL DAILY PRN
Qty: 10 TABLET | Refills: 0 | Status: SHIPPED | OUTPATIENT
Start: 2025-05-15

## 2025-05-15 NOTE — TELEPHONE ENCOUNTER
See pt MyChart messages below. Pt requesting refill of hydrocodone on file for migraines.    Angelica Mcqueen, RODDYN, RN

## 2025-05-20 ENCOUNTER — TRANSFERRED RECORDS (OUTPATIENT)
Dept: HEALTH INFORMATION MANAGEMENT | Facility: CLINIC | Age: 55
End: 2025-05-20
Payer: COMMERCIAL

## 2025-06-03 DIAGNOSIS — E11.9 TYPE 2 DIABETES MELLITUS WITHOUT COMPLICATION, WITHOUT LONG-TERM CURRENT USE OF INSULIN (H): ICD-10-CM

## 2025-06-10 DIAGNOSIS — E11.9 TYPE 2 DIABETES MELLITUS WITHOUT COMPLICATION, WITHOUT LONG-TERM CURRENT USE OF INSULIN (H): ICD-10-CM

## 2025-06-10 DIAGNOSIS — I10 HYPERTENSION GOAL BP (BLOOD PRESSURE) < 140/90: ICD-10-CM

## 2025-06-10 RX ORDER — LOSARTAN POTASSIUM AND HYDROCHLOROTHIAZIDE 25; 100 MG/1; MG/1
1 TABLET ORAL DAILY
Qty: 90 TABLET | Refills: 0 | Status: SHIPPED | OUTPATIENT
Start: 2025-06-10

## 2025-06-10 RX ORDER — METFORMIN HYDROCHLORIDE 500 MG/1
TABLET, EXTENDED RELEASE ORAL
Qty: 120 TABLET | Refills: 1 | Status: SHIPPED | OUTPATIENT
Start: 2025-06-10

## 2025-06-18 DIAGNOSIS — E11.9 TYPE 2 DIABETES MELLITUS WITHOUT COMPLICATION, WITHOUT LONG-TERM CURRENT USE OF INSULIN (H): ICD-10-CM

## 2025-06-18 RX ORDER — TIRZEPATIDE 2.5 MG/.5ML
2.5 INJECTION, SOLUTION SUBCUTANEOUS
Qty: 6 ML | Refills: 0 | Status: SHIPPED | OUTPATIENT
Start: 2025-06-18

## 2025-06-22 ENCOUNTER — HEALTH MAINTENANCE LETTER (OUTPATIENT)
Age: 55
End: 2025-06-22

## 2025-06-26 ENCOUNTER — MYC REFILL (OUTPATIENT)
Dept: FAMILY MEDICINE | Facility: CLINIC | Age: 55
End: 2025-06-26
Payer: COMMERCIAL

## 2025-06-26 DIAGNOSIS — E11.9 TYPE 2 DIABETES MELLITUS WITHOUT COMPLICATION, WITHOUT LONG-TERM CURRENT USE OF INSULIN (H): ICD-10-CM

## 2025-06-26 RX ORDER — TIRZEPATIDE 2.5 MG/.5ML
2.5 INJECTION, SOLUTION SUBCUTANEOUS
OUTPATIENT
Start: 2025-06-26

## 2025-06-26 RX ORDER — INSULIN GLARGINE 100 [IU]/ML
40 INJECTION, SOLUTION SUBCUTANEOUS EVERY MORNING
Qty: 45 ML | Refills: 0 | Status: SHIPPED | OUTPATIENT
Start: 2025-06-26

## 2025-06-26 RX ORDER — TIRZEPATIDE 2.5 MG/.5ML
2.5 INJECTION, SOLUTION SUBCUTANEOUS
Qty: 6 ML | Refills: 0 | OUTPATIENT
Start: 2025-06-26

## 2025-07-03 ENCOUNTER — MYC MEDICAL ADVICE (OUTPATIENT)
Dept: FAMILY MEDICINE | Facility: CLINIC | Age: 55
End: 2025-07-03
Payer: COMMERCIAL

## 2025-07-03 DIAGNOSIS — G43.109 MIGRAINE WITH AURA AND WITHOUT STATUS MIGRAINOSUS, NOT INTRACTABLE: ICD-10-CM

## 2025-07-03 RX ORDER — HYDROCODONE BITARTRATE AND ACETAMINOPHEN 5; 325 MG/1; MG/1
1 TABLET ORAL DAILY PRN
Qty: 10 TABLET | Refills: 0 | Status: SHIPPED | OUTPATIENT
Start: 2025-07-03

## 2025-07-03 NOTE — TELEPHONE ENCOUNTER
Patient has a history of migraines and uses norco to treat migraines.    Med and pharmacy pended.    Estefanía Hernandez RN

## 2025-07-08 ENCOUNTER — ORDERS ONLY (AUTO-RELEASED) (OUTPATIENT)
Dept: FAMILY MEDICINE | Facility: CLINIC | Age: 55
End: 2025-07-08

## 2025-07-08 ENCOUNTER — OFFICE VISIT (OUTPATIENT)
Dept: FAMILY MEDICINE | Facility: CLINIC | Age: 55
End: 2025-07-08
Payer: COMMERCIAL

## 2025-07-08 VITALS
SYSTOLIC BLOOD PRESSURE: 141 MMHG | HEIGHT: 69 IN | WEIGHT: 293 LBS | BODY MASS INDEX: 43.4 KG/M2 | HEART RATE: 97 BPM | RESPIRATION RATE: 20 BRPM | OXYGEN SATURATION: 97 % | DIASTOLIC BLOOD PRESSURE: 80 MMHG | TEMPERATURE: 96.5 F

## 2025-07-08 DIAGNOSIS — I10 HYPERTENSION GOAL BP (BLOOD PRESSURE) < 140/90: ICD-10-CM

## 2025-07-08 DIAGNOSIS — E66.01 MORBID OBESITY (H): ICD-10-CM

## 2025-07-08 DIAGNOSIS — Z12.11 SCREEN FOR COLON CANCER: ICD-10-CM

## 2025-07-08 DIAGNOSIS — E78.5 HYPERLIPIDEMIA LDL GOAL <100: ICD-10-CM

## 2025-07-08 DIAGNOSIS — G43.919 INTRACTABLE MIGRAINE WITHOUT STATUS MIGRAINOSUS, UNSPECIFIED MIGRAINE TYPE: ICD-10-CM

## 2025-07-08 DIAGNOSIS — Z12.31 VISIT FOR SCREENING MAMMOGRAM: ICD-10-CM

## 2025-07-08 DIAGNOSIS — E11.9 TYPE 2 DIABETES MELLITUS WITHOUT COMPLICATION, WITHOUT LONG-TERM CURRENT USE OF INSULIN (H): Primary | ICD-10-CM

## 2025-07-08 DIAGNOSIS — F31.9 BIPOLAR AFFECTIVE DISORDER, REMISSION STATUS UNSPECIFIED (H): ICD-10-CM

## 2025-07-08 LAB
EST. AVERAGE GLUCOSE BLD GHB EST-MCNC: 186 MG/DL
HBA1C MFR BLD: 8.1 % (ref 0–5.6)

## 2025-07-08 PROCEDURE — G2211 COMPLEX E/M VISIT ADD ON: HCPCS | Performed by: FAMILY MEDICINE

## 2025-07-08 PROCEDURE — 36415 COLL VENOUS BLD VENIPUNCTURE: CPT | Performed by: FAMILY MEDICINE

## 2025-07-08 PROCEDURE — 3052F HG A1C>EQUAL 8.0%<EQUAL 9.0%: CPT | Performed by: FAMILY MEDICINE

## 2025-07-08 PROCEDURE — 83036 HEMOGLOBIN GLYCOSYLATED A1C: CPT | Performed by: FAMILY MEDICINE

## 2025-07-08 PROCEDURE — 99214 OFFICE O/P EST MOD 30 MIN: CPT | Performed by: FAMILY MEDICINE

## 2025-07-08 PROCEDURE — 3077F SYST BP >= 140 MM HG: CPT | Performed by: FAMILY MEDICINE

## 2025-07-08 PROCEDURE — 3079F DIAST BP 80-89 MM HG: CPT | Performed by: FAMILY MEDICINE

## 2025-07-08 RX ORDER — SIMVASTATIN 20 MG
20 TABLET ORAL AT BEDTIME
Qty: 90 TABLET | Refills: 1 | Status: SHIPPED | OUTPATIENT
Start: 2025-07-08

## 2025-07-08 RX ORDER — METFORMIN HYDROCHLORIDE 500 MG/1
TABLET, EXTENDED RELEASE ORAL
Qty: 360 TABLET | Refills: 1 | Status: SHIPPED | OUTPATIENT
Start: 2025-07-08

## 2025-07-08 RX ORDER — INSULIN GLARGINE 100 [IU]/ML
68 INJECTION, SOLUTION SUBCUTANEOUS EVERY MORNING
Qty: 45 ML | Refills: 1 | Status: SHIPPED | OUTPATIENT
Start: 2025-07-08

## 2025-07-08 RX ORDER — METOPROLOL TARTRATE 100 MG/1
TABLET ORAL
Qty: 270 TABLET | Refills: 1 | Status: SHIPPED | OUTPATIENT
Start: 2025-07-08

## 2025-07-08 RX ORDER — LOSARTAN POTASSIUM AND HYDROCHLOROTHIAZIDE 25; 100 MG/1; MG/1
1 TABLET ORAL DAILY
Qty: 90 TABLET | Refills: 0 | Status: SHIPPED | OUTPATIENT
Start: 2025-07-08

## 2025-07-08 ASSESSMENT — PATIENT HEALTH QUESTIONNAIRE - PHQ9
SUM OF ALL RESPONSES TO PHQ QUESTIONS 1-9: 4
SUM OF ALL RESPONSES TO PHQ QUESTIONS 1-9: 4
10. IF YOU CHECKED OFF ANY PROBLEMS, HOW DIFFICULT HAVE THESE PROBLEMS MADE IT FOR YOU TO DO YOUR WORK, TAKE CARE OF THINGS AT HOME, OR GET ALONG WITH OTHER PEOPLE: SOMEWHAT DIFFICULT

## 2025-07-08 NOTE — PROGRESS NOTES
ASSESSMENT / PLAN:  (E11.9) Type 2 diabetes mellitus without complication, without long-term current use of insulin (H)  (primary encounter diagnosis)  Comment: improving  Plan: insulin glargine (LANTUS SOLOSTAR) 100 UNIT/ML         pen, metFORMIN (GLUCOPHAGE XR) 500 MG 24 hr         tablet        Increase 64 to 68units. Exercise x3/week and start nicotine patch. Recheck in 4 months    The longitudinal plan of care for the diagnosis(es)/condition(s) as documented were addressed during this visit. Due to the added complexity in care, I will continue to support Carissa in the subsequent management and with ongoing continuity of care.    (Z12.31) Visit for screening mammogram  Comment: due  Plan: MA Screen Bilateral w/Beneditc            (Z12.11) Screen for colon cancer  Plan: COLOGUARD([employer])        Patient willing to do colo-guard.     (F31.9) Bipolar affective disorder, remission status unspecified (H)  Comment: stable  Plan: per psysch    (E66.01) Morbid obesity (H)  Comment: needs hepl  Plan: exercise x3/week. Recheck in 4 months      (I10) Hypertension goal BP (blood pressure) < 140/90  Comment: a little high  Plan: losartan-hydrochlorothiazide (HYZAAR) 100-25 MG        tablet, metoprolol tartrate (LOPRESSOR) 100 MG         tablet        Patient will self-monitor. Chest pain or shortness of breath to er. Recheck in 4 months      (G43.919) Intractable migraine without status migrainosus, unspecified migraine type  Comment: stable  Plan: metoprolol tartrate (LOPRESSOR) 100 MG tablet            (E78.5) Hyperlipidemia LDL goal <100  Comment: stabl  Plan: simvastatin (ZOCOR) 20 MG tablet        Recheck in 4 months        Subjective   Carissa is a 55 year old, presenting for the following health issues:  Diabetes    Follow-up dm, htn, high cholesterol, bipolar, migraines, morbid obesity and lumbar radiculopathy   Seen dm ed and started on lantus.   Unable to tolerated omzepic and start on tradjenta in summer- no  "side effects noted.  No blood sugars <50. Not >300.  G7 continous monitor.  No shortness of breath or chest pain. Emotionally doing ok. Sleep - seeing psych.   Needs more exercise. Has elipitcal.  No feet changes. Eye exam last fall -ok.   Smoking - has patches. Outside blood pressure readings ok. No cough. Will use patches.   Willing to do colo-guard. Mammogram.   Grandma with breast cancer in 70's.    Son at camp.         7/8/2025     8:40 AM   Additional Questions   Roomed by Mary   Accompanied by self         7/8/2025     8:40 AM   Patient Reported Additional Medications   Patient reports taking the following new medications n/a     History of Present Illness       Diabetes:   She presents for follow up of diabetes.  She is checking home blood glucose four or more times daily.   She checks blood glucose before meals and after meals.  Blood glucose is sometimes over 200 and never under 70.  When her blood glucose is low, the patient is asymptomatic for confusion, blurred vision, lethargy and reports not feeling dizzy, shaky, or weak.  She is concerned about blood sugar frequently over 200.   She is having weight gain.  The patient has had a diabetic eye exam in the last 12 months. Eye exam performed on [id number]. Location of last eye exam [facility].        She eats 4 or more servings of fruits and vegetables daily.She consumes 0 sweetened beverage(s) daily.She exercises with enough effort to increase her heart rate 9 or less minutes per day.  She exercises with enough effort to increase her heart rate 3 or less days per week.   She is taking medications regularly.              Objective    BP (!) 141/80   Pulse 97   Temp (!) 96.5  F (35.8  C) (Tympanic)   Resp 20   Ht 1.74 m (5' 8.5\")   Wt 135.6 kg (299 lb)   LMP  (LMP Unknown)   SpO2 97%   BMI 44.80 kg/m    Body mass index is 44.8 kg/m .  Physical Exam   GENERAL: alert and no distress  EYES: Eyes grossly normal to inspection, PERRL and conjunctivae " and sclerae normal  NECK: no adenopathy, no asymmetry, masses, or scars  RESP: lungs clear to auscultation - no rales, rhonchi or wheezes  CV: regular rate and rhythm, normal S1 S2, no S3 or S4, no murmur, click or rub, no peripheral edema   ABDOMEN: soft, nontender, no hepatosplenomegaly, no masses and bowel sounds normal  MS: no gross musculoskeletal defects noted, no edema  NEURO: Normal strength and tone, mentation intact and speech normal  NEURO: good sensation bilateral feet.   PSYCH: mentation appears normal, affect normal/bright            Signed Electronically by: Silvino Beach MD

## 2025-07-09 ENCOUNTER — PATIENT OUTREACH (OUTPATIENT)
Dept: CARE COORDINATION | Facility: CLINIC | Age: 55
End: 2025-07-09
Payer: COMMERCIAL

## 2025-07-13 ENCOUNTER — HEALTH MAINTENANCE LETTER (OUTPATIENT)
Age: 55
End: 2025-07-13

## 2025-07-27 DIAGNOSIS — E11.9 TYPE 2 DIABETES MELLITUS WITHOUT COMPLICATION, WITHOUT LONG-TERM CURRENT USE OF INSULIN (H): ICD-10-CM

## 2025-07-28 ENCOUNTER — TRANSFERRED RECORDS (OUTPATIENT)
Dept: HEALTH INFORMATION MANAGEMENT | Facility: CLINIC | Age: 55
End: 2025-07-28
Payer: COMMERCIAL

## 2025-07-28 RX ORDER — ACYCLOVIR 400 MG/1
TABLET ORAL
Qty: 9 EACH | Refills: 2 | Status: SHIPPED | OUTPATIENT
Start: 2025-07-28

## 2025-08-06 ENCOUNTER — MYC MEDICAL ADVICE (OUTPATIENT)
Dept: FAMILY MEDICINE | Facility: CLINIC | Age: 55
End: 2025-08-06
Payer: COMMERCIAL

## 2025-08-06 DIAGNOSIS — G43.109 MIGRAINE WITH AURA AND WITHOUT STATUS MIGRAINOSUS, NOT INTRACTABLE: ICD-10-CM

## 2025-08-06 RX ORDER — HYDROCODONE BITARTRATE AND ACETAMINOPHEN 5; 325 MG/1; MG/1
1 TABLET ORAL DAILY PRN
Qty: 10 TABLET | Refills: 0 | Status: SHIPPED | OUTPATIENT
Start: 2025-08-06

## 2025-08-19 ENCOUNTER — MYC REFILL (OUTPATIENT)
Dept: FAMILY MEDICINE | Facility: CLINIC | Age: 55
End: 2025-08-19
Payer: COMMERCIAL

## 2025-08-19 DIAGNOSIS — E11.9 TYPE 2 DIABETES MELLITUS WITHOUT COMPLICATION, WITHOUT LONG-TERM CURRENT USE OF INSULIN (H): ICD-10-CM

## 2025-08-19 RX ORDER — INSULIN GLARGINE 100 [IU]/ML
68 INJECTION, SOLUTION SUBCUTANEOUS EVERY MORNING
Qty: 45 ML | Refills: 0 | Status: SHIPPED | OUTPATIENT
Start: 2025-08-19

## 2025-08-19 RX ORDER — TIRZEPATIDE 2.5 MG/.5ML
2.5 INJECTION, SOLUTION SUBCUTANEOUS
Qty: 6 ML | Refills: 0 | Status: SHIPPED | OUTPATIENT
Start: 2025-08-19

## 2025-08-24 ENCOUNTER — HEALTH MAINTENANCE LETTER (OUTPATIENT)
Age: 55
End: 2025-08-24